# Patient Record
Sex: MALE | Race: BLACK OR AFRICAN AMERICAN | NOT HISPANIC OR LATINO | URBAN - METROPOLITAN AREA
[De-identification: names, ages, dates, MRNs, and addresses within clinical notes are randomized per-mention and may not be internally consistent; named-entity substitution may affect disease eponyms.]

---

## 2018-11-18 ENCOUNTER — INPATIENT (INPATIENT)
Facility: HOSPITAL | Age: 78
LOS: 4 days | Discharge: NURSING, REHAB OR LONG-TERM CARE | DRG: 244 | End: 2018-11-23
Attending: THORACIC SURGERY (CARDIOTHORACIC VASCULAR SURGERY) | Admitting: THORACIC SURGERY (CARDIOTHORACIC VASCULAR SURGERY)
Payer: MEDICARE

## 2018-11-18 VITALS
OXYGEN SATURATION: 96 % | HEART RATE: 78 BPM | DIASTOLIC BLOOD PRESSURE: 108 MMHG | WEIGHT: 167.55 LBS | RESPIRATION RATE: 19 BRPM | SYSTOLIC BLOOD PRESSURE: 188 MMHG

## 2018-11-18 LAB
ALBUMIN SERPL ELPH-MCNC: 3.9 G/DL — SIGNIFICANT CHANGE UP (ref 3.3–5)
ALP SERPL-CCNC: SIGNIFICANT CHANGE UP U/L (ref 40–120)
ALT FLD-CCNC: SIGNIFICANT CHANGE UP U/L (ref 10–45)
ANION GAP SERPL CALC-SCNC: 13 MMOL/L — SIGNIFICANT CHANGE UP (ref 5–17)
APPEARANCE UR: ABNORMAL
APTT BLD: 33.2 SEC — SIGNIFICANT CHANGE UP (ref 27.5–36.3)
AST SERPL-CCNC: SIGNIFICANT CHANGE UP U/L (ref 10–40)
BASOPHILS NFR BLD AUTO: 0.2 % — SIGNIFICANT CHANGE UP (ref 0–2)
BILIRUB SERPL-MCNC: 1.5 MG/DL — HIGH (ref 0.2–1.2)
BILIRUB UR-MCNC: NEGATIVE — SIGNIFICANT CHANGE UP
BLD GP AB SCN SERPL QL: NEGATIVE — SIGNIFICANT CHANGE UP
BUN SERPL-MCNC: 15 MG/DL — SIGNIFICANT CHANGE UP (ref 7–23)
CALCIUM SERPL-MCNC: 9.8 MG/DL — SIGNIFICANT CHANGE UP (ref 8.4–10.5)
CHLORIDE SERPL-SCNC: 102 MMOL/L — SIGNIFICANT CHANGE UP (ref 96–108)
CK MB CFR SERPL CALC: 3.6 NG/ML — SIGNIFICANT CHANGE UP (ref 0–6.7)
CK MB CFR SERPL CALC: 4.8 NG/ML — SIGNIFICANT CHANGE UP (ref 0–6.7)
CK SERPL-CCNC: SIGNIFICANT CHANGE UP U/L (ref 30–200)
CK SERPL-CCNC: SIGNIFICANT CHANGE UP U/L (ref 30–200)
CO2 SERPL-SCNC: 24 MMOL/L — SIGNIFICANT CHANGE UP (ref 22–31)
COLOR SPEC: YELLOW — SIGNIFICANT CHANGE UP
CREAT SERPL-MCNC: 1.55 MG/DL — HIGH (ref 0.5–1.3)
DIFF PNL FLD: ABNORMAL
EOSINOPHIL NFR BLD AUTO: 0.2 % — SIGNIFICANT CHANGE UP (ref 0–6)
GLUCOSE BLDC GLUCOMTR-MCNC: 88 MG/DL — SIGNIFICANT CHANGE UP (ref 70–99)
GLUCOSE SERPL-MCNC: 125 MG/DL — HIGH (ref 70–99)
GLUCOSE UR QL: NEGATIVE — SIGNIFICANT CHANGE UP
HBA1C BLD-MCNC: 5.3 % — SIGNIFICANT CHANGE UP (ref 4–5.6)
HCT VFR BLD CALC: 42 % — SIGNIFICANT CHANGE UP (ref 39–50)
HGB BLD-MCNC: 13.7 G/DL — SIGNIFICANT CHANGE UP (ref 13–17)
INR BLD: 1.21 — HIGH (ref 0.88–1.16)
KETONES UR-MCNC: NEGATIVE — SIGNIFICANT CHANGE UP
LEUKOCYTE ESTERASE UR-ACNC: NEGATIVE — SIGNIFICANT CHANGE UP
LYMPHOCYTES # BLD AUTO: 10.9 % — LOW (ref 13–44)
MCHC RBC-ENTMCNC: 30.8 PG — SIGNIFICANT CHANGE UP (ref 27–34)
MCHC RBC-ENTMCNC: 32.6 G/DL — SIGNIFICANT CHANGE UP (ref 32–36)
MCV RBC AUTO: 94.4 FL — SIGNIFICANT CHANGE UP (ref 80–100)
MONOCYTES NFR BLD AUTO: 6.9 % — SIGNIFICANT CHANGE UP (ref 2–14)
NEUTROPHILS NFR BLD AUTO: 81.8 % — HIGH (ref 43–77)
NITRITE UR-MCNC: NEGATIVE — SIGNIFICANT CHANGE UP
NT-PROBNP SERPL-SCNC: 596 PG/ML — HIGH (ref 0–300)
PH UR: 6.5 — SIGNIFICANT CHANGE UP (ref 5–8)
PLATELET # BLD AUTO: 172 K/UL — SIGNIFICANT CHANGE UP (ref 150–400)
POTASSIUM SERPL-MCNC: SIGNIFICANT CHANGE UP MMOL/L (ref 3.5–5.3)
POTASSIUM SERPL-SCNC: SIGNIFICANT CHANGE UP MMOL/L (ref 3.5–5.3)
PROT SERPL-MCNC: 7.5 G/DL — SIGNIFICANT CHANGE UP (ref 6–8.3)
PROT UR-MCNC: 100 MG/DL
PROTHROM AB SERPL-ACNC: 13.8 SEC — HIGH (ref 10–12.9)
RBC # BLD: 4.45 M/UL — SIGNIFICANT CHANGE UP (ref 4.2–5.8)
RBC # FLD: 13.4 % — SIGNIFICANT CHANGE UP (ref 10.3–16.9)
RH IG SCN BLD-IMP: POSITIVE — SIGNIFICANT CHANGE UP
RH IG SCN BLD-IMP: POSITIVE — SIGNIFICANT CHANGE UP
SODIUM SERPL-SCNC: 139 MMOL/L — SIGNIFICANT CHANGE UP (ref 135–145)
SP GR SPEC: 1.01 — SIGNIFICANT CHANGE UP (ref 1–1.03)
TROPONIN T SERPL-MCNC: <0.01 NG/ML — SIGNIFICANT CHANGE UP (ref 0–0.01)
TROPONIN T SERPL-MCNC: <0.01 NG/ML — SIGNIFICANT CHANGE UP (ref 0–0.01)
TSH SERPL-MCNC: 0.56 UIU/ML — SIGNIFICANT CHANGE UP (ref 0.35–4.94)
UROBILINOGEN FLD QL: 0.2 E.U./DL — SIGNIFICANT CHANGE UP
WBC # BLD: 13.2 K/UL — HIGH (ref 3.8–10.5)
WBC # FLD AUTO: 13.2 K/UL — HIGH (ref 3.8–10.5)

## 2018-11-18 PROCEDURE — 71045 X-RAY EXAM CHEST 1 VIEW: CPT | Mod: 26

## 2018-11-18 PROCEDURE — 99222 1ST HOSP IP/OBS MODERATE 55: CPT

## 2018-11-18 PROCEDURE — 93010 ELECTROCARDIOGRAM REPORT: CPT

## 2018-11-18 PROCEDURE — 99233 SBSQ HOSP IP/OBS HIGH 50: CPT

## 2018-11-18 RX ORDER — HEPARIN SODIUM 5000 [USP'U]/ML
5000 INJECTION INTRAVENOUS; SUBCUTANEOUS EVERY 8 HOURS
Qty: 0 | Refills: 0 | Status: DISCONTINUED | OUTPATIENT
Start: 2018-11-18 | End: 2018-11-19

## 2018-11-18 RX ORDER — SODIUM CHLORIDE 9 MG/ML
1000 INJECTION, SOLUTION INTRAVENOUS
Qty: 0 | Refills: 0 | Status: DISCONTINUED | OUTPATIENT
Start: 2018-11-18 | End: 2018-11-23

## 2018-11-18 RX ORDER — DEXTROSE 50 % IN WATER 50 %
25 SYRINGE (ML) INTRAVENOUS ONCE
Qty: 0 | Refills: 0 | Status: DISCONTINUED | OUTPATIENT
Start: 2018-11-18 | End: 2018-11-23

## 2018-11-18 RX ORDER — LABETALOL HCL 100 MG
10 TABLET ORAL ONCE
Qty: 0 | Refills: 0 | Status: COMPLETED | OUTPATIENT
Start: 2018-11-18 | End: 2018-11-18

## 2018-11-18 RX ORDER — DEXTROSE 50 % IN WATER 50 %
12.5 SYRINGE (ML) INTRAVENOUS ONCE
Qty: 0 | Refills: 0 | Status: DISCONTINUED | OUTPATIENT
Start: 2018-11-18 | End: 2018-11-23

## 2018-11-18 RX ORDER — INSULIN LISPRO 100/ML
VIAL (ML) SUBCUTANEOUS
Qty: 0 | Refills: 0 | Status: DISCONTINUED | OUTPATIENT
Start: 2018-11-18 | End: 2018-11-23

## 2018-11-18 RX ORDER — DEXTROSE 50 % IN WATER 50 %
15 SYRINGE (ML) INTRAVENOUS ONCE
Qty: 0 | Refills: 0 | Status: DISCONTINUED | OUTPATIENT
Start: 2018-11-18 | End: 2018-11-23

## 2018-11-18 RX ORDER — PANTOPRAZOLE SODIUM 20 MG/1
40 TABLET, DELAYED RELEASE ORAL
Qty: 0 | Refills: 0 | Status: DISCONTINUED | OUTPATIENT
Start: 2018-11-18 | End: 2018-11-23

## 2018-11-18 RX ORDER — GLUCAGON INJECTION, SOLUTION 0.5 MG/.1ML
1 INJECTION, SOLUTION SUBCUTANEOUS ONCE
Qty: 0 | Refills: 0 | Status: DISCONTINUED | OUTPATIENT
Start: 2018-11-18 | End: 2018-11-23

## 2018-11-18 RX ORDER — SODIUM CHLORIDE 9 MG/ML
3 INJECTION INTRAMUSCULAR; INTRAVENOUS; SUBCUTANEOUS EVERY 8 HOURS
Qty: 0 | Refills: 0 | Status: DISCONTINUED | OUTPATIENT
Start: 2018-11-18 | End: 2018-11-21

## 2018-11-18 RX ORDER — AMLODIPINE BESYLATE 2.5 MG/1
10 TABLET ORAL DAILY
Qty: 0 | Refills: 0 | Status: DISCONTINUED | OUTPATIENT
Start: 2018-11-18 | End: 2018-11-22

## 2018-11-18 RX ADMIN — Medication 10 MILLIGRAM(S): at 14:23

## 2018-11-18 RX ADMIN — AMLODIPINE BESYLATE 10 MILLIGRAM(S): 2.5 TABLET ORAL at 14:23

## 2018-11-18 RX ADMIN — HEPARIN SODIUM 5000 UNIT(S): 5000 INJECTION INTRAVENOUS; SUBCUTANEOUS at 22:51

## 2018-11-18 RX ADMIN — SODIUM CHLORIDE 3 MILLILITER(S): 9 INJECTION INTRAMUSCULAR; INTRAVENOUS; SUBCUTANEOUS at 14:19

## 2018-11-18 RX ADMIN — Medication 10 MILLIGRAM(S): at 14:20

## 2018-11-18 RX ADMIN — SODIUM CHLORIDE 3 MILLILITER(S): 9 INJECTION INTRAMUSCULAR; INTRAVENOUS; SUBCUTANEOUS at 22:44

## 2018-11-18 NOTE — PATIENT PROFILE ADULT - VISION (WITH CORRECTIVE LENSES IF THE PATIENT USUALLY WEARS THEM):
Normal vision: sees adequately in most situations; can see medication labels, newsprint
requires assistance/unsteady

## 2018-11-18 NOTE — H&P ADULT - HISTORY OF PRESENT ILLNESS
78 year old male with PMHx significant for hypertension, who presented to the ED after having an unwitnessed syncopal episode. He was transferred from [facility] for management of Descending thoracic aortic aneurysm.      Pt reports he was in his usual state of health until early this morning when he felt dizzy getting back into bed. He had a syncopal episode per report (although patient states he did no lose consciousness), and was found down by his wife.  EMS was called and patient was found to be bradycardic when EMS arrived, given Atropine X1.  Workup in the ED showed nl labs with exception of elevated Cr.  CXR concerning for widened mediastinum which prompted CTA of chest.  CT head to eval for CVA. CTA showed fusiform aneurysmal dilation of descending aorta measuring 5.5cm with what looks like extensive mural thrombus, soft plaque vs thrombosed false lumen.  CT head reviewed no acute process noted.    Per conversation with patient, he states he had a similar episode approximately 4-5 years ago, however states he has never been told he has an aortic aneurysm. Patient states he is an ex-smoker (smoked for 10 years), however quit 12 years ago. He denies any alcohol or illicit drug use. 78 year old male with PMHx significant for hypertension, who presented to the ED after having an unwitnessed syncopal episode. He was transferred from [facility] for management of Descending thoracic aortic aneurysm.      Pt reports he was in his usual state of health until early this morning when he felt dizzy getting back into bed. He had a syncopal episode per report (although patient states he did no lose consciousness), and was found down by his wife.  EMS was called and patient was found to be bradycardic when EMS arrived, given Atropine X1.  Workup in the ED showed nl labs with exception of elevated Cr.  CXR concerning for widened mediastinum which prompted CTA of chest.  CT head to eval for CVA. CTA showed fusiform aneurysmal dilation of descending aorta measuring 5.5cm with what looks like extensive mural thrombus, soft plaque vs thrombosed false lumen.  CT head reviewed no acute process noted.    Per conversation with patient, he states he had a similar episode approximately 4-5 years ago, however states he has never been told he has an aortic aneurysm. Patient states he is an ex-smoker (smoked for 10 years), however quit 12 years ago. He denies any alcohol or illicit drug use. 78 year old male with PMHx significant for hypertension, who presented to the ED after having an unwitnessed syncopal episode. He was transferred from [facility] for management of Descending thoracic aortic aneurysm.      Pt reports he was in his usual state of health until early this morning when he felt dizzy getting back into bed. He had a syncopal episode per report (although patient states he did no lose consciousness), and was found down by his wife.  EMS was called and patient was found to be bradycardic when EMS arrived, given Atropine X1.  Workup in the [facility] ED showed nl labs with exception of elevated Cr.  CXR concerning for widened mediastinum which prompted CTA of chest.  CT head to eval for CVA. CTA showed fusiform aneurysmal dilation of descending aorta measuring 5.5cm with what looks like extensive mural thrombus, soft plaque vs thrombosed false lumen.  CT head reviewed no acute process noted. He was started on and Esmolol drip and transferred to [facility].     Per conversation with patient, he states he had a similar episode approximately 4-5 years ago, however states he has never been told he has an aortic aneurysm. Patient states he is an ex-smoker (smoked for 10 years), however quit 12 years ago. He denies any alcohol or illicit drug use. 78 year old male with PMHx significant for hypertension, who presented to the ED after having an unwitnessed syncopal episode. He was transferred from [facility] for management of Descending thoracic aortic aneurysm.      Pt reports he was in his usual state of health until early this morning when he felt dizzy getting back into bed. He had a syncopal episode per report (although patient states he did no lose consciousness), and was found down by his wife.  EMS was called and patient was found to be bradycardic when EMS arrived, given Atropine X1.  Workup in the [facility] ED showed nl labs with exception of elevated Cr.  CXR concerning for widened mediastinum which prompted CTA of chest.  CT head to eval for CVA. CTA showed fusiform aneurysmal dilation of descending aorta measuring 5.5cm with what looks like extensive mural thrombus, soft plaque vs thrombosed false lumen.  CT head reviewed no acute process noted. He was started on and Esmolol drip and transferred to [facility] under the care of Dr. Garcia.     Per conversation with patient, he states he had a similar episode approximately 4-5 years ago, however states he has never been told he has an aortic aneurysm. He denies chest pains, palpitations, shortness of breath, orthopnea, paroxysmal nocturnal dyspnea, or lower extremity edema.

## 2018-11-18 NOTE — H&P ADULT - NSHPLABSRESULTS_GEN_ALL_CORE
78 year old male with PMHx significant for hypertension, who presented to the ED after having an unwitnessed syncopal episode. He was transferred from [facility] for management of Descending thoracic aortic aneurysm.  Pt reports he was in his usual state of health until early this morning when he felt dizzy getting back into bed. He had a syncopal episode per report (although patient states he did no lose consciousness), and was found down by his wife.  EMS was called and patient was found to be bradycardic when EMS arrived, given Atropine X1.  Workup in the [facility] ED showed nl labs with exception of elevated Cr.  CXR concerning for widened mediastinum which prompted CTA of chest.  CT head to eval for CVA. CTA showed fusiform aneurysmal dilation of descending aorta measuring 5.5cm with what looks like extensive mural thrombus, soft plaque vs thrombosed false lumen.  CT head reviewed no acute process noted.    Neuro:   - No pain issues    Cardiac:  -Sinus Jeffrey, SBP 150s. Labetolol given x 1. Norvasc 10 mg daily.   -EP consult needed to evaluate jeffrey cardia, syncope.   -TTE tomorrow.   -Trending Cardiac enzymes, negative.     Vasc:  -Carotid ultrasound pending as part of preop work up    Pulm:   -On RA, no issues.   -PFTs PENDING.     GI:  -PO diet RISS.     Heme:  -Heparin SQ for DVT ppx.     Dispo:  -Dr. Garcia to evaluate for surgical correction of Desc Ao Aneurysm.

## 2018-11-18 NOTE — H&P ADULT - NSHPSOCIALHISTORY_GEN_ALL_CORE
Patient states he is an ex-smoker (smoked for 10 years), however quit 12 years ago. He denies any alcohol or illicit drug use.   Lives at home with his wife and is independent in activities of daily living.

## 2018-11-18 NOTE — PROGRESS NOTE ADULT - SUBJECTIVE AND OBJECTIVE BOX
77yo transferred from [facility] for management of Descending thoracic aortic aneurysm.      Pt reports he had a syncopal episode early AM and was found down by his wife.  EMS was called, per report pt was bradycardic when EMS arrived, given Atropine X1.  Workup in the ED showed nl labs with exception of elevated Cr.  CXR concerning for widened mediastinum which prompted CTA of chest.  CT head to eval for CVA    CTA showed fusiform aneurysmal dilation of descending aorta measuring 5.5cm with what looks like extensive mural thrombus, soft plaque vs thrombosed false lumen.    CT head reviewed no acute process noted. 77yo transferred from [facility] for management of Descending thoracic aortic aneurysm.      Pt reports he he felt dizzy, vomitted and slid to the floor.  His wife found him on the floor and called EMS,  per report pt was bradycardic when EMS arrived, given Atropine X1.  Workup in the ED showed nl labs with exception of elevated Cr.  CXR concerning for widened mediastinum which prompted CTA of chest.  CT head to eval for CVA    CTA showed fusiform aneurysmal dilation of descending aorta measuring 5.5cm with what looks like extensive mural thrombus, soft plaque vs thrombosed false lumen.    CT head reviewed no acute process noted.     Pt transferred to [facility] for further evaluation of aortic aneurysm     On arrival here, he was non-toxic appearing in no acute distress.  Denies CP, SOB, nausea.  Denies weakness but per wife pt has chronic RLE weakness which limits his mobility.     VS showed NSR 60s, Hypertension with SBP 180s. Pt arrived on esmolol drip which was stopped and given IV push of labetolol and home dose of norvasc.       PMH :  ANEURYSM  Hypertension, unspecified type    ROS no complaints, states he feels normal   All other systems reviewed and negative.    ICU Vital Signs Last 24 Hrs  T(C): 36.6 (11-18-18 @ 17:01), Max: 37.1 (11-18-18 @ 15:00)  T(F): 97.8 (11-18-18 @ 17:01), Max: 98.8 (11-18-18 @ 15:00)  HR: 62 (11-18-18 @ 17:01) (56 - 78)  BP: 128/95 (11-18-18 @ 17:01) (128/95 - 188/127)  BP(mean): 108 (11-18-18 @ 17:01) (108 - 148)  RR: 18 (11-18-18 @ 17:01) (12 - 19)  SpO2: 98% (11-18-18 @ 17:01) (94% - 98%)    I&O's Summary    18 Nov 2018 07:01  -  18 Nov 2018 17:33  --------------------------------------------------------  IN: 120 mL / OUT: 250 mL / NET: -130 mL                 13.7   13.2  )-----------( 172      ( 18 Nov 2018 14:14 )             42.0     18 Nov 2018 14:14    139    |  102    |  15     ----------------------------<  125    see note   |  24     |  1.55     Ca    9.8        18 Nov 2018 14:14    TPro  7.5    /  Alb  3.9    /  TBili  1.5    /  DBili  x      /  AST  see note  /  ALT  see note  /  AlkPhos  see note  18 Nov 2018 14:14    PT/INR - ( 18 Nov 2018 14:14 )   PT: 13.8 sec;   INR: 1.21     PTT - ( 18 Nov 2018 14:14 )  PTT:33.2 sec    MEDICATIONS  (STANDING):  amLODIPine   Tablet 10 milliGRAM(s) Oral daily  heparin  Injectable 5000 Unit(s) SubCutaneous every 8 hours  insulin lispro (HumaLOG) corrective regimen sliding scale   SubCutaneous Before meals and at bedtime      Home Medications:  amLODIPine 5 mg oral tablet: 1 tab(s) orally once a day (18 Nov 2018 16:29)  losartan:  (18 Nov 2018 16:29)    PHYSICAL EXAM:  Gen : no acute distress  Neck: No LAD, No JVD  Respiratory: decreased in the bases  Cardiovascular: S1 and S2, RRR, no M/G/R  Gastrointestinal: BS+, soft, NT/ND  Extremities: No peripheral edema  Vascular: 2+ peripheral pulses  Neurological: A/O x 3, no focal deficits

## 2018-11-18 NOTE — H&P ADULT - NSHPREVIEWOFSYSTEMS_GEN_ALL_CORE
CONSTITUTIONAL:  Denies Fevers / chills, sweats, fatigue, weight loss, weight gain                                      NEURO:  Denies parathesias, seizures, confusion                                                                                EYES:  Denies Blurry vision, discharge, pain, loss of vision                                                                                    ENMT:  Denies Difficulty hearing, vertigo, dysphagia, epistaxis, recent dental work                                       CV:  per HPI                                                                                      RESPIRATORY:  Denies Wheezing, SOB, cough / sputum, hemoptysis                                                                GI:  Denies Nausea, vomiting, diarrhea, constipation, melena, difficulty swallowing                                               : Denies Hematuria, dysuria, urgency, incontinence                                                                                         MUSCULOSKELETAL:  Denies arthritis, joint swelling, muscle weakness                                                             SKIN/BREAST:  Denies rash, itching, masses                                                                                            PSYCH:  Denies depression, anxiety, suicidal ideation                                                                                               HEME/LYMPH:  Denies bruises easily, enlarged lymph nodes, tender lymph nodes                                        ENDOCRINE:  Denies cold intolerance, heat intolerance, polydipsia

## 2018-11-18 NOTE — PROGRESS NOTE ADULT - ASSESSMENT
Problems  1. Syncope/bradycardia   2. descending and and abdominal aortic aneurysm with extensive mural thormbus  3. Hypertension   4. Acute vs chronic CKD   Plan   -- needs workup for syncope, continue to rule out ACS, Echo in AM, EP eval  -- hypertension management  -- CKD - hydrate for CINDY   -- CTA reviewed severely diseased aorta currently asymptomatic, either thrombosed dissection or mural hematoma along descending and abd Aorta.  Dr Garcia will review imaging and discuss surgical options for definitive management and evaluate pt for surgical candidacy.   Discussed and reviewed image result and plan with patient and wife at length using  service.     Critical care time spent 40 min

## 2018-11-18 NOTE — H&P ADULT - NSHPPHYSICALEXAM_GEN_ALL_CORE
Pt will be coming in for HFU appt on 7/26/17. Pt called Dr. Ramón rAce office regarding constipation and urinary issues-- pt was advised to continue to monitor and go to ER with any worsening sx. Constitutional: Lying comfortably, no acute distress    Eyes: PERRL, EOMI    ENMT: Hearing grossly intact, oropharynx benign    Neck: supple, no JVD    Back: symmetric, no CVA tenderness    Respiratory: CTABL    Cardiovascular: RRR, no m/r/g    Gastrointestinal: + BS, soft, non tender    Extremities: warm, no edema    Vascular: 2+ Carotid, radial, femoral, DP pulses bilaterally. PT 1+ pulses bilaterally.     Neurological: A&O x 3, non focal    Skin: no lesions or rashes    Musculoskeletal: 5/5 strength and equal in bilateral upper and lower extremities

## 2018-11-19 DIAGNOSIS — R00.1 BRADYCARDIA, UNSPECIFIED: ICD-10-CM

## 2018-11-19 LAB
ALBUMIN SERPL ELPH-MCNC: 3.7 G/DL — SIGNIFICANT CHANGE UP (ref 3.3–5)
ALBUMIN SERPL ELPH-MCNC: 4 G/DL — SIGNIFICANT CHANGE UP (ref 3.3–5)
ALBUMIN SERPL ELPH-MCNC: 4 G/DL — SIGNIFICANT CHANGE UP (ref 3.3–5)
ALP SERPL-CCNC: 71 U/L — SIGNIFICANT CHANGE UP (ref 40–120)
ALP SERPL-CCNC: 75 U/L — SIGNIFICANT CHANGE UP (ref 40–120)
ALP SERPL-CCNC: 76 U/L — SIGNIFICANT CHANGE UP (ref 40–120)
ALT FLD-CCNC: 11 U/L — SIGNIFICANT CHANGE UP (ref 10–45)
ALT FLD-CCNC: 11 U/L — SIGNIFICANT CHANGE UP (ref 10–45)
ALT FLD-CCNC: 13 U/L — SIGNIFICANT CHANGE UP (ref 10–45)
ANION GAP SERPL CALC-SCNC: 10 MMOL/L — SIGNIFICANT CHANGE UP (ref 5–17)
ANION GAP SERPL CALC-SCNC: 10 MMOL/L — SIGNIFICANT CHANGE UP (ref 5–17)
ANION GAP SERPL CALC-SCNC: 14 MMOL/L — SIGNIFICANT CHANGE UP (ref 5–17)
APTT BLD: 28 SEC — SIGNIFICANT CHANGE UP (ref 27.5–36.3)
APTT BLD: 31.9 SEC — SIGNIFICANT CHANGE UP (ref 27.5–36.3)
APTT BLD: 32.9 SEC — SIGNIFICANT CHANGE UP (ref 27.5–36.3)
AST SERPL-CCNC: 23 U/L — SIGNIFICANT CHANGE UP (ref 10–40)
AST SERPL-CCNC: 27 U/L — SIGNIFICANT CHANGE UP (ref 10–40)
AST SERPL-CCNC: 27 U/L — SIGNIFICANT CHANGE UP (ref 10–40)
BILIRUB SERPL-MCNC: 1.2 MG/DL — SIGNIFICANT CHANGE UP (ref 0.2–1.2)
BILIRUB SERPL-MCNC: 1.3 MG/DL — HIGH (ref 0.2–1.2)
BILIRUB SERPL-MCNC: 1.3 MG/DL — HIGH (ref 0.2–1.2)
BUN SERPL-MCNC: 20 MG/DL — SIGNIFICANT CHANGE UP (ref 7–23)
BUN SERPL-MCNC: 21 MG/DL — SIGNIFICANT CHANGE UP (ref 7–23)
BUN SERPL-MCNC: 21 MG/DL — SIGNIFICANT CHANGE UP (ref 7–23)
CALCIUM SERPL-MCNC: 10 MG/DL — SIGNIFICANT CHANGE UP (ref 8.4–10.5)
CALCIUM SERPL-MCNC: 9.3 MG/DL — SIGNIFICANT CHANGE UP (ref 8.4–10.5)
CALCIUM SERPL-MCNC: 9.7 MG/DL — SIGNIFICANT CHANGE UP (ref 8.4–10.5)
CHLORIDE SERPL-SCNC: 103 MMOL/L — SIGNIFICANT CHANGE UP (ref 96–108)
CHLORIDE SERPL-SCNC: 106 MMOL/L — SIGNIFICANT CHANGE UP (ref 96–108)
CHLORIDE SERPL-SCNC: 107 MMOL/L — SIGNIFICANT CHANGE UP (ref 96–108)
CHOLEST SERPL-MCNC: 151 MG/DL — SIGNIFICANT CHANGE UP (ref 10–199)
CK SERPL-CCNC: 435 U/L — HIGH (ref 30–200)
CK SERPL-CCNC: 443 U/L — HIGH (ref 30–200)
CO2 SERPL-SCNC: 23 MMOL/L — SIGNIFICANT CHANGE UP (ref 22–31)
CO2 SERPL-SCNC: 26 MMOL/L — SIGNIFICANT CHANGE UP (ref 22–31)
CO2 SERPL-SCNC: 26 MMOL/L — SIGNIFICANT CHANGE UP (ref 22–31)
CREAT SERPL-MCNC: 2.19 MG/DL — HIGH (ref 0.5–1.3)
CREAT SERPL-MCNC: 2.25 MG/DL — HIGH (ref 0.5–1.3)
CREAT SERPL-MCNC: 2.34 MG/DL — HIGH (ref 0.5–1.3)
GLUCOSE BLDC GLUCOMTR-MCNC: 104 MG/DL — HIGH (ref 70–99)
GLUCOSE BLDC GLUCOMTR-MCNC: 109 MG/DL — HIGH (ref 70–99)
GLUCOSE BLDC GLUCOMTR-MCNC: 113 MG/DL — HIGH (ref 70–99)
GLUCOSE BLDC GLUCOMTR-MCNC: 137 MG/DL — HIGH (ref 70–99)
GLUCOSE SERPL-MCNC: 103 MG/DL — HIGH (ref 70–99)
GLUCOSE SERPL-MCNC: 115 MG/DL — HIGH (ref 70–99)
GLUCOSE SERPL-MCNC: 131 MG/DL — HIGH (ref 70–99)
HCT VFR BLD CALC: 40.2 % — SIGNIFICANT CHANGE UP (ref 39–50)
HCT VFR BLD CALC: 41.9 % — SIGNIFICANT CHANGE UP (ref 39–50)
HCT VFR BLD CALC: 41.9 % — SIGNIFICANT CHANGE UP (ref 39–50)
HDLC SERPL-MCNC: 51 MG/DL — SIGNIFICANT CHANGE UP
HGB BLD-MCNC: 13.2 G/DL — SIGNIFICANT CHANGE UP (ref 13–17)
HGB BLD-MCNC: 13.7 G/DL — SIGNIFICANT CHANGE UP (ref 13–17)
HGB BLD-MCNC: 13.9 G/DL — SIGNIFICANT CHANGE UP (ref 13–17)
INR BLD: 1.15 — SIGNIFICANT CHANGE UP (ref 0.88–1.16)
INR BLD: 1.17 — HIGH (ref 0.88–1.16)
INR BLD: 1.2 — HIGH (ref 0.88–1.16)
LACTATE SERPL-SCNC: 1 MMOL/L — SIGNIFICANT CHANGE UP (ref 0.5–2)
LACTATE SERPL-SCNC: 2.2 MMOL/L — HIGH (ref 0.5–2)
LIPID PNL WITH DIRECT LDL SERPL: 75 MG/DL — SIGNIFICANT CHANGE UP
MAGNESIUM SERPL-MCNC: 2 MG/DL — SIGNIFICANT CHANGE UP (ref 1.6–2.6)
MAGNESIUM SERPL-MCNC: 2.1 MG/DL — SIGNIFICANT CHANGE UP (ref 1.6–2.6)
MAGNESIUM SERPL-MCNC: 2.2 MG/DL — SIGNIFICANT CHANGE UP (ref 1.6–2.6)
MCHC RBC-ENTMCNC: 30.6 PG — SIGNIFICANT CHANGE UP (ref 27–34)
MCHC RBC-ENTMCNC: 30.6 PG — SIGNIFICANT CHANGE UP (ref 27–34)
MCHC RBC-ENTMCNC: 31.4 PG — SIGNIFICANT CHANGE UP (ref 27–34)
MCHC RBC-ENTMCNC: 32.7 G/DL — SIGNIFICANT CHANGE UP (ref 32–36)
MCHC RBC-ENTMCNC: 32.8 G/DL — SIGNIFICANT CHANGE UP (ref 32–36)
MCHC RBC-ENTMCNC: 33.2 G/DL — SIGNIFICANT CHANGE UP (ref 32–36)
MCV RBC AUTO: 93.3 FL — SIGNIFICANT CHANGE UP (ref 80–100)
MCV RBC AUTO: 93.7 FL — SIGNIFICANT CHANGE UP (ref 80–100)
MCV RBC AUTO: 94.6 FL — SIGNIFICANT CHANGE UP (ref 80–100)
PHOSPHATE SERPL-MCNC: 2.3 MG/DL — LOW (ref 2.5–4.5)
PHOSPHATE SERPL-MCNC: 2.7 MG/DL — SIGNIFICANT CHANGE UP (ref 2.5–4.5)
PHOSPHATE SERPL-MCNC: 3.4 MG/DL — SIGNIFICANT CHANGE UP (ref 2.5–4.5)
PLATELET # BLD AUTO: 151 K/UL — SIGNIFICANT CHANGE UP (ref 150–400)
PLATELET # BLD AUTO: 151 K/UL — SIGNIFICANT CHANGE UP (ref 150–400)
PLATELET # BLD AUTO: 158 K/UL — SIGNIFICANT CHANGE UP (ref 150–400)
POTASSIUM SERPL-MCNC: 3.7 MMOL/L — SIGNIFICANT CHANGE UP (ref 3.5–5.3)
POTASSIUM SERPL-MCNC: 4.2 MMOL/L — SIGNIFICANT CHANGE UP (ref 3.5–5.3)
POTASSIUM SERPL-MCNC: 4.4 MMOL/L — SIGNIFICANT CHANGE UP (ref 3.5–5.3)
POTASSIUM SERPL-SCNC: 3.7 MMOL/L — SIGNIFICANT CHANGE UP (ref 3.5–5.3)
POTASSIUM SERPL-SCNC: 4.2 MMOL/L — SIGNIFICANT CHANGE UP (ref 3.5–5.3)
POTASSIUM SERPL-SCNC: 4.4 MMOL/L — SIGNIFICANT CHANGE UP (ref 3.5–5.3)
PROT SERPL-MCNC: 6.7 G/DL — SIGNIFICANT CHANGE UP (ref 6–8.3)
PROT SERPL-MCNC: 6.9 G/DL — SIGNIFICANT CHANGE UP (ref 6–8.3)
PROT SERPL-MCNC: 7.1 G/DL — SIGNIFICANT CHANGE UP (ref 6–8.3)
PROTHROM AB SERPL-ACNC: 13 SEC — HIGH (ref 10–12.9)
PROTHROM AB SERPL-ACNC: 13.3 SEC — HIGH (ref 10–12.9)
PROTHROM AB SERPL-ACNC: 13.6 SEC — HIGH (ref 10–12.9)
RBC # BLD: 4.31 M/UL — SIGNIFICANT CHANGE UP (ref 4.2–5.8)
RBC # BLD: 4.43 M/UL — SIGNIFICANT CHANGE UP (ref 4.2–5.8)
RBC # BLD: 4.47 M/UL — SIGNIFICANT CHANGE UP (ref 4.2–5.8)
RBC # FLD: 13.2 % — SIGNIFICANT CHANGE UP (ref 10.3–16.9)
RBC # FLD: 13.3 % — SIGNIFICANT CHANGE UP (ref 10.3–16.9)
RBC # FLD: 13.4 % — SIGNIFICANT CHANGE UP (ref 10.3–16.9)
SODIUM SERPL-SCNC: 140 MMOL/L — SIGNIFICANT CHANGE UP (ref 135–145)
SODIUM SERPL-SCNC: 142 MMOL/L — SIGNIFICANT CHANGE UP (ref 135–145)
SODIUM SERPL-SCNC: 143 MMOL/L — SIGNIFICANT CHANGE UP (ref 135–145)
TOTAL CHOLESTEROL/HDL RATIO MEASUREMENT: 3 RATIO — LOW (ref 3.4–9.6)
TRIGL SERPL-MCNC: 124 MG/DL — SIGNIFICANT CHANGE UP (ref 10–149)
TROPONIN T SERPL-MCNC: <0.01 NG/ML — SIGNIFICANT CHANGE UP (ref 0–0.01)
WBC # BLD: 10 K/UL — SIGNIFICANT CHANGE UP (ref 3.8–10.5)
WBC # BLD: 11.1 K/UL — HIGH (ref 3.8–10.5)
WBC # BLD: 11.9 K/UL — HIGH (ref 3.8–10.5)
WBC # FLD AUTO: 10 K/UL — SIGNIFICANT CHANGE UP (ref 3.8–10.5)
WBC # FLD AUTO: 11.1 K/UL — HIGH (ref 3.8–10.5)
WBC # FLD AUTO: 11.9 K/UL — HIGH (ref 3.8–10.5)

## 2018-11-19 PROCEDURE — 94010 BREATHING CAPACITY TEST: CPT | Mod: 26

## 2018-11-19 PROCEDURE — 99291 CRITICAL CARE FIRST HOUR: CPT

## 2018-11-19 PROCEDURE — 99223 1ST HOSP IP/OBS HIGH 75: CPT

## 2018-11-19 PROCEDURE — 93880 EXTRACRANIAL BILAT STUDY: CPT | Mod: 26

## 2018-11-19 PROCEDURE — 99292 CRITICAL CARE ADDL 30 MIN: CPT

## 2018-11-19 PROCEDURE — 36620 INSERTION CATHETER ARTERY: CPT

## 2018-11-19 PROCEDURE — 93306 TTE W/DOPPLER COMPLETE: CPT | Mod: 26

## 2018-11-19 RX ORDER — SODIUM CHLORIDE 9 MG/ML
1000 INJECTION, SOLUTION INTRAVENOUS ONCE
Qty: 0 | Refills: 0 | Status: COMPLETED | OUTPATIENT
Start: 2018-11-19 | End: 2018-11-19

## 2018-11-19 RX ORDER — SODIUM CHLORIDE 9 MG/ML
1000 INJECTION, SOLUTION INTRAVENOUS ONCE
Qty: 0 | Refills: 0 | Status: DISCONTINUED | OUTPATIENT
Start: 2018-11-19 | End: 2018-11-21

## 2018-11-19 RX ORDER — SODIUM BICARBONATE 1 MEQ/ML
0.1 SYRINGE (ML) INTRAVENOUS
Qty: 150 | Refills: 0 | Status: DISCONTINUED | OUTPATIENT
Start: 2018-11-19 | End: 2018-11-19

## 2018-11-19 RX ORDER — SODIUM BICARBONATE 1 MEQ/ML
0.21 SYRINGE (ML) INTRAVENOUS
Qty: 150 | Refills: 0 | Status: DISCONTINUED | OUTPATIENT
Start: 2018-11-19 | End: 2018-11-20

## 2018-11-19 RX ORDER — LABETALOL HCL 100 MG
5 TABLET ORAL EVERY 6 HOURS
Qty: 0 | Refills: 0 | Status: DISCONTINUED | OUTPATIENT
Start: 2018-11-19 | End: 2018-11-19

## 2018-11-19 RX ORDER — LABETALOL HCL 100 MG
200 TABLET ORAL EVERY 6 HOURS
Qty: 0 | Refills: 0 | Status: DISCONTINUED | OUTPATIENT
Start: 2018-11-19 | End: 2018-11-20

## 2018-11-19 RX ORDER — LABETALOL HCL 100 MG
5 TABLET ORAL EVERY 4 HOURS
Qty: 0 | Refills: 0 | Status: DISCONTINUED | OUTPATIENT
Start: 2018-11-19 | End: 2018-11-20

## 2018-11-19 RX ADMIN — Medication 100 MEQ/KG/HR: at 15:32

## 2018-11-19 RX ADMIN — SODIUM CHLORIDE 1000 MILLILITER(S): 9 INJECTION, SOLUTION INTRAVENOUS at 13:39

## 2018-11-19 RX ADMIN — SODIUM CHLORIDE 3 MILLILITER(S): 9 INJECTION INTRAMUSCULAR; INTRAVENOUS; SUBCUTANEOUS at 14:23

## 2018-11-19 RX ADMIN — Medication 200 MILLIGRAM(S): at 11:45

## 2018-11-19 RX ADMIN — HEPARIN SODIUM 5000 UNIT(S): 5000 INJECTION INTRAVENOUS; SUBCUTANEOUS at 06:22

## 2018-11-19 RX ADMIN — Medication 200 MILLIGRAM(S): at 23:54

## 2018-11-19 RX ADMIN — AMLODIPINE BESYLATE 10 MILLIGRAM(S): 2.5 TABLET ORAL at 06:21

## 2018-11-19 RX ADMIN — Medication 200 MILLIGRAM(S): at 17:26

## 2018-11-19 RX ADMIN — Medication 5 MILLIGRAM(S): at 15:06

## 2018-11-19 RX ADMIN — Medication 100 MEQ/KG/HR: at 22:14

## 2018-11-19 RX ADMIN — PANTOPRAZOLE SODIUM 40 MILLIGRAM(S): 20 TABLET, DELAYED RELEASE ORAL at 06:21

## 2018-11-19 RX ADMIN — SODIUM CHLORIDE 3 MILLILITER(S): 9 INJECTION INTRAMUSCULAR; INTRAVENOUS; SUBCUTANEOUS at 05:54

## 2018-11-19 RX ADMIN — SODIUM CHLORIDE 3 MILLILITER(S): 9 INJECTION INTRAMUSCULAR; INTRAVENOUS; SUBCUTANEOUS at 22:10

## 2018-11-19 NOTE — PROGRESS NOTE ADULT - SUBJECTIVE AND OBJECTIVE BOX
CTICU  CRITICAL  CARE  attending     Hand off received 					   Pertinent clinical, laboratory, radiographic, hemodynamic, echocardiographic, respiratory data, microbiologic data and chart were reviewed and analyzed frequently throughout the course of the day and night  Patient seen and examined with CTS/ SH attending at bedside  Pt is a 78y , Male, HEALTH ISSUES - PROBLEM Dx:      , FAMILY HISTORY:  No pertinent family history in first degree relatives  PAST MEDICAL & SURGICAL HISTORY:  Hypertension, unspecified type  No significant past surgical history    Patient is a 78y old  Male who presents with a chief complaint of syncope/ fusiform aneurysmal dilation of descending aorta (2018 15:05)      14 system review was unremarkable  acute changes include acute respiratory failure  Vital signs, hemodynamic and respiratory parameters were reviewed from the bedside nursing flowsheet.  ICU Vital Signs Last 24 Hrs  T(C): 36.5 (2018 05:00), Max: 37.1 (2018 15:00)  T(F): 97.7 (2018 05:00), Max: 98.8 (2018 15:00)  HR: 60 (2018 08:00) (55 - 78)  BP: 153/97 (2018 08:00) (120/80 - 188/127)  BP(mean): 116 (2018 08:00) (88 - 148)  ABP: --  ABP(mean): --  RR: 14 (2018 08:00) (12 - 19)  SpO2: 94% (2018 04:00) (92% - 99%)    Adult Advanced Hemodynamics Last 24 Hrs  CVP(mm Hg): --  CVP(cm H2O): --  CO: --  CI: --  PA: --  PA(mean): --  PCWP: --  SVR: --  SVRI: --  PVR: --  PVRI: --,     Intake and output was reviewed and the fluid balance was calculated  Daily     Daily Weight in k (2018 13:50)  I&O's Summary    2018 07:01  -  2018 07:00  --------------------------------------------------------  IN: 220 mL / OUT: 800 mL / NET: -580 mL    2018 07:01  -  2018 09:06  --------------------------------------------------------  IN: 1000 mL / OUT: 0 mL / NET: 1000 mL        All lines and drain sites were assessed  [facility]      POCT Blood Glucose.: 109 mg/dL (2018 06:18)    No acute change in mental status  Auscultation of the chest reveals equal bs  Abdomen is soft  Extremities are warm and well perfused  Wounds appear clean and unremarkable  Antibiotics are periop    labs  CBC Full  -  ( 2018 03:11 )  WBC Count : 11.9 K/uL  Hemoglobin : 13.7 g/dL  Hematocrit : 41.9 %  Platelet Count - Automated : 151 K/uL  Mean Cell Volume : 93.7 fL  Mean Cell Hemoglobin : 30.6 pg  Mean Cell Hemoglobin Concentration : 32.7 g/dL  Auto Neutrophil # : x  Auto Lymphocyte # : x  Auto Monocyte # : x  Auto Eosinophil # : x  Auto Basophil # : x  Auto Neutrophil % : x  Auto Lymphocyte % : x  Auto Monocyte % : x  Auto Eosinophil % : x  Auto Basophil % : x    11-19    140  |  103  |  21  ----------------------------<  115<H>  4.4   |  23  |  2.25<H>    Ca    10.0      2018 03:11  Phos  3.4       Mg     2.1         TPro  7.1  /  Alb  4.0  /  TBili  1.3<H>  /  DBili  x   /  AST  27  /  ALT  11  /  AlkPhos  76  11-    PT/INR - ( 2018 03:11 )   PT: 13.6 sec;   INR: 1.20          PTT - ( 2018 03:11 )  PTT:31.9 sec  The current medications were reviewed   MEDICATIONS  (STANDING):  amLODIPine   Tablet 10 milliGRAM(s) Oral daily  dextrose 5%. 1000 milliLiter(s) (50 mL/Hr) IV Continuous <Continuous>  dextrose 50% Injectable 12.5 Gram(s) IV Push once  dextrose 50% Injectable 25 Gram(s) IV Push once  dextrose 50% Injectable 25 Gram(s) IV Push once  heparin  Injectable 5000 Unit(s) SubCutaneous every 8 hours  insulin lispro (HumaLOG) corrective regimen sliding scale   SubCutaneous Before meals and at bedtime  pantoprazole    Tablet 40 milliGRAM(s) Oral before breakfast  sodium bicarbonate  Infusion 0.103 mEq/kG/Hr (50 mL/Hr) IV Continuous <Continuous>  sodium chloride 0.9% lock flush 3 milliLiter(s) IV Push every 8 hours    MEDICATIONS  (PRN):  dextrose 40% Gel 15 Gram(s) Oral once PRN Blood Glucose LESS THAN 70 milliGRAM(s)/deciliter  glucagon  Injectable 1 milliGRAM(s) IntraMuscular once PRN Glucose LESS THAN 70 milligrams/deciliter       PROBLEM LIST/ ASSESSMENT:  HEALTH ISSUES - PROBLEM Dx:      ,   Patient is a 78y old  Male who presents with a chief complaint of syncope/ fusiform aneurysmal dilation of descending aorta (2018 15:05)        My plan includes :  close hemodynamic, ventilatory and drain monitoring and management per post op routine  fu renal chem  Monitor for arrhythmias and monitor parameters for organ perfusion  monitor neurologic status  Head of the bed should remain elevated to 45 deg .   chest PT and IS will be encouraged  monitor adequacy of oxygenation and ventilation and attempt to wean oxygen  Nutritional goals will be met using po eventually , ensure adequate caloric intake and montior the same  Stress ulcer and VTE prophylaxis will be achieved    Glycemic control is satisfactory  Electrolytes have been repleted as necessary and wound care has been carried out. Pain control has been achieved.   agressive physical therapy and early mobility and ambulation goals will be met   The family was updated about the course and plan  CRITICAL CARE TIME SPENT in evaluation and management, reassessments, review and interpretation of labs and x-rays, ventilator and hemodynamic management, formulating a plan and coordinating care: ___90____ MIN.  Time does not include procedural time.  CTICU ATTENDING     					    Harley Vazquez MD

## 2018-11-19 NOTE — CONSULT NOTE ADULT - SUBJECTIVE AND OBJECTIVE BOX
HISTORY OF PRESENT ILLNESS: 78 year old male with PMHx significant for hypertension, who presented to the ED after having an unwitnessed syncopal episode. He was transferred from [facility] for management of Descending thoracic aortic aneurysm.      Pt reports he was in his usual state of health until early Sunday morning when he felt dizzy getting back into bed. He had a syncopal episode per report (although patient states he did not lose consciousness), and was found down by his wife.  EMS was called and patient was found to be bradycardic, he was given Atropine X1 (strips not available).  Workup in the [facility] ED showed nl labs with exception of elevated Cr.  CXR concerning for widened mediastinum which prompted CTA of chest.  CT head to [facility] for CVA. CTA showed fusiform aneurysmal dilation of descending aorta measuring 5.5cm with what looks like extensive mural thrombus, soft plaque vs thrombosed false lumen.  CT head reviewed no acute process noted. He was started on Esmolol drip and transferred to [facility] under the care of Dr. Garcia.     Per conversation with patient, he states that he was sitting down and suddenly became dizzy. The dizziness subsided after lying down, however, his wife insisted he go to the hospital. He denies loc. He had a similar episode 5 years ago while brushing his teeth. He felt dizzy, sat on the toilet, dizziness persisted, he decided to lay down on the floor, then felt better. Again denies loc. He denies associated chest pains, palpitations, shortness of breath, orthopnea, paroxysmal nocturnal dyspnea, or lower extremity edema.     EP has been consulted for bradycardia to determine if PPM implant is appropriate.     PAST MEDICAL & SURGICAL HISTORY:  Hypertension, unspecified type  No significant past surgical history    Allergies  No Known Allergies    MEDICATIONS:  amLODIPine   Tablet 10 milliGRAM(s) Oral daily  labetalol 200 milliGRAM(s) Oral every 6 hours  labetalol Injectable 5 milliGRAM(s) IV Push every 4 hours PRN  pantoprazole  Tablet 40 milliGRAM(s) Oral before breakfast  glucagon  Injectable 1 milliGRAM(s) IntraMuscular once PRN  insulin lispro (HumaLOG) corrective regimen sliding scale   SubCutaneous Before meals and at bedtime  dextrose 5%. 1000 milliLiter(s) IV Continuous <Continuous>  sodium bicarbonate  Infusion 0.103 mEq/kG/Hr IV Continuous <Continuous>  sodium chloride 0.9% lock flush 3 milliLiter(s) IV Push every 8 hours    FAMILY HISTORY:  No pertinent family history in first degree relatives    SOCIAL HISTORY:    [X] Non-smoker  [ ] Smoker  [ ] Alcohol    REVIEW OF SYSTEMS:    CONSTITUTIONAL: No fever, weight loss, or fatigue  EYES: No eye pain, visual disturbances, or discharge  ENMT:  No difficulty hearing, tinnitus, vertigo; No sinus or throat pain  NECK: No pain or stiffness  BREASTS: No pain, masses, or nipple discharge  RESPIRATORY: No cough, wheezing, chills or hemoptysis; No Shortness of Breath  CARDIOVASCULAR: No chest pain, palpitations, dizziness, or leg swelling  GASTROINTESTINAL: No abdominal or epigastric pain. No nausea, vomiting, or hematemesis; No diarrhea or constipation. No melena or hematochezia.  GENITOURINARY: No dysuria, frequency, hematuria, or incontinence  NEUROLOGICAL: No headaches, memory loss, loss of strength, numbness, or tremors  SKIN: No itching, burning, rashes, or lesions   LYMPH Nodes: No enlarged glands  ENDOCRINE: No heat or cold intolerance; No hair loss  MUSCULOSKELETAL: No joint pain or swelling; No muscle, back, or extremity pain  PSYCHIATRIC: No depression, anxiety, mood swings, or difficulty sleeping  HEME/LYMPH: No easy bruising, or bleeding gums  ALLERY AND IMMUNOLOGIC: No hives or eczema	    [X] All others negative	  [ ] Unable to obtain    PHYSICAL EXAM:  T(C): 36.7 (11-19-18 @ 10:00), Max: 37.1 (11-18-18 @ 15:00)  HR: 74 (11-19-18 @ 11:00) (55 - 80)  BP: 155/94 (11-19-18 @ 11:00) (120/80 - 188/127)  RR: 24 (11-19-18 @ 11:00) (12 - 24)  SpO2: 97% (11-19-18 @ 11:00) (92% - 99%)    I&O's Summary    18 Nov 2018 07:01  -  19 Nov 2018 07:00  --------------------------------------------------------  IN: 220 mL / OUT: 800 mL / NET: -580 mL    19 Nov 2018 07:01  -  19 Nov 2018 11:12  --------------------------------------------------------  IN: 1320 mL / OUT: 250 mL / NET: 1070 mL    TELEMETRY: Sinus bradycardia/sinus rhythm 50-60bpm, no pauses, no arrythmia 	    ECG: Sinus rhythm 63 bpm, normal intervals, QRS 90ms, LVH    Appearance: Normal	  HEENT:   Normal oral mucosa, PERRL, EOMI	  Cardiovascular: Normal S1 S2, No JVD, No murmurs, No edema  Respiratory: Lungs clear to auscultation	  Gastrointestinal:  Soft, Non-tender, + BS	  Neurologic: A&O x 3, Non-focal  Extremities: Normal range of motion, No clubbing, cyanosis or edema  Vascular: Peripheral pulses palpable 2+ bilaterally    	  LABS:	 	                        13.7   11.9  )-----------( 151      ( 19 Nov 2018 03:11 )             41.9     11-19    140  |  103  |  21  ----------------------------<  115<H>  4.4   |  23  |  2.25<H>    Ca    10.0      19 Nov 2018 03:11  Phos  3.4     11-19  Mg     2.1     11-19    TPro  7.1  /  Alb  4.0  /  TBili  1.3<H>  /  DBili  x   /  AST  27  /  ALT  11  /  AlkPhos  76  11-19    proBNP: Serum Pro-Brain Natriuretic Peptide: 596 pg/mL (11-18 @ 14:14)  HgA1c: Hemoglobin A1C, Whole Blood: 5.3 % (11-18 @ 14:14)  TSH: Thyroid Stimulating Hormone, Serum: 0.561 uIU/mL (11-18 @ 14:14)

## 2018-11-19 NOTE — CONSULT NOTE ADULT - ASSESSMENT
78 year old male with PMHx significant for hypertension, who presented to the ED after having an unwitnessed syncopal episode. He was transferred from [facility] for management of Descending thoracic aortic aneurysm. Pt reports he was in his usual state of health until early Sunday morning when he felt dizzy getting back into bed. He had a syncopal episode per report (although patient states he did not lose consciousness), and was found down by his wife.  EMS was called and patient was found to be bradycardic, he was given Atropine X1 (strips not available). Per conversation with patient, he states that he was sitting down and suddenly became dizzy. The dizziness subsided after lying down, however, his wife insisted he go to the hospital. He denies loc. He had a similar episode 5 years ago while brushing his teeth. He felt dizzy, sat on the toilet, dizziness persisted, he decided to lay down on the floor, then felt better. Again denies loc. He denies associated chest pains, palpitations, shortness of breath, orthopnea, paroxysmal nocturnal dyspnea, or lower extremity edema.     Work-up significant for CTA showing fusiform aneurysmal dilation of descending aorta measuring 5.5 cm with what looks like extensive mural thrombus, soft plaque vs thrombosed false lumen. CT surgery managing medically. Pt started on labetolol for BP control.

## 2018-11-19 NOTE — PROCEDURE NOTE - PROCEDURE
<<-----Click on this checkbox to enter Procedure Arterial line placement  11/19/2018    Active  DAVID

## 2018-11-19 NOTE — CONSULT NOTE ADULT - PROBLEM SELECTOR RECOMMENDATION 9
Reported episode of bradycardia in the field requiring atropine. The patient is in sinus rhythm on telemetry with no evidence of significant bradycardia, pauses or arrythmia. His EKG is sinus with normal intervals, and no conduction abnormalities.     Episode five years ago sounds orthostatic in nature. Episode yesterday could also be orthostatic with resultant cardioinhibition. This is not indication for pacemaker placement.     - Agree with echo for cardiac structures and EF evaluation.  - Recommend outpatient long-term monitor for longer term arrythmia monitoring. We will send this to his home and f/u with him in 6 weeks.

## 2018-11-19 NOTE — PROCEDURE NOTE - NSPROCDETAILS_GEN_ALL_CORE
location identified, draped/prepped, sterile technique used, needle inserted/introduced/positive blood return obtained via catheter/connected to a pressurized flush line/sutured in place

## 2018-11-20 LAB
ALBUMIN SERPL ELPH-MCNC: 3.3 G/DL — SIGNIFICANT CHANGE UP (ref 3.3–5)
ALBUMIN SERPL ELPH-MCNC: 3.7 G/DL — SIGNIFICANT CHANGE UP (ref 3.3–5)
ALP SERPL-CCNC: 64 U/L — SIGNIFICANT CHANGE UP (ref 40–120)
ALP SERPL-CCNC: 68 U/L — SIGNIFICANT CHANGE UP (ref 40–120)
ALT FLD-CCNC: 12 U/L — SIGNIFICANT CHANGE UP (ref 10–45)
ALT FLD-CCNC: 13 U/L — SIGNIFICANT CHANGE UP (ref 10–45)
ANION GAP SERPL CALC-SCNC: 12 MMOL/L — SIGNIFICANT CHANGE UP (ref 5–17)
ANION GAP SERPL CALC-SCNC: 8 MMOL/L — SIGNIFICANT CHANGE UP (ref 5–17)
APTT BLD: 25.7 SEC — LOW (ref 27.5–36.3)
APTT BLD: 27.3 SEC — LOW (ref 27.5–36.3)
AST SERPL-CCNC: 20 U/L — SIGNIFICANT CHANGE UP (ref 10–40)
AST SERPL-CCNC: 22 U/L — SIGNIFICANT CHANGE UP (ref 10–40)
BASOPHILS NFR BLD AUTO: 0.1 % — SIGNIFICANT CHANGE UP (ref 0–2)
BILIRUB SERPL-MCNC: 0.5 MG/DL — SIGNIFICANT CHANGE UP (ref 0.2–1.2)
BILIRUB SERPL-MCNC: 0.8 MG/DL — SIGNIFICANT CHANGE UP (ref 0.2–1.2)
BUN SERPL-MCNC: 19 MG/DL — SIGNIFICANT CHANGE UP (ref 7–23)
BUN SERPL-MCNC: 20 MG/DL — SIGNIFICANT CHANGE UP (ref 7–23)
CALCIUM SERPL-MCNC: 8.9 MG/DL — SIGNIFICANT CHANGE UP (ref 8.4–10.5)
CALCIUM SERPL-MCNC: 9.2 MG/DL — SIGNIFICANT CHANGE UP (ref 8.4–10.5)
CHLORIDE SERPL-SCNC: 104 MMOL/L — SIGNIFICANT CHANGE UP (ref 96–108)
CHLORIDE SERPL-SCNC: 104 MMOL/L — SIGNIFICANT CHANGE UP (ref 96–108)
CO2 SERPL-SCNC: 29 MMOL/L — SIGNIFICANT CHANGE UP (ref 22–31)
CO2 SERPL-SCNC: 33 MMOL/L — HIGH (ref 22–31)
CREAT SERPL-MCNC: 2.24 MG/DL — HIGH (ref 0.5–1.3)
CREAT SERPL-MCNC: 2.27 MG/DL — HIGH (ref 0.5–1.3)
EOSINOPHIL NFR BLD AUTO: 1.1 % — SIGNIFICANT CHANGE UP (ref 0–6)
GAS PNL BLDA: SIGNIFICANT CHANGE UP
GLUCOSE BLDC GLUCOMTR-MCNC: 101 MG/DL — HIGH (ref 70–99)
GLUCOSE BLDC GLUCOMTR-MCNC: 117 MG/DL — HIGH (ref 70–99)
GLUCOSE BLDC GLUCOMTR-MCNC: 124 MG/DL — HIGH (ref 70–99)
GLUCOSE BLDC GLUCOMTR-MCNC: 126 MG/DL — HIGH (ref 70–99)
GLUCOSE BLDC GLUCOMTR-MCNC: 95 MG/DL — SIGNIFICANT CHANGE UP (ref 70–99)
GLUCOSE SERPL-MCNC: 125 MG/DL — HIGH (ref 70–99)
GLUCOSE SERPL-MCNC: 132 MG/DL — HIGH (ref 70–99)
HCT VFR BLD CALC: 38.4 % — LOW (ref 39–50)
HCT VFR BLD CALC: 39.4 % — SIGNIFICANT CHANGE UP (ref 39–50)
HGB BLD-MCNC: 12.5 G/DL — LOW (ref 13–17)
HGB BLD-MCNC: 12.7 G/DL — LOW (ref 13–17)
INR BLD: 1.21 — HIGH (ref 0.88–1.16)
INR BLD: 1.22 — HIGH (ref 0.88–1.16)
LACTATE SERPL-SCNC: 1.5 MMOL/L — SIGNIFICANT CHANGE UP (ref 0.5–2)
LYMPHOCYTES # BLD AUTO: 14.2 % — SIGNIFICANT CHANGE UP (ref 13–44)
MAGNESIUM SERPL-MCNC: 1.8 MG/DL — SIGNIFICANT CHANGE UP (ref 1.6–2.6)
MCHC RBC-ENTMCNC: 30.3 PG — SIGNIFICANT CHANGE UP (ref 27–34)
MCHC RBC-ENTMCNC: 30.5 PG — SIGNIFICANT CHANGE UP (ref 27–34)
MCHC RBC-ENTMCNC: 32.2 G/DL — SIGNIFICANT CHANGE UP (ref 32–36)
MCHC RBC-ENTMCNC: 32.6 G/DL — SIGNIFICANT CHANGE UP (ref 32–36)
MCV RBC AUTO: 93.2 FL — SIGNIFICANT CHANGE UP (ref 80–100)
MCV RBC AUTO: 94.7 FL — SIGNIFICANT CHANGE UP (ref 80–100)
MONOCYTES NFR BLD AUTO: 9.6 % — SIGNIFICANT CHANGE UP (ref 2–14)
NEUTROPHILS NFR BLD AUTO: 75 % — SIGNIFICANT CHANGE UP (ref 43–77)
PHOSPHATE SERPL-MCNC: 3.3 MG/DL — SIGNIFICANT CHANGE UP (ref 2.5–4.5)
PLATELET # BLD AUTO: 140 K/UL — LOW (ref 150–400)
PLATELET # BLD AUTO: 141 K/UL — LOW (ref 150–400)
POTASSIUM SERPL-MCNC: 3.5 MMOL/L — SIGNIFICANT CHANGE UP (ref 3.5–5.3)
POTASSIUM SERPL-MCNC: 3.8 MMOL/L — SIGNIFICANT CHANGE UP (ref 3.5–5.3)
POTASSIUM SERPL-SCNC: 3.5 MMOL/L — SIGNIFICANT CHANGE UP (ref 3.5–5.3)
POTASSIUM SERPL-SCNC: 3.8 MMOL/L — SIGNIFICANT CHANGE UP (ref 3.5–5.3)
PROT SERPL-MCNC: 6.2 G/DL — SIGNIFICANT CHANGE UP (ref 6–8.3)
PROT SERPL-MCNC: 6.4 G/DL — SIGNIFICANT CHANGE UP (ref 6–8.3)
PROTHROM AB SERPL-ACNC: 13.7 SEC — HIGH (ref 10–12.9)
PROTHROM AB SERPL-ACNC: 13.9 SEC — HIGH (ref 10–12.9)
RBC # BLD: 4.12 M/UL — LOW (ref 4.2–5.8)
RBC # BLD: 4.16 M/UL — LOW (ref 4.2–5.8)
RBC # FLD: 12.9 % — SIGNIFICANT CHANGE UP (ref 10.3–16.9)
RBC # FLD: 13.2 % — SIGNIFICANT CHANGE UP (ref 10.3–16.9)
SODIUM SERPL-SCNC: 145 MMOL/L — SIGNIFICANT CHANGE UP (ref 135–145)
SODIUM SERPL-SCNC: 145 MMOL/L — SIGNIFICANT CHANGE UP (ref 135–145)
WBC # BLD: 8.7 K/UL — SIGNIFICANT CHANGE UP (ref 3.8–10.5)
WBC # BLD: 8.9 K/UL — SIGNIFICANT CHANGE UP (ref 3.8–10.5)
WBC # FLD AUTO: 8.7 K/UL — SIGNIFICANT CHANGE UP (ref 3.8–10.5)
WBC # FLD AUTO: 8.9 K/UL — SIGNIFICANT CHANGE UP (ref 3.8–10.5)

## 2018-11-20 PROCEDURE — 99291 CRITICAL CARE FIRST HOUR: CPT

## 2018-11-20 PROCEDURE — 71045 X-RAY EXAM CHEST 1 VIEW: CPT | Mod: 26

## 2018-11-20 RX ORDER — POTASSIUM CHLORIDE 20 MEQ
20 PACKET (EA) ORAL ONCE
Qty: 0 | Refills: 0 | Status: COMPLETED | OUTPATIENT
Start: 2018-11-20 | End: 2018-11-20

## 2018-11-20 RX ORDER — HYDRALAZINE HCL 50 MG
10 TABLET ORAL EVERY 6 HOURS
Qty: 0 | Refills: 0 | Status: DISCONTINUED | OUTPATIENT
Start: 2018-11-20 | End: 2018-11-22

## 2018-11-20 RX ORDER — ALBUMIN HUMAN 25 %
250 VIAL (ML) INTRAVENOUS
Qty: 0 | Refills: 0 | Status: COMPLETED | OUTPATIENT
Start: 2018-11-20 | End: 2018-11-20

## 2018-11-20 RX ORDER — ACETAZOLAMIDE 250 MG/1
250 TABLET ORAL ONCE
Qty: 0 | Refills: 0 | Status: COMPLETED | OUTPATIENT
Start: 2018-11-20 | End: 2018-11-20

## 2018-11-20 RX ORDER — POTASSIUM CHLORIDE 20 MEQ
40 PACKET (EA) ORAL ONCE
Qty: 0 | Refills: 0 | Status: COMPLETED | OUTPATIENT
Start: 2018-11-20 | End: 2018-11-20

## 2018-11-20 RX ORDER — MAGNESIUM SULFATE 500 MG/ML
2 VIAL (ML) INJECTION ONCE
Qty: 0 | Refills: 0 | Status: COMPLETED | OUTPATIENT
Start: 2018-11-20 | End: 2018-11-20

## 2018-11-20 RX ORDER — SODIUM CHLORIDE 9 MG/ML
1000 INJECTION, SOLUTION INTRAVENOUS
Qty: 0 | Refills: 0 | Status: DISCONTINUED | OUTPATIENT
Start: 2018-11-20 | End: 2018-11-21

## 2018-11-20 RX ADMIN — Medication 100 MEQ/KG/HR: at 08:16

## 2018-11-20 RX ADMIN — PANTOPRAZOLE SODIUM 40 MILLIGRAM(S): 20 TABLET, DELAYED RELEASE ORAL at 06:57

## 2018-11-20 RX ADMIN — AMLODIPINE BESYLATE 10 MILLIGRAM(S): 2.5 TABLET ORAL at 05:36

## 2018-11-20 RX ADMIN — Medication 20 MILLIEQUIVALENT(S): at 12:58

## 2018-11-20 RX ADMIN — Medication 5 MILLIGRAM(S): at 04:05

## 2018-11-20 RX ADMIN — SODIUM CHLORIDE 3 MILLILITER(S): 9 INJECTION INTRAMUSCULAR; INTRAVENOUS; SUBCUTANEOUS at 05:37

## 2018-11-20 RX ADMIN — SODIUM CHLORIDE 3 MILLILITER(S): 9 INJECTION INTRAMUSCULAR; INTRAVENOUS; SUBCUTANEOUS at 22:28

## 2018-11-20 RX ADMIN — Medication 200 MILLIGRAM(S): at 05:36

## 2018-11-20 RX ADMIN — SODIUM CHLORIDE 3 MILLILITER(S): 9 INJECTION INTRAMUSCULAR; INTRAVENOUS; SUBCUTANEOUS at 16:06

## 2018-11-20 RX ADMIN — Medication 250 MILLILITER(S): at 12:55

## 2018-11-20 RX ADMIN — Medication 50 GRAM(S): at 12:56

## 2018-11-20 RX ADMIN — Medication 10 MILLIGRAM(S): at 17:42

## 2018-11-20 RX ADMIN — SODIUM CHLORIDE 100 MILLILITER(S): 9 INJECTION, SOLUTION INTRAVENOUS at 11:33

## 2018-11-20 RX ADMIN — Medication 250 MILLILITER(S): at 11:22

## 2018-11-20 NOTE — DIETITIAN INITIAL EVALUATION ADULT. - OTHER INFO
Patient is a 78y old  Male who presents with a chief complaint of syncope/ fusiform aneurysmal dilation of descending aorta. Pt seen asleep in bed this am. Per RN, pt had syncopal episode this am and requested to let pt rest. He is NPO at present. No N/V/D/C and mechanical issues noted. Pt appears to be comfortable, without pain. Unsure of diet/wt history PTA as no family present. H/o HTN noted otherwise healthy. Please restart Dash/TLC diet once medically feasible. RD to follow per policy to monitor meal intake/tolerance and educate as appropriate.

## 2018-11-20 NOTE — PROGRESS NOTE ADULT - SUBJECTIVE AND OBJECTIVE BOX
EPS Progress Note    S: no active cp, palps, dizziness. responded "yes I think i passed out"  when asked about event this morning  O: pt's nurse vidya, reports patient was sitting up in chair talking after taking morning meds when BP started to drop, patient became diaphoretic. back to bed and reported LOC with bradycardia ~ 50 bpm.  Reports SBP as low as 80 mmHg  T(C): 37.2 (11-20-18 @ 01:00), Max: 37.2 (11-20-18 @ 01:00)  HR: 58 (11-20-18 @ 08:00) (58 - 90)  BP: 134/91 (11-20-18 @ 08:00) (124/82 - 163/92)  RR: 18 (11-20-18 @ 08:00) (14 - 26)  SpO2: 99% (11-20-18 @ 08:00) (93% - 99%)  Wt(kg): --     Telemetry: SR 60-70's, jeffrey down to ~ 50 bpm at ~ 6:28 am          General:  NAD     lethargic but arouseable, oriented x3    Chest:  CTA B/L         Cardiac:  RRR      s1/s2         Abdomen:  +BS      Soft      Non-Tender      Non-Distended         Extremities: No LE Edema      Distal Pulses Intact         Labs:                                                               12.5   8.7   )-----------( 140      ( 20 Nov 2018 03:27 )             38.4     11-20    145  |  104  |  20  ----------------------------<  125<H>  3.5   |  29  |  2.27<H>    Ca    8.9      20 Nov 2018 03:27  Phos  2.3     11-19  Mg     2.0     11-19    TPro  6.2  /  Alb  3.3  /  TBili  0.5  /  DBili  x   /  AST  22  /  ALT  12  /  AlkPhos  64  11-20    PT/INR - ( 20 Nov 2018 03:27 )   PT: 13.7 sec;   INR: 1.21          PTT - ( 20 Nov 2018 03:27 )  PTT:27.3 sec    Assessment/Plan:    78 year old male with PMHx significant for hypertension, who presented to the ED after having an unwitnessed syncopal episode. He was transferred from [facility] for management of Descending thoracic aortic aneurysm. Pt reports he was in his usual state of health until early Sunday morning when he felt dizzy getting back into bed. He had a syncopal episode per report (although patient states he did not lose consciousness), and was found down by his wife.  EMS was called and patient was found to be bradycardic, he was given Atropine X1 (strips not available). Per conversation with patient, he states that he was sitting down and suddenly became dizzy. The dizziness subsided after lying down, however, his wife insisted he go to the hospital. He denies loc. He had a similar episode 5 years ago while brushing his teeth. He felt dizzy, sat on the toilet, dizziness persisted, he decided to lay down on the floor, then felt better. Again denies loc. He denies associated chest pains, palpitations, shortness of breath, orthopnea, paroxysmal nocturnal dyspnea, or lower extremity edema.     Work-up significant for CTA showing fusiform aneurysmal dilation of descending aorta measuring 5.5 cm with what looks like extensive mural thrombus, soft plaque vs thrombosed false lumen. CT surgery managing medically. Pt started on labetolol for BP control.           - Witnessed syncopal episode this AM.  Likely vasovagal syncope with mixed response.  Will consider PPM with CLS algorithm. EPS Progress Note    S: no active cp, palps, dizziness. responded "yes I think i passed out"  when asked about event this morning  O: pt's nurse vidya, reports patient was sitting up in chair talking after taking morning meds when BP started to drop, patient became diaphoretic. back to bed and reported LOC with bradycardia ~ 50 bpm.  Reports SBP as low as 80 mmHg  T(C): 37.2 (11-20-18 @ 01:00), Max: 37.2 (11-20-18 @ 01:00)  HR: 58 (11-20-18 @ 08:00) (58 - 90)  BP: 134/91 (11-20-18 @ 08:00) (124/82 - 163/92)  RR: 18 (11-20-18 @ 08:00) (14 - 26)  SpO2: 99% (11-20-18 @ 08:00) (93% - 99%)  Wt(kg): --     Telemetry: SR 60-70's, jeffrey down to ~ 50 bpm at ~ 6:28 am          General:  NAD     lethargic but arouseable, oriented x3    Chest:  CTA B/L         Cardiac:  RRR      s1/s2         Abdomen:  +BS      Soft      Non-Tender      Non-Distended         Extremities: No LE Edema      Distal Pulses Intact         Labs:                                                               12.5   8.7   )-----------( 140      ( 20 Nov 2018 03:27 )             38.4     11-20    145  |  104  |  20  ----------------------------<  125<H>  3.5   |  29  |  2.27<H>    Ca    8.9      20 Nov 2018 03:27  Phos  2.3     11-19  Mg     2.0     11-19    TPro  6.2  /  Alb  3.3  /  TBili  0.5  /  DBili  x   /  AST  22  /  ALT  12  /  AlkPhos  64  11-20    PT/INR - ( 20 Nov 2018 03:27 )   PT: 13.7 sec;   INR: 1.21          PTT - ( 20 Nov 2018 03:27 )  PTT:27.3 sec    Assessment/Plan:    78 year old male with PMHx significant for hypertension, who presented to the ED after having an unwitnessed syncopal episode. He was transferred from [facility] for management of Descending thoracic aortic aneurysm. Pt reports he was in his usual state of health until early Sunday morning when he felt dizzy getting back into bed. He had a syncopal episode per report (although patient states he did not lose consciousness), and was found down by his wife.  EMS was called and patient was found to be bradycardic, he was given Atropine X1 (strips not available). Per conversation with patient, he states that he was sitting down and suddenly became dizzy. The dizziness subsided after lying down, however, his wife insisted he go to the hospital. He denies loc. He had a similar episode 5 years ago while brushing his teeth. He felt dizzy, sat on the toilet, dizziness persisted, he decided to lay down on the floor, then felt better. Again denies loc. He denies associated chest pains, palpitations, shortness of breath, orthopnea, paroxysmal nocturnal dyspnea, or lower extremity edema.     Work-up significant for CTA showing fusiform aneurysmal dilation of descending aorta measuring 5.5 cm with what looks like extensive mural thrombus, soft plaque vs thrombosed false lumen. CT surgery managing medically. Pt started on labetolol for BP control.           - Witnessed syncopal episode this AM.  Likely vasovagal syncope with mixed response. EPS Progress Note    S: no active cp, palps, dizziness. responded "yes I think i passed out"  when asked about event this morning  O: pt's nurse vidya, reports patient was sitting up in chair talking after taking morning meds when BP started to drop, patient became diaphoretic. back to bed and reported LOC with bradycardia ~ 50 bpm.  Reports SBP as low as 80 mmHg  T(C): 37.2 (11-20-18 @ 01:00), Max: 37.2 (11-20-18 @ 01:00)  HR: 58 (11-20-18 @ 08:00) (58 - 90)  BP: 134/91 (11-20-18 @ 08:00) (124/82 - 163/92)  RR: 18 (11-20-18 @ 08:00) (14 - 26)  SpO2: 99% (11-20-18 @ 08:00) (93% - 99%)  Wt(kg): --     Telemetry: SR 60-70's, jeffrey down to ~ 50 bpm at ~ 6:28 am          General:  NAD     lethargic but arouseable, oriented x3    Chest:  CTA B/L         Cardiac:  RRR      s1/s2         Abdomen:  +BS      Soft      Non-Tender      Non-Distended         Extremities: No LE Edema      Distal Pulses Intact         Labs:                                                               12.5   8.7   )-----------( 140      ( 20 Nov 2018 03:27 )             38.4     11-20    145  |  104  |  20  ----------------------------<  125<H>  3.5   |  29  |  2.27<H>    Ca    8.9      20 Nov 2018 03:27  Phos  2.3     11-19  Mg     2.0     11-19    TPro  6.2  /  Alb  3.3  /  TBili  0.5  /  DBili  x   /  AST  22  /  ALT  12  /  AlkPhos  64  11-20    PT/INR - ( 20 Nov 2018 03:27 )   PT: 13.7 sec;   INR: 1.21          PTT - ( 20 Nov 2018 03:27 )  PTT:27.3 sec    Assessment/Plan:    78 year old male with PMHx significant for hypertension, who presented to the ED after having an unwitnessed syncopal episode. He was transferred from [facility] for management of Descending thoracic aortic aneurysm. Pt reports he was in his usual state of health until early Sunday morning when he felt dizzy getting back into bed. He had a syncopal episode per report (although patient states he did not lose consciousness), and was found down by his wife.  EMS was called and patient was found to be bradycardic, he was given Atropine X1 (strips not available). Per conversation with patient, he states that he was sitting down and suddenly became dizzy. The dizziness subsided after lying down, however, his wife insisted he go to the hospital. He denies loc. He had a similar episode 5 years ago while brushing his teeth. He felt dizzy, sat on the toilet, dizziness persisted, he decided to lay down on the floor, then felt better. Again denies loc. He denies associated chest pains, palpitations, shortness of breath, orthopnea, paroxysmal nocturnal dyspnea, or lower extremity edema.     Work-up significant for CTA showing fusiform aneurysmal dilation of descending aorta measuring 5.5 cm with what looks like extensive mural thrombus, soft plaque vs thrombosed false lumen. CT surgery managing medically. Pt started on labetolol for BP control.           - Witnessed syncopal episode this AM.  Consistent with vasovagal syncope with mixed response. EPS Progress Note    S: no active cp, palps, dizziness. responded "yes I think i passed out"  when asked about event this morning. He continues to deny losing consciousness.     O: pt's nurse vidya, reports patient was sitting up in chair talking after taking morning meds when BP started to drop, patient became diaphoretic. Back to bed and reported LOC with bradycardia ~ 50 bpm.  SBP as low as 80 mmHg (verified on A-line).     T(C): 37.2 (11-20-18 @ 01:00), Max: 37.2 (11-20-18 @ 01:00)  HR: 58 (11-20-18 @ 08:00) (58 - 90)  BP: 134/91 (11-20-18 @ 08:00) (124/82 - 163/92)  RR: 18 (11-20-18 @ 08:00) (14 - 26)  SpO2: 99% (11-20-18 @ 08:00) (93% - 99%)     Telemetry: SR 60-70's, jeffrey down to ~ 50 bpm at ~ 6:28 am          General:  NAD     lethargic but arouseable, oriented x3   Chest:  CTA B/L        Cardiac:  RRR      s1/s2        Abdomen:  +BS      Soft      Non-Tender      Non-Distended        Extremities: No LE Edema      Distal Pulses Intact           Labs:                                                  12.5   8.7   )-----------( 140      ( 20 Nov 2018 03:27 )             38.4     11-20    145  |  104  |  20  ----------------------------<  125<H>  3.5   |  29  |  2.27<H>    Ca    8.9      20 Nov 2018 03:27  Phos  2.3     11-19  Mg     2.0     11-19    TPro  6.2  /  Alb  3.3  /  TBili  0.5  /  DBili  x   /  AST  22  /  ALT  12  /  AlkPhos  64  11-20    PT/INR - ( 20 Nov 2018 03:27 )   PT: 13.7 sec;   INR: 1.21          PTT - ( 20 Nov 2018 03:27 )  PTT:27.3 sec    Assessment/Plan:    78 year old male with PMHx significant for hypertension, who presented to the ED after having an unwitnessed syncopal episode. He was transferred from [facility] for management of Descending thoracic aortic aneurysm. Pt reports he was in his usual state of health until early Sunday morning when he felt dizzy getting back into bed. He had a syncopal episode per report (although patient states he did not lose consciousness), and was found down by his wife.  EMS was called and patient was found to be bradycardic, he was given Atropine X1 (strips not available). Per conversation with patient, he states that he was sitting down and suddenly became dizzy. The dizziness subsided after lying down, however, his wife insisted he go to the hospital. He denies loc. He had a similar episode 5 years ago while brushing his teeth. He felt dizzy, sat on the toilet, dizziness persisted, he decided to lay down on the floor, then felt better. Again denies loc. He denies associated chest pains, palpitations, shortness of breath, orthopnea, paroxysmal nocturnal dyspnea, or lower extremity edema.     Work-up significant for CTA showing fusiform aneurysmal dilation of descending aorta measuring 5.5 cm with what looks like extensive mural thrombus, soft plaque vs thrombosed false lumen. CT surgery managing medically. Pt started on labetolol for BP control.           - Witnessed syncopal episode this AM.  Consistent with vasovagal syncope with mixed response.  This patient could benefit from a Biotronik CLS pacemaker. Had discussion with the patient who is amenable. Placement timing to be decided. Please keep patient NPO after midnight.

## 2018-11-20 NOTE — DIETITIAN INITIAL EVALUATION ADULT. - ENERGY NEEDS
Ht 171.5cm; Wt 72.6Kg  IBW 68.7Kg; %%  BMI 24.7    Utilized ABW to calculate needs, pt falls within % of IBW. Adjusted for age.

## 2018-11-20 NOTE — PROGRESS NOTE ADULT - ATTENDING COMMENTS
Witnessed episode of syncope with drop in BP and heart rate, followed by diaphoresis. No particular triggering mechanism, patient sitting on the chair, in no distress. Recurrent episodes of syncope, possibly vasovagal nature with mixed response. Patient not dehydrated and has no other identifiable risk factors that could trigger vagal response. I explained that although pacemaker use in that setting is questionable, however newer sensors (CLS) are promising, therefore it would not be unreasonable since no other therapeutic available. Will discuss with wife and primary team.

## 2018-11-20 NOTE — PROGRESS NOTE ADULT - SUBJECTIVE AND OBJECTIVE BOX
CTICU  CRITICAL  CARE  attending     Hand off received 					   Pertinent clinical, laboratory, radiographic, hemodynamic, echocardiographic, respiratory data, microbiologic data and chart were reviewed and analyzed frequently throughout the course of the day and night  Patient seen and examined with CTS/ SH attending at bedside  Pt is a 78y , Male, HEALTH ISSUES - PROBLEM Dx:  Bradycardia: Bradycardia      , FAMILY HISTORY:  No pertinent family history in first degree relatives  PAST MEDICAL & SURGICAL HISTORY:  Hypertension, unspecified type  No significant past surgical history    Patient is a 78y old  Male who presents with a chief complaint of syncope/ fusiform aneurysmal dilation of descending aorta (22 Nov 2018 17:26)      14 system review was unremarkable  acute changes include acute respiratory failure  Vital signs, hemodynamic and respiratory parameters were reviewed from the bedside nursing flowsheet.  ICU Vital Signs Last 24 Hrs  T(C): 37.4 (22 Nov 2018 14:00), Max: 37.4 (22 Nov 2018 14:00)  T(F): 99.3 (22 Nov 2018 14:00), Max: 99.3 (22 Nov 2018 14:00)  HR: 78 (22 Nov 2018 14:50) (66 - 106)  BP: 134/105 (22 Nov 2018 14:50) (115/77 - 169/108)  BP(mean): 122 (22 Nov 2018 14:50) (84 - 138)  ABP: --  ABP(mean): --  RR: 16 (22 Nov 2018 13:55) (16 - 20)  SpO2: 100% (22 Nov 2018 14:50) (91% - 100%)    Adult Advanced Hemodynamics Last 24 Hrs  CVP(mm Hg): --  CVP(cm H2O): --  CO: --  CI: --  PA: --  PA(mean): --  PCWP: --  SVR: --  SVRI: --  PVR: --  PVRI: --, ABG - ( 22 Nov 2018 14:14 )  pH, Arterial: 7.48  pH, Blood: x     /  pCO2: 28    /  pO2: 85    / HCO3: 20    / Base Excess: -1.9  /  SaO2: 97                  Intake and output was reviewed and the fluid balance was calculated  Daily     Daily   I&O's Summary    21 Nov 2018 07:01  -  22 Nov 2018 07:00  --------------------------------------------------------  IN: 615 mL / OUT: 1040 mL / NET: -425 mL        All lines and drain sites were assessed  [facility]      POCT Blood Glucose.: 140 mg/dL (22 Nov 2018 16:48)    No acute change in mental status  Auscultation of the chest reveals equal bs  Abdomen is soft  Extremities are warm and well perfused  Wounds appear clean and unremarkable  Antibiotics are periop    labs  CBC Full  -  ( 22 Nov 2018 17:07 )  WBC Count : 12.2 K/uL  Hemoglobin : 13.7 g/dL  Hematocrit : 41.3 %  Platelet Count - Automated : 158 K/uL  Mean Cell Volume : 93.0 fL  Mean Cell Hemoglobin : 30.9 pg  Mean Cell Hemoglobin Concentration : 33.2 g/dL  Auto Neutrophil # : x  Auto Lymphocyte # : x  Auto Monocyte # : x  Auto Eosinophil # : x  Auto Basophil # : x  Auto Neutrophil % : x  Auto Lymphocyte % : x  Auto Monocyte % : x  Auto Eosinophil % : x  Auto Basophil % : x    11-22    141  |  107  |  25<H>  ----------------------------<  123<H>  4.2   |  19<L>  |  2.01<H>    Ca    9.7      22 Nov 2018 14:33  Phos  3.5     11-22  Mg     2.4     11-22    TPro  8.1  /  Alb  4.5  /  TBili  1.6<H>  /  DBili  x   /  AST  22  /  ALT  11  /  AlkPhos  73  11-22    PT/INR - ( 21 Nov 2018 03:37 )   PT: 13.4 sec;   INR: 1.18          PTT - ( 21 Nov 2018 03:37 )  PTT:28.3 sec  The current medications were reviewed   MEDICATIONS  (STANDING):  dextrose 5%. 1000 milliLiter(s) (50 mL/Hr) IV Continuous <Continuous>  dextrose 50% Injectable 12.5 Gram(s) IV Push once  dextrose 50% Injectable 25 Gram(s) IV Push once  dextrose 50% Injectable 25 Gram(s) IV Push once  docusate sodium 100 milliGRAM(s) Oral three times a day  heparin  Injectable 5000 Unit(s) SubCutaneous every 8 hours  insulin lispro (HumaLOG) corrective regimen sliding scale   SubCutaneous Before meals and at bedtime  pantoprazole    Tablet 40 milliGRAM(s) Oral before breakfast  senna 2 Tablet(s) Oral at bedtime  sertraline 25 milliGRAM(s) Oral daily  sodium chloride 0.9% lock flush 3 milliLiter(s) IV Push every 8 hours  testosterone patch 4 mG/24 Hr(s) 4 milliGRAM(s) Transdermal daily    MEDICATIONS  (PRN):  dextrose 40% Gel 15 Gram(s) Oral once PRN Blood Glucose LESS THAN 70 milliGRAM(s)/deciliter  glucagon  Injectable 1 milliGRAM(s) IntraMuscular once PRN Glucose LESS THAN 70 milligrams/deciliter  polyethylene glycol 3350 17 Gram(s) Oral daily PRN Constipation       PROBLEM LIST/ ASSESSMENT:  HEALTH ISSUES - PROBLEM Dx:  Bradycardia: Bradycardia      ,   Patient is a 78y old  Male who presents with a chief complaint of syncope/ fusiform aneurysmal dilation of descending aorta (22 Nov 2018 17:26)     s/p cardiac surgery      My plan includes :  close hemodynamic, ventilatory and drain monitoring and management per post op routine    Monitor for arrhythmias and monitor parameters for organ perfusion  monitor neurologic status  Head of the bed should remain elevated to 45 deg .   chest PT and IS will be encouraged  monitor adequacy of oxygenation and ventilation and attempt to wean oxygen  Nutritional goals will be met using po eventually , ensure adequate caloric intake and montior the same  Stress ulcer and VTE prophylaxis will be achieved    Glycemic control is satisfactory  Electrolytes have been repleted as necessary and wound care has been carried out. Pain control has been achieved.   agressive physical therapy and early mobility and ambulation goals will be met   The family was updated about the course and plan  CRITICAL CARE TIME SPENT in evaluation and management, reassessments, review and interpretation of labs and x-rays, ventilator and hemodynamic management, formulating a plan and coordinating care: ___90____ MIN.  Time does not include procedural time.  CTICU ATTENDING     					    Harley Vazquez MD

## 2018-11-21 LAB
ALBUMIN SERPL ELPH-MCNC: 4.3 G/DL — SIGNIFICANT CHANGE UP (ref 3.3–5)
ALP SERPL-CCNC: 74 U/L — SIGNIFICANT CHANGE UP (ref 40–120)
ALT FLD-CCNC: 12 U/L — SIGNIFICANT CHANGE UP (ref 10–45)
ANION GAP SERPL CALC-SCNC: 11 MMOL/L — SIGNIFICANT CHANGE UP (ref 5–17)
ANION GAP SERPL CALC-SCNC: 13 MMOL/L — SIGNIFICANT CHANGE UP (ref 5–17)
APTT BLD: 28.3 SEC — SIGNIFICANT CHANGE UP (ref 27.5–36.3)
AST SERPL-CCNC: 19 U/L — SIGNIFICANT CHANGE UP (ref 10–40)
BASE EXCESS BLDA CALC-SCNC: 2 MMOL/L — SIGNIFICANT CHANGE UP (ref -2–3)
BILIRUB SERPL-MCNC: 1.2 MG/DL — SIGNIFICANT CHANGE UP (ref 0.2–1.2)
BUN SERPL-MCNC: 16 MG/DL — SIGNIFICANT CHANGE UP (ref 7–23)
BUN SERPL-MCNC: 18 MG/DL — SIGNIFICANT CHANGE UP (ref 7–23)
CALCIUM SERPL-MCNC: 10.4 MG/DL — SIGNIFICANT CHANGE UP (ref 8.4–10.5)
CALCIUM SERPL-MCNC: 9.8 MG/DL — SIGNIFICANT CHANGE UP (ref 8.4–10.5)
CHLORIDE SERPL-SCNC: 105 MMOL/L — SIGNIFICANT CHANGE UP (ref 96–108)
CHLORIDE SERPL-SCNC: 108 MMOL/L — SIGNIFICANT CHANGE UP (ref 96–108)
CO2 SERPL-SCNC: 24 MMOL/L — SIGNIFICANT CHANGE UP (ref 22–31)
CO2 SERPL-SCNC: 26 MMOL/L — SIGNIFICANT CHANGE UP (ref 22–31)
CREAT SERPL-MCNC: 2.12 MG/DL — HIGH (ref 0.5–1.3)
CREAT SERPL-MCNC: 2.21 MG/DL — HIGH (ref 0.5–1.3)
GAS PNL BLDA: SIGNIFICANT CHANGE UP
GLUCOSE BLDC GLUCOMTR-MCNC: 109 MG/DL — HIGH (ref 70–99)
GLUCOSE BLDC GLUCOMTR-MCNC: 116 MG/DL — HIGH (ref 70–99)
GLUCOSE BLDC GLUCOMTR-MCNC: 164 MG/DL — HIGH (ref 70–99)
GLUCOSE SERPL-MCNC: 111 MG/DL — HIGH (ref 70–99)
GLUCOSE SERPL-MCNC: 111 MG/DL — HIGH (ref 70–99)
HCO3 BLDA-SCNC: 25 MMOL/L — SIGNIFICANT CHANGE UP (ref 21–28)
HCT VFR BLD CALC: 41.9 % — SIGNIFICANT CHANGE UP (ref 39–50)
HGB BLD-MCNC: 13.7 G/DL — SIGNIFICANT CHANGE UP (ref 13–17)
INR BLD: 1.18 — HIGH (ref 0.88–1.16)
MAGNESIUM SERPL-MCNC: 2.3 MG/DL — SIGNIFICANT CHANGE UP (ref 1.6–2.6)
MCHC RBC-ENTMCNC: 30.8 PG — SIGNIFICANT CHANGE UP (ref 27–34)
MCHC RBC-ENTMCNC: 32.7 G/DL — SIGNIFICANT CHANGE UP (ref 32–36)
MCV RBC AUTO: 94.2 FL — SIGNIFICANT CHANGE UP (ref 80–100)
PCO2 BLDA: 34 MMHG — LOW (ref 35–48)
PH BLDA: 7.48 — HIGH (ref 7.35–7.45)
PHOSPHATE SERPL-MCNC: 2.7 MG/DL — SIGNIFICANT CHANGE UP (ref 2.5–4.5)
PLATELET # BLD AUTO: 153 K/UL — SIGNIFICANT CHANGE UP (ref 150–400)
PO2 BLDA: 70 MMHG — LOW (ref 83–108)
POTASSIUM SERPL-MCNC: 3.8 MMOL/L — SIGNIFICANT CHANGE UP (ref 3.5–5.3)
POTASSIUM SERPL-MCNC: 3.8 MMOL/L — SIGNIFICANT CHANGE UP (ref 3.5–5.3)
POTASSIUM SERPL-SCNC: 3.8 MMOL/L — SIGNIFICANT CHANGE UP (ref 3.5–5.3)
POTASSIUM SERPL-SCNC: 3.8 MMOL/L — SIGNIFICANT CHANGE UP (ref 3.5–5.3)
PROT SERPL-MCNC: 7.7 G/DL — SIGNIFICANT CHANGE UP (ref 6–8.3)
PROTHROM AB SERPL-ACNC: 13.4 SEC — HIGH (ref 10–12.9)
RBC # BLD: 4.45 M/UL — SIGNIFICANT CHANGE UP (ref 4.2–5.8)
RBC # FLD: 13.2 % — SIGNIFICANT CHANGE UP (ref 10.3–16.9)
SAO2 % BLDA: 95 % — SIGNIFICANT CHANGE UP (ref 95–100)
SODIUM SERPL-SCNC: 142 MMOL/L — SIGNIFICANT CHANGE UP (ref 135–145)
SODIUM SERPL-SCNC: 145 MMOL/L — SIGNIFICANT CHANGE UP (ref 135–145)
WBC # BLD: 9.6 K/UL — SIGNIFICANT CHANGE UP (ref 3.8–10.5)
WBC # FLD AUTO: 9.6 K/UL — SIGNIFICANT CHANGE UP (ref 3.8–10.5)

## 2018-11-21 PROCEDURE — 99291 CRITICAL CARE FIRST HOUR: CPT

## 2018-11-21 PROCEDURE — 71045 X-RAY EXAM CHEST 1 VIEW: CPT | Mod: 26,77

## 2018-11-21 PROCEDURE — 71045 X-RAY EXAM CHEST 1 VIEW: CPT | Mod: 26

## 2018-11-21 PROCEDURE — 33208 INSRT HEART PM ATRIAL & VENT: CPT | Mod: KX

## 2018-11-21 RX ORDER — CEFAZOLIN SODIUM 1 G
2000 VIAL (EA) INJECTION ONCE
Qty: 0 | Refills: 0 | Status: COMPLETED | OUTPATIENT
Start: 2018-11-21 | End: 2018-11-21

## 2018-11-21 RX ORDER — VANCOMYCIN HCL 1 G
1000 VIAL (EA) INTRAVENOUS ONCE
Qty: 0 | Refills: 0 | Status: COMPLETED | OUTPATIENT
Start: 2018-11-21 | End: 2018-11-21

## 2018-11-21 RX ORDER — SODIUM CHLORIDE 9 MG/ML
3 INJECTION INTRAMUSCULAR; INTRAVENOUS; SUBCUTANEOUS EVERY 8 HOURS
Qty: 0 | Refills: 0 | Status: DISCONTINUED | OUTPATIENT
Start: 2018-11-21 | End: 2018-11-23

## 2018-11-21 RX ORDER — METOPROLOL TARTRATE 50 MG
5 TABLET ORAL ONCE
Qty: 0 | Refills: 0 | Status: COMPLETED | OUTPATIENT
Start: 2018-11-21 | End: 2018-11-21

## 2018-11-21 RX ADMIN — Medication 10 MILLIGRAM(S): at 00:23

## 2018-11-21 RX ADMIN — Medication 100 MILLIGRAM(S): at 15:00

## 2018-11-21 RX ADMIN — Medication 2: at 22:11

## 2018-11-21 RX ADMIN — PANTOPRAZOLE SODIUM 40 MILLIGRAM(S): 20 TABLET, DELAYED RELEASE ORAL at 06:59

## 2018-11-21 RX ADMIN — SODIUM CHLORIDE 3 MILLILITER(S): 9 INJECTION INTRAMUSCULAR; INTRAVENOUS; SUBCUTANEOUS at 22:00

## 2018-11-21 RX ADMIN — Medication 250 MILLIGRAM(S): at 15:15

## 2018-11-21 RX ADMIN — SODIUM CHLORIDE 3 MILLILITER(S): 9 INJECTION INTRAMUSCULAR; INTRAVENOUS; SUBCUTANEOUS at 14:45

## 2018-11-21 RX ADMIN — AMLODIPINE BESYLATE 10 MILLIGRAM(S): 2.5 TABLET ORAL at 05:10

## 2018-11-21 RX ADMIN — Medication 5 MILLIGRAM(S): at 23:55

## 2018-11-21 RX ADMIN — SODIUM CHLORIDE 3 MILLILITER(S): 9 INJECTION INTRAMUSCULAR; INTRAVENOUS; SUBCUTANEOUS at 05:09

## 2018-11-21 RX ADMIN — Medication 10 MILLIGRAM(S): at 22:10

## 2018-11-21 NOTE — CHART NOTE - NSCHARTNOTEFT_GEN_A_CORE
Admitting Diagnosis:   Patient is a 78y old  Male who presents with a chief complaint of syncope/ fusiform aneurysmal dilation of descending aorta (2018 08:06)      PAST MEDICAL & SURGICAL HISTORY:  Hypertension, unspecified type  No significant past surgical history      Current Nutrition Order: NPO     GI Issues: Pt with no complaints of n/v/d/c     Pain: Pt with no complaints of pain     Skin Integrity: intact; tangela 18    Labs:       145  |  108  |  16  ----------------------------<  111<H>  3.8   |  24  |  2.12<H>    Ca    9.8      2018 03:37  Phos  2.7       Mg     2.3         TPro  7.7  /  Alb  4.3  /  TBili  1.2  /  DBili  x   /  AST  19  /  ALT  12  /  AlkPhos  74      CAPILLARY BLOOD GLUCOSE      POCT Blood Glucose.: 101 mg/dL (2018 23:44)  POCT Blood Glucose.: 95 mg/dL (2018 17:47)  POCT Blood Glucose.: 126 mg/dL (2018 11:27)      Medications:  MEDICATIONS  (STANDING):  amLODIPine   Tablet 10 milliGRAM(s) Oral daily  dextrose 5%. 1000 milliLiter(s) (50 mL/Hr) IV Continuous <Continuous>  dextrose 50% Injectable 12.5 Gram(s) IV Push once  dextrose 50% Injectable 25 Gram(s) IV Push once  dextrose 50% Injectable 25 Gram(s) IV Push once  insulin lispro (HumaLOG) corrective regimen sliding scale   SubCutaneous Before meals and at bedtime  lactated ringers Bolus 1000 milliLiter(s) IV Bolus once  lactated ringers. 1000 milliLiter(s) (100 mL/Hr) IV Continuous <Continuous>  pantoprazole    Tablet 40 milliGRAM(s) Oral before breakfast  sodium chloride 0.9% lock flush 3 milliLiter(s) IV Push every 8 hours    MEDICATIONS  (PRN):  dextrose 40% Gel 15 Gram(s) Oral once PRN Blood Glucose LESS THAN 70 milliGRAM(s)/deciliter  glucagon  Injectable 1 milliGRAM(s) IntraMuscular once PRN Glucose LESS THAN 70 milligrams/deciliter  hydrALAZINE Injectable 10 milliGRAM(s) IV Push every 6 hours PRN SBP > 160      Daily Weight in k.6 (2018 13:48)    Weight Change: () 76kg; ~3.5kg weight loss noted - will continue to monitor     Estimated energy needs: Utilized ABW to calculate needs, pt falls within % of IBW. Adjusted for age.  1815-2178kcal (25-30cal/kg)  72.6-87.12g pro (1-1.2g pro/kg)  2178-2541ml (30-35ml/kg)    Subjective: Pt presents with a chief complaint of syncope/ fusiform aneurysmal dilation of descending aorta.  Pt is s/p syncopal episode yesterday; much improved at present, however, unable to answer several of interview questions at this time.  Of note, yesterday's episode has been determined to be vasovagal; possible plan for placement of pacemaker.  Pt remains NPO.      Previous Nutrition Diagnosis: Inadequate energy intake r/t NPO status AEB meeting 0% estimated nutrient needs    Active [ x  ]  Resolved [   ]    Goal: Resume PO diet within 24 hrs    Recommendations:   1. Monitor MS  2. Resume previous DASH/TLC diet as medically feasible     Education: Nutrition education unable to be completed at this time.     Risk Level: High [  x ] Moderate [   ] Low [   ]

## 2018-11-21 NOTE — PROGRESS NOTE ADULT - SUBJECTIVE AND OBJECTIVE BOX
Brief note, full note to follow.    Uncomplicated dual chamber pacemaker implanted via a single left axillary puncture (second rib under fluoroscopic guidance)  RV septal and high right atrial leads placed and confirmed in the [place] and [place]ANG projections.    Programmed DDD with intrinsic rhythm optimization as well as CLS algorithm for treatment of neurocardiogenic syncope.    Tolerated well. No further antibiotics (creatinine clearance of 30)  chest x-ray and ecg in am.

## 2018-11-21 NOTE — PROGRESS NOTE ADULT - SUBJECTIVE AND OBJECTIVE BOX
CTICU  CRITICAL  CARE  attending     Hand off received @ 7a					   Pertinent clinical, laboratory, radiographic, hemodynamic, echocardiographic, respiratory data, microbiologic data and chart were reviewed and analyzed frequently throughout the course of the day and night  Patient seen and examined with CTS/ SH attending at bedside    Pt is a 78y , Male, admitted with Type B aortic dissection;    active issues:    symptomatic bradycardia  pre syncope while ambulating yesterday  awaiting PPM this afternoon      Bradycardia: Bradycardia      , FAMILY HISTORY:  No pertinent family history in first degree relatives  PAST MEDICAL & SURGICAL HISTORY:  Hypertension, unspecified type  No significant past surgical history    Patient is a 78y old  Male who presents with a chief complaint of syncope/ fusiform aneurysmal dilation of descending aorta (2018 08:06)      14 system review was unremarkable  acute changes include acute respiratory failure  Vital signs, hemodynamic and respiratory parameters were reviewed from the bedside nursing flowsheet.  ICU Vital Signs Last 24 Hrs  T(C): 36.7 (2018 09:44), Max: 36.9 (2018 22:09)  T(F): 98 (2018 09:44), Max: 98.5 (2018 22:09)  HR: 82 (2018 11:00) (62 - 95)  BP: 139/80 (2018 07:16) (131/91 - 158/82)  BP(mean): 111 (2018 21:00) (102 - 111)  ABP: 138/80 (2018 11:00) (134/74 - 174/88)  ABP(mean): 100 (2018 11:00) (96 - 122)  RR: 14 (2018 11:00) (14 - 22)  SpO2: 97% (2018 11:00) (95% - 100%)    Adult Advanced Hemodynamics Last 24 Hrs  CVP(mm Hg): --  CVP(cm H2O): --  CO: --  CI: --  PA: --  PA(mean): --  PCWP: --  SVR: --  SVRI: --  PVR: --  PVRI: --, ABG - ( 2018 03:34 )  pH, Arterial: 7.48  pH, Blood: x     /  pCO2: 34    /  pO2: 70    / HCO3: 25    / Base Excess: 2.0   /  SaO2: 95                  Intake and output was reviewed and the fluid balance was calculated  Daily Height in cm: 171 (2018 07:16)    Daily Weight in k.6 (2018 13:48)  I&O's Summary    2018 07:  -  2018 07:00  --------------------------------------------------------  IN: 1500 mL / OUT: 5095 mL / NET: -3595 mL    2018 07:01  -  2018 12:27  --------------------------------------------------------  IN: 155 mL / OUT: 340 mL / NET: -185 mL        All lines and drain sites were assessed  [facility]      POCT Blood Glucose.: 116 mg/dL (2018 10:55)    No acute change in mental status  Auscultation of the chest reveals equal bs  Abdomen is soft  Extremities are warm and well perfused  Wounds appear clean and unremarkable  Antibiotics are periop    labs  CBC Full  -  ( 2018 03:37 )  WBC Count : 9.6 K/uL  Hemoglobin : 13.7 g/dL  Hematocrit : 41.9 %  Platelet Count - Automated : 153 K/uL  Mean Cell Volume : 94.2 fL  Mean Cell Hemoglobin : 30.8 pg  Mean Cell Hemoglobin Concentration : 32.7 g/dL  Auto Neutrophil # : x  Auto Lymphocyte # : x  Auto Monocyte # : x  Auto Eosinophil # : x  Auto Basophil # : x  Auto Neutrophil % : x  Auto Lymphocyte % : x  Auto Monocyte % : x  Auto Eosinophil % : x  Auto Basophil % : x        145  |  108  |  16  ----------------------------<  111<H>  3.8   |  24  |  2.12<H>    Ca    9.8      2018 03:37  Phos  2.7       Mg     2.3         TPro  7.7  /  Alb  4.3  /  TBili  1.2  /  DBili  x   /  AST  19  /  ALT  12  /  AlkPhos  74      PT/INR - ( 2018 03:37 )   PT: 13.4 sec;   INR: 1.18          PTT - ( 2018 03:37 )  PTT:28.3 sec  The current medications were reviewed   MEDICATIONS  (STANDING):  amLODIPine   Tablet 10 milliGRAM(s) Oral daily  dextrose 5%. 1000 milliLiter(s) (50 mL/Hr) IV Continuous <Continuous>  dextrose 50% Injectable 12.5 Gram(s) IV Push once  dextrose 50% Injectable 25 Gram(s) IV Push once  dextrose 50% Injectable 25 Gram(s) IV Push once  insulin lispro (HumaLOG) corrective regimen sliding scale   SubCutaneous Before meals and at bedtime  lactated ringers Bolus 1000 milliLiter(s) IV Bolus once  lactated ringers. 1000 milliLiter(s) (100 mL/Hr) IV Continuous <Continuous>  pantoprazole    Tablet 40 milliGRAM(s) Oral before breakfast  sodium chloride 0.9% lock flush 3 milliLiter(s) IV Push every 8 hours    MEDICATIONS  (PRN):  dextrose 40% Gel 15 Gram(s) Oral once PRN Blood Glucose LESS THAN 70 milliGRAM(s)/deciliter  glucagon  Injectable 1 milliGRAM(s) IntraMuscular once PRN Glucose LESS THAN 70 milligrams/deciliter  hydrALAZINE Injectable 10 milliGRAM(s) IV Push every 6 hours PRN SBP > 160       PROBLEM LIST/ ASSESSMENT:  HEALTH ISSUES - PROBLEM Dx:  Presyncope  type B aortic dissection  Bradycardia: Bradycardia      ,   Patient is a 78y old  Male who presents with a chief complaint of syncope/ fusiform aneurysmal dilation of descending aorta (2018 08:06)        My plan includes :  close hemodynamic, ventilatory and drain monitoring and management per post op routine    Monitor for arrhythmias and monitor parameters for organ perfusion  monitor neurologic status  Head of the bed should remain elevated to 45 deg .   chest PT and IS will be encouraged  monitor adequacy of oxygenation and ventilation and attempt to wean oxygen  Nutritional goals will be met using po eventually , ensure adequate caloric intake and montior the same  Stress ulcer and VTE prophylaxis will be achieved    Glycemic control is satisfactory  Electrolytes have been repleted as necessary and wound care has been carried out. Pain control has been achieved.   agressive physical therapy and early mobility and ambulation goals will be met   The family was updated about the course and plan  CRITICAL CARE TIME SPENT in evaluation and management, reassessments, review and interpretation of labs and x-rays, ventilator and hemodynamic management, formulating a plan and coordinating care: ___45____ MIN.  Time does not include procedural time.  CTICU ATTENDING     					    Chapito Ortega MD

## 2018-11-22 LAB
ALBUMIN SERPL ELPH-MCNC: 4.5 G/DL — SIGNIFICANT CHANGE UP (ref 3.3–5)
ALP SERPL-CCNC: 73 U/L — SIGNIFICANT CHANGE UP (ref 40–120)
ALT FLD-CCNC: 11 U/L — SIGNIFICANT CHANGE UP (ref 10–45)
ANION GAP SERPL CALC-SCNC: 15 MMOL/L — SIGNIFICANT CHANGE UP (ref 5–17)
ANION GAP SERPL CALC-SCNC: 15 MMOL/L — SIGNIFICANT CHANGE UP (ref 5–17)
AST SERPL-CCNC: 22 U/L — SIGNIFICANT CHANGE UP (ref 10–40)
BASE EXCESS BLDA CALC-SCNC: -1.9 MMOL/L — SIGNIFICANT CHANGE UP (ref -2–3)
BASOPHILS NFR BLD AUTO: 0.3 % — SIGNIFICANT CHANGE UP (ref 0–2)
BILIRUB SERPL-MCNC: 1.6 MG/DL — HIGH (ref 0.2–1.2)
BUN SERPL-MCNC: 22 MG/DL — SIGNIFICANT CHANGE UP (ref 7–23)
BUN SERPL-MCNC: 25 MG/DL — HIGH (ref 7–23)
CALCIUM SERPL-MCNC: 9.7 MG/DL — SIGNIFICANT CHANGE UP (ref 8.4–10.5)
CALCIUM SERPL-MCNC: 9.9 MG/DL — SIGNIFICANT CHANGE UP (ref 8.4–10.5)
CHLORIDE SERPL-SCNC: 103 MMOL/L — SIGNIFICANT CHANGE UP (ref 96–108)
CHLORIDE SERPL-SCNC: 107 MMOL/L — SIGNIFICANT CHANGE UP (ref 96–108)
CO2 SERPL-SCNC: 19 MMOL/L — LOW (ref 22–31)
CO2 SERPL-SCNC: 21 MMOL/L — LOW (ref 22–31)
CREAT SERPL-MCNC: 2.01 MG/DL — HIGH (ref 0.5–1.3)
CREAT SERPL-MCNC: 2.01 MG/DL — HIGH (ref 0.5–1.3)
EOSINOPHIL NFR BLD AUTO: 2.3 % — SIGNIFICANT CHANGE UP (ref 0–6)
GAS PNL BLDA: SIGNIFICANT CHANGE UP
GLUCOSE BLDC GLUCOMTR-MCNC: 105 MG/DL — HIGH (ref 70–99)
GLUCOSE BLDC GLUCOMTR-MCNC: 120 MG/DL — HIGH (ref 70–99)
GLUCOSE BLDC GLUCOMTR-MCNC: 140 MG/DL — HIGH (ref 70–99)
GLUCOSE BLDC GLUCOMTR-MCNC: 151 MG/DL — HIGH (ref 70–99)
GLUCOSE BLDC GLUCOMTR-MCNC: 93 MG/DL — SIGNIFICANT CHANGE UP (ref 70–99)
GLUCOSE SERPL-MCNC: 116 MG/DL — HIGH (ref 70–99)
GLUCOSE SERPL-MCNC: 123 MG/DL — HIGH (ref 70–99)
HCO3 BLDA-SCNC: 20 MMOL/L — LOW (ref 21–28)
HCT VFR BLD CALC: 41.3 % — SIGNIFICANT CHANGE UP (ref 39–50)
HCT VFR BLD CALC: 45.8 % — SIGNIFICANT CHANGE UP (ref 39–50)
HGB BLD-MCNC: 13.7 G/DL — SIGNIFICANT CHANGE UP (ref 13–17)
HGB BLD-MCNC: 15.4 G/DL — SIGNIFICANT CHANGE UP (ref 13–17)
LACTATE SERPL-SCNC: 1.6 MMOL/L — SIGNIFICANT CHANGE UP (ref 0.5–2)
LYMPHOCYTES # BLD AUTO: 13.7 % — SIGNIFICANT CHANGE UP (ref 13–44)
MAGNESIUM SERPL-MCNC: 2.4 MG/DL — SIGNIFICANT CHANGE UP (ref 1.6–2.6)
MCHC RBC-ENTMCNC: 30.9 PG — SIGNIFICANT CHANGE UP (ref 27–34)
MCHC RBC-ENTMCNC: 31.5 PG — SIGNIFICANT CHANGE UP (ref 27–34)
MCHC RBC-ENTMCNC: 33.2 G/DL — SIGNIFICANT CHANGE UP (ref 32–36)
MCHC RBC-ENTMCNC: 33.6 G/DL — SIGNIFICANT CHANGE UP (ref 32–36)
MCV RBC AUTO: 93 FL — SIGNIFICANT CHANGE UP (ref 80–100)
MCV RBC AUTO: 93.7 FL — SIGNIFICANT CHANGE UP (ref 80–100)
MONOCYTES NFR BLD AUTO: 9.5 % — SIGNIFICANT CHANGE UP (ref 2–14)
NEUTROPHILS NFR BLD AUTO: 74.2 % — SIGNIFICANT CHANGE UP (ref 43–77)
PCO2 BLDA: 28 MMHG — LOW (ref 35–48)
PH BLDA: 7.48 — HIGH (ref 7.35–7.45)
PHOSPHATE SERPL-MCNC: 3.5 MG/DL — SIGNIFICANT CHANGE UP (ref 2.5–4.5)
PLATELET # BLD AUTO: 147 K/UL — LOW (ref 150–400)
PLATELET # BLD AUTO: 158 K/UL — SIGNIFICANT CHANGE UP (ref 150–400)
PO2 BLDA: 85 MMHG — SIGNIFICANT CHANGE UP (ref 83–108)
POTASSIUM SERPL-MCNC: 4.2 MMOL/L — SIGNIFICANT CHANGE UP (ref 3.5–5.3)
POTASSIUM SERPL-MCNC: 4.2 MMOL/L — SIGNIFICANT CHANGE UP (ref 3.5–5.3)
POTASSIUM SERPL-SCNC: 4.2 MMOL/L — SIGNIFICANT CHANGE UP (ref 3.5–5.3)
POTASSIUM SERPL-SCNC: 4.2 MMOL/L — SIGNIFICANT CHANGE UP (ref 3.5–5.3)
PROT SERPL-MCNC: 8.1 G/DL — SIGNIFICANT CHANGE UP (ref 6–8.3)
RBC # BLD: 4.44 M/UL — SIGNIFICANT CHANGE UP (ref 4.2–5.8)
RBC # BLD: 4.89 M/UL — SIGNIFICANT CHANGE UP (ref 4.2–5.8)
RBC # FLD: 13 % — SIGNIFICANT CHANGE UP (ref 10.3–16.9)
RBC # FLD: 13.4 % — SIGNIFICANT CHANGE UP (ref 10.3–16.9)
SAO2 % BLDA: 97 % — SIGNIFICANT CHANGE UP (ref 95–100)
SODIUM SERPL-SCNC: 139 MMOL/L — SIGNIFICANT CHANGE UP (ref 135–145)
SODIUM SERPL-SCNC: 141 MMOL/L — SIGNIFICANT CHANGE UP (ref 135–145)
WBC # BLD: 11 K/UL — HIGH (ref 3.8–10.5)
WBC # BLD: 12.2 K/UL — HIGH (ref 3.8–10.5)
WBC # FLD AUTO: 11 K/UL — HIGH (ref 3.8–10.5)
WBC # FLD AUTO: 12.2 K/UL — HIGH (ref 3.8–10.5)

## 2018-11-22 PROCEDURE — 70450 CT HEAD/BRAIN W/O DYE: CPT | Mod: 26

## 2018-11-22 PROCEDURE — 93010 ELECTROCARDIOGRAM REPORT: CPT

## 2018-11-22 PROCEDURE — 71045 X-RAY EXAM CHEST 1 VIEW: CPT | Mod: 26,59

## 2018-11-22 PROCEDURE — 71045 X-RAY EXAM CHEST 1 VIEW: CPT | Mod: 26,77,59

## 2018-11-22 PROCEDURE — 71046 X-RAY EXAM CHEST 2 VIEWS: CPT | Mod: 26,59

## 2018-11-22 PROCEDURE — 71250 CT THORAX DX C-: CPT | Mod: 26

## 2018-11-22 PROCEDURE — 99291 CRITICAL CARE FIRST HOUR: CPT

## 2018-11-22 RX ORDER — LABETALOL HCL 100 MG
100 TABLET ORAL EVERY 8 HOURS
Qty: 0 | Refills: 0 | Status: DISCONTINUED | OUTPATIENT
Start: 2018-11-22 | End: 2018-11-22

## 2018-11-22 RX ORDER — HYDRALAZINE HCL 50 MG
5 TABLET ORAL ONCE
Qty: 0 | Refills: 0 | Status: COMPLETED | OUTPATIENT
Start: 2018-11-22 | End: 2018-11-22

## 2018-11-22 RX ORDER — FLUDROCORTISONE ACETATE 0.1 MG/1
0.1 TABLET ORAL EVERY 12 HOURS
Qty: 0 | Refills: 0 | Status: DISCONTINUED | OUTPATIENT
Start: 2018-11-22 | End: 2018-11-23

## 2018-11-22 RX ORDER — SERTRALINE 25 MG/1
25 TABLET, FILM COATED ORAL DAILY
Qty: 0 | Refills: 0 | Status: DISCONTINUED | OUTPATIENT
Start: 2018-11-22 | End: 2018-11-23

## 2018-11-22 RX ORDER — POLYETHYLENE GLYCOL 3350 17 G/17G
17 POWDER, FOR SOLUTION ORAL DAILY
Qty: 0 | Refills: 0 | Status: DISCONTINUED | OUTPATIENT
Start: 2018-11-22 | End: 2018-11-23

## 2018-11-22 RX ORDER — ALBUMIN HUMAN 25 %
250 VIAL (ML) INTRAVENOUS ONCE
Qty: 0 | Refills: 0 | Status: COMPLETED | OUTPATIENT
Start: 2018-11-22 | End: 2018-11-22

## 2018-11-22 RX ORDER — HEPARIN SODIUM 5000 [USP'U]/ML
5000 INJECTION INTRAVENOUS; SUBCUTANEOUS EVERY 8 HOURS
Qty: 0 | Refills: 0 | Status: DISCONTINUED | OUTPATIENT
Start: 2018-11-22 | End: 2018-11-23

## 2018-11-22 RX ORDER — METOPROLOL TARTRATE 50 MG
12.5 TABLET ORAL EVERY 12 HOURS
Qty: 0 | Refills: 0 | Status: DISCONTINUED | OUTPATIENT
Start: 2018-11-22 | End: 2018-11-22

## 2018-11-22 RX ORDER — SENNA PLUS 8.6 MG/1
2 TABLET ORAL AT BEDTIME
Qty: 0 | Refills: 0 | Status: DISCONTINUED | OUTPATIENT
Start: 2018-11-22 | End: 2018-11-23

## 2018-11-22 RX ORDER — DOCUSATE SODIUM 100 MG
100 CAPSULE ORAL THREE TIMES A DAY
Qty: 0 | Refills: 0 | Status: DISCONTINUED | OUTPATIENT
Start: 2018-11-22 | End: 2018-11-23

## 2018-11-22 RX ORDER — LABETALOL HCL 100 MG
200 TABLET ORAL EVERY 8 HOURS
Qty: 0 | Refills: 0 | Status: DISCONTINUED | OUTPATIENT
Start: 2018-11-22 | End: 2018-11-22

## 2018-11-22 RX ADMIN — AMLODIPINE BESYLATE 10 MILLIGRAM(S): 2.5 TABLET ORAL at 05:03

## 2018-11-22 RX ADMIN — Medication 4 MILLIGRAM(S): at 18:45

## 2018-11-22 RX ADMIN — SODIUM CHLORIDE 3 MILLILITER(S): 9 INJECTION INTRAMUSCULAR; INTRAVENOUS; SUBCUTANEOUS at 21:28

## 2018-11-22 RX ADMIN — Medication 10 MILLIGRAM(S): at 05:02

## 2018-11-22 RX ADMIN — Medication 100 MILLIGRAM(S): at 09:52

## 2018-11-22 RX ADMIN — Medication 500 MILLILITER(S): at 13:00

## 2018-11-22 RX ADMIN — SENNA PLUS 2 TABLET(S): 8.6 TABLET ORAL at 21:27

## 2018-11-22 RX ADMIN — HEPARIN SODIUM 5000 UNIT(S): 5000 INJECTION INTRAVENOUS; SUBCUTANEOUS at 13:15

## 2018-11-22 RX ADMIN — Medication 12.5 MILLIGRAM(S): at 07:50

## 2018-11-22 RX ADMIN — SODIUM CHLORIDE 3 MILLILITER(S): 9 INJECTION INTRAMUSCULAR; INTRAVENOUS; SUBCUTANEOUS at 13:15

## 2018-11-22 RX ADMIN — Medication 100 MILLIGRAM(S): at 21:27

## 2018-11-22 RX ADMIN — Medication 125 MILLILITER(S): at 05:03

## 2018-11-22 RX ADMIN — Medication 100 MILLIGRAM(S): at 13:17

## 2018-11-22 RX ADMIN — SERTRALINE 25 MILLIGRAM(S): 25 TABLET, FILM COATED ORAL at 18:46

## 2018-11-22 RX ADMIN — Medication 2: at 13:00

## 2018-11-22 RX ADMIN — HEPARIN SODIUM 5000 UNIT(S): 5000 INJECTION INTRAVENOUS; SUBCUTANEOUS at 21:28

## 2018-11-22 RX ADMIN — PANTOPRAZOLE SODIUM 40 MILLIGRAM(S): 20 TABLET, DELAYED RELEASE ORAL at 07:51

## 2018-11-22 RX ADMIN — FLUDROCORTISONE ACETATE 0.1 MILLIGRAM(S): 0.1 TABLET ORAL at 21:27

## 2018-11-22 RX ADMIN — SODIUM CHLORIDE 3 MILLILITER(S): 9 INJECTION INTRAMUSCULAR; INTRAVENOUS; SUBCUTANEOUS at 07:51

## 2018-11-22 RX ADMIN — Medication 5 MILLIGRAM(S): at 04:30

## 2018-11-22 NOTE — PROGRESS NOTE ADULT - ASSESSMENT
78 year old male with PMHx significant for HTN, who presented to the ED after having an unwitnessed syncopal episode. He was transferred from [facility] for management of Descending thoracic aortic aneurysm.  He had a syncopal episode and was found down by his wife.  EMS was called and patient was found to be bradycardic when EMS arrived, given Atropine X1.  CTA showed fusiform aneurysmal dilation of descending aorta measuring 5.5cm with what looks like extensive mural thrombus, soft plaque vs thrombosed false lumen.  CT head reviewed no acute process noted. He was started on and Esmolol drip and transferred to [facility] under the care of Dr. Garcia. Medical management for Descending thoracic aneurysm. Patient had CLS PM placed by EP 2/2 symptomatic bradycardia with vasovagal syncope.  Patient then transferred to floor. Today POD1.     A/P:  Neurovascular: No delirium. No pain.  -Syncope now s/p PPM.    Cardiovascular: Hemodynamically stable. HR controlled.  -Hx HTN, syncope 2/2 vasovagal response now s/p PM by EP  -Dr. Sellers interrogated PM this morning, thought that one of the leads may have been dislodged, CXR reviewed looked OK, wants patient to be NPO for possible revision tomorrow, will re-interrogate in AM  -5.5 cm descending thoracic aneyursym- Continue amlodipine 10mg qd, labetolol 100mg TID per Dr. Vazquez  -monitor hr/bp/tele    Respiratory: 02 Sat = 98% on RA.  -If on oxygen wean to RA from for O2 Sat > 93%.  -Encourage ambulation, C+DB and Use of IS 10x / hr while awake.  -CXR- stable    GI: Stable.  -protonix for GI PPX.  -Continue bowel regimen  -PO Diet, NPO p MN    Renal / : ? CKD, baseline Cr unkown, BUN/Cr 22/2.01, trending down  -Monitor renal function.  -Monitor I/O's.    Endocrine:    -A1c 5.3  -TSH WNL    Hematologic: H&H 15/45  -f/u AM CBC    ID: Afebrile, WBC 11  -received one dose Abx per EP 2/2 Cr  -Observe for SIRS/Sepsis Syndrome.    Prophylaxis:  -DVT prophylaxis with 5000 SubQ Heparin q8h.  -SCD's    Disposition:  -PT rec IRAM

## 2018-11-22 NOTE — PROGRESS NOTE ADULT - SUBJECTIVE AND OBJECTIVE BOX
CTICU  CRITICAL  CARE  attending     Hand off received 					   Pertinent clinical, laboratory, radiographic, hemodynamic, echocardiographic, respiratory data, microbiologic data and chart were reviewed and analyzed frequently throughout the course of the day and night  Patient seen and examined with CTS/ SH attending at bedside  Pt is a 78y , Male, HEALTH ISSUES - PROBLEM Dx:  Bradycardia: Bradycardia      , FAMILY HISTORY:  No pertinent family history in first degree relatives  PAST MEDICAL & SURGICAL HISTORY:  Hypertension, unspecified type  No significant past surgical history    Patient is a 78y old  Male who presents with a chief complaint of syncope/ fusiform aneurysmal dilation of descending aorta (22 Nov 2018 12:46)      14 system review was unremarkable  acute changes include acute respiratory failure  Vital signs, hemodynamic and respiratory parameters were reviewed from the bedside nursing flowsheet.  ICU Vital Signs Last 24 Hrs  T(C): 37.4 (22 Nov 2018 14:00), Max: 37.4 (22 Nov 2018 14:00)  T(F): 99.3 (22 Nov 2018 14:00), Max: 99.3 (22 Nov 2018 14:00)  HR: 78 (22 Nov 2018 14:50) (66 - 106)  BP: 134/105 (22 Nov 2018 14:50) (115/77 - 169/108)  BP(mean): 122 (22 Nov 2018 14:50) (84 - 138)  ABP: --  ABP(mean): --  RR: 16 (22 Nov 2018 13:55) (16 - 20)  SpO2: 100% (22 Nov 2018 14:50) (91% - 100%)    Adult Advanced Hemodynamics Last 24 Hrs  CVP(mm Hg): --  CVP(cm H2O): --  CO: --  CI: --  PA: --  PA(mean): --  PCWP: --  SVR: --  SVRI: --  PVR: --  PVRI: --, ABG - ( 22 Nov 2018 14:14 )  pH, Arterial: 7.48  pH, Blood: x     /  pCO2: 28    /  pO2: 85    / HCO3: 20    / Base Excess: -1.9  /  SaO2: 97                  Intake and output was reviewed and the fluid balance was calculated  Daily     Daily   I&O's Summary    21 Nov 2018 07:01  -  22 Nov 2018 07:00  --------------------------------------------------------  IN: 615 mL / OUT: 1040 mL / NET: -425 mL        All lines and drain sites were assessed  [facility]      POCT Blood Glucose.: 140 mg/dL (22 Nov 2018 16:48)    No acute change in mental status  Auscultation of the chest reveals equal bs  Abdomen is soft  Extremities are warm and well perfused  Wounds appear clean and unremarkable  Antibiotics are periop    labs  CBC Full  -  ( 22 Nov 2018 07:23 )  WBC Count : 11.0 K/uL  Hemoglobin : 15.4 g/dL  Hematocrit : 45.8 %  Platelet Count - Automated : 147 K/uL  Mean Cell Volume : 93.7 fL  Mean Cell Hemoglobin : 31.5 pg  Mean Cell Hemoglobin Concentration : 33.6 g/dL  Auto Neutrophil # : x  Auto Lymphocyte # : x  Auto Monocyte # : x  Auto Eosinophil # : x  Auto Basophil # : x  Auto Neutrophil % : 74.2 %  Auto Lymphocyte % : 13.7 %  Auto Monocyte % : 9.5 %  Auto Eosinophil % : 2.3 %  Auto Basophil % : 0.3 %    11-22    141  |  107  |  25<H>  ----------------------------<  123<H>  4.2   |  19<L>  |  2.01<H>    Ca    9.7      22 Nov 2018 14:33  Phos  3.5     11-22  Mg     2.4     11-22    TPro  8.1  /  Alb  4.5  /  TBili  1.6<H>  /  DBili  x   /  AST  22  /  ALT  11  /  AlkPhos  73  11-22    PT/INR - ( 21 Nov 2018 03:37 )   PT: 13.4 sec;   INR: 1.18          PTT - ( 21 Nov 2018 03:37 )  PTT:28.3 sec  The current medications were reviewed   MEDICATIONS  (STANDING):  dextrose 5%. 1000 milliLiter(s) (50 mL/Hr) IV Continuous <Continuous>  dextrose 50% Injectable 12.5 Gram(s) IV Push once  dextrose 50% Injectable 25 Gram(s) IV Push once  dextrose 50% Injectable 25 Gram(s) IV Push once  docusate sodium 100 milliGRAM(s) Oral three times a day  heparin  Injectable 5000 Unit(s) SubCutaneous every 8 hours  insulin lispro (HumaLOG) corrective regimen sliding scale   SubCutaneous Before meals and at bedtime  pantoprazole    Tablet 40 milliGRAM(s) Oral before breakfast  senna 2 Tablet(s) Oral at bedtime  sertraline 25 milliGRAM(s) Oral daily  sodium chloride 0.9% lock flush 3 milliLiter(s) IV Push every 8 hours  testosterone patch 4 mG/24 Hr(s) 4 milliGRAM(s) Transdermal daily    MEDICATIONS  (PRN):  dextrose 40% Gel 15 Gram(s) Oral once PRN Blood Glucose LESS THAN 70 milliGRAM(s)/deciliter  glucagon  Injectable 1 milliGRAM(s) IntraMuscular once PRN Glucose LESS THAN 70 milligrams/deciliter  polyethylene glycol 3350 17 Gram(s) Oral daily PRN Constipation       PROBLEM LIST/ ASSESSMENT:  HEALTH ISSUES - PROBLEM Dx:  Bradycardia: Bradycardia      ,   Patient is a 78y old  Male who presents with a chief complaint of syncope/ fusiform aneurysmal dilation of descending aorta (22 Nov 2018 12:46)     s/p cardiac surgery      My plan includes :  close hemodynamic, ventilatory and drain monitoring and management per post op routine    Monitor for arrhythmias and monitor parameters for organ perfusion  monitor neurologic status  Head of the bed should remain elevated to 45 deg .   chest PT and IS will be encouraged  monitor adequacy of oxygenation and ventilation and attempt to wean oxygen  Nutritional goals will be met using po eventually , ensure adequate caloric intake and montior the same  Stress ulcer and VTE prophylaxis will be achieved    Glycemic control is satisfactory  Electrolytes have been repleted as necessary and wound care has been carried out. Pain control has been achieved.   agressive physical therapy and early mobility and ambulation goals will be met   The family was updated about the course and plan  CRITICAL CARE TIME SPENT in evaluation and management, reassessments, review and interpretation of labs and x-rays, ventilator and hemodynamic management, formulating a plan and coordinating care: ___90____ MIN.  Time does not include procedural time.  CTICU ATTENDING     					    Harley Vazquez MD

## 2018-11-22 NOTE — CHART NOTE - NSCHARTNOTEFT_GEN_A_CORE
Called to patients bedside about 14:00 by RN because of AMS.  Patient seen and examined at bedside, AAOx3, neuro exam WNL, no focal deficits, hemodynamically stable with HR in 70s, BP 110s, NSR/paced rhythm.  Dr. Vazquez called to bedside. Per Dr. Vazquez Albumin 25% 250cc x 2, and Kevin (total of 3ml in about 15 minutes given), with rise in patients BP to 130-140s, with improvement.  Patient ordered for CT head and chest, (still pending), and Dr. Sellers called.  Dr. Sellers to review PM with potential for lead revision in AM.    -Patient remained hemodynamically stable and neurologically intact entire time. Per Dr. Vazquez, D/C all BP meds including Labetolol 100mg that was started this AM 2/2 descending thoracic aneurysm for BP control. Called to patients bedside about 14:00 by RN because of AMS.  Patient seen and examined at bedside, AAOx3, neuro exam WNL, no focal deficits, hemodynamically stable with HR in 70s, BP 110s, NSR/paced rhythm.  Dr. Vazquez called to bedside. Per Dr. Vazquez Albumin 25% 250cc x 2, and Kevin (total of 3ml in about 15 minutes given), with rise in patients BP to 130-140s, with improvement. Labs/ABG/CXR reviewed by myself and Dr. Vazquez- acceptable. Patient ordered for CT head and chest, (still pending), and Dr. Sellers called.  Dr. Sellers to review PM with potential for lead revision in AM.    -Patient remained hemodynamically stable and neurologically intact entire time. Per Dr. Vazquez, D/C all BP meds including Labetolol 100mg that was started this AM 2/2 descending thoracic aneurysm for BP control.

## 2018-11-22 NOTE — PHYSICAL THERAPY INITIAL EVALUATION ADULT - GAIT DEVIATIONS NOTED, PT EVAL
decreased step length/dec L step length, unsteady, veers to R, stooped posture, increased lateral sway, dec L heel strike/decreased weight-shifting ability dec L step length, unsteady, veers to R, stooped posture, increased lateral sway, dec L heel strike, SOB, MCNALLY, desat to 92% on room air/decreased step length/decreased weight-shifting ability

## 2018-11-22 NOTE — PROGRESS NOTE ADULT - SUBJECTIVE AND OBJECTIVE BOX
Patient discussed on morning rounds with Dr. Panda/Dr. Vazquez     Operation / Date:  s/p PPM 11/22/18    SUBJECTIVE ASSESSMENT:  78y Male seen and examined. Feels well, offers no acute complaints. Denies fever, chest pain, dizziness, weakness, changes in vision, palpitations, SOB, abdominal pain, n/v.  He worked with PT today is rec IRAM. D    Vital Signs Last 24 Hrs  T(C): 37.3 (22 Nov 2018 09:00), Max: 37.3 (22 Nov 2018 09:00)  T(F): 99.2 (22 Nov 2018 09:00), Max: 99.2 (22 Nov 2018 09:00)  HR: 80 (22 Nov 2018 12:02) (80 - 106)  BP: 142/85 (22 Nov 2018 12:02) (140/93 - 169/108)  BP(mean): 102 (22 Nov 2018 12:02) (102 - 138)  RR: 16 (22 Nov 2018 12:02) (16 - 27)  SpO2: 100% (22 Nov 2018 12:02) (97% - 100%)  I&O's Detail    21 Nov 2018 07:01  -  22 Nov 2018 07:00  --------------------------------------------------------  IN:    lactated ringers.: 45 mL    Oral Fluid: 570 mL  Total IN: 615 mL    OUT:    Indwelling Catheter - Urethral: 340 mL    Voided: 700 mL  Total OUT: 1040 mL    Total NET: -425 mL      CHEST TUBE:  No  BRUNA DRAIN:  No.  EPICARDIAL WIRES: No.  TIE DOWNS:No.  CASILLAS: No.    PHYSICAL EXAM:    General: Patient lying comfortably in bed, no acute distress     Neurological: Alert and oriented. No focal neurological deficits     Cardiovascular: S1S2, RRR, no murmurs appreciated on exam     Respiratory: Clear to ausculation bilaterally, no wheeze/rhonchi/rales    Gastrointestinal: + BS, soft, non tender, non distended     Extremities: Warm and well perfused. No edema, no calf tenderness     Vascular: 2+ Peripheral pulses b/l     Incision Sites: PPM site: CDI, soft no hematoma/signs of infection.     LABS:                        15.4   11.0  )-----------( 147      ( 22 Nov 2018 07:23 )             45.8       COUMADIN:  No    PT/INR - ( 21 Nov 2018 03:37 )   PT: 13.4 sec;   INR: 1.18          PTT - ( 21 Nov 2018 03:37 )  PTT:28.3 sec    11-22    139  |  103  |  22  ----------------------------<  116<H>  4.2   |  21<L>  |  2.01<H>    Ca    9.9      22 Nov 2018 07:23  Phos  2.7     11-21  Mg     2.3     11-21    TPro  7.7  /  Alb  4.3  /  TBili  1.2  /  DBili  x   /  AST  19  /  ALT  12  /  AlkPhos  74  11-21          MEDICATIONS  (STANDING):  amLODIPine   Tablet 10 milliGRAM(s) Oral daily  dextrose 5%. 1000 milliLiter(s) (50 mL/Hr) IV Continuous <Continuous>  dextrose 50% Injectable 12.5 Gram(s) IV Push once  dextrose 50% Injectable 25 Gram(s) IV Push once  dextrose 50% Injectable 25 Gram(s) IV Push once  docusate sodium 100 milliGRAM(s) Oral three times a day  heparin  Injectable 5000 Unit(s) SubCutaneous every 8 hours  insulin lispro (HumaLOG) corrective regimen sliding scale   SubCutaneous Before meals and at bedtime  labetalol 100 milliGRAM(s) Oral every 8 hours  pantoprazole    Tablet 40 milliGRAM(s) Oral before breakfast  senna 2 Tablet(s) Oral at bedtime  sodium chloride 0.9% lock flush 3 milliLiter(s) IV Push every 8 hours    MEDICATIONS  (PRN):  dextrose 40% Gel 15 Gram(s) Oral once PRN Blood Glucose LESS THAN 70 milliGRAM(s)/deciliter  glucagon  Injectable 1 milliGRAM(s) IntraMuscular once PRN Glucose LESS THAN 70 milligrams/deciliter  polyethylene glycol 3350 17 Gram(s) Oral daily PRN Constipation        RADIOLOGY & ADDITIONAL TESTS:

## 2018-11-22 NOTE — PHYSICAL THERAPY INITIAL EVALUATION ADULT - GENERAL OBSERVATIONS, REHAB EVAL
Received sitting in chair in NAD, denies pain +EKG, IV hep. Left as found +RN Cordelia aware, call rincon in reach

## 2018-11-22 NOTE — PROGRESS NOTE ADULT - SUBJECTIVE AND OBJECTIVE BOX
EPS Progress Note    S: The patient is without overnight events or complaints.  Denies any pain or discomfort at site of device implant.      T(C): 37.2 (11-22-18 @ 17:39), Max: 37.4 (11-22-18 @ 14:00)  HR: 78 (11-22-18 @ 14:50) (66 - 106)  BP: 134/105 (11-22-18 @ 14:50) (115/77 - 169/108)  RR: 16 (11-22-18 @ 13:55) (16 - 20)  SpO2: 100% (11-22-18 @ 14:50) (91% - 100%)  Wt(kg): --     Telemetry:           General:  No acute distress      Chest:  Chest is clear to auscultation bilaterally without wheezes, crackles, or rhonchi  Device site:  Site is healing well without signs of hematoma or infection  Cardiac:  Regular rate and rhythm.  No murmur, rubs, or gallops heard.  Abdomen:  Soft without rebound or guarding.  Bowel sounds are presnt in all 4 quadrants.  No hepatosplenomegaly  Extremities:  No lower extremity edema is present.  No cyanosis or clubbing       MEDICATIONS  (STANDING):  dextrose 5%. 1000 milliLiter(s) (50 mL/Hr) IV Continuous <Continuous>  dextrose 50% Injectable 12.5 Gram(s) IV Push once  dextrose 50% Injectable 25 Gram(s) IV Push once  dextrose 50% Injectable 25 Gram(s) IV Push once  docusate sodium 100 milliGRAM(s) Oral three times a day  heparin  Injectable 5000 Unit(s) SubCutaneous every 8 hours  insulin lispro (HumaLOG) corrective regimen sliding scale   SubCutaneous Before meals and at bedtime  pantoprazole    Tablet 40 milliGRAM(s) Oral before breakfast  senna 2 Tablet(s) Oral at bedtime  sertraline 25 milliGRAM(s) Oral daily  sodium chloride 0.9% lock flush 3 milliLiter(s) IV Push every 8 hours  testosterone patch 4 mG/24 Hr(s) 4 milliGRAM(s) Transdermal daily    MEDICATIONS  (PRN):  dextrose 40% Gel 15 Gram(s) Oral once PRN Blood Glucose LESS THAN 70 milliGRAM(s)/deciliter  glucagon  Injectable 1 milliGRAM(s) IntraMuscular once PRN Glucose LESS THAN 70 milligrams/deciliter  polyethylene glycol 3350 17 Gram(s) Oral daily PRN Constipation         Device Intterogation:  device parameters reviewed and R wave sensing has decreased    Labs:                                                               13.7   12.2  )-----------( 158      ( 22 Nov 2018 17:07 )             41.3     11-22    141  |  107  |  25<H>  ----------------------------<  123<H>  4.2   |  19<L>  |  2.01<H>    Ca    9.7      22 Nov 2018 14:33  Phos  3.5     11-22  Mg     2.4     11-22    TPro  8.1  /  Alb  4.5  /  TBili  1.6<H>  /  DBili  x   /  AST  22  /  ALT  11  /  AlkPhos  73  11-22    PT/INR - ( 21 Nov 2018 03:37 )   PT: 13.4 sec;   INR: 1.18          PTT - ( 21 Nov 2018 03:37 )  PTT:28.3 sec      CXR:  Device position is appropriate and no evidence of pneumothorax    Assessment/Plan:    Mr. Carrasquillo is a 79 y/o male with AAA repair and vasovagal syncope s/p dual chamber PPM.  Decreased R wave sensing today, but lead remains in same position as implant on CXR.  Will re-evaluate tomorrow.  If values remain low, will consider lead revision  -NPO at midnight.

## 2018-11-22 NOTE — CHART NOTE - NSCHARTNOTEFT_GEN_A_CORE
Called by Dr. Sellers for changes in capture of new pacemaker.   BiotroniAdAdapted  brought to bedside.  Pacing mode DDD changed to AAI because of decreasing capture amplitude.   Discussed with Dr. Sellers who will see patient tomorrow.   Please call cardiology fellow overnight for any concerns.   Interrogation strip in chart.

## 2018-11-23 ENCOUNTER — TRANSCRIPTION ENCOUNTER (OUTPATIENT)
Age: 78
End: 2018-11-23

## 2018-11-23 VITALS
OXYGEN SATURATION: 98 % | RESPIRATION RATE: 12 BRPM | SYSTOLIC BLOOD PRESSURE: 129 MMHG | HEART RATE: 92 BPM | DIASTOLIC BLOOD PRESSURE: 106 MMHG

## 2018-11-23 PROBLEM — Z00.00 ENCOUNTER FOR PREVENTIVE HEALTH EXAMINATION: Status: ACTIVE | Noted: 2018-11-23

## 2018-11-23 PROBLEM — I10 ESSENTIAL (PRIMARY) HYPERTENSION: Chronic | Status: ACTIVE | Noted: 2018-11-18

## 2018-11-23 LAB
ANION GAP SERPL CALC-SCNC: 14 MMOL/L — SIGNIFICANT CHANGE UP (ref 5–17)
BUN SERPL-MCNC: 31 MG/DL — HIGH (ref 7–23)
CALCIUM SERPL-MCNC: 9.9 MG/DL — SIGNIFICANT CHANGE UP (ref 8.4–10.5)
CHLORIDE SERPL-SCNC: 107 MMOL/L — SIGNIFICANT CHANGE UP (ref 96–108)
CO2 SERPL-SCNC: 22 MMOL/L — SIGNIFICANT CHANGE UP (ref 22–31)
CREAT SERPL-MCNC: 2.1 MG/DL — HIGH (ref 0.5–1.3)
GLUCOSE BLDC GLUCOMTR-MCNC: 125 MG/DL — HIGH (ref 70–99)
GLUCOSE BLDC GLUCOMTR-MCNC: 163 MG/DL — HIGH (ref 70–99)
GLUCOSE SERPL-MCNC: 138 MG/DL — HIGH (ref 70–99)
HCT VFR BLD CALC: 41.4 % — SIGNIFICANT CHANGE UP (ref 39–50)
HGB BLD-MCNC: 13.8 G/DL — SIGNIFICANT CHANGE UP (ref 13–17)
MAGNESIUM SERPL-MCNC: 2.5 MG/DL — SIGNIFICANT CHANGE UP (ref 1.6–2.6)
MCHC RBC-ENTMCNC: 31.4 PG — SIGNIFICANT CHANGE UP (ref 27–34)
MCHC RBC-ENTMCNC: 33.3 G/DL — SIGNIFICANT CHANGE UP (ref 32–36)
MCV RBC AUTO: 94.1 FL — SIGNIFICANT CHANGE UP (ref 80–100)
PLATELET # BLD AUTO: 175 K/UL — SIGNIFICANT CHANGE UP (ref 150–400)
POTASSIUM SERPL-MCNC: 3.6 MMOL/L — SIGNIFICANT CHANGE UP (ref 3.5–5.3)
POTASSIUM SERPL-SCNC: 3.6 MMOL/L — SIGNIFICANT CHANGE UP (ref 3.5–5.3)
RBC # BLD: 4.4 M/UL — SIGNIFICANT CHANGE UP (ref 4.2–5.8)
RBC # FLD: 13.5 % — SIGNIFICANT CHANGE UP (ref 10.3–16.9)
SODIUM SERPL-SCNC: 143 MMOL/L — SIGNIFICANT CHANGE UP (ref 135–145)
WBC # BLD: 10.3 K/UL — SIGNIFICANT CHANGE UP (ref 3.8–10.5)
WBC # FLD AUTO: 10.3 K/UL — SIGNIFICANT CHANGE UP (ref 3.8–10.5)

## 2018-11-23 RX ORDER — SERTRALINE 25 MG/1
1 TABLET, FILM COATED ORAL
Qty: 0 | Refills: 0 | COMMUNITY
Start: 2018-11-23

## 2018-11-23 RX ORDER — LOSARTAN POTASSIUM 100 MG/1
0 TABLET, FILM COATED ORAL
Qty: 0 | Refills: 0 | COMMUNITY

## 2018-11-23 RX ORDER — IPRATROPIUM BROMIDE 0.2 MG/ML
500 SOLUTION, NON-ORAL INHALATION EVERY 6 HOURS
Qty: 0 | Refills: 0 | Status: DISCONTINUED | OUTPATIENT
Start: 2018-11-23 | End: 2018-11-23

## 2018-11-23 RX ORDER — SENNA PLUS 8.6 MG/1
2 TABLET ORAL
Qty: 0 | Refills: 0 | COMMUNITY
Start: 2018-11-23

## 2018-11-23 RX ORDER — FLUDROCORTISONE ACETATE 0.1 MG/1
1 TABLET ORAL
Qty: 0 | Refills: 0 | COMMUNITY
Start: 2018-11-23

## 2018-11-23 RX ORDER — POLYETHYLENE GLYCOL 3350 17 G/17G
17 POWDER, FOR SOLUTION ORAL
Qty: 0 | Refills: 0 | COMMUNITY
Start: 2018-11-23

## 2018-11-23 RX ORDER — PANTOPRAZOLE SODIUM 20 MG/1
1 TABLET, DELAYED RELEASE ORAL
Qty: 0 | Refills: 0 | COMMUNITY
Start: 2018-11-23

## 2018-11-23 RX ORDER — DOCUSATE SODIUM 100 MG
1 CAPSULE ORAL
Qty: 0 | Refills: 0 | COMMUNITY
Start: 2018-11-23

## 2018-11-23 RX ORDER — AMLODIPINE BESYLATE 2.5 MG/1
1 TABLET ORAL
Qty: 0 | Refills: 0 | COMMUNITY

## 2018-11-23 RX ADMIN — Medication 2: at 12:17

## 2018-11-23 RX ADMIN — SODIUM CHLORIDE 3 MILLILITER(S): 9 INJECTION INTRAMUSCULAR; INTRAVENOUS; SUBCUTANEOUS at 06:39

## 2018-11-23 RX ADMIN — Medication 4 MILLIGRAM(S): at 06:39

## 2018-11-23 RX ADMIN — HEPARIN SODIUM 5000 UNIT(S): 5000 INJECTION INTRAVENOUS; SUBCUTANEOUS at 13:55

## 2018-11-23 RX ADMIN — Medication 500 MICROGRAM(S): at 11:54

## 2018-11-23 RX ADMIN — Medication 100 MILLIGRAM(S): at 13:55

## 2018-11-23 RX ADMIN — HEPARIN SODIUM 5000 UNIT(S): 5000 INJECTION INTRAVENOUS; SUBCUTANEOUS at 06:39

## 2018-11-23 RX ADMIN — SERTRALINE 25 MILLIGRAM(S): 25 TABLET, FILM COATED ORAL at 11:54

## 2018-11-23 RX ADMIN — FLUDROCORTISONE ACETATE 0.1 MILLIGRAM(S): 0.1 TABLET ORAL at 11:49

## 2018-11-23 RX ADMIN — SODIUM CHLORIDE 3 MILLILITER(S): 9 INJECTION INTRAMUSCULAR; INTRAVENOUS; SUBCUTANEOUS at 13:32

## 2018-11-23 NOTE — PROGRESS NOTE ADULT - SUBJECTIVE AND OBJECTIVE BOX
Patient discussed on morning rounds with Dr. Panda/Dr. Godoy    Operation / Date:  s/p PPM 11/22/18    SUBJECTIVE ASSESSMENT:  78y Male seen and examined. Called to patients bedside by RN around 08:30 because the patient fell. Patient states that he was sitting up in chair with his legs up on chair facing him. He wanted to "scoot" up in chair so he pushed with his legs off of the adjacent chair which slid out from under him, and slid in between both chairs landing on his buttock.  He denies any pain, LOC, dizziness, weakness, trying to get up and walk, loss of balance, head trauma. Other wise feels well today, is ambulating with PT, using IS pulling 500cc, tolerating PO diet.  He is agreeable to rehab.  Denies fever, chest pain, palpitations, SOB, abdominal pain, n/v.     Vital Signs Last 24 Hrs  T(C): 37.2 (23 Nov 2018 05:01), Max: 37.4 (22 Nov 2018 14:00)  T(F): 98.9 (23 Nov 2018 05:01), Max: 99.3 (22 Nov 2018 14:00)  HR: 96 (23 Nov 2018 08:21) (66 - 96)  BP: 134/96 (23 Nov 2018 08:21) (115/77 - 158/86)  BP(mean): 117 (23 Nov 2018 08:21) (84 - 128)  RR: 15 (23 Nov 2018 08:21) (15 - 17)  SpO2: 94% (23 Nov 2018 08:21) (91% - 100%)  I&O's Detail    22 Nov 2018 07:01  -  23 Nov 2018 07:00  --------------------------------------------------------  IN:    Albumin 5%  - 250 mL: 500 mL    Oral Fluid: 600 mL  Total IN: 1100 mL    OUT:    Voided: 725 mL  Total OUT: 725 mL    Total NET: 375 mL        PHYSICAL EXAM:    PHYSICAL EXAM:    General: Patient lying comfortably in bed, no acute distress     Neurological: Alert and oriented x 3. No focal neurological deficits. 5/5 b/l UE and LE strength.     Cardiovascular: S1S2, RRR, no murmurs appreciated on exam     Respiratory: Clear to ausculation bilaterally, no wheeze/rhonchi/rales    Gastrointestinal: + BS, soft, non tender, non distended     Extremities: Warm and well perfused. No edema, no calf tenderness     Vascular: 2+ Peripheral pulses b/l     Incision Sites: PPM site: CDI, soft no hematoma/signs of infection.       LABS:                        13.8   10.3  )-----------( 175      ( 23 Nov 2018 06:51 )             41.4       COUMADIN:  No        11-23    143  |  107  |  31<H>  ----------------------------<  138<H>  3.6   |  22  |  2.10<H>    Ca    9.9      23 Nov 2018 06:51  Phos  3.5     11-22  Mg     2.5     11-23    TPro  8.1  /  Alb  4.5  /  TBili  1.6<H>  /  DBili  x   /  AST  22  /  ALT  11  /  AlkPhos  73  11-22          MEDICATIONS  (STANDING):  dextrose 5%. 1000 milliLiter(s) (50 mL/Hr) IV Continuous <Continuous>  dextrose 50% Injectable 12.5 Gram(s) IV Push once  dextrose 50% Injectable 25 Gram(s) IV Push once  dextrose 50% Injectable 25 Gram(s) IV Push once  docusate sodium 100 milliGRAM(s) Oral three times a day  fludroCORTISONE 0.1 milliGRAM(s) Oral every 12 hours  heparin  Injectable 5000 Unit(s) SubCutaneous every 8 hours  insulin lispro (HumaLOG) corrective regimen sliding scale   SubCutaneous Before meals and at bedtime  pantoprazole    Tablet 40 milliGRAM(s) Oral before breakfast  senna 2 Tablet(s) Oral at bedtime  sertraline 25 milliGRAM(s) Oral daily  sodium chloride 0.9% lock flush 3 milliLiter(s) IV Push every 8 hours  testosterone patch 4 mG/24 Hr(s) 4 milliGRAM(s) Transdermal daily    MEDICATIONS  (PRN):  dextrose 40% Gel 15 Gram(s) Oral once PRN Blood Glucose LESS THAN 70 milliGRAM(s)/deciliter  glucagon  Injectable 1 milliGRAM(s) IntraMuscular once PRN Glucose LESS THAN 70 milligrams/deciliter  polyethylene glycol 3350 17 Gram(s) Oral daily PRN Constipation        RADIOLOGY & ADDITIONAL TESTS: Patient discussed on morning rounds with Dr. Panda/Dr. Godoy    Operation / Date: Incidental descending thoracic aneurysm 5.5cm. Bradycardia with vasovagal syncopne s/p PPM 11/22/18    SUBJECTIVE ASSESSMENT:  78y Male seen and examined. Called to patients bedside by RN around 08:30 because the patient fell. Patient states that he was sitting up in chair with his legs up on chair facing him. He wanted to "scoot" up in chair so he pushed with his legs off of the adjacent chair which slid out from under him, and slid in between both chairs landing on his buttock.  He denies any pain, LOC, dizziness, weakness, trying to get up and walk, loss of balance, head trauma. Other wise feels well today, is ambulating with PT, using IS pulling 500cc, tolerating PO diet.  He is agreeable to rehab.  Denies fever, chest pain, palpitations, SOB, abdominal pain, n/v.     Vital Signs Last 24 Hrs  T(C): 37.2 (23 Nov 2018 05:01), Max: 37.4 (22 Nov 2018 14:00)  T(F): 98.9 (23 Nov 2018 05:01), Max: 99.3 (22 Nov 2018 14:00)  HR: 96 (23 Nov 2018 08:21) (66 - 96)  BP: 134/96 (23 Nov 2018 08:21) (115/77 - 158/86)  BP(mean): 117 (23 Nov 2018 08:21) (84 - 128)  RR: 15 (23 Nov 2018 08:21) (15 - 17)  SpO2: 94% (23 Nov 2018 08:21) (91% - 100%)  I&O's Detail    22 Nov 2018 07:01  -  23 Nov 2018 07:00  --------------------------------------------------------  IN:    Albumin 5%  - 250 mL: 500 mL    Oral Fluid: 600 mL  Total IN: 1100 mL    OUT:    Voided: 725 mL  Total OUT: 725 mL    Total NET: 375 mL        PHYSICAL EXAM:    PHYSICAL EXAM:    General: Patient lying comfortably in bed, no acute distress     Neurological: Alert and oriented x 3. No focal neurological deficits. 5/5 b/l UE and LE strength.     Cardiovascular: S1S2, RRR, no murmurs appreciated on exam     Respiratory: Clear to ausculation bilaterally, no wheeze/rhonchi/rales    Gastrointestinal: + BS, soft, non tender, non distended     Extremities: Warm and well perfused. No edema, no calf tenderness     Vascular: 2+ Peripheral pulses b/l     Incision Sites: PPM site: CDI, soft no hematoma/signs of infection.       LABS:                        13.8   10.3  )-----------( 175      ( 23 Nov 2018 06:51 )             41.4       COUMADIN:  No        11-23    143  |  107  |  31<H>  ----------------------------<  138<H>  3.6   |  22  |  2.10<H>    Ca    9.9      23 Nov 2018 06:51  Phos  3.5     11-22  Mg     2.5     11-23    TPro  8.1  /  Alb  4.5  /  TBili  1.6<H>  /  DBili  x   /  AST  22  /  ALT  11  /  AlkPhos  73  11-22          MEDICATIONS  (STANDING):  dextrose 5%. 1000 milliLiter(s) (50 mL/Hr) IV Continuous <Continuous>  dextrose 50% Injectable 12.5 Gram(s) IV Push once  dextrose 50% Injectable 25 Gram(s) IV Push once  dextrose 50% Injectable 25 Gram(s) IV Push once  docusate sodium 100 milliGRAM(s) Oral three times a day  fludroCORTISONE 0.1 milliGRAM(s) Oral every 12 hours  heparin  Injectable 5000 Unit(s) SubCutaneous every 8 hours  insulin lispro (HumaLOG) corrective regimen sliding scale   SubCutaneous Before meals and at bedtime  pantoprazole    Tablet 40 milliGRAM(s) Oral before breakfast  senna 2 Tablet(s) Oral at bedtime  sertraline 25 milliGRAM(s) Oral daily  sodium chloride 0.9% lock flush 3 milliLiter(s) IV Push every 8 hours  testosterone patch 4 mG/24 Hr(s) 4 milliGRAM(s) Transdermal daily    MEDICATIONS  (PRN):  dextrose 40% Gel 15 Gram(s) Oral once PRN Blood Glucose LESS THAN 70 milliGRAM(s)/deciliter  glucagon  Injectable 1 milliGRAM(s) IntraMuscular once PRN Glucose LESS THAN 70 milligrams/deciliter  polyethylene glycol 3350 17 Gram(s) Oral daily PRN Constipation        RADIOLOGY & ADDITIONAL TESTS:

## 2018-11-23 NOTE — PROGRESS NOTE ADULT - PROVIDER SPECIALTY LIST ADULT
CT Surgery
Critical Care
Electrophysiology
CT Surgery

## 2018-11-23 NOTE — DISCHARGE NOTE ADULT - HOSPITAL COURSE
78 year old M, PMHx PMHx significant for HTN, who presented to the ED after having an unwitnessed syncopal episode. He was transferred from [facility] [place] for management of Descending thoracic aortic aneurysm.  He had a syncopal episode and was found down by his wife.  EMS was called and patient was found to be bradycardic when EMS arrived, given Atropine X1.  CTA showed fusiform aneurysmal dilation of descending aorta measuring 5.5cm with what looks like extensive mural thrombus, soft plaque vs thrombosed false lumen.  CT head reviewed no acute process noted. He was started on and Esmolol drip and transferred to [facility] under the care of Dr. Garcia. Medical management for Descending thoracic aneurysm. Patient had dual chamber [employer] PM placed by EP 2/2 symptomatic bradycardia with vasovagal syncope.  Patient then transferred to floor. POD1, AMS 2/2 relative hypotension, exam non focal, resolved with sly push, CT head negative for acute process. All HTN agents stopped, put on zoloft, fludrocortisone, test patch per Dr. Vazquez. EPS Dr. Sellers interrogated PM, changed settings to AAI 60.  Today POD2, Dr. Sellers re-interrogated PM with no issues. Patient is doing well, he is ambulating with PT, offers no acute complaints. Per Dr. Vazquez patient to be d/c on Fludrocortisone 0.1mg BID x 5 days followed by 0.1mg QD, zoloft 25mg QD, and Hydralazine 25mg PO PRN for systolic BP > 180. Patient will f/u with Dr. Garcia, Dr. Martin, and Dr. Sellers.  Per Dr. Panda and Dr. Vazquez patient ready for discharge to [facility].

## 2018-11-23 NOTE — DISCHARGE NOTE ADULT - MEDICATION SUMMARY - MEDICATIONS TO TAKE
I will START or STAY ON the medications listed below when I get home from the hospital:    fludrocortisone 0.1 mg oral tablet  -- 1 tab(s) by mouth every 12 hours x 5 days (End date: 11/27/18). Then 1 tab PO QD (start date 11/28/18)  -- Indication: For Steroid    sertraline 25 mg oral tablet  -- 1 tab(s) by mouth once a day  -- Indication: For Mental status    docusate sodium 100 mg oral capsule  -- 1 cap(s) by mouth 3 times a day  -- Indication: For Stool softer- hold for loose stool    senna oral tablet  -- 2 tab(s) by mouth once a day (at bedtime)  -- Indication: For laxative - hold for loose stool    polyethylene glycol 3350 oral powder for reconstitution  -- 17 gram(s) by mouth once a day, As needed, Constipation  -- Indication: For Laxative- as needed for constipation    pantoprazole 40 mg oral delayed release tablet  -- 1 tab(s) by mouth once a day (before a meal)  -- Indication: For Stomach protection    testosterone 4 mg/24 hr transdermal film, extended release  -- 1 patch by transdermal patch once a day x 10 days  -- Indication: For Hormone     hydrALAZINE 25 mg oral tablet  -- 1 tab(s) by mouth 2 times a day, As Needed for systolic BP > 180  -- Indication: For As needed for blood pressure

## 2018-11-23 NOTE — PROGRESS NOTE ADULT - ASSESSMENT
78 year old male with PMHx significant for HTN, who presented to the ED after having an unwitnessed syncopal episode. He was transferred from [facility] for management of Descending thoracic aortic aneurysm.  He had a syncopal episode and was found down by his wife.  EMS was called and patient was found to be bradycardic when EMS arrived, given Atropine X1.  CTA showed fusiform aneurysmal dilation of descending aorta measuring 5.5cm with what looks like extensive mural thrombus, soft plaque vs thrombosed false lumen.  CT head reviewed no acute process noted. He was started on and Esmolol drip and transferred to [facility] under the care of Dr. Garcia. Medical management for Descending thoracic aneurysm. Patient had CLS PM placed by EP 2/2 symptomatic bradycardia with vasovagal syncope.  Patient then transferred to floor. POD1, AMS 2/2 relative hypotension, exam non focal, resolved with sly push, CT head negative for acute process. All HTN agents stopped, put on zoloft, fludrocortisone, test patch per Dr. Vazquez.     A/P:  Neurovascular: No delirium. No pain.  -Syncope now s/p PPM.    Cardiovascular: Hemodynamically stable. HR controlled.  -Hx HTN, syncope 2/2 vasovagal response now s/p PM by EP  -Dr. Sellers interrogated PM yesterday, thought that one of the leads may have been dislodged, CXR reviewed looked OK, currently NPO, for possible revision today. Dr. Sellers to f/u this AM to re-interrogate  -Cardiology fellow changed settings of PM last night, -AAI 60  -5.5 cm descending thoracic aneyursym- allowing permissive -160 per Dr. Vazquez and Hemli  2/2 episode yesterday of AMS in setting of relative hypotension.   -monitor hr/bp/tele    Respiratory: 02 Sat = 98% on RA.  -If on oxygen wean to RA from for O2 Sat > 93%.  -Encourage ambulation, C+DB and Use of IS 10x / hr while awake.  -CXR- stable    GI: Stable.  -protonix for GI PPX.  -Continue bowel regimen  -Currently NPO for possible lead revision    Renal / : ? CKD, baseline Cr unkown, BUN/Cr 31/2.10  -Monitor renal function.  -Monitor I/O's.  -encourage PO hydration     Endocrine:    -A1c 5.3  -TSH WNL    Hematologic: H&H 13/41  -f/u AM CBC    ID: Afebrile, WBC 10  -Observe for SIRS/Sepsis Syndrome.    Prophylaxis:  -DVT prophylaxis with 5000 SubQ Heparin q8h.  -SCD's    Disposition:  -PT rec IRAM 78 year old male with PMHx significant for HTN, who presented to the ED after having an unwitnessed syncopal episode. He was transferred from [facility] for management of Descending thoracic aortic aneurysm.  He had a syncopal episode and was found down by his wife.  EMS was called and patient was found to be bradycardic when EMS arrived, given Atropine X1.  CTA showed fusiform aneurysmal dilation of descending aorta measuring 5.5cm with what looks like extensive mural thrombus, soft plaque vs thrombosed false lumen.  CT head reviewed no acute process noted. He was started on and Esmolol drip and transferred to [facility] under the care of Dr. Garcia. Medical management for Descending thoracic aneurysm. Patient had CLS PM placed by EP 2/2 symptomatic bradycardia with vasovagal syncope.  Patient then transferred to floor. POD1, AMS 2/2 relative hypotension, exam non focal, resolved with sly push, CT head negative for acute process. All HTN agents stopped, put on zoloft, fludrocortisone, test patch per Dr. Vazquez.     A/P:  Neurovascular: No delirium. No pain.  -Syncope now s/p PPM.    Cardiovascular: Hemodynamically stable. HR controlled.  -Hx HTN, syncope 2/2 vasovagal response now s/p PM by EP  -Dr. Sellers interrogated PM yesterday, thought that one of the leads may have been dislodged, CXR reviewed looked OK, currently NPO, for possible revision today. Dr. Sellers to f/u this AM to re-interrogate  -Cardiology fellow changed settings of PM last night, -AAI 60  -5.5 cm descending thoracic aneyursym- allowing permissive -160 per Dr. Vazquez and Hemli  2/2 episode yesterday of AMS in setting of relative hypotension.   -monitor hr/bp/tele    Respiratory: 02 Sat = 98% on RA.  -If on oxygen wean to RA from for O2 Sat > 93%.  -Encourage ambulation, C+DB and Use of IS 10x / hr while awake.  -CXR- stable    GI: Stable.  -protonix for GI PPX.  -Continue bowel regimen  -Currently NPO for possible lead revision    Renal / : ? CKD, baseline Cr unkown, BUN/Cr 31/2.10  -Monitor renal function.  -Monitor I/O's.  -encourage PO hydration     Endocrine:    -A1c 5.3  -TSH WNL    Hematologic: H&H 13/41  -f/u AM CBC    ID: Afebrile, WBC 10  -Observe for SIRS/Sepsis Syndrome.    Misc  -Called to patients bedside by RN around 08:30 because the patient fell. Seen and examined at bedside. Patient states that he was sitting up in chair with his legs up on chair facing him. He wanted to "scoot" up in chair so he pushed with his legs off of the adjacent chair which slid out from under him, and slid in between both chairs landing on his buttock.  He denies any pain, LOC, dizziness, weakness, trying to get up and walk, loss of balance, head trauma. BP was 130s at time of visit. Discussed with Dr. Godoy, no need for additional imaging/lab work.   -Will continue to monitor    Prophylaxis:  -DVT prophylaxis with 5000 SubQ Heparin q8h.  -SCD's    Disposition:  -PT rec IRAM

## 2018-11-23 NOTE — DISCHARGE NOTE ADULT - PATIENT PORTAL LINK FT
You can access the [facility] Patient Portal, offered by [facility], by registering with the following website: [email or web address]

## 2018-11-23 NOTE — DISCHARGE NOTE ADULT - CARE PLAN
Principal Discharge DX:	Descending aortic aneurysm  Goal:	To feel better  Assessment and plan of treatment:	-Please follow up with Dr. Garcia on 12/5/18 at 10:00am.  The office is located at [facility], [facility], 4th floor. Call us with any questions, #[phone].  -Please follow up with Dr. Sellers (Elecrophysiologist) on 12/3/18 at 11:15am for your pacemaker. Office information is listed below.   -Please follow up with Dr. Martin (neurologist) on 12/7/18 at 1:00pm. Office information listed below.     -Walk daily as tolerated and use your incentive spirometer every hour.    -No driving or strenuous activity/exercise for 6 weeks, or until cleared by your surgeon.    -You may shower.  Be sure to gently clean your incisions with anti-bacterial soap and water daily, pat dry.  You may leave them open to air.    -Call your doctor if you have shortness of breath, chest pain not relieved by pain medication, dizziness, fever >101.5, or increased redness or drainage from incisions.

## 2018-11-23 NOTE — DISCHARGE NOTE ADULT - PLAN OF CARE
To feel better -Please follow up with Dr. Garcia on 12/5/18 at 10:00am.  The office is located at [facility], [facility], 4th floor. Call us with any questions, #[phone].  -Please follow up with Dr. Sellers (Elecrophysiologist) on 12/3/18 at 11:15am for your pacemaker. Office information is listed below.   -Please follow up with Dr. Martin (neurologist) on 12/7/18 at 1:00pm. Office information listed below.     -Walk daily as tolerated and use your incentive spirometer every hour.    -No driving or strenuous activity/exercise for 6 weeks, or until cleared by your surgeon.    -You may shower.  Be sure to gently clean your incisions with anti-bacterial soap and water daily, pat dry.  You may leave them open to air.    -Call your doctor if you have shortness of breath, chest pain not relieved by pain medication, dizziness, fever >101.5, or increased redness or drainage from incisions.

## 2018-11-23 NOTE — PROGRESS NOTE ADULT - SUBJECTIVE AND OBJECTIVE BOX
EPS Progress Note    S: The patient is without overnight events or complaints.  Denies any pain or discomfort at site of device implant.   Due to poor R wave sensing device was placed in AAI mode overnight    T(C): 36.5 (11-23-18 @ 10:15), Max: 37.4 (11-22-18 @ 14:00)  HR: 96 (11-23-18 @ 08:21) (66 - 96)  BP: 134/96 (11-23-18 @ 08:21) (115/77 - 158/86)  RR: 15 (11-23-18 @ 08:21) (15 - 17)  SpO2: 94% (11-23-18 @ 08:21) (91% - 100%)  Wt(kg): --     Telemetry:  NSR          General:  No acute distress      Chest:  Chest is clear to auscultation bilaterally without wheezes, crackles, or rhonchi  Device site:  Site is healing well without signs of hematoma or infection  Cardiac:  Regular rate and rhythm.  No murmur, rubs, or gallops heard.  Device site healing well.    Abdomen:  Soft without rebound or guarding.  Bowel sounds are presnt in all 4 quadrants.  No hepatosplenomegaly  Extremities:  No lower extremity edema is present.  No cyanosis or clubbing       MEDICATIONS  (STANDING):  dextrose 50% Injectable 12.5 Gram(s) IV Push once  dextrose 50% Injectable 25 Gram(s) IV Push once  dextrose 50% Injectable 25 Gram(s) IV Push once  docusate sodium 100 milliGRAM(s) Oral three times a day  fludroCORTISONE 0.1 milliGRAM(s) Oral every 12 hours  heparin  Injectable 5000 Unit(s) SubCutaneous every 8 hours  insulin lispro (HumaLOG) corrective regimen sliding scale   SubCutaneous Before meals and at bedtime  ipratropium    for Nebulization 500 MICROGram(s) Nebulizer every 6 hours  pantoprazole    Tablet 40 milliGRAM(s) Oral before breakfast  senna 2 Tablet(s) Oral at bedtime  sertraline 25 milliGRAM(s) Oral daily  sodium chloride 0.9% lock flush 3 milliLiter(s) IV Push every 8 hours  testosterone patch 4 mG/24 Hr(s) 4 milliGRAM(s) Transdermal daily    MEDICATIONS  (PRN):  dextrose 40% Gel 15 Gram(s) Oral once PRN Blood Glucose LESS THAN 70 milliGRAM(s)/deciliter  glucagon  Injectable 1 milliGRAM(s) IntraMuscular once PRN Glucose LESS THAN 70 milligrams/deciliter  polyethylene glycol 3350 17 Gram(s) Oral daily PRN Constipation         Device Interrogation:  device parameters reviewed and R wave sensing is improving.  Remaining parameters stable and adequate    Labs:                                                               13.8   10.3  )-----------( 175      ( 23 Nov 2018 06:51 )             41.4     11-23    143  |  107  |  31<H>  ----------------------------<  138<H>  3.6   |  22  |  2.10<H>    Ca    9.9      23 Nov 2018 06:51  Phos  3.5     11-22  Mg     2.5     11-23    TPro  8.1  /  Alb  4.5  /  TBili  1.6<H>  /  DBili  x   /  AST  22  /  ALT  11  /  AlkPhos  73  11-22          CXR:  Device position is appropriate and no evidence of pneumothorax    Assessment/Plan:    Mr. Carrasquillo is a 78 year-old male s/p AAA repair and vasovagal syncope s/p dual chamber Biotronik pacemaker.  Poor R wave sensing yesterday was likely due to transient inflammation/edema.  As it is improving now, will not proceed with lead revision at this time.  Will repeat interrogation in 2 weeks as outpatient.  No further EP intervention/workup this admission.

## 2018-11-23 NOTE — PROGRESS NOTE ADULT - REASON FOR ADMISSION
Descending thoracic aneursym
syncope/ fusiform aneurysmal dilation of descending aorta

## 2018-11-23 NOTE — DISCHARGE NOTE ADULT - CARE PROVIDER_API CALL
Mitchell Garcia), Surgery; Thoracic and Cardiac Surgery  [address]  [address]th [place] [address]  Phone: [phone]  Fax: [phone]    Kwame Martin), Neurology; Vascular Neurology  [address] [address]  Phone: [phone]  Fax: [phone]    Suzy Sellers), Cardiac Electrophysiology; Cardiovascular Disease; Internal Medicine  [address] [place] [address]  Phone: [phone]  Fax: [phone]

## 2018-11-23 NOTE — DISCHARGE NOTE ADULT - CARE PROVIDERS DIRECT ADDRESSES
[email or web address]

## 2018-11-26 ENCOUNTER — INBOUND DOCUMENT (OUTPATIENT)
Age: 78
End: 2018-11-26

## 2018-11-27 DIAGNOSIS — I95.89 OTHER HYPOTENSION: ICD-10-CM

## 2018-11-27 DIAGNOSIS — I71.9 AORTIC ANEURYSM OF UNSPECIFIED SITE, WITHOUT RUPTURE: ICD-10-CM

## 2018-11-27 DIAGNOSIS — I10 ESSENTIAL (PRIMARY) HYPERTENSION: ICD-10-CM

## 2018-11-27 DIAGNOSIS — Z87.891 PERSONAL HISTORY OF NICOTINE DEPENDENCE: ICD-10-CM

## 2018-11-27 DIAGNOSIS — R00.1 BRADYCARDIA, UNSPECIFIED: ICD-10-CM

## 2018-11-27 DIAGNOSIS — R55 SYNCOPE AND COLLAPSE: ICD-10-CM

## 2018-11-27 DIAGNOSIS — R41.82 ALTERED MENTAL STATUS, UNSPECIFIED: ICD-10-CM

## 2018-11-27 PROBLEM — I71.2 ANEURYSM OF DESCENDING THORACIC AORTA: Status: ACTIVE | Noted: 2018-11-27

## 2018-11-27 LAB
CULTURE RESULTS: SIGNIFICANT CHANGE UP
SPECIMEN SOURCE: SIGNIFICANT CHANGE UP

## 2018-12-03 ENCOUNTER — APPOINTMENT (OUTPATIENT)
Dept: HEART AND VASCULAR | Facility: CLINIC | Age: 78
End: 2018-12-03

## 2018-12-03 ENCOUNTER — INPATIENT (INPATIENT)
Facility: HOSPITAL | Age: 78
LOS: 9 days | Discharge: NURSING, REHAB OR LONG-TERM CARE | DRG: 221 | End: 2018-12-13
Attending: THORACIC SURGERY (CARDIOTHORACIC VASCULAR SURGERY) | Admitting: THORACIC SURGERY (CARDIOTHORACIC VASCULAR SURGERY)
Payer: MEDICARE

## 2018-12-03 VITALS
HEART RATE: 68 BPM | RESPIRATION RATE: 15 BRPM | OXYGEN SATURATION: 98 % | SYSTOLIC BLOOD PRESSURE: 180 MMHG | WEIGHT: 154.1 LBS | DIASTOLIC BLOOD PRESSURE: 111 MMHG | TEMPERATURE: 98 F

## 2018-12-03 DIAGNOSIS — I71.2 THORACIC AORTIC ANEURYSM, W/OUT RUPTURE: ICD-10-CM

## 2018-12-03 PROBLEM — Z95.0 PACEMAKER: Status: ACTIVE | Noted: 2018-12-03

## 2018-12-03 PROCEDURE — 99291 CRITICAL CARE FIRST HOUR: CPT

## 2018-12-03 RX ORDER — INFLUENZA VIRUS VACCINE 15; 15; 15; 15 UG/.5ML; UG/.5ML; UG/.5ML; UG/.5ML
0.5 SUSPENSION INTRAMUSCULAR ONCE
Qty: 0 | Refills: 0 | Status: DISCONTINUED | OUTPATIENT
Start: 2018-12-03 | End: 2018-12-07

## 2018-12-03 RX ORDER — HYDRALAZINE HCL 50 MG
10 TABLET ORAL ONCE
Qty: 0 | Refills: 0 | Status: DISCONTINUED | OUTPATIENT
Start: 2018-12-03 | End: 2018-12-04

## 2018-12-03 NOTE — PATIENT PROFILE ADULT - NSPROEDALEARNPREFOTH_GEN_A_NUR
audio/skill demonstration/individual instruction/pictorial/verbal instruction/video/written material/computer/internet

## 2018-12-03 NOTE — PROGRESS NOTE ADULT - SUBJECTIVE AND OBJECTIVE BOX
CTICU  CRITICAL  CARE  attending     Hand off received 					   Pertinent clinical, laboratory, radiographic, hemodynamic, echocardiographic, respiratory data, microbiologic data and chart were reviewed and analyzed frequently throughout the course of the day and night  Patient seen and examined with CTS/ SH attending at bedside     79 y/o male PMH HTN , CRI (1.65) ,aortic aneurysm (fusiform aneurysmal dilation of descending aorta measuring 5.5cm) , He was admitted to [facility] with syncope.  He was transferred to [facility] for bradycardia 18.  He is S/P [employer] PPM (followed by Dr. Sarmiento). He was transferred to nursing home.   He was admitted to [facility] after another syncopal episode while at [facility] working with home PT. He describes that he passed out for a short time after feeling very warm. Denies hitting his head, denies chest pain or back pain. Went to [facility] and subsequently was transferred to [facility] for observation and management.        HEALTH ISSUES - PROBLEM Dx:  Phlebitis/thrombophlebitis: Phlebitis/thrombophlebitis  Bradycardia: Bradycardia  Renal insufficiency: Renal insufficiency  Hypertension, unspecified type: Hypertension, unspecified type  Syncope, unspecified syncope type: Syncope, unspecified syncope type  Descending aortic aneurysm: Descending aortic aneurysm      FAMILY HISTORY:  No pertinent family history in first degree relatives  PAST MEDICAL & SURGICAL HISTORY:  Bradycardia  Renal insufficiency  Anxiety  Syncope, unspecified syncope type  Descending aortic aneurysm  Hypertension, unspecified type  H/O cardiac pacemaker: Medtronic     Patient is a 78y old  Male who presents with a chief complaint of     14 system review was unremarkable  acute changes include acute respiratory failure  Vital signs, hemodynamic and respiratory parameters were reviewed from the bedside nursing flow sheet.  ICU Vital Signs Last 24 Hrs  T(C): 36.9 (04 Dec 2018 01:01), Max: 36.9 (04 Dec 2018 01:01)  T(F): 98.5 (04 Dec 2018 01:01), Max: 98.5 (04 Dec 2018 01:01)  HR: 68 (04 Dec 2018 03:00) (62 - 82)  BP: 162/101 (03 Dec 2018 23:00) (162/101 - 180/111)  BP(mean): 125 (03 Dec 2018 23:00) (125 - 138)  ABP: 142/72 (04 Dec 2018 03:00) (142/72 - 204/114)  ABP(mean): 96 (04 Dec 2018 03:00) (96 - 150)  RR: 13 (04 Dec 2018 03:00) (13 - 18)  SpO2: 94% (04 Dec 2018 03:00) (94% - 98%)    Adult Advanced Hemodynamics Last 24 Hrs  CVP(mm Hg): --  CVP(cm H2O): --  CO: --  CI: --  PA: --  PA(mean): --  PCWP: --  SVR: --  SVRI: --  PVR: --  PVRI: --, ABG - ( 04 Dec 2018 02:39 )  pH, Arterial: 7.44  pH, Blood: x     /  pCO2: 35    /  pO2: 76    / HCO3: 23    / Base Excess: -0.2  /  SaO2: 96                  Intake and output was reviewed and the fluid balance was calculated  Daily     Daily Weight in k.9 (03 Dec 2018 22:00)  I&O's Summary    03 Dec 2018 07:01  -  04 Dec 2018 03:35  --------------------------------------------------------  IN: 234 mL / OUT: 700 mL / NET: -466 mL        All lines and drain sites were assessed  Glycemic trend was reviewed CAPILLARY BLOOD GLUCOSE        NEURO: No acute change in mental status from the baseline. No focal motor deficit. Reflexes 2 +.  CHEST: Left pectoral incision  looks OK.  CVS: S1, S2, No S3.  RESPIRATORY: Auscultation of the chest reveals equal breath sounds.  GI: Abdomen is soft  Extremities are warm and well perfused.  VASCULAR: + Distal pulses.  Wounds appear clean and unremarkable      labs  CBC Full  -  ( 04 Dec 2018 02:54 )  WBC Count : 9.3 K/uL  Hemoglobin : 12.9 g/dL  Hematocrit : 38.2 %  Platelet Count - Automated : 314 K/uL  Mean Cell Volume : 93.9 fL  Mean Cell Hemoglobin : 31.7 pg  Mean Cell Hemoglobin Concentration : 33.8 g/dL  Auto Neutrophil # : x  Auto Lymphocyte # : x  Auto Monocyte # : x  Auto Eosinophil # : x  Auto Basophil # : x  Auto Neutrophil % : x  Auto Lymphocyte % : x  Auto Monocyte % : x  Auto Eosinophil % : x  Auto Basophil % : x    12-04    144  |  106  |  17  ----------------------------<  123<H>  4.0   |  23  |  1.65<H>    Ca    9.6      04 Dec 2018 02:53  Phos  2.5     12  Mg     2.1         TPro  7.4  /  Alb  3.8  /  TBili  0.5  /  DBili  x   /  AST  45<H>  /  ALT  101<H>  /  AlkPhos  82  12    PT/INR - ( 04 Dec 2018 02:56 )   PT: 13.9 sec;   INR: 1.22          PTT - ( 04 Dec 2018 02:56 )  PTT:30.4 sec  The current medications were reviewed   MEDICATIONS  (STANDING):  clevidipine Infusion 1.25 mG/Hr (2.5 mL/Hr) IV Continuous <Continuous>  heparin  Injectable 5000 Unit(s) SubCutaneous every 8 hours  influenza   Vaccine 0.5 milliLiter(s) IntraMuscular once  pantoprazole    Tablet 40 milliGRAM(s) Oral before breakfast  sertraline 25 milliGRAM(s) Oral daily  sodium chloride 0.9% lock flush 3 milliLiter(s) IV Push every 8 hours    MEDICATIONS  (PRN):      PROBLEM LIST/ ASSESSMENT:  HEALTH ISSUES - PROBLEM Dx:  Phlebitis/thrombophlebitis: Phlebitis/thrombophlebitis  Bradycardia: Bradycardia  Renal insufficiency: Renal insufficiency  Hypertension, unspecified type: Hypertension, unspecified type  Syncope, unspecified syncope type: Syncope, unspecified syncope type  Descending aortic aneurysm: Descending aortic aneurysm        Patient is a 78y old  Male who presents with SYNCOPE.  Uncontrolled HTN        My plan includes :  ECHO  ADJUST Anti hypertensive medications.  Schedule for TEVAR  Monitor for arrhythmias and monitor parameters for organ perfusion  Monitor neurologic status  Head of the bed should remain elevated to 45 deg .   Chest PT and IS will be encouraged  Monitor adequacy of oxygenation and ventilation and attempt to wean oxygen  Nutritional goals will be met using po eventually , ensure adequate caloric intake and monitor  the same  Stress ulcer and VTE prophylaxis will be achieved    Glycemic control is satisfactory  Electrolytes have been repleted as necessary and wound care has been carried out. Pain control has been achieved.   Aggressive  physical therapy and early mobility and ambulation goals will be met   The family was updated about the course and plan  CRITICAL CARE TIME SPENT in evaluation and management, reassessments, review and interpretation of labs and x-rays, ventilator and hemodynamic management, formulating a plan and coordinating care: ___60____ MIN.  Time does not include procedural time.  CTICU ATTENDING     					    Jose Alford MD

## 2018-12-03 NOTE — PROGRESS NOTE ADULT - SUBJECTIVE AND OBJECTIVE BOX
CTICU  CRITICAL  CARE  attending     Hand off received 					   Pertinent clinical, laboratory, radiographic, hemodynamic, echocardiographic, respiratory data, microbiologic data and chart were reviewed and analyzed frequently throughout the course of the day and night  Patient seen and examined with CTS/ SH attending at bedside  Pt is a 78y , Male, HEALTH ISSUES - PROBLEM Dx:  Phlebitis/thrombophlebitis: Phlebitis/thrombophlebitis  Bradycardia: Bradycardia  Renal insufficiency: Renal insufficiency  Hypertension, unspecified type: Hypertension, unspecified type  Syncope, unspecified syncope type: Syncope, unspecified syncope type  Descending aortic aneurysm: Descending aortic aneurysm      , FAMILY HISTORY:  No pertinent family history in first degree relatives  PAST MEDICAL & SURGICAL HISTORY:  Bradycardia  Renal insufficiency  Anxiety  Syncope, unspecified syncope type  Descending aortic aneurysm  Hypertension, unspecified type  H/O cardiac pacemaker: medtronic    Patient is a 78y old  Male who presents with a chief complaint of     14 system review was unremarkable  acute changes include acute respiratory failure  Vital signs, hemodynamic and respiratory parameters were reviewed from the bedside nursing flowsheet.  ICU Vital Signs Last 24 Hrs  T(C): 36.3 (04 Dec 2018 13:31), Max: 36.9 (04 Dec 2018 01:01)  T(F): 97.4 (04 Dec 2018 13:31), Max: 98.5 (04 Dec 2018 01:01)  HR: 76 (04 Dec 2018 15:00) (62 - 90)  BP: 165/90 (04 Dec 2018 15:00) (112/73 - 180/111)  BP(mean): 114 (04 Dec 2018 15:00) (84 - 138)  ABP: 156/86 (04 Dec 2018 10:00) (140/74 - 204/114)  ABP(mean): 110 (04 Dec 2018 10:00) (96 - 150)  RR: 16 (04 Dec 2018 15:00) (9 - 20)  SpO2: 97% (04 Dec 2018 15:00) (94% - 99%)    Adult Advanced Hemodynamics Last 24 Hrs  CVP(mm Hg): --  CVP(cm H2O): --  CO: --  CI: --  PA: --  PA(mean): --  PCWP: --  SVR: --  SVRI: --  PVR: --  PVRI: --, ABG - ( 04 Dec 2018 02:39 )  pH, Arterial: 7.44  pH, Blood: x     /  pCO2: 35    /  pO2: 76    / HCO3: 23    / Base Excess: -0.2  /  SaO2: 96                  Intake and output was reviewed and the fluid balance was calculated  Daily     Daily Weight in k.9 (03 Dec 2018 22:00)  I&O's Summary    03 Dec 2018 07:01  -  04 Dec 2018 07:00  --------------------------------------------------------  IN: 412 mL / OUT: 1170 mL / NET: -758 mL    04 Dec 2018 07:01  -  04 Dec 2018 15:25  --------------------------------------------------------  IN: 4 mL / OUT: 300 mL / NET: -296 mL        All lines and drain sites were assessed  Glycemic trend was reviewedCAPILLARY BLOOD GLUCOSE        No acute change in mental status  Auscultation of the chest reveals equal bs  Abdomen is soft  Extremities are warm and well perfused  Wounds appear clean and unremarkable  Antibiotics are periop    labs  CBC Full  -  ( 04 Dec 2018 02:54 )  WBC Count : 9.3 K/uL  Hemoglobin : 12.9 g/dL  Hematocrit : 38.2 %  Platelet Count - Automated : 314 K/uL  Mean Cell Volume : 93.9 fL  Mean Cell Hemoglobin : 31.7 pg  Mean Cell Hemoglobin Concentration : 33.8 g/dL  Auto Neutrophil # : x  Auto Lymphocyte # : x  Auto Monocyte # : x  Auto Eosinophil # : x  Auto Basophil # : x  Auto Neutrophil % : x  Auto Lymphocyte % : x  Auto Monocyte % : x  Auto Eosinophil % : x  Auto Basophil % : x    12    144  |  106  |  17  ----------------------------<  123<H>  4.0   |  23  |  1.65<H>    Ca    9.6      04 Dec 2018 02:53  Phos  2.5     12-  Mg     2.1     12-04    TPro  7.4  /  Alb  3.8  /  TBili  0.5  /  DBili  x   /  AST  45<H>  /  ALT  101<H>  /  AlkPhos  82  12-04    PT/INR - ( 04 Dec 2018 02:56 )   PT: 13.9 sec;   INR: 1.22          PTT - ( 04 Dec 2018 02:56 )  PTT:30.4 sec  The current medications were reviewed   MEDICATIONS  (STANDING):  clevidipine Infusion 1.25 mG/Hr (2.5 mL/Hr) IV Continuous <Continuous>  docusate sodium 100 milliGRAM(s) Oral two times a day  heparin  Injectable 5000 Unit(s) SubCutaneous every 8 hours  influenza   Vaccine 0.5 milliLiter(s) IntraMuscular once  labetalol 200 milliGRAM(s) Oral every 6 hours  pantoprazole    Tablet 40 milliGRAM(s) Oral before breakfast  polyethylene glycol 3350 17 Gram(s) Oral daily  senna 2 Tablet(s) Oral at bedtime  sertraline 25 milliGRAM(s) Oral daily  sodium chloride 0.9% lock flush 3 milliLiter(s) IV Push every 8 hours  testosterone patch 4 mG/24 Hr(s) 4 milliGRAM(s) Transdermal daily    MEDICATIONS  (PRN):  acetaminophen   Tablet .. 650 milliGRAM(s) Oral every 6 hours PRN Mild Pain (1 - 3)  hydrALAZINE Injectable 10 milliGRAM(s) IV Push every 4 hours PRN SBP > 160       PROBLEM LIST/ ASSESSMENT:  HEALTH ISSUES - PROBLEM Dx:  Phlebitis/thrombophlebitis: Phlebitis/thrombophlebitis  Bradycardia: Bradycardia  Renal insufficiency: Renal insufficiency  Hypertension, unspecified type: Hypertension, unspecified type  Syncope, unspecified syncope type: Syncope, unspecified syncope type  Descending aortic aneurysm: Descending aortic aneurysm      ,   Patient is a 78y old  Male who presents with a chief complaint of    s/p cardiac surgery      My plan includes :  close hemodynamic, ventilatory and drain monitoring and management per post op routine    Monitor for arrhythmias and monitor parameters for organ perfusion  monitor neurologic status  Head of the bed should remain elevated to 45 deg .   chest PT and IS will be encouraged  monitor adequacy of oxygenation and ventilation and attempt to wean oxygen  Nutritional goals will be met using po eventually , ensure adequate caloric intake and montior the same  Stress ulcer and VTE prophylaxis will be achieved    Glycemic control is satisfactory  Electrolytes have been repleted as necessary and wound care has been carried out. Pain control has been achieved.   agressive physical therapy and early mobility and ambulation goals will be met   The family was updated about the course and plan  CRITICAL CARE TIME SPENT in evaluation and management, reassessments, review and interpretation of labs and x-rays, ventilator and hemodynamic management, formulating a plan and coordinating care: ___90____ MIN.  Time does not include procedural time.  CTICU ATTENDING     					    Harley Vazquez MD

## 2018-12-03 NOTE — PATIENT PROFILE ADULT - NSPROEDALEARNPREF_GEN_A_NUR
skill demonstration/pictorial/computer/internet/individual instruction/video/written material/audio/verbal instruction

## 2018-12-04 ENCOUNTER — OTHER (OUTPATIENT)
Age: 78
End: 2018-12-04

## 2018-12-04 DIAGNOSIS — N28.9 DISORDER OF KIDNEY AND URETER, UNSPECIFIED: ICD-10-CM

## 2018-12-04 DIAGNOSIS — I10 ESSENTIAL (PRIMARY) HYPERTENSION: ICD-10-CM

## 2018-12-04 DIAGNOSIS — R00.1 BRADYCARDIA, UNSPECIFIED: ICD-10-CM

## 2018-12-04 DIAGNOSIS — R55 SYNCOPE AND COLLAPSE: ICD-10-CM

## 2018-12-04 DIAGNOSIS — I71.9 AORTIC ANEURYSM OF UNSPECIFIED SITE, WITHOUT RUPTURE: ICD-10-CM

## 2018-12-04 DIAGNOSIS — Z95.0 PRESENCE OF CARDIAC PACEMAKER: Chronic | ICD-10-CM

## 2018-12-04 DIAGNOSIS — I80.9 PHLEBITIS AND THROMBOPHLEBITIS OF UNSPECIFIED SITE: ICD-10-CM

## 2018-12-04 LAB
ALBUMIN SERPL ELPH-MCNC: 3.8 G/DL — SIGNIFICANT CHANGE UP (ref 3.3–5)
ALBUMIN SERPL ELPH-MCNC: 4 G/DL — SIGNIFICANT CHANGE UP (ref 3.3–5)
ALP SERPL-CCNC: 82 U/L — SIGNIFICANT CHANGE UP (ref 40–120)
ALP SERPL-CCNC: 87 U/L — SIGNIFICANT CHANGE UP (ref 40–120)
ALT FLD-CCNC: 101 U/L — HIGH (ref 10–45)
ALT FLD-CCNC: 105 U/L — HIGH (ref 10–45)
ANION GAP SERPL CALC-SCNC: 12 MMOL/L — SIGNIFICANT CHANGE UP (ref 5–17)
ANION GAP SERPL CALC-SCNC: 15 MMOL/L — SIGNIFICANT CHANGE UP (ref 5–17)
APPEARANCE UR: CLEAR — SIGNIFICANT CHANGE UP
APTT BLD: 28.3 SEC — SIGNIFICANT CHANGE UP (ref 27.5–36.3)
APTT BLD: 30.4 SEC — SIGNIFICANT CHANGE UP (ref 27.5–36.3)
AST SERPL-CCNC: 44 U/L — HIGH (ref 10–40)
AST SERPL-CCNC: 45 U/L — HIGH (ref 10–40)
BASE EXCESS BLDA CALC-SCNC: -2 MMOL/L — SIGNIFICANT CHANGE UP (ref -2–3)
BILIRUB SERPL-MCNC: 0.5 MG/DL — SIGNIFICANT CHANGE UP (ref 0.2–1.2)
BILIRUB SERPL-MCNC: 0.6 MG/DL — SIGNIFICANT CHANGE UP (ref 0.2–1.2)
BILIRUB UR-MCNC: NEGATIVE — SIGNIFICANT CHANGE UP
BLD GP AB SCN SERPL QL: NEGATIVE — SIGNIFICANT CHANGE UP
BUN SERPL-MCNC: 17 MG/DL — SIGNIFICANT CHANGE UP (ref 7–23)
BUN SERPL-MCNC: 18 MG/DL — SIGNIFICANT CHANGE UP (ref 7–23)
CALCIUM SERPL-MCNC: 9.5 MG/DL — SIGNIFICANT CHANGE UP (ref 8.4–10.5)
CALCIUM SERPL-MCNC: 9.6 MG/DL — SIGNIFICANT CHANGE UP (ref 8.4–10.5)
CHLORIDE SERPL-SCNC: 106 MMOL/L — SIGNIFICANT CHANGE UP (ref 96–108)
CHLORIDE SERPL-SCNC: 110 MMOL/L — HIGH (ref 96–108)
CHOLEST SERPL-MCNC: 129 MG/DL — SIGNIFICANT CHANGE UP (ref 10–199)
CO2 SERPL-SCNC: 21 MMOL/L — LOW (ref 22–31)
CO2 SERPL-SCNC: 23 MMOL/L — SIGNIFICANT CHANGE UP (ref 22–31)
COLOR SPEC: YELLOW — SIGNIFICANT CHANGE UP
CREAT SERPL-MCNC: 1.61 MG/DL — HIGH (ref 0.5–1.3)
CREAT SERPL-MCNC: 1.65 MG/DL — HIGH (ref 0.5–1.3)
DIFF PNL FLD: NEGATIVE — SIGNIFICANT CHANGE UP
GAS PNL BLDA: SIGNIFICANT CHANGE UP
GAS PNL BLDA: SIGNIFICANT CHANGE UP
GLUCOSE SERPL-MCNC: 109 MG/DL — HIGH (ref 70–99)
GLUCOSE SERPL-MCNC: 123 MG/DL — HIGH (ref 70–99)
GLUCOSE UR QL: NEGATIVE — SIGNIFICANT CHANGE UP
HBA1C BLD-MCNC: 5.4 % — SIGNIFICANT CHANGE UP (ref 4–5.6)
HCO3 BLDA-SCNC: 22 MMOL/L — SIGNIFICANT CHANGE UP (ref 21–28)
HCT VFR BLD CALC: 37.6 % — LOW (ref 39–50)
HCT VFR BLD CALC: 38.2 % — LOW (ref 39–50)
HDLC SERPL-MCNC: 47 MG/DL — SIGNIFICANT CHANGE UP
HGB BLD-MCNC: 12.5 G/DL — LOW (ref 13–17)
HGB BLD-MCNC: 12.9 G/DL — LOW (ref 13–17)
INR BLD: 1.22 — HIGH (ref 0.88–1.16)
INR BLD: 1.22 — HIGH (ref 0.88–1.16)
KETONES UR-MCNC: NEGATIVE — SIGNIFICANT CHANGE UP
LEUKOCYTE ESTERASE UR-ACNC: NEGATIVE — SIGNIFICANT CHANGE UP
LIPID PNL WITH DIRECT LDL SERPL: 60 MG/DL — SIGNIFICANT CHANGE UP
MAGNESIUM SERPL-MCNC: 2 MG/DL — SIGNIFICANT CHANGE UP (ref 1.6–2.6)
MAGNESIUM SERPL-MCNC: 2.1 MG/DL — SIGNIFICANT CHANGE UP (ref 1.6–2.6)
MCHC RBC-ENTMCNC: 31.2 PG — SIGNIFICANT CHANGE UP (ref 27–34)
MCHC RBC-ENTMCNC: 31.7 PG — SIGNIFICANT CHANGE UP (ref 27–34)
MCHC RBC-ENTMCNC: 33.2 G/DL — SIGNIFICANT CHANGE UP (ref 32–36)
MCHC RBC-ENTMCNC: 33.8 G/DL — SIGNIFICANT CHANGE UP (ref 32–36)
MCV RBC AUTO: 93.8 FL — SIGNIFICANT CHANGE UP (ref 80–100)
MCV RBC AUTO: 93.9 FL — SIGNIFICANT CHANGE UP (ref 80–100)
NITRITE UR-MCNC: NEGATIVE — SIGNIFICANT CHANGE UP
NT-PROBNP SERPL-SCNC: 236 PG/ML — SIGNIFICANT CHANGE UP (ref 0–300)
PCO2 BLDA: 36 MMHG — SIGNIFICANT CHANGE UP (ref 35–48)
PH BLDA: 7.4 — SIGNIFICANT CHANGE UP (ref 7.35–7.45)
PH UR: 7 — SIGNIFICANT CHANGE UP (ref 5–8)
PHOSPHATE SERPL-MCNC: 2.5 MG/DL — SIGNIFICANT CHANGE UP (ref 2.5–4.5)
PLATELET # BLD AUTO: 314 K/UL — SIGNIFICANT CHANGE UP (ref 150–400)
PLATELET # BLD AUTO: 326 K/UL — SIGNIFICANT CHANGE UP (ref 150–400)
PO2 BLDA: 81 MMHG — LOW (ref 83–108)
POTASSIUM SERPL-MCNC: 4 MMOL/L — SIGNIFICANT CHANGE UP (ref 3.5–5.3)
POTASSIUM SERPL-MCNC: 4.5 MMOL/L — SIGNIFICANT CHANGE UP (ref 3.5–5.3)
POTASSIUM SERPL-SCNC: 4 MMOL/L — SIGNIFICANT CHANGE UP (ref 3.5–5.3)
POTASSIUM SERPL-SCNC: 4.5 MMOL/L — SIGNIFICANT CHANGE UP (ref 3.5–5.3)
PROT SERPL-MCNC: 7.1 G/DL — SIGNIFICANT CHANGE UP (ref 6–8.3)
PROT SERPL-MCNC: 7.4 G/DL — SIGNIFICANT CHANGE UP (ref 6–8.3)
PROT UR-MCNC: NEGATIVE MG/DL — SIGNIFICANT CHANGE UP
PROTHROM AB SERPL-ACNC: 13.9 SEC — HIGH (ref 10–12.9)
PROTHROM AB SERPL-ACNC: 13.9 SEC — HIGH (ref 10–12.9)
RBC # BLD: 4.01 M/UL — LOW (ref 4.2–5.8)
RBC # BLD: 4.07 M/UL — LOW (ref 4.2–5.8)
RBC # FLD: 12.8 % — SIGNIFICANT CHANGE UP (ref 10.3–16.9)
RBC # FLD: 12.8 % — SIGNIFICANT CHANGE UP (ref 10.3–16.9)
RH IG SCN BLD-IMP: POSITIVE — SIGNIFICANT CHANGE UP
SAO2 % BLDA: 96 % — SIGNIFICANT CHANGE UP (ref 95–100)
SODIUM SERPL-SCNC: 143 MMOL/L — SIGNIFICANT CHANGE UP (ref 135–145)
SODIUM SERPL-SCNC: 144 MMOL/L — SIGNIFICANT CHANGE UP (ref 135–145)
SP GR SPEC: 1.01 — SIGNIFICANT CHANGE UP (ref 1–1.03)
T4 AB SER-ACNC: 5.45 UG/DL — SIGNIFICANT CHANGE UP (ref 3.17–11.72)
TOTAL CHOLESTEROL/HDL RATIO MEASUREMENT: 2.7 RATIO — LOW (ref 3.4–9.6)
TRIGL SERPL-MCNC: 109 MG/DL — SIGNIFICANT CHANGE UP (ref 10–149)
TROPONIN T SERPL-MCNC: <0.01 NG/ML — SIGNIFICANT CHANGE UP (ref 0–0.01)
TSH SERPL-MCNC: 1.08 UIU/ML — SIGNIFICANT CHANGE UP (ref 0.35–4.94)
UROBILINOGEN FLD QL: 0.2 E.U./DL — SIGNIFICANT CHANGE UP
WBC # BLD: 10 K/UL — SIGNIFICANT CHANGE UP (ref 3.8–10.5)
WBC # BLD: 9.3 K/UL — SIGNIFICANT CHANGE UP (ref 3.8–10.5)
WBC # FLD AUTO: 10 K/UL — SIGNIFICANT CHANGE UP (ref 3.8–10.5)
WBC # FLD AUTO: 9.3 K/UL — SIGNIFICANT CHANGE UP (ref 3.8–10.5)

## 2018-12-04 PROCEDURE — 93010 ELECTROCARDIOGRAM REPORT: CPT

## 2018-12-04 PROCEDURE — 36620 INSERTION CATHETER ARTERY: CPT

## 2018-12-04 PROCEDURE — 99291 CRITICAL CARE FIRST HOUR: CPT

## 2018-12-04 PROCEDURE — 71045 X-RAY EXAM CHEST 1 VIEW: CPT | Mod: 26

## 2018-12-04 RX ORDER — SODIUM CHLORIDE 9 MG/ML
3 INJECTION INTRAMUSCULAR; INTRAVENOUS; SUBCUTANEOUS EVERY 8 HOURS
Qty: 0 | Refills: 0 | Status: DISCONTINUED | OUTPATIENT
Start: 2018-12-04 | End: 2018-12-07

## 2018-12-04 RX ORDER — POLYETHYLENE GLYCOL 3350 17 G/17G
17 POWDER, FOR SOLUTION ORAL DAILY
Qty: 0 | Refills: 0 | Status: DISCONTINUED | OUTPATIENT
Start: 2018-12-04 | End: 2018-12-07

## 2018-12-04 RX ORDER — SERTRALINE 25 MG/1
25 TABLET, FILM COATED ORAL DAILY
Qty: 0 | Refills: 0 | Status: DISCONTINUED | OUTPATIENT
Start: 2018-12-04 | End: 2018-12-11

## 2018-12-04 RX ORDER — DOCUSATE SODIUM 100 MG
100 CAPSULE ORAL
Qty: 0 | Refills: 0 | Status: DISCONTINUED | OUTPATIENT
Start: 2018-12-04 | End: 2018-12-07

## 2018-12-04 RX ORDER — HEPARIN SODIUM 5000 [USP'U]/ML
5000 INJECTION INTRAVENOUS; SUBCUTANEOUS EVERY 8 HOURS
Qty: 0 | Refills: 0 | Status: DISCONTINUED | OUTPATIENT
Start: 2018-12-04 | End: 2018-12-07

## 2018-12-04 RX ORDER — HYDRALAZINE HCL 50 MG
10 TABLET ORAL EVERY 4 HOURS
Qty: 0 | Refills: 0 | Status: DISCONTINUED | OUTPATIENT
Start: 2018-12-04 | End: 2018-12-07

## 2018-12-04 RX ORDER — CLEVIDIPINE BUTYRATE 50MG/100ML
1.25 VIAL (ML) INTRAVENOUS
Qty: 25 | Refills: 0 | Status: DISCONTINUED | OUTPATIENT
Start: 2018-12-04 | End: 2018-12-04

## 2018-12-04 RX ORDER — LABETALOL HCL 100 MG
200 TABLET ORAL EVERY 6 HOURS
Qty: 0 | Refills: 0 | Status: DISCONTINUED | OUTPATIENT
Start: 2018-12-04 | End: 2018-12-07

## 2018-12-04 RX ORDER — PANTOPRAZOLE SODIUM 20 MG/1
40 TABLET, DELAYED RELEASE ORAL
Qty: 0 | Refills: 0 | Status: DISCONTINUED | OUTPATIENT
Start: 2018-12-04 | End: 2018-12-07

## 2018-12-04 RX ORDER — SENNA PLUS 8.6 MG/1
2 TABLET ORAL AT BEDTIME
Qty: 0 | Refills: 0 | Status: DISCONTINUED | OUTPATIENT
Start: 2018-12-04 | End: 2018-12-07

## 2018-12-04 RX ORDER — ACETAMINOPHEN 500 MG
650 TABLET ORAL EVERY 6 HOURS
Qty: 0 | Refills: 0 | Status: DISCONTINUED | OUTPATIENT
Start: 2018-12-04 | End: 2018-12-07

## 2018-12-04 RX ADMIN — Medication 4 MILLIGRAM(S): at 12:18

## 2018-12-04 RX ADMIN — SODIUM CHLORIDE 3 MILLILITER(S): 9 INJECTION INTRAMUSCULAR; INTRAVENOUS; SUBCUTANEOUS at 22:10

## 2018-12-04 RX ADMIN — POLYETHYLENE GLYCOL 3350 17 GRAM(S): 17 POWDER, FOR SOLUTION ORAL at 12:11

## 2018-12-04 RX ADMIN — Medication 4 MILLIGRAM(S): at 22:10

## 2018-12-04 RX ADMIN — Medication 10 MILLIGRAM(S): at 09:58

## 2018-12-04 RX ADMIN — Medication 2.5 MG/HR: at 00:18

## 2018-12-04 RX ADMIN — SERTRALINE 25 MILLIGRAM(S): 25 TABLET, FILM COATED ORAL at 12:11

## 2018-12-04 RX ADMIN — HEPARIN SODIUM 5000 UNIT(S): 5000 INJECTION INTRAVENOUS; SUBCUTANEOUS at 23:19

## 2018-12-04 RX ADMIN — HEPARIN SODIUM 5000 UNIT(S): 5000 INJECTION INTRAVENOUS; SUBCUTANEOUS at 14:54

## 2018-12-04 RX ADMIN — SODIUM CHLORIDE 3 MILLILITER(S): 9 INJECTION INTRAMUSCULAR; INTRAVENOUS; SUBCUTANEOUS at 12:43

## 2018-12-04 RX ADMIN — HEPARIN SODIUM 5000 UNIT(S): 5000 INJECTION INTRAVENOUS; SUBCUTANEOUS at 06:14

## 2018-12-04 RX ADMIN — SODIUM CHLORIDE 3 MILLILITER(S): 9 INJECTION INTRAMUSCULAR; INTRAVENOUS; SUBCUTANEOUS at 05:24

## 2018-12-04 RX ADMIN — Medication 200 MILLIGRAM(S): at 09:52

## 2018-12-04 RX ADMIN — SENNA PLUS 2 TABLET(S): 8.6 TABLET ORAL at 22:15

## 2018-12-04 RX ADMIN — PANTOPRAZOLE SODIUM 40 MILLIGRAM(S): 20 TABLET, DELAYED RELEASE ORAL at 06:15

## 2018-12-04 NOTE — PROGRESS NOTE ADULT - SUBJECTIVE AND OBJECTIVE BOX
EPS Device interrogation    Indication: syncope    Device model: Monitor My Meds					Implanting Physician: Dr. Saldana     Functioning Mode: DDD-CLS, 60/130			    Underlying Rhythm: AS/VS    Pacemaker dependency:  No    Battery status: 100% (MANASA), 5y2m  Interrogating parameters:   				RA			RV			LV    Sense:                                           2.3 mV                       6.9  mV (unip), 5.4 mV (bipol)     Threshold:                                     0.9 V @ 0.4 ms          3.8 V @0.4 ms (unip), 3.6 V @ 0.4 ms (bipol)    Pacing Impedance:                               526om                        292   om    Shock Impedance:                                                    n/a    Events/Alert:  none    Parameter change: 	none    Normal function PPM, increase in pacing threshold, form implant, will continue to monitor.     [ ]EPS attending: Interrogation reviewed. Agree with above.

## 2018-12-04 NOTE — H&P ADULT - ASSESSMENT
77 y/o male PMH HTN , CRI (1.65) ,fusiform aneurysmal dilation of descending aorta measuring 5.5cm , syncope, bradycardia, h/o Medtronic PPM (followed by Dr. Sarmiento) who is known to Dr. Garcia previously hospitalized at [facility] for management of descending aortic aneurysm. Currently c/o of another syncopal episode while at home working with home.

## 2018-12-04 NOTE — PROGRESS NOTE ADULT - SUBJECTIVE AND OBJECTIVE BOX
CTICU  CRITICAL  CARE  attending     Hand off received 					   Pertinent clinical, laboratory, radiographic, hemodynamic, echocardiographic, respiratory data, microbiologic data and chart were reviewed and analyzed frequently throughout the course of the day and night  Patient seen and examined with CTS/ SH attending at bedside  Pt is a 78y , Male, HEALTH ISSUES - PROBLEM Dx:  Phlebitis/thrombophlebitis: Phlebitis/thrombophlebitis  Bradycardia: Bradycardia  Renal insufficiency: Renal insufficiency  Hypertension, unspecified type: Hypertension, unspecified type  Syncope, unspecified syncope type: Syncope, unspecified syncope type  Descending aortic aneurysm: Descending aortic aneurysm      , FAMILY HISTORY:  No pertinent family history in first degree relatives  PAST MEDICAL & SURGICAL HISTORY:  Bradycardia  Renal insufficiency  Anxiety  Syncope, unspecified syncope type  Descending aortic aneurysm  Hypertension, unspecified type  H/O cardiac pacemaker: medtronic    Patient is a 78y old  Male who presents with a chief complaint of Preop thoracic aortic aneurysm (05 Dec 2018 13:24)      14 system review was unremarkable  acute changes include acute respiratory failure  Vital signs, hemodynamic and respiratory parameters were reviewed from the bedside nursing flowsheet.  ICU Vital Signs Last 24 Hrs  T(C): 36.4 (05 Dec 2018 09:01), Max: 37.6 (04 Dec 2018 22:15)  T(F): 97.6 (05 Dec 2018 09:01), Max: 99.7 (04 Dec 2018 22:15)  HR: 66 (05 Dec 2018 13:00) (64 - 68)  BP: 115/85 (05 Dec 2018 13:00) (115/84 - 151/105)  BP(mean): 96 (05 Dec 2018 13:00) (96 - 124)  ABP: --  ABP(mean): --  RR: 16 (05 Dec 2018 13:00) (16 - 18)  SpO2: 98% (05 Dec 2018 13:00) (97% - 98%)    Adult Advanced Hemodynamics Last 24 Hrs  CVP(mm Hg): --  CVP(cm H2O): --  CO: --  CI: --  PA: --  PA(mean): --  PCWP: --  SVR: --  SVRI: --  PVR: --  PVRI: --, ABG - ( 04 Dec 2018 02:39 )  pH, Arterial: 7.44  pH, Blood: x     /  pCO2: 35    /  pO2: 76    / HCO3: 23    / Base Excess: -0.2  /  SaO2: 96                  Intake and output was reviewed and the fluid balance was calculated  Daily     Daily   I&O's Summary    04 Dec 2018 07:01  -  05 Dec 2018 07:00  --------------------------------------------------------  IN: 404 mL / OUT: 300 mL / NET: 104 mL    05 Dec 2018 07:01  -  05 Dec 2018 16:14  --------------------------------------------------------  IN: 0 mL / OUT: 200 mL / NET: -200 mL        All lines and drain sites were assessed  Glycemic trend was reviewedCAPILLARY BLOOD GLUCOSE        No acute change in mental status  Auscultation of the chest reveals equal bs  Abdomen is soft  Extremities are warm and well perfused  Wounds appear clean and unremarkable  Antibiotics are periop    labs  CBC Full  -  ( 05 Dec 2018 05:40 )  WBC Count : 7.6 K/uL  Hemoglobin : 12.5 g/dL  Hematocrit : 38.0 %  Platelet Count - Automated : 313 K/uL  Mean Cell Volume : 94.8 fL  Mean Cell Hemoglobin : 31.2 pg  Mean Cell Hemoglobin Concentration : 32.9 g/dL  Auto Neutrophil # : x  Auto Lymphocyte # : x  Auto Monocyte # : x  Auto Eosinophil # : x  Auto Basophil # : x  Auto Neutrophil % : x  Auto Lymphocyte % : x  Auto Monocyte % : x  Auto Eosinophil % : x  Auto Basophil % : x    12-05    143  |  108  |  17  ----------------------------<  104<H>  4.0   |  23  |  1.84<H>    Ca    9.3      05 Dec 2018 05:40  Phos  3.4     12-05  Mg     2.0     12-05    TPro  7.4  /  Alb  3.8  /  TBili  0.5  /  DBili  x   /  AST  45<H>  /  ALT  101<H>  /  AlkPhos  82  12-04    PT/INR - ( 05 Dec 2018 05:40 )   PT: 13.3 sec;   INR: 1.17          PTT - ( 05 Dec 2018 05:40 )  PTT:30.3 sec  The current medications were reviewed   MEDICATIONS  (STANDING):  docusate sodium 100 milliGRAM(s) Oral two times a day  heparin  Injectable 5000 Unit(s) SubCutaneous every 8 hours  influenza   Vaccine 0.5 milliLiter(s) IntraMuscular once  labetalol 200 milliGRAM(s) Oral every 6 hours  pantoprazole    Tablet 40 milliGRAM(s) Oral before breakfast  polyethylene glycol 3350 17 Gram(s) Oral daily  senna 2 Tablet(s) Oral at bedtime  sertraline 25 milliGRAM(s) Oral daily  sodium chloride 0.9% lock flush 3 milliLiter(s) IV Push every 8 hours  testosterone patch 4 mG/24 Hr(s) 4 milliGRAM(s) Transdermal daily    MEDICATIONS  (PRN):  acetaminophen   Tablet .. 650 milliGRAM(s) Oral every 6 hours PRN Mild Pain (1 - 3)  hydrALAZINE Injectable 10 milliGRAM(s) IV Push every 4 hours PRN SBP > 160       PROBLEM LIST/ ASSESSMENT:  HEALTH ISSUES - PROBLEM Dx:  Phlebitis/thrombophlebitis: Phlebitis/thrombophlebitis  Bradycardia: Bradycardia  Renal insufficiency: Renal insufficiency  Hypertension, unspecified type: Hypertension, unspecified type  Syncope, unspecified syncope type: Syncope, unspecified syncope type  Descending aortic aneurysm: Descending aortic aneurysm      ,   Patient is a 78y old  Male who presents with a chief complaint of Preop thoracic aortic aneurysm (05 Dec 2018 13:24)          My plan includes :  close hemodynamic, ventilatory and drain monitoring and management per post op routine    Monitor for arrhythmias and monitor parameters for organ perfusion  monitor neurologic status  Head of the bed should remain elevated to 45 deg .   chest PT and IS will be encouraged  monitor adequacy of oxygenation and ventilation and attempt to wean oxygen  Nutritional goals will be met using po eventually , ensure adequate caloric intake and montior the same  Stress ulcer and VTE prophylaxis will be achieved    Glycemic control is satisfactory  Electrolytes have been repleted as necessary and wound care has been carried out. Pain control has been achieved.   agressive physical therapy and early mobility and ambulation goals will be met   The family was updated about the course and plan  CRITICAL CARE TIME SPENT in evaluation and management, reassessments, review and interpretation of labs and x-rays, ventilator and hemodynamic management, formulating a plan and coordinating care: ___90____ MIN.  Time does not include procedural time.  CTICU ATTENDING     					    Harley Vazquez MD

## 2018-12-04 NOTE — H&P ADULT - NSHPREVIEWOFSYSTEMS_GEN_ALL_CORE
Review of Systems  CONSTITUTIONAL:  Denies Fevers / chills, sweats, fatigue, weight loss, weight gain                                      NEURO:  syncope,                                                                                EYES:  Denies Blurry vision, discharge, pain, loss of vision                                                                                    ENMT:  Denies Difficulty hearing, vertigo, dysphagia, epistaxis, recent dental work                                       CV:  Denies Chest pain, palpitations, MCNALLY, orthopnea                                                                                          RESPIRATORY:  Denies Wheezing, SOB, cough / sputum, hemoptysis                                                                GI:  Denies Nausea, vomiting, diarrhea, constipation, melena, difficulty swallowing                                               : Denies Hematuria, dysuria, urgency, incontinence                                                                                         MUSKULOSKELETAL:  Denies arthritis, joint swelling, muscle weakness                                                             SKIN/BREAST:  Denies rash, itching, hair loss, masses                                                                                            PSYCH:   depression, anxiety                                                                                           HEME/LYMPH:  Denies bruises easily, enlarged lymph nodes, tender lymph nodes                                        ENDOCRINE:  Denies cold intolerance, heat intolerance, polydipsia

## 2018-12-04 NOTE — H&P ADULT - PROBLEM SELECTOR PLAN 1
BP control with IV clevidipine/B-Blockers  -goal systolic 150's  full labs  CXR  EP follow up to see PPM settings

## 2018-12-04 NOTE — H&P ADULT - PMH
Anxiety    Bradycardia    Descending aortic aneurysm    Hypertension, unspecified type    Renal insufficiency    Syncope, unspecified syncope type

## 2018-12-04 NOTE — H&P ADULT - HISTORY OF PRESENT ILLNESS
79 y/o male PMH HTN , CRI (1.65) ,fusiform aneurysmal dilation of descending aorta measuring 5.5cm , syncope, bradycardia, h/o Medtronic PPM (followed by Dr. Sarmiento) who is known to Dr. Garcia previously hospitalized at [facility] for management of descending aortic aneurysm. 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 He describes that he passed out for a short time after feeling very warm. Denies hitting his head, denies chest pain or back pain. Went to [facility] and subsequently was transferred to [facility] for observation and management.

## 2018-12-05 ENCOUNTER — APPOINTMENT (OUTPATIENT)
Dept: CARDIOTHORACIC SURGERY | Facility: CLINIC | Age: 78
End: 2018-12-05

## 2018-12-05 DIAGNOSIS — Z95.0 PRESENCE OF CARDIAC PACEMAKER: ICD-10-CM

## 2018-12-05 PROBLEM — N28.9 DISORDER OF KIDNEY AND URETER, UNSPECIFIED: Chronic | Status: ACTIVE | Noted: 2018-12-04

## 2018-12-05 PROBLEM — R55 SYNCOPE AND COLLAPSE: Chronic | Status: ACTIVE | Noted: 2018-12-04

## 2018-12-05 PROBLEM — R00.1 BRADYCARDIA, UNSPECIFIED: Chronic | Status: ACTIVE | Noted: 2018-12-04

## 2018-12-05 PROBLEM — F41.9 ANXIETY DISORDER, UNSPECIFIED: Chronic | Status: ACTIVE | Noted: 2018-12-04

## 2018-12-05 PROBLEM — I71.9 AORTIC ANEURYSM OF UNSPECIFIED SITE, WITHOUT RUPTURE: Chronic | Status: ACTIVE | Noted: 2018-12-04

## 2018-12-05 LAB
ANION GAP SERPL CALC-SCNC: 12 MMOL/L — SIGNIFICANT CHANGE UP (ref 5–17)
APTT BLD: 30.3 SEC — SIGNIFICANT CHANGE UP (ref 27.5–36.3)
BUN SERPL-MCNC: 17 MG/DL — SIGNIFICANT CHANGE UP (ref 7–23)
CALCIUM SERPL-MCNC: 9.3 MG/DL — SIGNIFICANT CHANGE UP (ref 8.4–10.5)
CHLORIDE SERPL-SCNC: 108 MMOL/L — SIGNIFICANT CHANGE UP (ref 96–108)
CO2 SERPL-SCNC: 23 MMOL/L — SIGNIFICANT CHANGE UP (ref 22–31)
CREAT SERPL-MCNC: 1.84 MG/DL — HIGH (ref 0.5–1.3)
GLUCOSE SERPL-MCNC: 104 MG/DL — HIGH (ref 70–99)
HCT VFR BLD CALC: 38 % — LOW (ref 39–50)
HGB BLD-MCNC: 12.5 G/DL — LOW (ref 13–17)
INR BLD: 1.17 — HIGH (ref 0.88–1.16)
MAGNESIUM SERPL-MCNC: 2 MG/DL — SIGNIFICANT CHANGE UP (ref 1.6–2.6)
MCHC RBC-ENTMCNC: 31.2 PG — SIGNIFICANT CHANGE UP (ref 27–34)
MCHC RBC-ENTMCNC: 32.9 G/DL — SIGNIFICANT CHANGE UP (ref 32–36)
MCV RBC AUTO: 94.8 FL — SIGNIFICANT CHANGE UP (ref 80–100)
PHOSPHATE SERPL-MCNC: 3.4 MG/DL — SIGNIFICANT CHANGE UP (ref 2.5–4.5)
PLATELET # BLD AUTO: 313 K/UL — SIGNIFICANT CHANGE UP (ref 150–400)
POTASSIUM SERPL-MCNC: 4 MMOL/L — SIGNIFICANT CHANGE UP (ref 3.5–5.3)
POTASSIUM SERPL-SCNC: 4 MMOL/L — SIGNIFICANT CHANGE UP (ref 3.5–5.3)
PROTHROM AB SERPL-ACNC: 13.3 SEC — HIGH (ref 10–12.9)
RBC # BLD: 4.01 M/UL — LOW (ref 4.2–5.8)
RBC # FLD: 13.2 % — SIGNIFICANT CHANGE UP (ref 10.3–16.9)
SODIUM SERPL-SCNC: 143 MMOL/L — SIGNIFICANT CHANGE UP (ref 135–145)
WBC # BLD: 7.6 K/UL — SIGNIFICANT CHANGE UP (ref 3.8–10.5)
WBC # FLD AUTO: 7.6 K/UL — SIGNIFICANT CHANGE UP (ref 3.8–10.5)

## 2018-12-05 PROCEDURE — 72100 X-RAY EXAM L-S SPINE 2/3 VWS: CPT | Mod: 26

## 2018-12-05 RX ADMIN — Medication 4 MILLIGRAM(S): at 19:37

## 2018-12-05 RX ADMIN — Medication 4 MILLIGRAM(S): at 11:53

## 2018-12-05 RX ADMIN — Medication 200 MILLIGRAM(S): at 22:43

## 2018-12-05 RX ADMIN — SENNA PLUS 2 TABLET(S): 8.6 TABLET ORAL at 22:43

## 2018-12-05 RX ADMIN — SERTRALINE 25 MILLIGRAM(S): 25 TABLET, FILM COATED ORAL at 11:25

## 2018-12-05 RX ADMIN — HEPARIN SODIUM 5000 UNIT(S): 5000 INJECTION INTRAVENOUS; SUBCUTANEOUS at 06:09

## 2018-12-05 RX ADMIN — Medication 200 MILLIGRAM(S): at 03:23

## 2018-12-05 RX ADMIN — Medication 4 MILLIGRAM(S): at 09:29

## 2018-12-05 RX ADMIN — SODIUM CHLORIDE 3 MILLILITER(S): 9 INJECTION INTRAMUSCULAR; INTRAVENOUS; SUBCUTANEOUS at 15:19

## 2018-12-05 RX ADMIN — SODIUM CHLORIDE 3 MILLILITER(S): 9 INJECTION INTRAMUSCULAR; INTRAVENOUS; SUBCUTANEOUS at 05:55

## 2018-12-05 RX ADMIN — Medication 4 MILLIGRAM(S): at 11:52

## 2018-12-05 RX ADMIN — HEPARIN SODIUM 5000 UNIT(S): 5000 INJECTION INTRAVENOUS; SUBCUTANEOUS at 22:43

## 2018-12-05 RX ADMIN — SODIUM CHLORIDE 3 MILLILITER(S): 9 INJECTION INTRAMUSCULAR; INTRAVENOUS; SUBCUTANEOUS at 22:27

## 2018-12-05 RX ADMIN — Medication 100 MILLIGRAM(S): at 06:08

## 2018-12-05 RX ADMIN — PANTOPRAZOLE SODIUM 40 MILLIGRAM(S): 20 TABLET, DELAYED RELEASE ORAL at 06:08

## 2018-12-05 NOTE — PROGRESS NOTE ADULT - SUBJECTIVE AND OBJECTIVE BOX
Patient discussed on morning rounds with Dr. Garcia     Operation / Date: preop descending thoracic aortic aneurysm     SUBJECTIVE ASSESSMENT:  No events overnight. Pt feeling well, denies any dizziness/ syncopal events/ palpitations, chest pain.     Vital Signs Last 24 Hrs  T(C): 36.4 (05 Dec 2018 09:01), Max: 37.6 (04 Dec 2018 22:15)  T(F): 97.6 (05 Dec 2018 09:01), Max: 99.7 (04 Dec 2018 22:15)  HR: 66 (05 Dec 2018 13:00) (64 - 77)  BP: 147/98 (05 Dec 2018 09:00) (113/83 - 165/90)  BP(mean): 124 (05 Dec 2018 09:00) (97 - 124)  RR: 16 (05 Dec 2018 13:00) (12 - 18)  SpO2: 98% (05 Dec 2018 13:00) (97% - 98%)  I&O's Detail    04 Dec 2018 07:01  -  05 Dec 2018 07:00  --------------------------------------------------------  IN:    clevidipine Infusion: 4 mL    Oral Fluid: 400 mL  Total IN: 404 mL    OUT:    Voided: 300 mL  Total OUT: 300 mL    Total NET: 104 mL      05 Dec 2018 07:01  -  05 Dec 2018 13:25  --------------------------------------------------------  IN:  Total IN: 0 mL    OUT:    Voided: 200 mL  Total OUT: 200 mL    Total NET: -200 mL          CHEST TUBE:  No.   BRUNA DRAIN:  No.  EPICARDIAL WIRES: No.  TIE DOWNS: No.  CASILLAS: No.    PHYSICAL EXAM:    General: NAD, sitting in bed comfortably     Neurological: AAOx3, no pain    Cardiovascular: RRR, no murmurs    Respiratory: CTA b/l, unlabored on room air    Gastrointestinal: soft, NTND, no aneurysm felt     Extremities: WWP    Vascular: +2 pedal pulses, +2 femoral pulses b/l       LABS:                        12.5   7.6   )-----------( 313      ( 05 Dec 2018 05:40 )             38.0       COUMADIN:  No.     PT/INR - ( 05 Dec 2018 05:40 )   PT: 13.3 sec;   INR: 1.17          PTT - ( 05 Dec 2018 05:40 )  PTT:30.3 sec    12    143  |  108  |  17  ----------------------------<  104<H>  4.0   |  23  |  1.84<H>    Ca    9.3      05 Dec 2018 05:40  Phos  3.4     12-  Mg     2.0         TPro  7.4  /  Alb  3.8  /  TBili  0.5  /  DBili  x   /  AST  45<H>  /  ALT  101<H>  /  AlkPhos  82  12      Urinalysis Basic - ( 04 Dec 2018 00:15 )    Color: Yellow / Appearance: Clear / S.010 / pH: x  Gluc: x / Ketone: NEGATIVE  / Bili: Negative / Urobili: 0.2 E.U./dL   Blood: x / Protein: NEGATIVE mg/dL / Nitrite: NEGATIVE   Leuk Esterase: NEGATIVE / RBC: x / WBC x   Sq Epi: x / Non Sq Epi: x / Bacteria: x        MEDICATIONS  (STANDING):  docusate sodium 100 milliGRAM(s) Oral two times a day  heparin  Injectable 5000 Unit(s) SubCutaneous every 8 hours  influenza   Vaccine 0.5 milliLiter(s) IntraMuscular once  labetalol 200 milliGRAM(s) Oral every 6 hours  pantoprazole    Tablet 40 milliGRAM(s) Oral before breakfast  polyethylene glycol 3350 17 Gram(s) Oral daily  senna 2 Tablet(s) Oral at bedtime  sertraline 25 milliGRAM(s) Oral daily  sodium chloride 0.9% lock flush 3 milliLiter(s) IV Push every 8 hours  testosterone patch 4 mG/24 Hr(s) 4 milliGRAM(s) Transdermal daily    MEDICATIONS  (PRN):  acetaminophen   Tablet .. 650 milliGRAM(s) Oral every 6 hours PRN Mild Pain (1 - 3)  hydrALAZINE Injectable 10 milliGRAM(s) IV Push every 4 hours PRN SBP > 160        RADIOLOGY & ADDITIONAL TESTS:

## 2018-12-05 NOTE — PROGRESS NOTE ADULT - ASSESSMENT
79 y/o male PMH HTN, CKD (1.65) ,fusiform aneurysmal dilation of descending aorta measuring 5.5cm, syncope, bradycardia, h/o Medtronic PPM (followed by Dr. Sarmiento) who is known to Dr. Garcia previously hospitalized at [facility] for management of descending aortic aneurysm. Currently c/o of another syncopal episode while at home working with home, admitted for further evaluation and preop workup.    Neuro:   - no pain, AAOx3   - syncope: EP interrogated PPM w/o no events. F/u Neuro consult to be seen today     Cards:   - descending thoracic aneurysm: tight BP control, cont labetalol 200mg q6h, with hydralazine PRN  - possible OR friday for TEVAR     Resp:   - no issues, 97% on room air    Renal:   - h/o CKD, creat slightly above baseline. avoid nephrotoxins   - monitor I/o    Dispo pending intervention

## 2018-12-05 NOTE — CONSULT NOTE ADULT - SUBJECTIVE AND OBJECTIVE BOX
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 Sarmiento) who is known to Dr. Garcia previously hospitalized at [facility] for management of descending aortic aneurysm. 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 He describes that he passed out for a short time after feeling very warm. Denies hitting his head, denies chest pain or back pain. Went to [facility] and subsequently was transferred to [facility] for observation and management. (04 Dec 2018 00:08)      PAST MEDICAL & SURGICAL HISTORY:  Bradycardia  Renal insufficiency  Anxiety  Syncope, unspecified syncope type  Descending aortic aneurysm  Hypertension, unspecified type  H/O cardiac pacemaker: medtronic      FAMILY HISTORY:  No pertinent family history in first degree relatives      Social History:    Review of Systems:  Constitutional: No fever, weight loss or fatigue  Eyes: No eye pain, visual disturbances, or discharge  ENMT:  No difficulty hearing, tinnitus, vertigo; No sinus or throat pain  Neck: No pain or stiffness  Respiratory: No cough, wheezing, chills or hemoptysis  Cardiovascular: No chest pain, palpitations, shortness of breath, dizziness or leg swelling  Gastrointestinal: No abdominal pain. No nausea, vomiting or hematemesis; No diarrhea or constipation. Nohematochezia.  Genitourinary: No dysuria, frequency, hematuria or incontinence  Neurological: As per HPI  Skin: No itching, burning, rashes or lesions   Endocrine: No heat or cold intolerance; No hair loss  Musculoskeletal: No joint pain or swelling; No muscle, back or extremity pain  Psychiatric: No depression, anxiety, mood swings or difficulty sleeping  Heme/Lymph: No easy bruising or bleeding gums    MEDICATIONS  (STANDING):  docusate sodium 100 milliGRAM(s) Oral two times a day  heparin  Injectable 5000 Unit(s) SubCutaneous every 8 hours  influenza   Vaccine 0.5 milliLiter(s) IntraMuscular once  labetalol 200 milliGRAM(s) Oral every 6 hours  pantoprazole    Tablet 40 milliGRAM(s) Oral before breakfast  polyethylene glycol 3350 17 Gram(s) Oral daily  senna 2 Tablet(s) Oral at bedtime  sertraline 25 milliGRAM(s) Oral daily  sodium chloride 0.9% lock flush 3 milliLiter(s) IV Push every 8 hours  testosterone patch 4 mG/24 Hr(s) 4 milliGRAM(s) Transdermal daily    MEDICATIONS  (PRN):  acetaminophen   Tablet .. 650 milliGRAM(s) Oral every 6 hours PRN Mild Pain (1 - 3)  hydrALAZINE Injectable 10 milliGRAM(s) IV Push every 4 hours PRN SBP > 160      Allergies    No Known Allergies    Intolerances        Vital Signs Last 24 Hrs  T(C): 36.4 (05 Dec 2018 17:57), Max: 37.6 (04 Dec 2018 22:15)  T(F): 97.5 (05 Dec 2018 17:57), Max: 99.7 (04 Dec 2018 22:15)  HR: 62 (05 Dec 2018 17:27) (62 - 68)  BP: 157/102 (05 Dec 2018 17:27) (115/84 - 157/102)  BP(mean): 126 (05 Dec 2018 17:27) (96 - 126)  RR: 16 (05 Dec 2018 17:27) (16 - 18)  SpO2: 98% (05 Dec 2018 17:27) (97% - 98%)    Neurologic Exam:  Neuro:  Mental status: Awake, alert and oriented x3.  mild dysarthria  Cranial nerves: Fundoscopic exam demonstrated no abnormalities, pupils equally round and reactive to light, visual fields full, no nystagmus, extraocular muscles intact, V1 through V3 intact bilaterally and symmetric, face symmetric, hearing intact to finger rub, palate elevation symmetric, tongue was midline, sternocleidomastoid/shoulder shrug strength bilaterally 5/5.    Motor:  Normal bulk and tone, strength 5/5 in bilateral upper and lower extremities.   strength 5/5.  Rapid alternating movements intact and symmetric.   Sensation: Intact to light touch, proprioception, vibration, temperature, pinprick.  No neglect.   Coordination: No dysmetria on finger-to-nose and heel-to-shin.  No clumsiness.  Reflexes: 2+ in upper and lower extremities, absent Babinski bilaterally  Gait: deferred        Labs:                        12.5   7.6   )-----------( 313      ( 05 Dec 2018 05:40 )             38.0     12-05    143  |  108  |  17  ----------------------------<  104<H>  4.0   |  23  |  1.84<H>    Ca    9.3      05 Dec 2018 05:40  Phos  3.4     12-05  Mg     2.0     12-05    TPro  7.4  /  Alb  3.8  /  TBili  0.5  /  DBili  x   /  AST  45<H>  /  ALT  101<H>  /  AlkPhos  82  12-04    PT/INR - ( 05 Dec 2018 05:40 )   PT: 13.3 sec;   INR: 1.17          PTT - ( 05 Dec 2018 05:40 )  PTT:30.3 sec          Radiology and Additional Studies:      Assessment & Plan:  Possible seizure  due to recurrent events that he does not remember and shanice appears to have an aura before each event. Will get VEEG overnight.   Also possible in the differential is orthostatic hypotension  - due to possible autonomic insufficiency -check orthostatics in am  Chronic cerebellar and BG strokes

## 2018-12-06 LAB
ANION GAP SERPL CALC-SCNC: 16 MMOL/L — SIGNIFICANT CHANGE UP (ref 5–17)
BLD GP AB SCN SERPL QL: NEGATIVE — SIGNIFICANT CHANGE UP
BUN SERPL-MCNC: 18 MG/DL — SIGNIFICANT CHANGE UP (ref 7–23)
CALCIUM SERPL-MCNC: 9.5 MG/DL — SIGNIFICANT CHANGE UP (ref 8.4–10.5)
CHLORIDE SERPL-SCNC: 103 MMOL/L — SIGNIFICANT CHANGE UP (ref 96–108)
CO2 SERPL-SCNC: 22 MMOL/L — SIGNIFICANT CHANGE UP (ref 22–31)
CREAT SERPL-MCNC: 1.87 MG/DL — HIGH (ref 0.5–1.3)
GLUCOSE SERPL-MCNC: 103 MG/DL — HIGH (ref 70–99)
HCT VFR BLD CALC: 38 % — LOW (ref 39–50)
HGB BLD-MCNC: 12.4 G/DL — LOW (ref 13–17)
MAGNESIUM SERPL-MCNC: 2.1 MG/DL — SIGNIFICANT CHANGE UP (ref 1.6–2.6)
MCHC RBC-ENTMCNC: 30.8 PG — SIGNIFICANT CHANGE UP (ref 27–34)
MCHC RBC-ENTMCNC: 32.6 G/DL — SIGNIFICANT CHANGE UP (ref 32–36)
MCV RBC AUTO: 94.5 FL — SIGNIFICANT CHANGE UP (ref 80–100)
PLATELET # BLD AUTO: 322 K/UL — SIGNIFICANT CHANGE UP (ref 150–400)
POTASSIUM SERPL-MCNC: 4 MMOL/L — SIGNIFICANT CHANGE UP (ref 3.5–5.3)
POTASSIUM SERPL-SCNC: 4 MMOL/L — SIGNIFICANT CHANGE UP (ref 3.5–5.3)
RBC # BLD: 4.02 M/UL — LOW (ref 4.2–5.8)
RBC # FLD: 12.8 % — SIGNIFICANT CHANGE UP (ref 10.3–16.9)
RH IG SCN BLD-IMP: POSITIVE — SIGNIFICANT CHANGE UP
SODIUM SERPL-SCNC: 141 MMOL/L — SIGNIFICANT CHANGE UP (ref 135–145)
WBC # BLD: 6.8 K/UL — SIGNIFICANT CHANGE UP (ref 3.8–10.5)
WBC # FLD AUTO: 6.8 K/UL — SIGNIFICANT CHANGE UP (ref 3.8–10.5)

## 2018-12-06 PROCEDURE — 62272 THER SPI PNXR DRG CSF: CPT

## 2018-12-06 PROCEDURE — 95951: CPT | Mod: 26

## 2018-12-06 PROCEDURE — 94010 BREATHING CAPACITY TEST: CPT | Mod: 26

## 2018-12-06 RX ORDER — CHLORHEXIDINE GLUCONATE 213 G/1000ML
1 SOLUTION TOPICAL ONCE
Qty: 0 | Refills: 0 | Status: COMPLETED | OUTPATIENT
Start: 2018-12-07 | End: 2018-12-07

## 2018-12-06 RX ORDER — FENTANYL CITRATE 50 UG/ML
100 INJECTION INTRAVENOUS ONCE
Qty: 0 | Refills: 0 | Status: DISCONTINUED | OUTPATIENT
Start: 2018-12-06 | End: 2018-12-06

## 2018-12-06 RX ORDER — MIDAZOLAM HYDROCHLORIDE 1 MG/ML
2 INJECTION, SOLUTION INTRAMUSCULAR; INTRAVENOUS ONCE
Qty: 0 | Refills: 0 | Status: DISCONTINUED | OUTPATIENT
Start: 2018-12-06 | End: 2018-12-06

## 2018-12-06 RX ORDER — SODIUM CHLORIDE 9 MG/ML
1000 INJECTION, SOLUTION INTRAVENOUS
Qty: 0 | Refills: 0 | Status: DISCONTINUED | OUTPATIENT
Start: 2018-12-06 | End: 2018-12-07

## 2018-12-06 RX ORDER — CHLORHEXIDINE GLUCONATE 213 G/1000ML
1 SOLUTION TOPICAL ONCE
Qty: 0 | Refills: 0 | Status: COMPLETED | OUTPATIENT
Start: 2018-12-06 | End: 2018-12-07

## 2018-12-06 RX ORDER — CHLORHEXIDINE GLUCONATE 213 G/1000ML
10 SOLUTION TOPICAL ONCE
Qty: 0 | Refills: 0 | Status: DISCONTINUED | OUTPATIENT
Start: 2018-12-06 | End: 2018-12-07

## 2018-12-06 RX ORDER — SODIUM CHLORIDE 9 MG/ML
1000 INJECTION, SOLUTION INTRAVENOUS
Qty: 0 | Refills: 0 | Status: COMPLETED | OUTPATIENT
Start: 2018-12-06 | End: 2018-12-06

## 2018-12-06 RX ORDER — CHLORHEXIDINE GLUCONATE 213 G/1000ML
1 SOLUTION TOPICAL ONCE
Qty: 0 | Refills: 0 | Status: COMPLETED | OUTPATIENT
Start: 2018-12-06 | End: 2018-12-06

## 2018-12-06 RX ORDER — CEFAZOLIN SODIUM 1 G
1000 VIAL (EA) INJECTION ONCE
Qty: 0 | Refills: 0 | Status: COMPLETED | OUTPATIENT
Start: 2018-12-06 | End: 2018-12-06

## 2018-12-06 RX ADMIN — POLYETHYLENE GLYCOL 3350 17 GRAM(S): 17 POWDER, FOR SOLUTION ORAL at 14:29

## 2018-12-06 RX ADMIN — Medication 100 MILLIGRAM(S): at 10:29

## 2018-12-06 RX ADMIN — Medication 4 MILLIGRAM(S): at 06:24

## 2018-12-06 RX ADMIN — Medication 200 MILLIGRAM(S): at 11:04

## 2018-12-06 RX ADMIN — SODIUM CHLORIDE 3 MILLILITER(S): 9 INJECTION INTRAMUSCULAR; INTRAVENOUS; SUBCUTANEOUS at 06:23

## 2018-12-06 RX ADMIN — Medication 100 MILLIGRAM(S): at 18:03

## 2018-12-06 RX ADMIN — SODIUM CHLORIDE 3 MILLILITER(S): 9 INJECTION INTRAMUSCULAR; INTRAVENOUS; SUBCUTANEOUS at 14:31

## 2018-12-06 RX ADMIN — SODIUM CHLORIDE 3 MILLILITER(S): 9 INJECTION INTRAMUSCULAR; INTRAVENOUS; SUBCUTANEOUS at 21:21

## 2018-12-06 RX ADMIN — PANTOPRAZOLE SODIUM 40 MILLIGRAM(S): 20 TABLET, DELAYED RELEASE ORAL at 06:23

## 2018-12-06 RX ADMIN — HEPARIN SODIUM 5000 UNIT(S): 5000 INJECTION INTRAVENOUS; SUBCUTANEOUS at 14:29

## 2018-12-06 RX ADMIN — FENTANYL CITRATE 100 MICROGRAM(S): 50 INJECTION INTRAVENOUS at 13:40

## 2018-12-06 RX ADMIN — Medication 100 MILLIGRAM(S): at 06:23

## 2018-12-06 RX ADMIN — SODIUM CHLORIDE 75 MILLILITER(S): 9 INJECTION, SOLUTION INTRAVENOUS at 23:52

## 2018-12-06 RX ADMIN — Medication 4 MILLIGRAM(S): at 11:00

## 2018-12-06 RX ADMIN — Medication 200 MILLIGRAM(S): at 04:22

## 2018-12-06 RX ADMIN — FENTANYL CITRATE 100 MICROGRAM(S): 50 INJECTION INTRAVENOUS at 14:00

## 2018-12-06 RX ADMIN — Medication 200 MILLIGRAM(S): at 21:19

## 2018-12-06 RX ADMIN — HEPARIN SODIUM 5000 UNIT(S): 5000 INJECTION INTRAVENOUS; SUBCUTANEOUS at 06:23

## 2018-12-06 RX ADMIN — SENNA PLUS 2 TABLET(S): 8.6 TABLET ORAL at 21:20

## 2018-12-06 RX ADMIN — HEPARIN SODIUM 5000 UNIT(S): 5000 INJECTION INTRAVENOUS; SUBCUTANEOUS at 21:20

## 2018-12-06 RX ADMIN — CHLORHEXIDINE GLUCONATE 1 APPLICATION(S): 213 SOLUTION TOPICAL at 21:20

## 2018-12-06 RX ADMIN — SODIUM CHLORIDE 999 MILLILITER(S): 9 INJECTION, SOLUTION INTRAVENOUS at 22:00

## 2018-12-06 RX ADMIN — Medication 4 MILLIGRAM(S): at 19:00

## 2018-12-06 RX ADMIN — SERTRALINE 25 MILLIGRAM(S): 25 TABLET, FILM COATED ORAL at 14:29

## 2018-12-06 RX ADMIN — Medication 200 MILLIGRAM(S): at 14:29

## 2018-12-06 RX ADMIN — Medication 4 MILLIGRAM(S): at 14:30

## 2018-12-06 RX ADMIN — MIDAZOLAM HYDROCHLORIDE 2 MILLIGRAM(S): 1 INJECTION, SOLUTION INTRAMUSCULAR; INTRAVENOUS at 11:04

## 2018-12-06 NOTE — PROCEDURE NOTE - PROCEDURE
<<-----Click on this checkbox to enter Procedure Lumbar drain implantation  12/06/2018    Active  [id number]

## 2018-12-06 NOTE — PROGRESS NOTE ADULT - SUBJECTIVE AND OBJECTIVE BOX
Planned Date of Surgery:       12/7/18                                                                                                           Surgeon: Dr. Garcia    Procedure: TEVAR    HPI:  Patient states he just experienced some weakness when the nurse stood him up to weigh him.  He reported some dizziness "like he was going to faint" but quickly was placed back in his bed and felt better.  Denies any SOB, pain anywhere, headache, N/V/D, fever or chills.  He states this "has happened" a few times recently, and "this is what brought him here".      PAST MEDICAL & SURGICAL HISTORY:  Bradycardia  Renal insufficiency  Anxiety  Syncope, unspecified syncope type  Descending aortic aneurysm  Hypertension, unspecified type  H/O cardiac pacemaker: medtronic      No Known Allergies      MEDICATIONS  (STANDING):  dextrose 5% + sodium chloride 0.45%. 1000 milliLiter(s) (75 mL/Hr) IV Continuous <Continuous>  docusate sodium 100 milliGRAM(s) Oral two times a day  heparin  Injectable 5000 Unit(s) SubCutaneous every 8 hours  influenza   Vaccine 0.5 milliLiter(s) IntraMuscular once  labetalol 200 milliGRAM(s) Oral every 6 hours  pantoprazole    Tablet 40 milliGRAM(s) Oral before breakfast  polyethylene glycol 3350 17 Gram(s) Oral daily  senna 2 Tablet(s) Oral at bedtime  sertraline 25 milliGRAM(s) Oral daily  sodium chloride 0.9% lock flush 3 milliLiter(s) IV Push every 8 hours  testosterone patch 4 mG/24 Hr(s) 4 milliGRAM(s) Transdermal daily    MEDICATIONS  (PRN):  acetaminophen   Tablet .. 650 milliGRAM(s) Oral every 6 hours PRN Mild Pain (1 - 3)  hydrALAZINE Injectable 10 milliGRAM(s) IV Push every 4 hours PRN SBP > 160      On Beta Blocker? YES    Labs:                        12.4   6.8   )-----------( 322      ( 06 Dec 2018 05:55 )             38.0     12-06    141  |  103  |  18  ----------------------------<  103<H>  4.0   |  22  |  1.87<H>    Ca    9.5      06 Dec 2018 05:56  Phos  3.4     12-05  Mg     2.1     12-06      PT/INR - ( 05 Dec 2018 05:40 )   PT: 13.3 sec;   INR: 1.17          PTT - ( 05 Dec 2018 05:40 )  PTT:30.3 sec    Hemoglobin A1C, Whole Blood: 5.4 % (12-04-18 @ 00:15)      GEN: NAD, looks comfortable. Lying in bed.  EEG monitors in place  Psych: Mood appropriate  Neuro: A&Ox3.  No focal deficits.  Moving all extremities. Upper and lower extremities with 5/5 strength.  At times, takes a little long to answer questions, but when he does he answers well and has no speech abnormalities to my assessment.    HEENT: No obvious abnormalities  CV: S1S2, regular, no murmurs appreciated.  No carotid bruits.  No JVD  Lungs: Clear B/L.  No wheezing, rales or rhonchi  ABD: Soft, non-tender, non-distended.  +Bowel sounds  EXT: Warm and well perfused.  No peripheral edema noted.  Pedal pulses are barely palpable, but good doppler signals of both DPs and right PT.  Left PT couldn't hear w/ doppler.    Musculoskeletal: Moving all extremities with normal ROM, no joint swelling        Hgb A1C: 5.4%    EKG: < from: 12 Lead ECG (12.04.18 @ 00:14) >  Diagnosis Line Normal sinus rhythm  Voltage criteria for left ventricular hypertrophy    < end of copied text >    CXR: < from: Xray Chest 1 View- PORTABLE-Urgent (12.04.18 @ 00:21) >  Heart is normal in size in this patient with dual-lead left-sided   transvenous pacemaker. There has been no gross change in marked   tortuosity of the aorta or in aneurysmal dilatation of the descending   aorta. There is no evidence of pulmonary vascular congestion, focal   infiltrate, mass, pleural fluid, or pneumothorax, and there has otherwise   been no interval change.    IMPRESSION: No significant change from 11/22/2018.    < end of copied text >   < from: Xray Lumbar Spine AP + Lateral (12.05.18 @ 18:40) >    IMPRESSION:     No compression deformity. Mild lumbar spondylosis    < end of copied text >      CT Scans: < from: CT Head No Cont (11.22.18 @ 17:07) >    IMPRESSION:      1. No acute intracranial hemorrhage or acute transcortical infarct.  2. 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 Given this background of chronic microangiopathic disease,   brain MR with diffusion-weighted imaging may be helpful for further   evaluation as this is more sensitive study for detection of acute   infarction.  3. Age-appropriate volume loss.  4. No significant change compared to the prior exam of 11/17/2018.    < end of copied text >   < from: CT Chest No Cont (11.22.18 @ 17:06) >  IMPRESSION: Mild enlargement of the descending thoracic aortic aneurysm.   No secondary findings of acute rupture. Evaluation for dissection is   limited without contrast.    < end of copied text >       Cath Report:      Echo: < from: Echocardiogram (11.19.18 @ 12:43) >  The left atrial size is normal. Right atrial size is normal.There is mild   aortic valve thickening. The aortic valve is trileaflet. No aortic   regurgitation noted. No hemodynamically significant valvular aortic   stenosis.   There is trivial mitral valve thickening. There is trace mitral   regurgitation.    Structurally normal tricuspid valve. There is trace tricuspid   regurgitation.There was insufficient TR detected from which to calculate   pulmonary artery systolic pressure.  The pulmonic valve is not well   visualized. No pulmonic regurgitation noted.The right ventricle is   normal in   size and function.There is mild concentric left ventricular hypertrophy.   The   left ventricular wall motion is normal. The left ventricular ejection   fraction   is 63%. Normal LV diastolic function.  No aortic root dilatation.There   is no   pericardial effusion.No prior study for comparison.    < end of copied text >      PFT's:     Carotid Duplex: < from: US Duplex Carotid Arteries Complete, Bilateral (11.19.18 @ 19:28) >  IMPRESSION:  No evidence of hemodynamically significant stenosis.    < end of copied text >      Consult in Chart?  YES (H&P)  Consent in Chart? PENDING  Pre-op Orders Placed? YES  Blood Prodeucts Ordered? YES  NPO ordered? YES Planned Date of Surgery:       12/7/18                                                                                                           Surgeon: Dr. Garcia    Procedure: TEVAR    HPI:  Patient states he just experienced some weakness when the nurse stood him up to weigh him.  He reported some dizziness "like he was going to faint" but quickly was placed back in his bed and felt better.  Denies any SOB, pain anywhere, headache, N/V/D, fever or chills.  He states this "has happened" a few times recently, and "this is what brought him here".      PAST MEDICAL & SURGICAL HISTORY:  Bradycardia  Renal insufficiency  Anxiety  Syncope, unspecified syncope type  Descending aortic aneurysm  Hypertension, unspecified type  H/O cardiac pacemaker: medtronic      No Known Allergies      MEDICATIONS  (STANDING):  dextrose 5% + sodium chloride 0.45%. 1000 milliLiter(s) (75 mL/Hr) IV Continuous <Continuous>  docusate sodium 100 milliGRAM(s) Oral two times a day  heparin  Injectable 5000 Unit(s) SubCutaneous every 8 hours  influenza   Vaccine 0.5 milliLiter(s) IntraMuscular once  labetalol 200 milliGRAM(s) Oral every 6 hours  pantoprazole    Tablet 40 milliGRAM(s) Oral before breakfast  polyethylene glycol 3350 17 Gram(s) Oral daily  senna 2 Tablet(s) Oral at bedtime  sertraline 25 milliGRAM(s) Oral daily  sodium chloride 0.9% lock flush 3 milliLiter(s) IV Push every 8 hours  testosterone patch 4 mG/24 Hr(s) 4 milliGRAM(s) Transdermal daily    MEDICATIONS  (PRN):  acetaminophen   Tablet .. 650 milliGRAM(s) Oral every 6 hours PRN Mild Pain (1 - 3)  hydrALAZINE Injectable 10 milliGRAM(s) IV Push every 4 hours PRN SBP > 160      On Beta Blocker? YES    Labs:                        12.4   6.8   )-----------( 322      ( 06 Dec 2018 05:55 )             38.0     12-06    141  |  103  |  18  ----------------------------<  103<H>  4.0   |  22  |  1.87<H>    Ca    9.5      06 Dec 2018 05:56  Phos  3.4     12-05  Mg     2.1     12-06      PT/INR - ( 05 Dec 2018 05:40 )   PT: 13.3 sec;   INR: 1.17          PTT - ( 05 Dec 2018 05:40 )  PTT:30.3 sec    Hemoglobin A1C, Whole Blood: 5.4 % (12-04-18 @ 00:15)      GEN: NAD, looks comfortable. Lying in bed.  EEG monitors in place  Psych: Mood appropriate  Neuro: A&Ox3.  No focal deficits.  Moving all extremities. Upper and lower extremities with 5/5 strength.  At times, takes a little long to answer questions, but when he does he answers well and has no speech abnormalities to my assessment.    HEENT: No obvious abnormalities  CV: S1S2, regular, no murmurs appreciated.  No carotid bruits.  No JVD  Lungs: Clear B/L.  No wheezing, rales or rhonchi  ABD: Soft, non-tender, non-distended.  +Bowel sounds  EXT: Warm and well perfused.  No peripheral edema noted.  Pedal pulses are barely palpable, but good doppler signals of both DPs and right PT.  Left PT couldn't hear w/ doppler.    Musculoskeletal: Moving all extremities with normal ROM, no joint swelling        Hgb A1C: 5.4%    EKG: < from: 12 Lead ECG (12.04.18 @ 00:14) >  Diagnosis Line Normal sinus rhythm  Voltage criteria for left ventricular hypertrophy    < end of copied text >    CXR: < from: Xray Chest 1 View- PORTABLE-Urgent (12.04.18 @ 00:21) >  Heart is normal in size in this patient with dual-lead left-sided   transvenous pacemaker. There has been no gross change in marked   tortuosity of the aorta or in aneurysmal dilatation of the descending   aorta. There is no evidence of pulmonary vascular congestion, focal   infiltrate, mass, pleural fluid, or pneumothorax, and there has otherwise   been no interval change.    IMPRESSION: No significant change from 11/22/2018.    < end of copied text >   < from: Xray Lumbar Spine AP + Lateral (12.05.18 @ 18:40) >    IMPRESSION:     No compression deformity. Mild lumbar spondylosis    < end of copied text >      CT Scans: < from: CT Head No Cont (11.22.18 @ 17:07) >    IMPRESSION:      1. No acute intracranial hemorrhage or acute transcortical infarct.  2. 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 Given this background of chronic microangiopathic disease,   brain MR with diffusion-weighted imaging may be helpful for further   evaluation as this is more sensitive study for detection of acute   infarction.  3. Age-appropriate volume loss.  4. No significant change compared to the prior exam of 11/17/2018.    < end of copied text >   < from: CT Chest No Cont (11.22.18 @ 17:06) >  IMPRESSION: Mild enlargement of the descending thoracic aortic aneurysm.   No secondary findings of acute rupture. Evaluation for dissection is   limited without contrast.    < end of copied text >       Cath Report: Not done     Echo: < from: Echocardiogram (11.19.18 @ 12:43) >  The left atrial size is normal. Right atrial size is normal.There is mild   aortic valve thickening. The aortic valve is trileaflet. No aortic   regurgitation noted. No hemodynamically significant valvular aortic   stenosis.   There is trivial mitral valve thickening. There is trace mitral   regurgitation.    Structurally normal tricuspid valve. There is trace tricuspid   regurgitation.There was insufficient TR detected from which to calculate   pulmonary artery systolic pressure.  The pulmonic valve is not well   visualized. No pulmonic regurgitation noted.The right ventricle is   normal in   size and function.There is mild concentric left ventricular hypertrophy.   The   left ventricular wall motion is normal. The left ventricular ejection   fraction   is 63%. Normal LV diastolic function.  No aortic root dilatation.There   is no   pericardial effusion.No prior study for comparison.    < end of copied text >      PFT's: FEV1/FVC 98% predicted    Carotid Duplex: < from: US Duplex Carotid Arteries Complete, Bilateral (11.19.18 @ 19:28) >  IMPRESSION:  No evidence of hemodynamically significant stenosis.    < end of copied text >      Consult in Chart?  YES (H&P)  Consent in Chart? Yes  Pre-op Orders Placed? YES  Blood Prodeucts Ordered? YES  NPO ordered? YES

## 2018-12-06 NOTE — CONSULT NOTE ADULT - SUBJECTIVE AND OBJECTIVE BOX
78M admitted for TEVAR procedure, neurosurgery consulted for lumbar drain placement pre-op. Pt denies hx of spine or brain surgery in past. CTH reviewed, no SDH or signs of herniation.     PE:   AOx3, NAD, fluent speech   CN II-XII intact   MAEx4, 5/5 throughout   SILT     A/P:   78M pre-op for TEVAR, lumbar drain placement today 78M admitted for TEVAR procedure, neurosurgery consulted for lumbar drain placement pre-op. Pt denies hx of spine or brain surgery in past. CTH reviewed, no SDH or signs of herniation.     PE:   AOx3, NAD, fluent speech   EEG in place   CN II-XII intact   MAEx4, 5/5 throughout   SILT   No spinal tenderness to palpation   Decubitus ulcer dressing in place     A/P:   78M pre-op for TEVAR, lumbar drain placement today without difficulty.   - Drainage and removal of drain as per CT surgery   - Will follow  - D/w Dr. Gee

## 2018-12-06 NOTE — DIETITIAN INITIAL EVALUATION ADULT. - OTHER INFO
79 yo/male with PMHx HTN, fusiform, aneurysmal dilation of descending aorta, mx syncopal episodes, bradycardia, admitted s/p syncopal episode. Now pre-op for TEVAR. Pt seen in room, awake, alert. Endorses not having too large of an appetite but does note that this is his usual. Currently NPO for lumbar drain. Pt denies N/V but reports no BM x 2 days. NKFA. No difficulty chewing or swallowing reported. Skin w/surgical wound. Pt amenable to ONS; discussed low sodium diet as well.

## 2018-12-06 NOTE — DIETITIAN INITIAL EVALUATION ADULT. - ENERGY NEEDS
Height 67"; .4# (suspect inaccurate, RN to take new weight); #; 144%IBW  BMI 33.4  Ideal body weight used for calculations as unsure what pt's accurate weight is. Adjusted for pre-op

## 2018-12-06 NOTE — PROGRESS NOTE ADULT - PROBLEM SELECTOR PLAN 1
Pain controlled.   Continue w/ BP controlled, currently 130's systolic. On beta blocker.   Hydrate w/ 75cc/hr IVF in light of his dizziness, and currently NPO for lumbar drain.  Neurosurg at bedside placing lumbar drain currently.   Keep on 9LA/mini-ICU care.    OR tomorrow, consent to follow later today. Pain controlled.   Continue w/ BP controlled, currently 130's systolic. On beta blocker. ***Orthostatics checked earlier this am.  Around 9:30 BP was 152/101.  After patient stood up BP was 145/91 and he felt dizzy.    Hydrate w/ 75cc/hr IVF in light of his dizziness, and currently NPO for lumbar drain.  Neurosurg at bedside placing lumbar drain currently.   Keep on 9LA/mini-ICU care.    OR tomorrow, consent to follow later today. Pain controlled.   Continue w/ BP controlled, currently 130's systolic. On beta blocker. ***Orthostatics checked earlier this am.  Around 9:30 BP was 152/101.  After patient stood up BP was 145/91 and he felt dizzy.    Hydrate w/ 75cc/hr IVF in light of his dizziness, and currently NPO for lumbar drain.  Neurosurg at bedside placing lumbar drain currently.   Keep on 9LA/mini-ICU care.    OR tomorrow, consent done.

## 2018-12-06 NOTE — PROCEDURE NOTE - ADDITIONAL PROCEDURE DETAILS
lumbar drain advanced just past 15cm, secured into place with sutures, sterile dressing applied, drainage noted at each step

## 2018-12-06 NOTE — PROGRESS NOTE ADULT - ASSESSMENT
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 Sarmiento) who is known to Dr. Garcia for his aortic aneurysm.  He presented to an OSH after having a syncopal episode at home.  He was supposed to have a F/U appt this week with Dr. Garcia, but light of his recent symptoms he was admitted to [facility] and will be having his TEVAR tomorrow.  EPS saw him and interrogated his device, with no record of recent arrhythmias.  Neurology team saw him, and recommended EEG monitoring.  They will follow along.  Pre-op for tomorrow.      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 Sarmiento) who is known to Dr. Garcia for his aortic aneurysm.  He presented to an OSH after having a syncopal episode at home.  He was supposed to have a F/U appt this week with Dr. Garcia, but light of his recent symptoms he was admitted to [facility] and will be having his TEVAR tomorrow.  EPS saw him and interrogated his device, with no record of recent arrhythmias.  Neurology team saw him, and recommended EEG monitoring.  They will follow along.  Pre-op for tomorrow.      F/U PFTs and 2nd T&S- done

## 2018-12-07 ENCOUNTER — APPOINTMENT (OUTPATIENT)
Dept: CARDIOTHORACIC SURGERY | Facility: HOSPITAL | Age: 78
End: 2018-12-07
Payer: MEDICARE

## 2018-12-07 ENCOUNTER — APPOINTMENT (OUTPATIENT)
Dept: NEUROLOGY | Facility: CLINIC | Age: 78
End: 2018-12-07

## 2018-12-07 LAB
ALBUMIN SERPL ELPH-MCNC: 3.7 G/DL — SIGNIFICANT CHANGE UP (ref 3.3–5)
ALP SERPL-CCNC: 80 U/L — SIGNIFICANT CHANGE UP (ref 40–120)
ALP SERPL-CCNC: 93 U/L — SIGNIFICANT CHANGE UP (ref 40–120)
ALP SERPL-CCNC: 93 U/L — SIGNIFICANT CHANGE UP (ref 40–120)
ALT FLD-CCNC: 101 U/L — HIGH (ref 10–45)
ALT FLD-CCNC: 101 U/L — HIGH (ref 10–45)
ALT FLD-CCNC: 106 U/L — HIGH (ref 10–45)
ANION GAP SERPL CALC-SCNC: 10 MMOL/L — SIGNIFICANT CHANGE UP (ref 5–17)
ANION GAP SERPL CALC-SCNC: 11 MMOL/L — SIGNIFICANT CHANGE UP (ref 5–17)
ANION GAP SERPL CALC-SCNC: 11 MMOL/L — SIGNIFICANT CHANGE UP (ref 5–17)
ANION GAP SERPL CALC-SCNC: 15 MMOL/L — SIGNIFICANT CHANGE UP (ref 5–17)
ANION GAP SERPL CALC-SCNC: 15 MMOL/L — SIGNIFICANT CHANGE UP (ref 5–17)
APTT BLD: 30.5 SEC — SIGNIFICANT CHANGE UP (ref 27.5–36.3)
APTT BLD: 30.5 SEC — SIGNIFICANT CHANGE UP (ref 27.5–36.3)
APTT BLD: 30.9 SEC — SIGNIFICANT CHANGE UP (ref 27.5–36.3)
APTT BLD: 31.8 SEC — SIGNIFICANT CHANGE UP (ref 27.5–36.3)
AST SERPL-CCNC: 43 U/L — HIGH (ref 10–40)
AST SERPL-CCNC: 51 U/L — HIGH (ref 10–40)
AST SERPL-CCNC: 52 U/L — HIGH (ref 10–40)
BASE EXCESS BLDV CALC-SCNC: -0.6 MMOL/L — SIGNIFICANT CHANGE UP
BASOPHILS NFR BLD AUTO: 0.2 % — SIGNIFICANT CHANGE UP (ref 0–2)
BILIRUB DIRECT SERPL-MCNC: 0.2 MG/DL — SIGNIFICANT CHANGE UP (ref 0–0.2)
BILIRUB DIRECT SERPL-MCNC: 0.3 MG/DL — HIGH (ref 0–0.2)
BILIRUB INDIRECT FLD-MCNC: 0.5 MG/DL — SIGNIFICANT CHANGE UP (ref 0.2–1)
BILIRUB INDIRECT FLD-MCNC: 0.6 MG/DL — SIGNIFICANT CHANGE UP (ref 0.2–1)
BILIRUB SERPL-MCNC: 0.8 MG/DL — SIGNIFICANT CHANGE UP (ref 0.2–1.2)
BUN SERPL-MCNC: 11 MG/DL — SIGNIFICANT CHANGE UP (ref 7–23)
BUN SERPL-MCNC: 11 MG/DL — SIGNIFICANT CHANGE UP (ref 7–23)
BUN SERPL-MCNC: 12 MG/DL — SIGNIFICANT CHANGE UP (ref 7–23)
BUN SERPL-MCNC: 14 MG/DL — SIGNIFICANT CHANGE UP (ref 7–23)
BUN SERPL-MCNC: 14 MG/DL — SIGNIFICANT CHANGE UP (ref 7–23)
CALCIUM SERPL-MCNC: 9.1 MG/DL — SIGNIFICANT CHANGE UP (ref 8.4–10.5)
CALCIUM SERPL-MCNC: 9.2 MG/DL — SIGNIFICANT CHANGE UP (ref 8.4–10.5)
CALCIUM SERPL-MCNC: 9.2 MG/DL — SIGNIFICANT CHANGE UP (ref 8.4–10.5)
CHLORIDE SERPL-SCNC: 102 MMOL/L — SIGNIFICANT CHANGE UP (ref 96–108)
CHLORIDE SERPL-SCNC: 104 MMOL/L — SIGNIFICANT CHANGE UP (ref 96–108)
CHLORIDE SERPL-SCNC: 106 MMOL/L — SIGNIFICANT CHANGE UP (ref 96–108)
CHLORIDE SERPL-SCNC: 107 MMOL/L — SIGNIFICANT CHANGE UP (ref 96–108)
CHLORIDE SERPL-SCNC: 108 MMOL/L — SIGNIFICANT CHANGE UP (ref 96–108)
CO2 SERPL-SCNC: 21 MMOL/L — LOW (ref 22–31)
CO2 SERPL-SCNC: 21 MMOL/L — LOW (ref 22–31)
CO2 SERPL-SCNC: 22 MMOL/L — SIGNIFICANT CHANGE UP (ref 22–31)
CREAT SERPL-MCNC: 1.58 MG/DL — HIGH (ref 0.5–1.3)
CREAT SERPL-MCNC: 1.61 MG/DL — HIGH (ref 0.5–1.3)
CREAT SERPL-MCNC: 1.62 MG/DL — HIGH (ref 0.5–1.3)
CREAT SERPL-MCNC: 1.66 MG/DL — HIGH (ref 0.5–1.3)
CREAT SERPL-MCNC: 1.69 MG/DL — HIGH (ref 0.5–1.3)
EOSINOPHIL NFR BLD AUTO: 0.5 % — SIGNIFICANT CHANGE UP (ref 0–6)
GAS PNL BLDA: SIGNIFICANT CHANGE UP
GLUCOSE BLDC GLUCOMTR-MCNC: 107 MG/DL — HIGH (ref 70–99)
GLUCOSE BLDC GLUCOMTR-MCNC: 118 MG/DL — HIGH (ref 70–99)
GLUCOSE BLDC GLUCOMTR-MCNC: 128 MG/DL — HIGH (ref 70–99)
GLUCOSE SERPL-MCNC: 111 MG/DL — HIGH (ref 70–99)
GLUCOSE SERPL-MCNC: 115 MG/DL — HIGH (ref 70–99)
GLUCOSE SERPL-MCNC: 118 MG/DL — HIGH (ref 70–99)
GLUCOSE SERPL-MCNC: 129 MG/DL — HIGH (ref 70–99)
GLUCOSE SERPL-MCNC: 132 MG/DL — HIGH (ref 70–99)
HCO3 BLDV-SCNC: 24 MMOL/L — SIGNIFICANT CHANGE UP (ref 20–27)
HCT VFR BLD CALC: 35.9 % — LOW (ref 39–50)
HCT VFR BLD CALC: 35.9 % — LOW (ref 39–50)
HCT VFR BLD CALC: 36.2 % — LOW (ref 39–50)
HCT VFR BLD CALC: 36.2 % — LOW (ref 39–50)
HCT VFR BLD CALC: 37 % — LOW (ref 39–50)
HGB BLD-MCNC: 11.9 G/DL — LOW (ref 13–17)
HGB BLD-MCNC: 11.9 G/DL — LOW (ref 13–17)
HGB BLD-MCNC: 12 G/DL — LOW (ref 13–17)
HGB BLD-MCNC: 12 G/DL — LOW (ref 13–17)
HGB BLD-MCNC: 12.1 G/DL — LOW (ref 13–17)
INR BLD: 1.17 — HIGH (ref 0.88–1.16)
INR BLD: 1.17 — HIGH (ref 0.88–1.16)
INR BLD: 1.21 — HIGH (ref 0.88–1.16)
INR BLD: 1.21 — HIGH (ref 0.88–1.16)
LACTATE SERPL-SCNC: 0.8 MMOL/L — SIGNIFICANT CHANGE UP (ref 0.5–2)
LYMPHOCYTES # BLD AUTO: 5.4 % — LOW (ref 13–44)
MAGNESIUM SERPL-MCNC: 1.7 MG/DL — SIGNIFICANT CHANGE UP (ref 1.6–2.6)
MAGNESIUM SERPL-MCNC: 1.8 MG/DL — SIGNIFICANT CHANGE UP (ref 1.6–2.6)
MAGNESIUM SERPL-MCNC: 2.2 MG/DL — SIGNIFICANT CHANGE UP (ref 1.6–2.6)
MAGNESIUM SERPL-MCNC: 2.4 MG/DL — SIGNIFICANT CHANGE UP (ref 1.6–2.6)
MCHC RBC-ENTMCNC: 30.4 PG — SIGNIFICANT CHANGE UP (ref 27–34)
MCHC RBC-ENTMCNC: 31 PG — SIGNIFICANT CHANGE UP (ref 27–34)
MCHC RBC-ENTMCNC: 31.3 PG — SIGNIFICANT CHANGE UP (ref 27–34)
MCHC RBC-ENTMCNC: 31.4 PG — SIGNIFICANT CHANGE UP (ref 27–34)
MCHC RBC-ENTMCNC: 31.4 PG — SIGNIFICANT CHANGE UP (ref 27–34)
MCHC RBC-ENTMCNC: 32.4 G/DL — SIGNIFICANT CHANGE UP (ref 32–36)
MCHC RBC-ENTMCNC: 32.9 G/DL — SIGNIFICANT CHANGE UP (ref 32–36)
MCHC RBC-ENTMCNC: 33.1 G/DL — SIGNIFICANT CHANGE UP (ref 32–36)
MCHC RBC-ENTMCNC: 33.4 G/DL — SIGNIFICANT CHANGE UP (ref 32–36)
MCHC RBC-ENTMCNC: 33.4 G/DL — SIGNIFICANT CHANGE UP (ref 32–36)
MCV RBC AUTO: 93.7 FL — SIGNIFICANT CHANGE UP (ref 80–100)
MCV RBC AUTO: 93.7 FL — SIGNIFICANT CHANGE UP (ref 80–100)
MCV RBC AUTO: 94 FL — SIGNIFICANT CHANGE UP (ref 80–100)
MCV RBC AUTO: 94.3 FL — SIGNIFICANT CHANGE UP (ref 80–100)
MCV RBC AUTO: 94.7 FL — SIGNIFICANT CHANGE UP (ref 80–100)
MONOCYTES NFR BLD AUTO: 3.3 % — SIGNIFICANT CHANGE UP (ref 2–14)
NEUTROPHILS NFR BLD AUTO: 90.6 % — HIGH (ref 43–77)
PCO2 BLDV: 38 MMHG — LOW (ref 41–51)
PH BLDV: 7.41 — SIGNIFICANT CHANGE UP (ref 7.32–7.43)
PHOSPHATE SERPL-MCNC: 2.7 MG/DL — SIGNIFICANT CHANGE UP (ref 2.5–4.5)
PHOSPHATE SERPL-MCNC: 3.4 MG/DL — SIGNIFICANT CHANGE UP (ref 2.5–4.5)
PLATELET # BLD AUTO: 261 K/UL — SIGNIFICANT CHANGE UP (ref 150–400)
PLATELET # BLD AUTO: 263 K/UL — SIGNIFICANT CHANGE UP (ref 150–400)
PLATELET # BLD AUTO: 268 K/UL — SIGNIFICANT CHANGE UP (ref 150–400)
PLATELET # BLD AUTO: 298 K/UL — SIGNIFICANT CHANGE UP (ref 150–400)
PLATELET # BLD AUTO: 299 K/UL — SIGNIFICANT CHANGE UP (ref 150–400)
PO2 BLDV: 70 MMHG — SIGNIFICANT CHANGE UP
POTASSIUM SERPL-MCNC: 3.9 MMOL/L — SIGNIFICANT CHANGE UP (ref 3.5–5.3)
POTASSIUM SERPL-MCNC: 4 MMOL/L — SIGNIFICANT CHANGE UP (ref 3.5–5.3)
POTASSIUM SERPL-MCNC: 4.3 MMOL/L — SIGNIFICANT CHANGE UP (ref 3.5–5.3)
POTASSIUM SERPL-MCNC: 4.6 MMOL/L — SIGNIFICANT CHANGE UP (ref 3.5–5.3)
POTASSIUM SERPL-MCNC: 4.8 MMOL/L — SIGNIFICANT CHANGE UP (ref 3.5–5.3)
POTASSIUM SERPL-SCNC: 3.9 MMOL/L — SIGNIFICANT CHANGE UP (ref 3.5–5.3)
POTASSIUM SERPL-SCNC: 4 MMOL/L — SIGNIFICANT CHANGE UP (ref 3.5–5.3)
POTASSIUM SERPL-SCNC: 4.3 MMOL/L — SIGNIFICANT CHANGE UP (ref 3.5–5.3)
POTASSIUM SERPL-SCNC: 4.6 MMOL/L — SIGNIFICANT CHANGE UP (ref 3.5–5.3)
POTASSIUM SERPL-SCNC: 4.8 MMOL/L — SIGNIFICANT CHANGE UP (ref 3.5–5.3)
PROT SERPL-MCNC: 6.6 G/DL — SIGNIFICANT CHANGE UP (ref 6–8.3)
PROT SERPL-MCNC: 6.7 G/DL — SIGNIFICANT CHANGE UP (ref 6–8.3)
PROT SERPL-MCNC: 6.7 G/DL — SIGNIFICANT CHANGE UP (ref 6–8.3)
PROTHROM AB SERPL-ACNC: 13.3 SEC — HIGH (ref 10–12.9)
PROTHROM AB SERPL-ACNC: 13.3 SEC — HIGH (ref 10–12.9)
PROTHROM AB SERPL-ACNC: 13.7 SEC — HIGH (ref 10–12.9)
PROTHROM AB SERPL-ACNC: 13.8 SEC — HIGH (ref 10–12.9)
RBC # BLD: 3.79 M/UL — LOW (ref 4.2–5.8)
RBC # BLD: 3.83 M/UL — LOW (ref 4.2–5.8)
RBC # BLD: 3.84 M/UL — LOW (ref 4.2–5.8)
RBC # BLD: 3.85 M/UL — LOW (ref 4.2–5.8)
RBC # BLD: 3.95 M/UL — LOW (ref 4.2–5.8)
RBC # FLD: 12.6 % — SIGNIFICANT CHANGE UP (ref 10.3–16.9)
RBC # FLD: 12.7 % — SIGNIFICANT CHANGE UP (ref 10.3–16.9)
RBC # FLD: 12.8 % — SIGNIFICANT CHANGE UP (ref 10.3–16.9)
RBC # FLD: 12.9 % — SIGNIFICANT CHANGE UP (ref 10.3–16.9)
RBC # FLD: 13 % — SIGNIFICANT CHANGE UP (ref 10.3–16.9)
SAO2 % BLDV: 94 % — SIGNIFICANT CHANGE UP
SODIUM SERPL-SCNC: 138 MMOL/L — SIGNIFICANT CHANGE UP (ref 135–145)
SODIUM SERPL-SCNC: 139 MMOL/L — SIGNIFICANT CHANGE UP (ref 135–145)
SODIUM SERPL-SCNC: 139 MMOL/L — SIGNIFICANT CHANGE UP (ref 135–145)
SODIUM SERPL-SCNC: 140 MMOL/L — SIGNIFICANT CHANGE UP (ref 135–145)
SODIUM SERPL-SCNC: 141 MMOL/L — SIGNIFICANT CHANGE UP (ref 135–145)
WBC # BLD: 12.8 K/UL — HIGH (ref 3.8–10.5)
WBC # BLD: 16 K/UL — HIGH (ref 3.8–10.5)
WBC # BLD: 16 K/UL — HIGH (ref 3.8–10.5)
WBC # BLD: 6.9 K/UL — SIGNIFICANT CHANGE UP (ref 3.8–10.5)
WBC # BLD: 7.6 K/UL — SIGNIFICANT CHANGE UP (ref 3.8–10.5)
WBC # FLD AUTO: 12.8 K/UL — HIGH (ref 3.8–10.5)
WBC # FLD AUTO: 16 K/UL — HIGH (ref 3.8–10.5)
WBC # FLD AUTO: 16 K/UL — HIGH (ref 3.8–10.5)
WBC # FLD AUTO: 6.9 K/UL — SIGNIFICANT CHANGE UP (ref 3.8–10.5)
WBC # FLD AUTO: 7.6 K/UL — SIGNIFICANT CHANGE UP (ref 3.8–10.5)

## 2018-12-07 PROCEDURE — 36200 PLACE CATHETER IN AORTA: CPT

## 2018-12-07 PROCEDURE — 93010 ELECTROCARDIOGRAM REPORT: CPT

## 2018-12-07 PROCEDURE — 33881 EVASC RPR TA NDGFT XCOV LSA: CPT | Mod: 62

## 2018-12-07 PROCEDURE — 71045 X-RAY EXAM CHEST 1 VIEW: CPT | Mod: 26,76

## 2018-12-07 PROCEDURE — 34713 PERQ ACCESS & CLSR FEM ART: CPT | Mod: 62

## 2018-12-07 PROCEDURE — 36620 INSERTION CATHETER ARTERY: CPT

## 2018-12-07 PROCEDURE — 75957: CPT | Mod: 26,80

## 2018-12-07 PROCEDURE — 34713 PERQ ACCESS & CLSR FEM ART: CPT | Mod: GC,62

## 2018-12-07 PROCEDURE — 62272 THER SPI PNXR DRG CSF: CPT

## 2018-12-07 PROCEDURE — 76937 US GUIDE VASCULAR ACCESS: CPT | Mod: 26

## 2018-12-07 PROCEDURE — 99291 CRITICAL CARE FIRST HOUR: CPT

## 2018-12-07 PROCEDURE — 36556 INSERT NON-TUNNEL CV CATH: CPT

## 2018-12-07 PROCEDURE — 75957: CPT | Mod: 26,GC

## 2018-12-07 PROCEDURE — 33881 EVASC RPR TA NDGFT XCOV LSA: CPT | Mod: GC,62

## 2018-12-07 RX ORDER — DEXTROSE 50 % IN WATER 50 %
50 SYRINGE (ML) INTRAVENOUS
Qty: 0 | Refills: 0 | Status: DISCONTINUED | OUTPATIENT
Start: 2018-12-07 | End: 2018-12-07

## 2018-12-07 RX ORDER — HEPARIN SODIUM 5000 [USP'U]/ML
5000 INJECTION INTRAVENOUS; SUBCUTANEOUS EVERY 8 HOURS
Qty: 0 | Refills: 0 | Status: DISCONTINUED | OUTPATIENT
Start: 2018-12-07 | End: 2018-12-08

## 2018-12-07 RX ORDER — CHLORHEXIDINE GLUCONATE 213 G/1000ML
1 SOLUTION TOPICAL DAILY
Qty: 0 | Refills: 0 | Status: DISCONTINUED | OUTPATIENT
Start: 2018-12-07 | End: 2018-12-10

## 2018-12-07 RX ORDER — GLUCAGON INJECTION, SOLUTION 0.5 MG/.1ML
1 INJECTION, SOLUTION SUBCUTANEOUS ONCE
Qty: 0 | Refills: 0 | Status: DISCONTINUED | OUTPATIENT
Start: 2018-12-07 | End: 2018-12-10

## 2018-12-07 RX ORDER — FENTANYL CITRATE 50 UG/ML
25 INJECTION INTRAVENOUS ONCE
Qty: 0 | Refills: 0 | Status: DISCONTINUED | OUTPATIENT
Start: 2018-12-07 | End: 2018-12-07

## 2018-12-07 RX ORDER — PANTOPRAZOLE SODIUM 20 MG/1
40 TABLET, DELAYED RELEASE ORAL DAILY
Qty: 0 | Refills: 0 | Status: DISCONTINUED | OUTPATIENT
Start: 2018-12-07 | End: 2018-12-07

## 2018-12-07 RX ORDER — DEXTROSE 50 % IN WATER 50 %
25 SYRINGE (ML) INTRAVENOUS
Qty: 0 | Refills: 0 | Status: DISCONTINUED | OUTPATIENT
Start: 2018-12-07 | End: 2018-12-07

## 2018-12-07 RX ORDER — ACETAMINOPHEN 500 MG
1000 TABLET ORAL ONCE
Qty: 0 | Refills: 0 | Status: COMPLETED | OUTPATIENT
Start: 2018-12-07 | End: 2018-12-07

## 2018-12-07 RX ORDER — INSULIN LISPRO 100/ML
VIAL (ML) SUBCUTANEOUS
Qty: 0 | Refills: 0 | Status: DISCONTINUED | OUTPATIENT
Start: 2018-12-07 | End: 2018-12-10

## 2018-12-07 RX ORDER — ACETAMINOPHEN 500 MG
1000 TABLET ORAL ONCE
Qty: 0 | Refills: 0 | Status: DISCONTINUED | OUTPATIENT
Start: 2018-12-07 | End: 2018-12-10

## 2018-12-07 RX ORDER — DEXTROSE 50 % IN WATER 50 %
12.5 SYRINGE (ML) INTRAVENOUS ONCE
Qty: 0 | Refills: 0 | Status: DISCONTINUED | OUTPATIENT
Start: 2018-12-07 | End: 2018-12-10

## 2018-12-07 RX ORDER — DEXTROSE 50 % IN WATER 50 %
25 SYRINGE (ML) INTRAVENOUS ONCE
Qty: 0 | Refills: 0 | Status: DISCONTINUED | OUTPATIENT
Start: 2018-12-07 | End: 2018-12-10

## 2018-12-07 RX ORDER — MAGNESIUM SULFATE 500 MG/ML
2 VIAL (ML) INJECTION ONCE
Qty: 0 | Refills: 0 | Status: COMPLETED | OUTPATIENT
Start: 2018-12-07 | End: 2018-12-07

## 2018-12-07 RX ORDER — POLYETHYLENE GLYCOL 3350 17 G/17G
17 POWDER, FOR SOLUTION ORAL DAILY
Qty: 0 | Refills: 0 | Status: DISCONTINUED | OUTPATIENT
Start: 2018-12-07 | End: 2018-12-13

## 2018-12-07 RX ORDER — SODIUM CHLORIDE 9 MG/ML
1000 INJECTION INTRAMUSCULAR; INTRAVENOUS; SUBCUTANEOUS
Qty: 0 | Refills: 0 | Status: DISCONTINUED | OUTPATIENT
Start: 2018-12-07 | End: 2018-12-10

## 2018-12-07 RX ORDER — NICARDIPINE HYDROCHLORIDE 30 MG/1
2 CAPSULE, EXTENDED RELEASE ORAL
Qty: 40 | Refills: 0 | Status: DISCONTINUED | OUTPATIENT
Start: 2018-12-07 | End: 2018-12-10

## 2018-12-07 RX ORDER — PANTOPRAZOLE SODIUM 20 MG/1
40 TABLET, DELAYED RELEASE ORAL
Qty: 0 | Refills: 0 | Status: DISCONTINUED | OUTPATIENT
Start: 2018-12-07 | End: 2018-12-13

## 2018-12-07 RX ORDER — DEXTROSE 50 % IN WATER 50 %
15 SYRINGE (ML) INTRAVENOUS ONCE
Qty: 0 | Refills: 0 | Status: DISCONTINUED | OUTPATIENT
Start: 2018-12-07 | End: 2018-12-10

## 2018-12-07 RX ORDER — MIDAZOLAM HYDROCHLORIDE 1 MG/ML
1 INJECTION, SOLUTION INTRAMUSCULAR; INTRAVENOUS ONCE
Qty: 0 | Refills: 0 | Status: DISCONTINUED | OUTPATIENT
Start: 2018-12-07 | End: 2018-12-07

## 2018-12-07 RX ORDER — SODIUM CHLORIDE 9 MG/ML
1000 INJECTION, SOLUTION INTRAVENOUS
Qty: 0 | Refills: 0 | Status: DISCONTINUED | OUTPATIENT
Start: 2018-12-07 | End: 2018-12-10

## 2018-12-07 RX ORDER — CEFAZOLIN SODIUM 1 G
2000 VIAL (EA) INJECTION EVERY 8 HOURS
Qty: 0 | Refills: 0 | Status: COMPLETED | OUTPATIENT
Start: 2018-12-07 | End: 2018-12-09

## 2018-12-07 RX ORDER — INSULIN HUMAN 100 [IU]/ML
2 INJECTION, SOLUTION SUBCUTANEOUS
Qty: 100 | Refills: 0 | Status: DISCONTINUED | OUTPATIENT
Start: 2018-12-07 | End: 2018-12-07

## 2018-12-07 RX ADMIN — Medication 400 MILLIGRAM(S): at 11:20

## 2018-12-07 RX ADMIN — HEPARIN SODIUM 5000 UNIT(S): 5000 INJECTION INTRAVENOUS; SUBCUTANEOUS at 21:09

## 2018-12-07 RX ADMIN — Medication 100 MILLIGRAM(S): at 16:24

## 2018-12-07 RX ADMIN — NICARDIPINE HYDROCHLORIDE 10 MG/HR: 30 CAPSULE, EXTENDED RELEASE ORAL at 11:45

## 2018-12-07 RX ADMIN — Medication 100 MILLIGRAM(S): at 06:27

## 2018-12-07 RX ADMIN — Medication 1000 MILLIGRAM(S): at 12:00

## 2018-12-07 RX ADMIN — MIDAZOLAM HYDROCHLORIDE 1 MILLIGRAM(S): 1 INJECTION, SOLUTION INTRAMUSCULAR; INTRAVENOUS at 03:00

## 2018-12-07 RX ADMIN — CHLORHEXIDINE GLUCONATE 1 APPLICATION(S): 213 SOLUTION TOPICAL at 05:45

## 2018-12-07 RX ADMIN — PANTOPRAZOLE SODIUM 40 MILLIGRAM(S): 20 TABLET, DELAYED RELEASE ORAL at 16:30

## 2018-12-07 RX ADMIN — SODIUM CHLORIDE 3 MILLILITER(S): 9 INJECTION INTRAMUSCULAR; INTRAVENOUS; SUBCUTANEOUS at 06:27

## 2018-12-07 RX ADMIN — HEPARIN SODIUM 5000 UNIT(S): 5000 INJECTION INTRAVENOUS; SUBCUTANEOUS at 06:27

## 2018-12-07 RX ADMIN — Medication 4 MILLIGRAM(S): at 06:27

## 2018-12-07 RX ADMIN — Medication 100 MILLIGRAM(S): at 23:47

## 2018-12-07 RX ADMIN — FENTANYL CITRATE 25 MICROGRAM(S): 50 INJECTION INTRAVENOUS at 02:00

## 2018-12-07 RX ADMIN — Medication 200 MILLIGRAM(S): at 05:13

## 2018-12-07 RX ADMIN — Medication 50 GRAM(S): at 14:02

## 2018-12-07 RX ADMIN — CHLORHEXIDINE GLUCONATE 1 APPLICATION(S): 213 SOLUTION TOPICAL at 05:31

## 2018-12-07 RX ADMIN — Medication 4 MILLIGRAM(S): at 13:55

## 2018-12-07 RX ADMIN — PANTOPRAZOLE SODIUM 40 MILLIGRAM(S): 20 TABLET, DELAYED RELEASE ORAL at 06:27

## 2018-12-07 RX ADMIN — FENTANYL CITRATE 25 MICROGRAM(S): 50 INJECTION INTRAVENOUS at 03:00

## 2018-12-07 NOTE — BRIEF OPERATIVE NOTE - PROCEDURE
<<-----Click on this checkbox to enter Procedure Thoracic endovascular aortic repair without revascularization of left subclavian artery  12/07/2018    Active  [email or web address]

## 2018-12-07 NOTE — PROGRESS NOTE ADULT - SUBJECTIVE AND OBJECTIVE BOX
CTICU  CRITICAL  CARE  attending     Hand off received 					   Pertinent clinical, laboratory, radiographic, hemodynamic, echocardiographic, respiratory data, microbiologic data and chart were reviewed and analyzed frequently throughout the course of the day and night  Patient seen and examined with CTS/ SH attending at bedside  Pt is a 78y , Male, s/p TEVAR for desc thoracic aneurysm  s/p lumbar drain placement yesterday    post procedure:    extubated in the OR  ISP 10-15  draining 10 cc of CSF/hr  auto diuresing        Phlebitis/thrombophlebitis: Phlebitis/thrombophlebitis  Bradycardia: Bradycardia  Renal insufficiency: Renal insufficiency  Hypertension, unspecified type: Hypertension, unspecified type  Syncope, unspecified syncope type: Syncope, unspecified syncope type  Descending aortic aneurysm: Descending aortic aneurysm      , FAMILY HISTORY:  No pertinent family history in first degree relatives  PAST MEDICAL & SURGICAL HISTORY:  Bradycardia  Renal insufficiency  Anxiety  Syncope, unspecified syncope type  Descending aortic aneurysm  Hypertension, unspecified type  H/O cardiac pacemaker: Periscapetronic    Patient is a 78y old  Male who presents with a chief complaint of s/p TEVAR (07 Dec 2018 14:32)      14 system review was unremarkable  acute changes include acute respiratory failure  Vital signs, hemodynamic and respiratory parameters were reviewed from the bedside nursing flowsheet.  ICU Vital Signs Last 24 Hrs  T(C): 36.5 (07 Dec 2018 14:00), Max: 37.2 (06 Dec 2018 21:33)  T(F): 97.7 (07 Dec 2018 14:00), Max: 99 (06 Dec 2018 21:33)  HR: 76 (07 Dec 2018 17:00) (60 - 78)  BP: 147/106 (07 Dec 2018 11:15) (130/83 - 174/84)  BP(mean): 121 (07 Dec 2018 11:15) (100 - 125)  ABP: 144/74 (07 Dec 2018 17:00) (132/72 - 180/92)  ABP(mean): 98 (07 Dec 2018 17:00) (90 - 126)  RR: 13 (07 Dec 2018 17:00) (10 - 21)  SpO2: 100% (07 Dec 2018 17:00) (94% - 100%)    Adult Advanced Hemodynamics Last 24 Hrs  CVP(mm Hg): --  CVP(cm H2O): --  CO: --  CI: --  PA: --  PA(mean): --  PCWP: --  SVR: --  SVRI: --  PVR: --  PVRI: --, ABG - ( 07 Dec 2018 11:15 )  pH, Arterial: 7.39  pH, Blood: x     /  pCO2: 38    /  pO2: 76    / HCO3: 22    / Base Excess: -2.3  /  SaO2: 96                  Intake and output was reviewed and the fluid balance was calculated  Daily Height in cm: 171.4 (07 Dec 2018 05:23)    Daily   I&O's Summary    06 Dec 2018 07:01  -  07 Dec 2018 07:00  --------------------------------------------------------  IN: 3750 mL / OUT: 2210 mL / NET: 1540 mL    07 Dec 2018 07:01  -  07 Dec 2018 17:46  --------------------------------------------------------  IN: 660 mL / OUT: 1985 mL / NET: -1325 mL        All lines and drain sites were assessed  [facility]      POCT Blood Glucose.: 128 mg/dL (07 Dec 2018 12:49)    No acute change in mental status  Auscultation of the chest reveals equal bs  Abdomen is soft  Extremities are warm and well perfused  Wounds appear clean and unremarkable  Antibiotics are periop    labs  CBC Full  -  ( 07 Dec 2018 11:22 )  WBC Count : 12.8 K/uL  Hemoglobin : 11.9 g/dL  Hematocrit : 35.9 %  Platelet Count - Automated : 261 K/uL  Mean Cell Volume : 94.7 fL  Mean Cell Hemoglobin : 31.4 pg  Mean Cell Hemoglobin Concentration : 33.1 g/dL  Auto Neutrophil # : x  Auto Lymphocyte # : x  Auto Monocyte # : x  Auto Eosinophil # : x  Auto Basophil # : x  Auto Neutrophil % : 90.6 %  Auto Lymphocyte % : 5.4 %  Auto Monocyte % : 3.3 %  Auto Eosinophil % : 0.5 %  Auto Basophil % : 0.2 %    12-07    140  |  107  |  12  ----------------------------<  132<H>  4.3   |  22  |  1.61<H>    Ca    9.2      07 Dec 2018 11:22  Phos  2.7     12-07  Mg     1.7     12-07    TPro  6.6  /  Alb  3.7  /  TBili  0.8  /  DBili  0.2  /  AST  52<H>  /  ALT  106<H>  /  AlkPhos  80  12-07    PT/INR - ( 07 Dec 2018 11:22 )   PT: 13.8 sec;   INR: 1.21          PTT - ( 07 Dec 2018 11:22 )  PTT:31.8 sec  The current medications were reviewed   MEDICATIONS  (STANDING):  ceFAZolin   IVPB 2000 milliGRAM(s) IV Intermittent every 8 hours  chlorhexidine 2% Cloths 1 Application(s) Topical daily  dextrose 50% Injectable 50 milliLiter(s) IV Push every 15 minutes  dextrose 50% Injectable 25 milliLiter(s) IV Push every 15 minutes  heparin  Injectable 5000 Unit(s) SubCutaneous every 8 hours  insulin Infusion 2 Unit(s)/Hr (2 mL/Hr) IV Continuous <Continuous>  niCARdipine Infusion 2 mG/Hr (10 mL/Hr) IV Continuous <Continuous>  pantoprazole  Injectable 40 milliGRAM(s) IV Push daily  sertraline 25 milliGRAM(s) Oral daily  sodium chloride 0.9%. 1000 milliLiter(s) (10 mL/Hr) IV Continuous <Continuous>    MEDICATIONS  (PRN):       PROBLEM LIST/ ASSESSMENT:  HEALTH ISSUES - PROBLEM Dx:  s/p TEVAR  Phlebitis/thrombophlebitis: Phlebitis/thrombophlebitis  Bradycardia: Bradycardia  Renal insufficiency: Renal insufficiency  Hypertension, unspecified type: Hypertension, unspecified type  Syncope, unspecified syncope type: Syncope, unspecified syncope type  Descending aortic aneurysm: Descending aortic aneurysm      ,   Patient is a 78y old  Male who presents with a chief complaint of s/p TEVAR (07 Dec 2018 14:32)     s/p TEVAR      My plan includes :  close hemodynamic, ventilatory and drain monitoring and management per post op routine    Monitor for arrhythmias and monitor parameters for organ perfusion  monitor neurologic status  Head of the bed should remain elevated to 45 deg .   chest PT and IS will be encouraged  monitor adequacy of oxygenation and ventilation and attempt to wean oxygen  Nutritional goals will be met using po eventually , ensure adequate caloric intake and montior the same  Stress ulcer and VTE prophylaxis will be achieved    Glycemic control is satisfactory  Electrolytes have been repleted as necessary and wound care has been carried out. Pain control has been achieved.   agressive physical therapy and early mobility and ambulation goals will be met   The family was updated about the course and plan  CRITICAL CARE TIME SPENT in evaluation and management, reassessments, review and interpretation of labs and x-rays, ventilator and hemodynamic management, formulating a plan and coordinating care: ___55___ MIN.  Time does not include procedural time.  CTICU ATTENDING     					    Chapito Ortega MD

## 2018-12-07 NOTE — PROCEDURE NOTE - NSPROCDETAILS_GEN_ALL_CORE
all materials/supplies accounted for at end of procedure/connected to a pressurized flush line/Seldinger technique/sutured in place/hemostasis with direct pressure, dressing applied
ultrasound guidance
location identified, draped/prepped, sterile technique used, needle inserted/introduced/area cleaned in sterile fashion/CSF Obtained
connected to a pressurized flush line/Seldinger technique/all materials/supplies accounted for at end of procedure/location identified, draped/prepped, sterile technique used, needle inserted/introduced/sutured in place

## 2018-12-07 NOTE — PROGRESS NOTE ADULT - SUBJECTIVE AND OBJECTIVE BOX
77 y/o male PMH HTN , CRI (1.65) ,fusiform aneurysmal dilation of descending aorta measuring 5.5cm , syncope, bradycardia, h/o Medtronic PPM (followed by Dr. Sarmiento) who is known to Dr. Garcia previously hospitalized at [facility] for management of descending aortic aneurysm.     Patient is now s/p TEVAR   patient seen and examined at bedside   pain controlled   resting comfortably in bed     ICU Vital Signs Last 24 Hrs  T(C): 36.5 (07 Dec 2018 14:00), Max: 37.2 (06 Dec 2018 21:33)  T(F): 97.7 (07 Dec 2018 14:00), Max: 99 (06 Dec 2018 21:33)  HR: 78 (07 Dec 2018 14:30) (60 - 78)  BP: 147/106 (07 Dec 2018 11:15) (125/86 - 174/84)  BP(mean): 121 (07 Dec 2018 11:15) (100 - 125)  ABP: 148/84 (07 Dec 2018 14:30) (132/72 - 180/92)  ABP(mean): 104 (07 Dec 2018 14:30) (90 - 126)  RR: 21 (07 Dec 2018 14:30) (10 - 21)  SpO2: 100% (07 Dec 2018 14:30) (94% - 100%)      I&O's Summary    06 Dec 2018 07:01  -  07 Dec 2018 07:00  --------------------------------------------------------  IN: 3750 mL / OUT: 2210 mL / NET: 1540 mL    07 Dec 2018 07:01  -  07 Dec 2018 14:34  --------------------------------------------------------  IN: 255 mL / OUT: 1210 mL / NET: -955 mL      Gen: NAD   Resp: CTA BL , no increased WOB   CV: RRR  abd: soft, nt / nd   Groin: left groin site of access c/d/i no evidence of hematoma   DP palpable bilaterally     77 y/o male s/p TEVAR   - care per CT ICU   - vascular surgery following

## 2018-12-07 NOTE — H&P ADULT - NSHPPHYSICALEXAM_GEN_ALL_CORE
Physical Exam  CONSTITUTIONAL:                                                              WNL  NEURO:                                                                       WNL                      EYES:                                                                                WNL  ENMT:                                                                               WNL  CV:                                                                                   WNL  RESPIRATORY:                                                                 WNL  GI:                                                                                     WNL  : CASILLAS + / -                                                                  WNL  MUSKULOSKELETAL:                                                       left arm tenderness  SKIN / BREAST:                                                                  left forearm phlebitis with cord/left anterior upper chest PPM site clean
123.8

## 2018-12-07 NOTE — PROCEDURE NOTE - NSINDICATIONS_GEN_A_CORE
monitoring purposes/cannulation purposes
venous access
therapeutic reduction in CSF
monitoring purposes

## 2018-12-08 LAB
ALBUMIN SERPL ELPH-MCNC: 3.9 G/DL — SIGNIFICANT CHANGE UP (ref 3.3–5)
ALBUMIN SERPL ELPH-MCNC: 4 G/DL — SIGNIFICANT CHANGE UP (ref 3.3–5)
ALP SERPL-CCNC: 93 U/L — SIGNIFICANT CHANGE UP (ref 40–120)
ALP SERPL-CCNC: 94 U/L — SIGNIFICANT CHANGE UP (ref 40–120)
ALT FLD-CCNC: 52 U/L — HIGH (ref 10–45)
ALT FLD-CCNC: 87 U/L — HIGH (ref 10–45)
ANION GAP SERPL CALC-SCNC: 11 MMOL/L — SIGNIFICANT CHANGE UP (ref 5–17)
ANION GAP SERPL CALC-SCNC: 11 MMOL/L — SIGNIFICANT CHANGE UP (ref 5–17)
ANION GAP SERPL CALC-SCNC: 16 MMOL/L — SIGNIFICANT CHANGE UP (ref 5–17)
APTT BLD: 28.4 SEC — SIGNIFICANT CHANGE UP (ref 27.5–36.3)
APTT BLD: 32.2 SEC — SIGNIFICANT CHANGE UP (ref 27.5–36.3)
APTT BLD: 33.9 SEC — SIGNIFICANT CHANGE UP (ref 27.5–36.3)
AST SERPL-CCNC: 43 U/L — HIGH (ref 10–40)
AST SERPL-CCNC: 44 U/L — HIGH (ref 10–40)
BILIRUB SERPL-MCNC: 0.7 MG/DL — SIGNIFICANT CHANGE UP (ref 0.2–1.2)
BILIRUB SERPL-MCNC: 0.8 MG/DL — SIGNIFICANT CHANGE UP (ref 0.2–1.2)
BUN SERPL-MCNC: 12 MG/DL — SIGNIFICANT CHANGE UP (ref 7–23)
CALCIUM SERPL-MCNC: 9.3 MG/DL — SIGNIFICANT CHANGE UP (ref 8.4–10.5)
CALCIUM SERPL-MCNC: 9.4 MG/DL — SIGNIFICANT CHANGE UP (ref 8.4–10.5)
CALCIUM SERPL-MCNC: 9.5 MG/DL — SIGNIFICANT CHANGE UP (ref 8.4–10.5)
CHLORIDE SERPL-SCNC: 100 MMOL/L — SIGNIFICANT CHANGE UP (ref 96–108)
CHLORIDE SERPL-SCNC: 102 MMOL/L — SIGNIFICANT CHANGE UP (ref 96–108)
CHLORIDE SERPL-SCNC: 105 MMOL/L — SIGNIFICANT CHANGE UP (ref 96–108)
CO2 SERPL-SCNC: 23 MMOL/L — SIGNIFICANT CHANGE UP (ref 22–31)
CREAT SERPL-MCNC: 1.5 MG/DL — HIGH (ref 0.5–1.3)
CREAT SERPL-MCNC: 1.65 MG/DL — HIGH (ref 0.5–1.3)
CREAT SERPL-MCNC: 1.68 MG/DL — HIGH (ref 0.5–1.3)
GAS PNL BLDA: SIGNIFICANT CHANGE UP
GLUCOSE BLDC GLUCOMTR-MCNC: 108 MG/DL — HIGH (ref 70–99)
GLUCOSE BLDC GLUCOMTR-MCNC: 129 MG/DL — HIGH (ref 70–99)
GLUCOSE BLDC GLUCOMTR-MCNC: 140 MG/DL — HIGH (ref 70–99)
GLUCOSE BLDC GLUCOMTR-MCNC: 146 MG/DL — HIGH (ref 70–99)
GLUCOSE SERPL-MCNC: 107 MG/DL — HIGH (ref 70–99)
GLUCOSE SERPL-MCNC: 160 MG/DL — HIGH (ref 70–99)
GLUCOSE SERPL-MCNC: 164 MG/DL — HIGH (ref 70–99)
HCT VFR BLD CALC: 35 % — LOW (ref 39–50)
HCT VFR BLD CALC: 35.3 % — LOW (ref 39–50)
HCT VFR BLD CALC: 36.6 % — LOW (ref 39–50)
HGB BLD-MCNC: 11.7 G/DL — LOW (ref 13–17)
HGB BLD-MCNC: 11.9 G/DL — LOW (ref 13–17)
HGB BLD-MCNC: 12.3 G/DL — LOW (ref 13–17)
INR BLD: 1.21 — HIGH (ref 0.88–1.16)
INR BLD: 1.24 — HIGH (ref 0.88–1.16)
INR BLD: 1.24 — HIGH (ref 0.88–1.16)
LACTATE SERPL-SCNC: 0.9 MMOL/L — SIGNIFICANT CHANGE UP (ref 0.5–2)
LACTATE SERPL-SCNC: 1 MMOL/L — SIGNIFICANT CHANGE UP (ref 0.5–2)
MAGNESIUM SERPL-MCNC: 2 MG/DL — SIGNIFICANT CHANGE UP (ref 1.6–2.6)
MAGNESIUM SERPL-MCNC: 2 MG/DL — SIGNIFICANT CHANGE UP (ref 1.6–2.6)
MAGNESIUM SERPL-MCNC: 2.1 MG/DL — SIGNIFICANT CHANGE UP (ref 1.6–2.6)
MCHC RBC-ENTMCNC: 31.3 PG — SIGNIFICANT CHANGE UP (ref 27–34)
MCHC RBC-ENTMCNC: 31.5 PG — SIGNIFICANT CHANGE UP (ref 27–34)
MCHC RBC-ENTMCNC: 31.5 PG — SIGNIFICANT CHANGE UP (ref 27–34)
MCHC RBC-ENTMCNC: 33.1 G/DL — SIGNIFICANT CHANGE UP (ref 32–36)
MCHC RBC-ENTMCNC: 33.6 G/DL — SIGNIFICANT CHANGE UP (ref 32–36)
MCHC RBC-ENTMCNC: 34 G/DL — SIGNIFICANT CHANGE UP (ref 32–36)
MCV RBC AUTO: 92.6 FL — SIGNIFICANT CHANGE UP (ref 80–100)
MCV RBC AUTO: 93.8 FL — SIGNIFICANT CHANGE UP (ref 80–100)
MCV RBC AUTO: 94.4 FL — SIGNIFICANT CHANGE UP (ref 80–100)
PHOSPHATE SERPL-MCNC: 2.5 MG/DL — SIGNIFICANT CHANGE UP (ref 2.5–4.5)
PHOSPHATE SERPL-MCNC: 2.7 MG/DL — SIGNIFICANT CHANGE UP (ref 2.5–4.5)
PHOSPHATE SERPL-MCNC: 2.8 MG/DL — SIGNIFICANT CHANGE UP (ref 2.5–4.5)
PLATELET # BLD AUTO: 261 K/UL — SIGNIFICANT CHANGE UP (ref 150–400)
PLATELET # BLD AUTO: 262 K/UL — SIGNIFICANT CHANGE UP (ref 150–400)
PLATELET # BLD AUTO: 273 K/UL — SIGNIFICANT CHANGE UP (ref 150–400)
POTASSIUM SERPL-MCNC: 4 MMOL/L — SIGNIFICANT CHANGE UP (ref 3.5–5.3)
POTASSIUM SERPL-MCNC: 4 MMOL/L — SIGNIFICANT CHANGE UP (ref 3.5–5.3)
POTASSIUM SERPL-MCNC: 4.1 MMOL/L — SIGNIFICANT CHANGE UP (ref 3.5–5.3)
POTASSIUM SERPL-SCNC: 4 MMOL/L — SIGNIFICANT CHANGE UP (ref 3.5–5.3)
POTASSIUM SERPL-SCNC: 4 MMOL/L — SIGNIFICANT CHANGE UP (ref 3.5–5.3)
POTASSIUM SERPL-SCNC: 4.1 MMOL/L — SIGNIFICANT CHANGE UP (ref 3.5–5.3)
PROT SERPL-MCNC: 6.7 G/DL — SIGNIFICANT CHANGE UP (ref 6–8.3)
PROT SERPL-MCNC: 7.4 G/DL — SIGNIFICANT CHANGE UP (ref 6–8.3)
PROTHROM AB SERPL-ACNC: 13.8 SEC — HIGH (ref 10–12.9)
PROTHROM AB SERPL-ACNC: 14.1 SEC — HIGH (ref 10–12.9)
PROTHROM AB SERPL-ACNC: 14.1 SEC — HIGH (ref 10–12.9)
RBC # BLD: 3.74 M/UL — LOW (ref 4.2–5.8)
RBC # BLD: 3.78 M/UL — LOW (ref 4.2–5.8)
RBC # BLD: 3.9 M/UL — LOW (ref 4.2–5.8)
RBC # FLD: 12.9 % — SIGNIFICANT CHANGE UP (ref 10.3–16.9)
RBC # FLD: 12.9 % — SIGNIFICANT CHANGE UP (ref 10.3–16.9)
RBC # FLD: 13.2 % — SIGNIFICANT CHANGE UP (ref 10.3–16.9)
SODIUM SERPL-SCNC: 136 MMOL/L — SIGNIFICANT CHANGE UP (ref 135–145)
SODIUM SERPL-SCNC: 139 MMOL/L — SIGNIFICANT CHANGE UP (ref 135–145)
SODIUM SERPL-SCNC: 139 MMOL/L — SIGNIFICANT CHANGE UP (ref 135–145)
WBC # BLD: 11.5 K/UL — HIGH (ref 3.8–10.5)
WBC # BLD: 12.3 K/UL — HIGH (ref 3.8–10.5)
WBC # BLD: 13.4 K/UL — HIGH (ref 3.8–10.5)
WBC # FLD AUTO: 11.5 K/UL — HIGH (ref 3.8–10.5)
WBC # FLD AUTO: 12.3 K/UL — HIGH (ref 3.8–10.5)
WBC # FLD AUTO: 13.4 K/UL — HIGH (ref 3.8–10.5)

## 2018-12-08 PROCEDURE — 99292 CRITICAL CARE ADDL 30 MIN: CPT

## 2018-12-08 PROCEDURE — 71045 X-RAY EXAM CHEST 1 VIEW: CPT | Mod: 26

## 2018-12-08 PROCEDURE — 99291 CRITICAL CARE FIRST HOUR: CPT

## 2018-12-08 PROCEDURE — 93010 ELECTROCARDIOGRAM REPORT: CPT

## 2018-12-08 RX ADMIN — SERTRALINE 25 MILLIGRAM(S): 25 TABLET, FILM COATED ORAL at 11:53

## 2018-12-08 RX ADMIN — NICARDIPINE HYDROCHLORIDE 10 MG/HR: 30 CAPSULE, EXTENDED RELEASE ORAL at 06:20

## 2018-12-08 RX ADMIN — HEPARIN SODIUM 5000 UNIT(S): 5000 INJECTION INTRAVENOUS; SUBCUTANEOUS at 05:37

## 2018-12-08 RX ADMIN — CHLORHEXIDINE GLUCONATE 1 APPLICATION(S): 213 SOLUTION TOPICAL at 05:37

## 2018-12-08 RX ADMIN — NICARDIPINE HYDROCHLORIDE 10 MG/HR: 30 CAPSULE, EXTENDED RELEASE ORAL at 00:51

## 2018-12-08 RX ADMIN — Medication 100 MILLIGRAM(S): at 07:05

## 2018-12-08 RX ADMIN — Medication 100 MILLIGRAM(S): at 15:56

## 2018-12-08 RX ADMIN — POLYETHYLENE GLYCOL 3350 17 GRAM(S): 17 POWDER, FOR SOLUTION ORAL at 11:53

## 2018-12-08 RX ADMIN — PANTOPRAZOLE SODIUM 40 MILLIGRAM(S): 20 TABLET, DELAYED RELEASE ORAL at 06:20

## 2018-12-08 NOTE — PROGRESS NOTE ADULT - SUBJECTIVE AND OBJECTIVE BOX
Called by CT ICU team today due to spotting on dressing overlying the lumbar drain. The drain continues to work well with clear CSF output, managed by CT ICU team. At bedside, patient without new neurological complaints. Lumbar drain dressing with spotting at drain entry site. Minimal trickle of bleeding witnessed which stopped after some manipulation of the drain tubing with new clean dressing applied. Plan at this time is for clamp trial of LD tomorrow. Will continue to monitor.

## 2018-12-08 NOTE — PROGRESS NOTE ADULT - SUBJECTIVE AND OBJECTIVE BOX
ID: 79 y/o male s/p TEVAR 12/7      SUBJECTIVE: Patient with no complaints      OBJECTIVE:    Vital Signs:  Vital Signs Last 24 Hrs  T(C): 36.9 (08 Dec 2018 13:30), Max: 36.9 (08 Dec 2018 05:00)  T(F): 98.4 (08 Dec 2018 13:30), Max: 98.5 (08 Dec 2018 05:00)  HR: 86 (08 Dec 2018 14:00) (64 - 88)  BP: 126/82 (08 Dec 2018 07:00) (126/82 - 135/91)  BP(mean): 98 (08 Dec 2018 07:00) (98 - 103)  RR: 21 (08 Dec 2018 14:00) (11 - 22)  SpO2: 98% (08 Dec 2018 14:00) (93% - 100%)    Physical Exam:  General: NAD  Pulmonary: Nonlabored breathing, no respiratory distress  Cardiovascular: No heaves/thrills  Abdominal: Soft, NT/ND, groin wound soft and incision CDI  Upper extremities: Radial pulses palpable bilaterally  Lower extremities: DP/PT pulses palpable bilaterally  Neuro: AAO x3, no focal deficits    Lines/Drains/Tubes:    Ins and Outs:  I&O's Summary    07 Dec 2018 07:01  -  08 Dec 2018 07:00  --------------------------------------------------------  IN: 1540 mL / OUT: 4560 mL / NET: -3020 mL    08 Dec 2018 07:01  -  08 Dec 2018 14:39  --------------------------------------------------------  IN: 1090 mL / OUT: 855 mL / NET: 235 mL        Labs:                        11.9   11.5  )-----------( 273      ( 08 Dec 2018 10:01 )             35.0     12-08    136  |  102  |  12  ----------------------------<  164<H>  4.0   |  23  |  1.65<H>    Ca    9.3      08 Dec 2018 10:01  Phos  2.7     12-08  Mg     2.0     12-08    TPro  6.7  /  Alb  3.9  /  TBili  0.8  /  DBili  x   /  AST  44<H>  /  ALT  87<H>  /  AlkPhos  93  12-08    PT/INR - ( 08 Dec 2018 10:01 )   PT: 14.1 sec;   INR: 1.24          PTT - ( 08 Dec 2018 10:01 )  PTT:33.9 sec    CAPILLARY BLOOD GLUCOSE      POCT Blood Glucose.: 146 mg/dL (08 Dec 2018 10:59)  POCT Blood Glucose.: 108 mg/dL (08 Dec 2018 06:16)  POCT Blood Glucose.: 107 mg/dL (07 Dec 2018 21:42)  POCT Blood Glucose.: 118 mg/dL (07 Dec 2018 18:22)    LIVER FUNCTIONS - ( 08 Dec 2018 03:42 )  Alb: 3.9 g/dL / Pro: 6.7 g/dL / ALK PHOS: 93 U/L / ALT: 87 U/L / AST: 44 U/L / GGT: x             Radiology & Additional Studies: ID: 77 y/o male s/p TEVAR 12/7      SUBJECTIVE: Patient with no complaints      OBJECTIVE:    Vital Signs:  Vital Signs Last 24 Hrs  T(C): 36.9 (08 Dec 2018 13:30), Max: 36.9 (08 Dec 2018 05:00)  T(F): 98.4 (08 Dec 2018 13:30), Max: 98.5 (08 Dec 2018 05:00)  HR: 86 (08 Dec 2018 14:00) (64 - 88)  BP: 126/82 (08 Dec 2018 07:00) (126/82 - 135/91)  BP(mean): 98 (08 Dec 2018 07:00) (98 - 103)  RR: 21 (08 Dec 2018 14:00) (11 - 22)  SpO2: 98% (08 Dec 2018 14:00) (93% - 100%)    Physical Exam:  General: NAD  Pulmonary: Nonlabored breathing, no respiratory distress  Cardiovascular: No heaves/thrills  Abdominal: Soft, NT/ND, groin wound soft and incision CDI  Back: Drain site soft and covered with gauze  : Hutson draining yellow urine  Upper extremities: Radial pulses palpable bilaterally  Lower extremities: DP/PT pulses palpable bilaterally  Neuro: AAO x3, no focal deficits    Lines/Drains/Tubes:    Ins and Outs:  I&O's Summary    07 Dec 2018 07:01  -  08 Dec 2018 07:00  --------------------------------------------------------  IN: 1540 mL / OUT: 4560 mL / NET: -3020 mL    08 Dec 2018 07:01  -  08 Dec 2018 14:39  --------------------------------------------------------  IN: 1090 mL / OUT: 855 mL / NET: 235 mL        Labs:                        11.9   11.5  )-----------( 273      ( 08 Dec 2018 10:01 )             35.0     12-08    136  |  102  |  12  ----------------------------<  164<H>  4.0   |  23  |  1.65<H>    Ca    9.3      08 Dec 2018 10:01  Phos  2.7     12-08  Mg     2.0     12-08    TPro  6.7  /  Alb  3.9  /  TBili  0.8  /  DBili  x   /  AST  44<H>  /  ALT  87<H>  /  AlkPhos  93  12-08    PT/INR - ( 08 Dec 2018 10:01 )   PT: 14.1 sec;   INR: 1.24          PTT - ( 08 Dec 2018 10:01 )  PTT:33.9 sec    CAPILLARY BLOOD GLUCOSE      POCT Blood Glucose.: 146 mg/dL (08 Dec 2018 10:59)  POCT Blood Glucose.: 108 mg/dL (08 Dec 2018 06:16)  POCT Blood Glucose.: 107 mg/dL (07 Dec 2018 21:42)  POCT Blood Glucose.: 118 mg/dL (07 Dec 2018 18:22)    LIVER FUNCTIONS - ( 08 Dec 2018 03:42 )  Alb: 3.9 g/dL / Pro: 6.7 g/dL / ALK PHOS: 93 U/L / ALT: 87 U/L / AST: 44 U/L / GGT: x             Radiology & Additional Studies:

## 2018-12-08 NOTE — PROGRESS NOTE ADULT - ASSESSMENT
A/P: 79 y/o male s/p TEVAR 12/7    - Spinal EDA drain per neurosurgery  - BP goals per CT ICU  - Care per CT ICU  - Vascular surgery will continue to follow A/P: 77 y/o male s/p TEVAR 12/7    - Spinal drain per neurosurgery  - BP goals per CT ICU  - Care per CT ICU  - Vascular surgery will continue to follow

## 2018-12-08 NOTE — PROGRESS NOTE ADULT - SUBJECTIVE AND OBJECTIVE BOX
INTERVAL HPI/OVERNIGHT EVENTS:    POD#1 TEVAR  D#3 lumbar drain    79yo male with Hx HTN, CKD (Cr1.65), PPM - syncope/[place], known descending aorta sneurysm (5.5cm) presenting again with syncope    s/p PPM interrogation  CT imaging 11/22 -  No acute intracranial hemorrhage or acute transcortical infarct. Mod-severe chronic microangiopathic disease including lacunar infarcts in the basal ganglia bilaterally, cerebellum bilaterally, and right mary ann    Neuro following - ?partial/absence seizures    lumbar drain placed 12/6 and taken to OR    uneventful intraop course reported - arrived extubated  neurologically intact and moving all extremities post-op    No acute event reported     PMHx include but is not limited to:   PPM (medtronic)  Renal insufficiency  Anxiety  Syncope, unspecified syncope type  Descending aortic aneurysm  Hypertension,    ICU Vital Signs Last 24 Hrs  T(C): 36.7 (08 Dec 2018 10:28), Max: 36.9 (08 Dec 2018 05:00)  T(F): 98 (08 Dec 2018 10:28), Max: 98.5 (08 Dec 2018 05:00)  HR: 76 (08 Dec 2018 11:00) (62 - 82) sinus  BP: 126/82 (08 Dec 2018 07:00) (126/82 - 135/91)  BP(mean): 98 (08 Dec 2018 07:00) (98 - 103)  ABP: 154/76 (08 Dec 2018 11:00) (108/75 - 156/78)  ABP(mean): 100 (08 Dec 2018 11:00) (88 - 104)  SpO2: 97% (08 Dec 2018 11:00) (93% - 100%) RA    Qtts: Cardene (2)    I&O's Summary    07 Dec 2018 07:01  -  08 Dec 2018 07:00  --------------------------------------------------------  IN: 1540 mL / OUT: 4560 mL / NET: -3020 mL    08 Dec 2018 07:01  -  08 Dec 2018 12:03  --------------------------------------------------------  IN: 985 mL / OUT: 585 mL / NET: 400 mL    Physical Exam    Heart - regular (-)rub/gallop  Lungs - CTA anterior (-)r/r/w  Abd - (+)BS soft NTND (-)r/r/g  Ext - warm to touch; no cyanosis/clubbing  Neuro - alert/oriented and interactive - moving all extremities and non-focal  Skin - no rash    LABS:                        11.9   11.5  )-----------( 273      ( 08 Dec 2018 10:01 )             35.0     12-08    136  |  102  |  12  ----------------------------<  164<H>  4.0   |  23  |  1.65<H>    Ca    9.3      08 Dec 2018 10:01  Phos  2.7     12-08  Mg     2.0     12-08    TPro  6.7  /  Alb  3.9  /  TBili  0.8  /  DBili  x   /  AST  44<H>  /  ALT  87<H>  /  AlkPhos  93  12-08    PT/INR - ( 08 Dec 2018 10:01 )   PT: 14.1 sec;   INR: 1.24     PTT - ( 08 Dec 2018 10:01 )  PTT:33.9 sec    ABG - ( 08 Dec 2018 09:49 ) 7.44/34/69/95    RADIOLOGY & ADDITIONAL STUDIES: reviewed    Patient with known descending aortic aneurysm now s/p elective TEVAR    1. CV  hemodynamically stable  sinus rhythm  cardene as needed for BP control - per protocol  sinus rhythm  complete periop Abx prophylaxis    2. Vascular  ongoing neuro checks  lumbar drainage per protocol  no pharm DVT prophy with lumbar drain in place    3. Renal   CKD at baseline - Cr 1.64 - currently 1.65  cont to monitor and avoid nephrotoxins as much as able    4. Neuro  prior syncope  PPM functioning well and imaging not revealing new/acute findings - prior scattered lacunar infarcts noted  Neuro following - partial/absence seizures being considered for etiology of syncope dylan in light of prodromw/aura sxs  no recurrence reported to date    maintain glycemic control - /108    d/w patient/staff/vascular and CTS    I have spent/provided stated minutes of critical care time to this patient: 60  min

## 2018-12-08 NOTE — PROGRESS NOTE ADULT - SUBJECTIVE AND OBJECTIVE BOX
CTICU  CRITICAL  CARE  attending     Hand off received 					   Pertinent clinical, laboratory, radiographic, hemodynamic, echocardiographic, respiratory data, microbiologic data and chart were reviewed and analyzed frequently throughout the course of the day and night  Patient seen and examined with CTS/ SH attending at bedside  Pt is a 78y , Male, post procedure day # 1 s/p TEVAR    has LD in place; inserted pre-procedure    today:    increasing lethargy  non focal  period of headache  ISP 8-12  CT head non con with no acute pathology  CSF not drained for ISP < 10      Phlebitis/thrombophlebitis: Phlebitis/thrombophlebitis  Bradycardia: Bradycardia  Renal insufficiency: Renal insufficiency  Hypertension, unspecified type: Hypertension, unspecified type  Syncope, unspecified syncope type: Syncope, unspecified syncope type  Descending aortic aneurysm: Descending aortic aneurysm      , FAMILY HISTORY:  No pertinent family history in first degree relatives  PAST MEDICAL & SURGICAL HISTORY:  Bradycardia  Renal insufficiency  Anxiety  Syncope, unspecified syncope type  Descending aortic aneurysm  Hypertension, unspecified type  H/O cardiac pacemaker: medtronic    Patient is a 78y old  Male who presents with a chief complaint of s/p TEVAR (07 Dec 2018 14:32)      14 system review was unremarkable  acute changes include acute respiratory failure  Vital signs, hemodynamic and respiratory parameters were reviewed from the bedside nursing flowsheet.  ICU Vital Signs Last 24 Hrs  T(C): 37.6 (09 Dec 2018 05:01), Max: 37.6 (09 Dec 2018 05:01)  T(F): 99.6 (09 Dec 2018 05:01), Max: 99.6 (09 Dec 2018 05:01)  HR: 88 (09 Dec 2018 06:00) (72 - 90)  BP: 148/90 (08 Dec 2018 23:00) (126/82 - 151/90)  BP(mean): 111 (08 Dec 2018 23:00) (98 - 111)  ABP: 158/78 (09 Dec 2018 06:00) (128/76 - 176/82)  ABP(mean): 104 (09 Dec 2018 06:00) (96 - 112)  RR: 20 (09 Dec 2018 06:00) (15 - 26)  SpO2: 95% (09 Dec 2018 06:00) (95% - 100%)    Adult Advanced Hemodynamics Last 24 Hrs  CVP(mm Hg): 5 (09 Dec 2018 06:00) (-2 - 12)  CVP(cm H2O): --  CO: --  CI: --  PA: --  PA(mean): --  PCWP: --  SVR: --  SVRI: --  PVR: --  PVRI: --, ABG - ( 09 Dec 2018 02:06 )  pH, Arterial: 7.48  pH, Blood: x     /  pCO2: 31    /  pO2: 70    / HCO3: 23    / Base Excess: -0.2  /  SaO2: 95                  Intake and output was reviewed and the fluid balance was calculated  Daily     Daily   I&O's Summary    07 Dec 2018 07:01  -  08 Dec 2018 07:00  --------------------------------------------------------  IN: 1540 mL / OUT: 4560 mL / NET: -3020 mL    08 Dec 2018 07:01  -  09 Dec 2018 06:37  --------------------------------------------------------  IN: 1965 mL / OUT: 2672 mL / NET: -707 mL        All lines and drain sites were assessed  Glycemic trend was reviewedCAPILLARY BLOOD GLUCOSE      POCT Blood Glucose.: 121 mg/dL (09 Dec 2018 06:24)    No acute change in mental status  Auscultation of the chest reveals equal bs  Abdomen is soft  Extremities are warm and well perfused  Wounds appear clean and unremarkable  Antibiotics are periop    labs  CBC Full  -  ( 09 Dec 2018 02:06 )  WBC Count : 13.4 K/uL  Hemoglobin : 12.3 g/dL  Hematocrit : 37.0 %  Platelet Count - Automated : 261 K/uL  Mean Cell Volume : 93.4 fL  Mean Cell Hemoglobin : 31.1 pg  Mean Cell Hemoglobin Concentration : 33.2 g/dL  Auto Neutrophil # : x  Auto Lymphocyte # : x  Auto Monocyte # : x  Auto Eosinophil # : x  Auto Basophil # : x  Auto Neutrophil % : x  Auto Lymphocyte % : x  Auto Monocyte % : x  Auto Eosinophil % : x  Auto Basophil % : x    12-09    138  |  99  |  11  ----------------------------<  131<H>  4.0   |  22  |  1.48<H>    Ca    9.5      09 Dec 2018 02:06  Phos  3.0     12-09  Mg     1.9     12-09    TPro  7.6  /  Alb  3.9  /  TBili  0.8  /  DBili  x   /  AST  42<H>  /  ALT  46<H>  /  AlkPhos  94  12-09    PT/INR - ( 09 Dec 2018 02:06 )   PT: 13.9 sec;   INR: 1.22          PTT - ( 09 Dec 2018 02:06 )  PTT:28.6 sec  The current medications were reviewed   MEDICATIONS  (STANDING):  acetaminophen  IVPB .. 1000 milliGRAM(s) IV Intermittent once  chlorhexidine 2% Cloths 1 Application(s) Topical daily  dextrose 5%. 1000 milliLiter(s) (50 mL/Hr) IV Continuous <Continuous>  dextrose 50% Injectable 12.5 Gram(s) IV Push once  dextrose 50% Injectable 25 Gram(s) IV Push once  dextrose 50% Injectable 25 Gram(s) IV Push once  insulin lispro (HumaLOG) corrective regimen sliding scale   SubCutaneous Before meals and at bedtime  niCARdipine Infusion 2 mG/Hr (10 mL/Hr) IV Continuous <Continuous>  pantoprazole    Tablet 40 milliGRAM(s) Oral before breakfast  polyethylene glycol 3350 17 Gram(s) Oral daily  sertraline 25 milliGRAM(s) Oral daily  sodium chloride 0.9%. 1000 milliLiter(s) (10 mL/Hr) IV Continuous <Continuous>    MEDICATIONS  (PRN):  dextrose 40% Gel 15 Gram(s) Oral once PRN Blood Glucose LESS THAN 70 milliGRAM(s)/deciliter  glucagon  Injectable 1 milliGRAM(s) IntraMuscular once PRN Glucose LESS THAN 70 milligrams/deciliter       PROBLEM LIST/ ASSESSMENT:  HEALTH ISSUES - PROBLEM Dx:  lethargy  s/p TEVAR  Phlebitis/thrombophlebitis: Phlebitis/thrombophlebitis  Bradycardia: Bradycardia  Renal insufficiency: Renal insufficiency  Hypertension, unspecified type: Hypertension, unspecified type  Syncope, unspecified syncope type: Syncope, unspecified syncope type  Descending aortic aneurysm: Descending aortic aneurysm      ,   Patient is a 78y old  Male who presents with a chief complaint of s/p TEVAR (07 Dec 2018 14:32)     s/p TEVAR      My plan includes :  close hemodynamic, ventilatory and drain monitoring and management per post op routine    Monitor for arrhythmias and monitor parameters for organ perfusion  monitor neurologic status  Head of the bed should remain elevated to 45 deg .   chest PT and IS will be encouraged  monitor adequacy of oxygenation and ventilation and attempt to wean oxygen  Nutritional goals will be met using po eventually , ensure adequate caloric intake and montior the same  Stress ulcer and VTE prophylaxis will be achieved    Glycemic control is satisfactory  Electrolytes have been repleted as necessary and wound care has been carried out. Pain control has been achieved.   agressive physical therapy and early mobility and ambulation goals will be met   The family was updated about the course and plan  CRITICAL CARE TIME SPENT in evaluation and management, reassessments, review and interpretation of labs and x-rays, ventilator and hemodynamic management, formulating a plan and coordinating care: ___30___ MIN.  Time does not include procedural time.  CTICU ATTENDING     					    Chapito Ortega MD

## 2018-12-09 LAB
ALBUMIN SERPL ELPH-MCNC: 3.9 G/DL — SIGNIFICANT CHANGE UP (ref 3.3–5)
ALP SERPL-CCNC: 94 U/L — SIGNIFICANT CHANGE UP (ref 40–120)
ALT FLD-CCNC: 46 U/L — HIGH (ref 10–45)
ANION GAP SERPL CALC-SCNC: 17 MMOL/L — SIGNIFICANT CHANGE UP (ref 5–17)
APTT BLD: 28.6 SEC — SIGNIFICANT CHANGE UP (ref 27.5–36.3)
AST SERPL-CCNC: 42 U/L — HIGH (ref 10–40)
BILIRUB SERPL-MCNC: 0.8 MG/DL — SIGNIFICANT CHANGE UP (ref 0.2–1.2)
BUN SERPL-MCNC: 11 MG/DL — SIGNIFICANT CHANGE UP (ref 7–23)
CALCIUM SERPL-MCNC: 9.5 MG/DL — SIGNIFICANT CHANGE UP (ref 8.4–10.5)
CHLORIDE SERPL-SCNC: 99 MMOL/L — SIGNIFICANT CHANGE UP (ref 96–108)
CO2 SERPL-SCNC: 22 MMOL/L — SIGNIFICANT CHANGE UP (ref 22–31)
CREAT SERPL-MCNC: 1.48 MG/DL — HIGH (ref 0.5–1.3)
GAS PNL BLDA: SIGNIFICANT CHANGE UP
GLUCOSE BLDC GLUCOMTR-MCNC: 121 MG/DL — HIGH (ref 70–99)
GLUCOSE BLDC GLUCOMTR-MCNC: 122 MG/DL — HIGH (ref 70–99)
GLUCOSE BLDC GLUCOMTR-MCNC: 129 MG/DL — HIGH (ref 70–99)
GLUCOSE BLDC GLUCOMTR-MCNC: 136 MG/DL — HIGH (ref 70–99)
GLUCOSE SERPL-MCNC: 131 MG/DL — HIGH (ref 70–99)
HCT VFR BLD CALC: 37 % — LOW (ref 39–50)
HGB BLD-MCNC: 12.3 G/DL — LOW (ref 13–17)
INR BLD: 1.22 — HIGH (ref 0.88–1.16)
LACTATE SERPL-SCNC: 0.7 MMOL/L — SIGNIFICANT CHANGE UP (ref 0.5–2)
MAGNESIUM SERPL-MCNC: 1.9 MG/DL — SIGNIFICANT CHANGE UP (ref 1.6–2.6)
MCHC RBC-ENTMCNC: 31.1 PG — SIGNIFICANT CHANGE UP (ref 27–34)
MCHC RBC-ENTMCNC: 33.2 G/DL — SIGNIFICANT CHANGE UP (ref 32–36)
MCV RBC AUTO: 93.4 FL — SIGNIFICANT CHANGE UP (ref 80–100)
PHOSPHATE SERPL-MCNC: 3 MG/DL — SIGNIFICANT CHANGE UP (ref 2.5–4.5)
PLATELET # BLD AUTO: 261 K/UL — SIGNIFICANT CHANGE UP (ref 150–400)
POTASSIUM SERPL-MCNC: 4 MMOL/L — SIGNIFICANT CHANGE UP (ref 3.5–5.3)
POTASSIUM SERPL-SCNC: 4 MMOL/L — SIGNIFICANT CHANGE UP (ref 3.5–5.3)
PROT SERPL-MCNC: 7.6 G/DL — SIGNIFICANT CHANGE UP (ref 6–8.3)
PROTHROM AB SERPL-ACNC: 13.9 SEC — HIGH (ref 10–12.9)
RBC # BLD: 3.96 M/UL — LOW (ref 4.2–5.8)
RBC # FLD: 13 % — SIGNIFICANT CHANGE UP (ref 10.3–16.9)
SODIUM SERPL-SCNC: 138 MMOL/L — SIGNIFICANT CHANGE UP (ref 135–145)
WBC # BLD: 13.4 K/UL — HIGH (ref 3.8–10.5)
WBC # FLD AUTO: 13.4 K/UL — HIGH (ref 3.8–10.5)

## 2018-12-09 PROCEDURE — 99291 CRITICAL CARE FIRST HOUR: CPT

## 2018-12-09 PROCEDURE — 70450 CT HEAD/BRAIN W/O DYE: CPT | Mod: 26

## 2018-12-09 PROCEDURE — 71045 X-RAY EXAM CHEST 1 VIEW: CPT | Mod: 26

## 2018-12-09 RX ORDER — LABETALOL HCL 100 MG
100 TABLET ORAL EVERY 6 HOURS
Qty: 0 | Refills: 0 | Status: DISCONTINUED | OUTPATIENT
Start: 2018-12-09 | End: 2018-12-10

## 2018-12-09 RX ORDER — HYDRALAZINE HCL 50 MG
25 TABLET ORAL EVERY 8 HOURS
Qty: 0 | Refills: 0 | Status: DISCONTINUED | OUTPATIENT
Start: 2018-12-09 | End: 2018-12-11

## 2018-12-09 RX ORDER — MAGNESIUM SULFATE 500 MG/ML
1 VIAL (ML) INJECTION ONCE
Qty: 0 | Refills: 0 | Status: COMPLETED | OUTPATIENT
Start: 2018-12-09 | End: 2018-12-09

## 2018-12-09 RX ORDER — MAGNESIUM SULFATE 500 MG/ML
2 VIAL (ML) INJECTION ONCE
Qty: 0 | Refills: 0 | Status: COMPLETED | OUTPATIENT
Start: 2018-12-09 | End: 2018-12-09

## 2018-12-09 RX ADMIN — POLYETHYLENE GLYCOL 3350 17 GRAM(S): 17 POWDER, FOR SOLUTION ORAL at 12:07

## 2018-12-09 RX ADMIN — CHLORHEXIDINE GLUCONATE 1 APPLICATION(S): 213 SOLUTION TOPICAL at 06:00

## 2018-12-09 RX ADMIN — Medication 100 GRAM(S): at 08:20

## 2018-12-09 RX ADMIN — Medication 25 MILLIGRAM(S): at 22:06

## 2018-12-09 RX ADMIN — Medication 100 MILLIGRAM(S): at 00:53

## 2018-12-09 RX ADMIN — NICARDIPINE HYDROCHLORIDE 10 MG/HR: 30 CAPSULE, EXTENDED RELEASE ORAL at 11:02

## 2018-12-09 RX ADMIN — SERTRALINE 25 MILLIGRAM(S): 25 TABLET, FILM COATED ORAL at 12:07

## 2018-12-09 RX ADMIN — Medication 100 MILLIGRAM(S): at 12:07

## 2018-12-09 RX ADMIN — Medication 100 MILLIGRAM(S): at 17:13

## 2018-12-09 RX ADMIN — PANTOPRAZOLE SODIUM 40 MILLIGRAM(S): 20 TABLET, DELAYED RELEASE ORAL at 06:29

## 2018-12-09 NOTE — PROGRESS NOTE ADULT - SUBJECTIVE AND OBJECTIVE BOX
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 Mod-severe chronic microangiopathic disease including lacunar infarcts in the basal ganglia bilaterally, cerebellum bilaterally, and right mary ann    Neuro following - ?partial/absence seizures    lumbar drain placed 12/6 and taken to OR 12/7    uneventful intraop course reported - arrived extubated  neurologically intact and moving all extremities post-op    transient report of HA yesterday - and inc lethargy overnight prompting CT Head  CT Head 12/9 -  No acute intracranial findings. Microvascular ischemic white matter disease. 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    patient awake and interactive this am - oriented x 3 and moving all extremities    PMHx include but is not limited to:   PPM (medtronic)  Renal insufficiency  Anxiety  Syncope, unspecified syncope type  Descending aortic aneurysm  Hypertension    ICU Vital Signs Last 24 Hrs  T(C): 36.4 (09 Dec 2018 09:57), Max: 37.6 (09 Dec 2018 05:01)  T(F): 97.6 (09 Dec 2018 09:57), Max: 99.6 (09 Dec 2018 05:01)  HR: 90 (09 Dec 2018 07:00) (72 - 90)  BP: 148/90 (08 Dec 2018 23:00) (148/90 - 151/90)  BP(mean): 111 (08 Dec 2018 23:00) (111 - 111)  ABP: 144/80 (09 Dec 2018 07:00) (144/70 - 176/82)  ABP(mean): 102 (09 Dec 2018 07:00) (96 - 112)  SpO2: 96% (09 Dec 2018 07:00) (95% - 98%)    Qtts: Bridget    I&O's Summary    08 Dec 2018 07:01  -  09 Dec 2018 07:00  --------------------------------------------------------  IN: 2050 mL / OUT: 2822 mL / NET: -772 mL    09 Dec 2018 07:01  -  09 Dec 2018 10:11  --------------------------------------------------------  IN: 35 mL / OUT: 0 mL / NET: 35 mL    Physical Exam    Heart - regular (-)rub/gallop  Lungs - CTA anterior (-)rub/gallop  Abd - (+)BS soft NTND (-)r/r/g  Ext - warm to touch no cyanosis/clubbing  Neuro - alert/oriented and interactive. moving all extremities and non-focal  Skin - no rash    LABS:                        12.3   13.4  )-----------( 261      ( 09 Dec 2018 02:06 )             37.0     12-09    138  |  99  |  11  ----------------------------<  131<H>  4.0   |  22  |  1.48<H>    Ca    9.5      09 Dec 2018 02:06  Phos  3.0     12-09  Mg     1.9     12-09    TPro  7.6  /  Alb  3.9  /  TBili  0.8  /  DBili  x   /  AST  42<H>  /  ALT  46<H>  /  AlkPhos  94  12-09    PT/INR - ( 09 Dec 2018 02:06 )   PT: 13.9 sec;   INR: 1.22     PTT - ( 09 Dec 2018 02:06 )  PTT:28.6 sec    ABG - ( 09 Dec 2018 02:06 ) 7.48/31/70/95    RADIOLOGY & ADDITIONAL STUDIES: reviewed    Patient with known descending aortic aneurysm now s/p elective TEVAR - POD#    1. CV  hemodynamically stable  sinus rhythm  cardene as needed for BP control - per protocol  sinus rhythm  complete periop Abx prophylaxis  LA 0.7      2. Vascular  ongoing neuro checks  lumbar drainage per protocol - capped this am and will assess for removal  no pharm DVT prophy with lumbar drain in place    3. Renal   CKD at baseline - Cr 1.64 - currently 1.48  cont to monitor and avoid nephrotoxins as much as able    4. Neuro  prior syncope  PPM functioning well and imaging not revealing new/acute findings - prior scattered lacunar infarcts noted  Neuro following - partial/absence seizures being considered for etiology of syncope dylan in light of prodromw/aura sxs  no recurrence reported to date    maintain glycemic control -     d/w patient/staff/vascular and CTS    I have spent/provided stated minutes of critical care time to this patient: 60

## 2018-12-10 LAB
ALBUMIN SERPL ELPH-MCNC: 3.5 G/DL — SIGNIFICANT CHANGE UP (ref 3.3–5)
ALP SERPL-CCNC: 93 U/L — SIGNIFICANT CHANGE UP (ref 40–120)
ALT FLD-CCNC: 40 U/L — SIGNIFICANT CHANGE UP (ref 10–45)
ANION GAP SERPL CALC-SCNC: 17 MMOL/L — SIGNIFICANT CHANGE UP (ref 5–17)
APTT BLD: 28.5 SEC — SIGNIFICANT CHANGE UP (ref 27.5–36.3)
AST SERPL-CCNC: 46 U/L — HIGH (ref 10–40)
BILIRUB SERPL-MCNC: 1.1 MG/DL — SIGNIFICANT CHANGE UP (ref 0.2–1.2)
BUN SERPL-MCNC: 18 MG/DL — SIGNIFICANT CHANGE UP (ref 7–23)
CALCIUM SERPL-MCNC: 9.6 MG/DL — SIGNIFICANT CHANGE UP (ref 8.4–10.5)
CHLORIDE SERPL-SCNC: 102 MMOL/L — SIGNIFICANT CHANGE UP (ref 96–108)
CO2 SERPL-SCNC: 21 MMOL/L — LOW (ref 22–31)
CREAT SERPL-MCNC: 1.44 MG/DL — HIGH (ref 0.5–1.3)
GAS PNL BLDA: SIGNIFICANT CHANGE UP
GLUCOSE BLDC GLUCOMTR-MCNC: 118 MG/DL — HIGH (ref 70–99)
GLUCOSE BLDC GLUCOMTR-MCNC: 170 MG/DL — HIGH (ref 70–99)
GLUCOSE SERPL-MCNC: 131 MG/DL — HIGH (ref 70–99)
HCT VFR BLD CALC: 35.8 % — LOW (ref 39–50)
HGB BLD-MCNC: 11.8 G/DL — LOW (ref 13–17)
INR BLD: 1.32 — HIGH (ref 0.88–1.16)
MAGNESIUM SERPL-MCNC: 2.2 MG/DL — SIGNIFICANT CHANGE UP (ref 1.6–2.6)
MCHC RBC-ENTMCNC: 31.1 PG — SIGNIFICANT CHANGE UP (ref 27–34)
MCHC RBC-ENTMCNC: 33 G/DL — SIGNIFICANT CHANGE UP (ref 32–36)
MCV RBC AUTO: 94.5 FL — SIGNIFICANT CHANGE UP (ref 80–100)
PHOSPHATE SERPL-MCNC: 4 MG/DL — SIGNIFICANT CHANGE UP (ref 2.5–4.5)
PLATELET # BLD AUTO: 241 K/UL — SIGNIFICANT CHANGE UP (ref 150–400)
POTASSIUM SERPL-MCNC: 4.5 MMOL/L — SIGNIFICANT CHANGE UP (ref 3.5–5.3)
POTASSIUM SERPL-SCNC: 4.5 MMOL/L — SIGNIFICANT CHANGE UP (ref 3.5–5.3)
PROT SERPL-MCNC: 7.3 G/DL — SIGNIFICANT CHANGE UP (ref 6–8.3)
PROTHROM AB SERPL-ACNC: 15 SEC — HIGH (ref 10–12.9)
RBC # BLD: 3.79 M/UL — LOW (ref 4.2–5.8)
RBC # FLD: 13.3 % — SIGNIFICANT CHANGE UP (ref 10.3–16.9)
SODIUM SERPL-SCNC: 140 MMOL/L — SIGNIFICANT CHANGE UP (ref 135–145)
WBC # BLD: 15.1 K/UL — HIGH (ref 3.8–10.5)
WBC # FLD AUTO: 15.1 K/UL — HIGH (ref 3.8–10.5)

## 2018-12-10 PROCEDURE — 71045 X-RAY EXAM CHEST 1 VIEW: CPT | Mod: 26

## 2018-12-10 PROCEDURE — 70450 CT HEAD/BRAIN W/O DYE: CPT | Mod: 26

## 2018-12-10 RX ORDER — SODIUM CHLORIDE 9 MG/ML
3 INJECTION INTRAMUSCULAR; INTRAVENOUS; SUBCUTANEOUS EVERY 8 HOURS
Qty: 0 | Refills: 0 | Status: DISCONTINUED | OUTPATIENT
Start: 2018-12-10 | End: 2018-12-13

## 2018-12-10 RX ORDER — SODIUM CHLORIDE 9 MG/ML
1000 INJECTION INTRAMUSCULAR; INTRAVENOUS; SUBCUTANEOUS ONCE
Qty: 0 | Refills: 0 | Status: COMPLETED | OUTPATIENT
Start: 2018-12-10 | End: 2018-12-10

## 2018-12-10 RX ORDER — FUROSEMIDE 40 MG
20 TABLET ORAL ONCE
Qty: 0 | Refills: 0 | Status: COMPLETED | OUTPATIENT
Start: 2018-12-10 | End: 2018-12-10

## 2018-12-10 RX ORDER — LABETALOL HCL 100 MG
200 TABLET ORAL EVERY 6 HOURS
Qty: 0 | Refills: 0 | Status: DISCONTINUED | OUTPATIENT
Start: 2018-12-10 | End: 2018-12-11

## 2018-12-10 RX ADMIN — Medication 25 MILLIGRAM(S): at 05:27

## 2018-12-10 RX ADMIN — Medication 25 MILLIGRAM(S): at 22:02

## 2018-12-10 RX ADMIN — Medication 100 MILLIGRAM(S): at 05:27

## 2018-12-10 RX ADMIN — Medication 200 MILLIGRAM(S): at 23:32

## 2018-12-10 RX ADMIN — SERTRALINE 25 MILLIGRAM(S): 25 TABLET, FILM COATED ORAL at 15:17

## 2018-12-10 RX ADMIN — Medication 100 MILLIGRAM(S): at 16:40

## 2018-12-10 RX ADMIN — PANTOPRAZOLE SODIUM 40 MILLIGRAM(S): 20 TABLET, DELAYED RELEASE ORAL at 06:01

## 2018-12-10 RX ADMIN — SODIUM CHLORIDE 3 MILLILITER(S): 9 INJECTION INTRAMUSCULAR; INTRAVENOUS; SUBCUTANEOUS at 21:58

## 2018-12-10 RX ADMIN — Medication 100 MILLIGRAM(S): at 00:08

## 2018-12-10 RX ADMIN — CHLORHEXIDINE GLUCONATE 1 APPLICATION(S): 213 SOLUTION TOPICAL at 05:28

## 2018-12-10 RX ADMIN — Medication 20 MILLIGRAM(S): at 05:27

## 2018-12-10 RX ADMIN — SODIUM CHLORIDE 2000 MILLILITER(S): 9 INJECTION INTRAMUSCULAR; INTRAVENOUS; SUBCUTANEOUS at 14:57

## 2018-12-10 RX ADMIN — SODIUM CHLORIDE 3 MILLILITER(S): 9 INJECTION INTRAMUSCULAR; INTRAVENOUS; SUBCUTANEOUS at 14:57

## 2018-12-10 RX ADMIN — Medication 200 MILLIGRAM(S): at 17:51

## 2018-12-10 NOTE — PHYSICAL THERAPY INITIAL EVALUATION ADULT - IMPAIRMENTS FOUND, PT EVAL
arousal, attention, and cognition/gait, locomotion, and balance/muscle strength/posture/aerobic capacity/endurance/ergonomics and body mechanics

## 2018-12-10 NOTE — PROGRESS NOTE ADULT - ASSESSMENT
Patient with known descending aortic aneurysm now s/p elective TEVAR - POD#3    1. CV  hemodynamically stable  sinus rhythm  cardene as needed for BP control - per protocol  Recurrent syncopal episodes s/p PPM on last admission       2. Vascular  ongoing neuro checks  lumbar drainage per protocol - capped overnight, removal this AM       3. Renal   CKD at baseline - Cr 1.64 - currently 1.48  oliguria today - will monitor post lasix   cont to monitor and avoid nephrotoxins as much as able    4. Neuro  prior syncope  PPM functioning well and imaging not revealing new/acute findings - prior scattered lacunar infarcts noted  Neuro following - partial/absence seizures being considered for etiology of syncope dylan in light of prodrome/aura sxs  no recurrence reported to date    GI/DVT proph  Mobilization   IS   Discharge planning  I have spent/provided stated minutes of critical care time to this patient: 35 min

## 2018-12-10 NOTE — PROCEDURE NOTE - NSPOSTCAREGUIDE_GEN_A_CORE
Verbal/written post procedure instructions were given to patient/caregiver/flat for 2 hours
Verbal/written post procedure instructions were given to patient/caregiver
Instructed patient/caregiver to follow-up with primary care physician/Care for catheter as per unit/ICU protocols

## 2018-12-10 NOTE — PROGRESS NOTE ADULT - SUBJECTIVE AND OBJECTIVE BOX
POD #3 s/p TEVAR  Doing well no acute issues  tolerated lumbar drain capping yesterday, no neuro deficit    Issues with oliguria this AM, appears to be responding to lasix.     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  Marital Status:  ( x  )    (   ) Single    (   )    (  )   Lives with: (  ) alone  (  ) children   (  ) spouse   (  ) parents  (  ) other  Recent Travel: No recent travel  Occupation:    Substance Use (street drugs): ( x ) never used  (  ) other:  Tobacco Usage:  ( x  ) never smoked   (   ) former smoker   (   ) current smoker  (     ) pack year  Alcohol Usage: None     FAMILY HISTORY:  No pertinent family history in first degree relatives    ICU Vital Signs Last 24 Hrs  T(C): 36.9 (12-10-18 @ 09:01), Max: 37.2 (12-10-18 @ 01:01)  T(F): 98.4 (12-10-18 @ 09:01), Max: 98.9 (12-10-18 @ 01:01)  HR: 76 (12-10-18 @ 10:00) (76 - 96)  BP: 140/88 (12-10-18 @ 08:00) (134/87 - 161/87)  BP(mean): 105 (12-10-18 @ 08:00) (102 - 117)  ABP: 114/72 (12-10-18 @ 10:00) (110/90 - 168/88)  ABP(mean): 86 (12-10-18 @ 10:00) (86 - 126)  RR: 20 (12-10-18 @ 10:00) (13 - 25)  SpO2: 97% (12-10-18 @ 10:00) (95% - 97%)    I&O's Summary    09 Dec 2018 07:01  -  10 Dec 2018 07:00  --------------------------------------------------------  IN: 652.5 mL / OUT: 1400 mL / NET: -747.5 mL    10 Dec 2018 07:01  -  10 Dec 2018 10:42  --------------------------------------------------------  IN: 130 mL / OUT: 95 mL / NET: 35 mL        ABG - ( 10 Dec 2018 02:07 )  pH: 7.46  /  pCO2: 32    /  pO2: 75    / HCO3: 22    / Base Excess: -1.1  /  SaO2: 96                              11.8   15.1  )-----------( 241      ( 10 Dec 2018 02:18 )             35.8     10 Dec 2018 02:17    140    |  102    |  18     ----------------------------<  131    4.5     |  21     |  1.44     Ca    9.6        10 Dec 2018 02:17  Phos  4.0       10 Dec 2018 02:17  Mg     2.2       10 Dec 2018 02:17    TPro  7.3    /  Alb  3.5    /  TBili  1.1    /  DBili  x      /  AST  46     /  ALT  40     /  AlkPhos  93     10 Dec 2018 02:17    PT/INR - ( 10 Dec 2018 02:18 )   PT: 15.0 sec;   INR: 1.32     PTT - ( 10 Dec 2018 02:18 )  PTT:28.5 sec    MEDICATIONS  (STANDING):  hydrALAZINE 25 milliGRAM(s) Oral every 8 hours  labetalol 100 milliGRAM(s) Oral every 6 hours  pantoprazole    Tablet 40 milliGRAM(s) Oral before breakfast  polyethylene glycol 3350 17 Gram(s) Oral daily  sertraline 25 milliGRAM(s) Oral daily      Home Medications:  docusate sodium 100 mg oral capsule: 1 cap(s) orally 3 times a day (23 Nov 2018 12:18)  fludrocortisone 0.1 mg oral tablet: 1 tab(s) orally every 12 hours x 5 days (End date: 11/27/18). Then 1 tab PO QD (start date 11/28/18) (23 Nov 2018 12:18)  hydrALAZINE 25 mg oral tablet: 1 tab(s) orally 2 times a day, As Needed for systolic BP &gt; 180 (23 Nov 2018 12:18)  pantoprazole 40 mg oral delayed release tablet: 1 tab(s) orally once a day (before a meal) (23 Nov 2018 12:18)  polyethylene glycol 3350 oral powder for reconstitution: 17 gram(s) orally once a day, As needed, Constipation (23 Nov 2018 12:18)  senna oral tablet: 2 tab(s) orally once a day (at bedtime) (23 Nov 2018 12:18)  sertraline 25 mg oral tablet: 1 tab(s) orally once a day (23 Nov 2018 12:18)  testosterone 4 mg/24 hr transdermal film, extended release: 1 patch transdermal once a day x 10 days (23 Nov 2018 12:18)    PHYSICAL EXAM:  Gen : no acute distress  Neck: No LAD, No JVD  Respiratory: decreased in the bases  Cardiovascular: S1 and S2, RRR, no M/G/R  Gastrointestinal: BS+, soft, NT/ND  Extremities: No peripheral edema  Vascular: 2+ peripheral pulses  Neurological: A/O x 3, no focal deficits  Skin : no rash or lesions   MSK : no weakness, normal gait   Lines: no sign of infection

## 2018-12-10 NOTE — PHYSICAL THERAPY INITIAL EVALUATION ADULT - PLANNED THERAPY INTERVENTIONS, PT EVAL
manual therapy techniques/ROM/strengthening/transfer training/balance training/bed mobility training/gait training/postural re-education

## 2018-12-10 NOTE — PROCEDURE NOTE - NSINFORMCONSENT_GEN_A_CORE
Benefits, risks, and possible complications of procedure explained to patient/caregiver who verbalized understanding and gave verbal consent.
Benefits, risks, and possible complications of procedure explained to patient/caregiver who verbalized understanding and gave verbal consent.
Benefits, risks, and possible complications of procedure explained to patient/caregiver who verbalized understanding and gave written consent.
Benefits, risks, and possible complications of procedure explained to patient/caregiver who verbalized understanding and gave verbal consent.
Benefits, risks, and possible complications of procedure explained to patient/caregiver who verbalized understanding and gave verbal consent.

## 2018-12-10 NOTE — PROGRESS NOTE ADULT - ASSESSMENT
A/P: 79 y/o male s/p TEVAR 12/7    - Spinal drain removed  - wbc trending up 15.1 [13.4] [12.3]  - net negative 700 cc, w/ concentrated urine  - CT head ordered yesterday; no evidence of new infarcts or hemorrhage    - no neuro deficits, but somnolence is concerning  - primary team to order repeat CT head    Recommendations:  - BP goals per CT ICU  - Care per CT ICU  - Vascular surgery will continue to follow

## 2018-12-10 NOTE — PROGRESS NOTE ADULT - SUBJECTIVE AND OBJECTIVE BOX
Weekend and overnight events:  Spinal drain removed over the weekend. Patient has been having intermittent episodes of "being out it" and somnolent. Net negative -700 in last 24 hours. WBC trending up for several days.       Patient seen at bedside. Sleeping but was arousable. Oriented to person, time, and place when able to stay awake. No complaints. Only tired. Complete ROS of systems unobtainable due to somnolence.          PMH:  Bradycardia  Renal insufficiency  Anxiety  Syncope, unspecified syncope type  Descending aortic aneurysm  Hypertension, unspecified type      Medication:   hydrALAZINE 25  labetalol 100        Vital Signs Last 24 Hrs  T(C): 36.9 (10 Dec 2018 09:01), Max: 37.2 (10 Dec 2018 01:01)  T(F): 98.4 (10 Dec 2018 09:01), Max: 98.9 (10 Dec 2018 01:01)  HR: 76 (10 Dec 2018 12:09) (60 - 96)  BP: 149/95 (10 Dec 2018 12:09) (120/79 - 161/87)  BP(mean): 114 (10 Dec 2018 12:09) (102 - 117)  RR: 26 (10 Dec 2018 12:09) (13 - 26)  SpO2: 96% (10 Dec 2018 12:09) (95% - 98%)  I&O's Summary    09 Dec 2018 07:01  -  10 Dec 2018 07:00  --------------------------------------------------------  IN: 652.5 mL / OUT: 1400 mL / NET: -747.5 mL    10 Dec 2018 07:01  -  10 Dec 2018 12:47  --------------------------------------------------------  IN: 130 mL / OUT: 185 mL / NET: -55 mL        Physical Exam:  Gen:  NAD, resting comfortably, appears tired and difficulty staying awake  Neuro: axo x3  Pulm:  no respiratory distress, nonlabored breathing  C/V: normal sinus rhythm, normotensive  Abd: soft, NT/ND  : gardiner in place w/ concentrated urine  Groin: bilateral groins soft  Extrem: WWP, non-edematous, no motor-sensory deficits  Pulse exam:   - palpable DP bilaterally, R PT biphasic, L PT triphasic      LABS:                        11.8   15.1  )-----------( 241      ( 10 Dec 2018 02:18 )             35.8     12-10    140  |  102  |  18  ----------------------------<  131<H>  4.5   |  21<L>  |  1.44<H>    Ca    9.6      10 Dec 2018 02:17  Phos  4.0     12-10  Mg     2.2     12-10    TPro  7.3  /  Alb  3.5  /  TBili  1.1  /  DBili  x   /  AST  46<H>  /  ALT  40  /  AlkPhos  93  12-10    PT/INR - ( 10 Dec 2018 02:18 )   PT: 15.0 sec;   INR: 1.32          PTT - ( 10 Dec 2018 02:18 )  PTT:28.5 sec    Radiology and Additional Studies:

## 2018-12-10 NOTE — PROCEDURE NOTE - GENERAL PROCEDURE DETAILS
Lumbar drained verified clamped. Patient positioned in left lateral decubitus fetal position. Insertion site dressing removed and area prepped with chlorohexidine. Anchoring sutures removed, catheter pulled without any resistance, tip visualized; 3-0 Monocryl absorbable suture placed. no CSF drainage noted.

## 2018-12-10 NOTE — PHYSICAL THERAPY INITIAL EVALUATION ADULT - GENERAL OBSERVATIONS, REHAB EVAL
Patient encountered supine in bed, NAD, +CB, as per RN Foreign and MD Nathan lumbar drain has been removed and patient is cleared to ambulate, +EKG, surgical site C/D/I.

## 2018-12-10 NOTE — PROGRESS NOTE ADULT - SUBJECTIVE AND OBJECTIVE BOX
HPI:  77 y/o male PMH HTN , CRI (1.65) ,fusiform aneurysmal dilation of descending aorta measuring 5.5cm , syncope, bradycardia, h/o Medtronic PPM (followed by Dr. Sarmiento) who is known to Dr. Garcia previously hospitalized at [facility] for management of descending aortic aneurysm. 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 He describes that he passed out for a short time after feeling very warm. Denies hitting his head, denies chest pain or back pain. Went to [facility] and subsequently was transferred to [facility] for observation and management. (04 Dec 2018 00:08)    Vital Signs Last 24 Hrs  T(C): 36.9 (10 Dec 2018 09:01), Max: 37.2 (10 Dec 2018 01:01)  T(F): 98.4 (10 Dec 2018 09:01), Max: 98.9 (10 Dec 2018 01:01)  HR: 76 (10 Dec 2018 10:00) (76 - 96)  BP: 140/88 (10 Dec 2018 08:00) (134/87 - 161/87)  BP(mean): 105 (10 Dec 2018 08:00) (102 - 117)  RR: 20 (10 Dec 2018 10:00) (13 - 25)  SpO2: 97% (10 Dec 2018 10:00) (95% - 97%)    I&O's Summary    09 Dec 2018 07:01  -  10 Dec 2018 07:00  --------------------------------------------------------  IN: 652.5 mL / OUT: 1400 mL / NET: -747.5 mL    10 Dec 2018 07:01  -  10 Dec 2018 10:40  --------------------------------------------------------  IN: 130 mL / OUT: 95 mL / NET: 35 mL        PHYSICAL EXAM:  AAOx3, FC, moving all extremities.     LABS:                        11.8   15.1  )-----------( 241      ( 10 Dec 2018 02:18 )             35.8     12-10    140  |  102  |  18  ----------------------------<  131<H>  4.5   |  21<L>  |  1.44<H>    Ca    9.6      10 Dec 2018 02:17  Phos  4.0     12-10  Mg     2.2     12-10    TPro  7.3  /  Alb  3.5  /  TBili  1.1  /  DBili  x   /  AST  46<H>  /  ALT  40  /  AlkPhos  93  12-10    PT/INR - ( 10 Dec 2018 02:18 )   PT: 15.0 sec;   INR: 1.32          PTT - ( 10 Dec 2018 02:18 )  PTT:28.5 sec        CAPILLARY BLOOD GLUCOSE      POCT Blood Glucose.: 118 mg/dL (10 Dec 2018 06:04)  POCT Blood Glucose.: 129 mg/dL (09 Dec 2018 22:03)  POCT Blood Glucose.: 122 mg/dL (09 Dec 2018 17:01)  POCT Blood Glucose.: 136 mg/dL (09 Dec 2018 11:15)      Drug Levels: [] N/A    CSF Analysis: [] N/A      Allergies    No Known Allergies    Intolerances      MEDICATIONS:  Antibiotics:    Neuro:  sertraline 25 milliGRAM(s) Oral daily    Anticoagulation:    OTHER:  hydrALAZINE 25 milliGRAM(s) Oral every 8 hours  labetalol 100 milliGRAM(s) Oral every 6 hours  pantoprazole    Tablet 40 milliGRAM(s) Oral before breakfast  polyethylene glycol 3350 17 Gram(s) Oral daily    IVF:  sodium chloride 0.9% lock flush 3 milliLiter(s) IV Push every 8 hours      ASSESSMENT:  78y Male with known descending aortic aneurysm now s/p TEVAR; LD placed 12/6, clamped since 12/9 neurosurgery called to remove LD today.       - LD removed, no complication.   - flat for 2 hours   - monitor for leak/neuro status  - neurosurgery signing off, reconsult prn.     d/w Dr. Gee.

## 2018-12-10 NOTE — PROGRESS NOTE ADULT - SUBJECTIVE AND OBJECTIVE BOX
Patient discussed on morning rounds with Dr. 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 Sarmiento) who is known to Dr. Garcia for his aortic aneurysm.  He presented to an OSH after having a syncopal episode at home.  He was supposed to have a F/U appt this week with Dr. Garcia, but light of his recent symptoms he was admitted to [facility] and will be having his TEVAR tomorrow.  EPS saw him and interrogated his device, with no record of recent arrhythmias.  Neurology team saw, and recommended EEG monitoring and CT head (negative for acute bleed, chronic lacunar infarcts). Pt was transferred to CTICU, lumbar drain placed on 12/6, now s/p TEVAR on 12/7 with Dr. Garcia. Post op transferred to CTICU extubated, on cardene for BP control.  Neurosurgery clamped lumbar drain on 12/9, removed on 12/10. Neurology continued to follow as pt still with episodes of disorientation/ ?syncope post op. EEG replaced on 12/10 for concern of seizures, with plan to keep in place for several days. Pt transferred to  in stable condition, currently AAOx3, though states he is tired. C/o abdominal "gas" pain, +BM on 12/9.         Vital Signs Last 24 Hrs  T(C): 36.5 (10 Dec 2018 17:38), Max: 37.2 (10 Dec 2018 01:01)  T(F): 97.7 (10 Dec 2018 17:38), Max: 98.9 (10 Dec 2018 01:01)  HR: 88 (10 Dec 2018 17:38) (60 - 96)  BP: 155/95 (10 Dec 2018 17:38) (120/79 - 166/93)  BP(mean): 118 (10 Dec 2018 15:00) (100 - 118)  RR: 18 (10 Dec 2018 17:38) (13 - 26)  SpO2: 97% (10 Dec 2018 17:38) (94% - 98%)  I&O's Detail    09 Dec 2018 07:01  -  10 Dec 2018 07:00  --------------------------------------------------------  IN:    Lactated Ringers IV Bolus: 10 mL    niCARdipine Infusion: 122.5 mL    Oral Fluid: 300 mL    sodium chloride 0.9%: 220 mL  Total IN: 652.5 mL    OUT:    Indwelling Catheter - Urethral: 1400 mL  Total OUT: 1400 mL    Total NET: -747.5 mL      10 Dec 2018 07:01  -  10 Dec 2018 18:47  --------------------------------------------------------  IN:    Oral Fluid: 120 mL    sodium chloride 0.9%: 10 mL    Sodium Chloride 0.9% IV Bolus: 1000 mL  Total IN: 1130 mL    OUT:    Indwelling Catheter - Urethral: 505 mL  Total OUT: 505 mL    Total NET: 625 mL          CHEST TUBE:  No.    BRUNA DRAIN:  No.  EPICARDIAL WIRES: No.  TIE DOWNS: yes  CASILLAS: No.    PHYSICAL EXAM:    General: NAD, sitting up in bed eating dinner with EEG in place    Neurological: AAOx3 currently, moving all extremities w/ 5/5 strength in upper and lower extremities b/l.     Cardiovascular: RRR    Respiratory: CTA b/l, unlabored on room air     Gastrointestinal: +BS b/l, slight tenderness to LUQ    Extremities: WWP, no edema b/l    Vascular: +2 pulses in LE b/l    Incision Sites: groin CDI    LABS:                        11.8   15.1  )-----------( 241      ( 10 Dec 2018 02:18 )             35.8       COUMADIN:  Yes/No. REASON: .    PT/INR - ( 10 Dec 2018 02:18 )   PT: 15.0 sec;   INR: 1.32          PTT - ( 10 Dec 2018 02:18 )  PTT:28.5 sec    12-10    140  |  102  |  18  ----------------------------<  131<H>  4.5   |  21<L>  |  1.44<H>    Ca    9.6      10 Dec 2018 02:17  Phos  4.0     12-10  Mg     2.2     12-10    TPro  7.3  /  Alb  3.5  /  TBili  1.1  /  DBili  x   /  AST  46<H>  /  ALT  40  /  AlkPhos  93  12-10          MEDICATIONS  (STANDING):  hydrALAZINE 25 milliGRAM(s) Oral every 8 hours  labetalol 200 milliGRAM(s) Oral every 6 hours  pantoprazole    Tablet 40 milliGRAM(s) Oral before breakfast  polyethylene glycol 3350 17 Gram(s) Oral daily  sertraline 25 milliGRAM(s) Oral daily  sodium chloride 0.9% lock flush 3 milliLiter(s) IV Push every 8 hours    MEDICATIONS  (PRN):        RADIOLOGY & ADDITIONAL TESTS:

## 2018-12-10 NOTE — PROGRESS NOTE ADULT - ASSESSMENT
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 Sarmiento) who is known to Dr. Garcia for his aortic aneurysm. Presented with syncope, continues to undergo workup for possible seizures, currently with EEG in place. Now s/p TEVAR on 12/7 with Dr. Garcia.     Neuro:   - syncope: appreciate neuro consult, EEG in place. ?seizure activity  - avoid narcotics  - cont zoloft    Cards:  - s/p TEVAR: aggressive BP control, labetalol increased to 200mg q6h, cont hydralazine    - GI: cont PPI, bowel regimen    Renal:  - Creat 1.4, downtrending from 1.5, cont to monitor    Heme/ ID  - WBC elevated at 15, afebrile, monitor     - PT/OT when able  discharge planning pending neuro workup

## 2018-12-10 NOTE — PHYSICAL THERAPY INITIAL EVALUATION ADULT - PERTINENT HX OF CURRENT PROBLEM, REHAB EVAL
Patient is a 77 y/o M with chief c/o syncopal episode in the home while working with home PT. Patient previously hospitalized at [facility] for management of descending aortic aneurysm.

## 2018-12-10 NOTE — PHYSICAL THERAPY INITIAL EVALUATION ADULT - CRITERIA FOR SKILLED THERAPEUTIC INTERVENTIONS
risk reduction/prevention/therapy frequency/anticipated discharge recommendation/predicted duration of therapy intervention/functional limitations in following categories/rehab potential/impairments found/anticipated equipment needs at discharge

## 2018-12-11 LAB
ALBUMIN SERPL ELPH-MCNC: 3.5 G/DL — SIGNIFICANT CHANGE UP (ref 3.3–5)
ALP SERPL-CCNC: 104 U/L — SIGNIFICANT CHANGE UP (ref 40–120)
ALT FLD-CCNC: 38 U/L — SIGNIFICANT CHANGE UP (ref 10–45)
ANION GAP SERPL CALC-SCNC: 14 MMOL/L — SIGNIFICANT CHANGE UP (ref 5–17)
ANION GAP SERPL CALC-SCNC: 18 MMOL/L — HIGH (ref 5–17)
APPEARANCE UR: ABNORMAL
APTT BLD: 26.1 SEC — LOW (ref 27.5–36.3)
APTT BLD: 26.8 SEC — LOW (ref 27.5–36.3)
AST SERPL-CCNC: 28 U/L — SIGNIFICANT CHANGE UP (ref 10–40)
BACTERIA # UR AUTO: PRESENT /HPF
BILIRUB SERPL-MCNC: 1.1 MG/DL — SIGNIFICANT CHANGE UP (ref 0.2–1.2)
BILIRUB UR-MCNC: ABNORMAL
BUN SERPL-MCNC: 29 MG/DL — HIGH (ref 7–23)
BUN SERPL-MCNC: 33 MG/DL — HIGH (ref 7–23)
CALCIUM SERPL-MCNC: 9.6 MG/DL — SIGNIFICANT CHANGE UP (ref 8.4–10.5)
CALCIUM SERPL-MCNC: 9.8 MG/DL — SIGNIFICANT CHANGE UP (ref 8.4–10.5)
CHLORIDE SERPL-SCNC: 102 MMOL/L — SIGNIFICANT CHANGE UP (ref 96–108)
CHLORIDE SERPL-SCNC: 105 MMOL/L — SIGNIFICANT CHANGE UP (ref 96–108)
CO2 SERPL-SCNC: 20 MMOL/L — LOW (ref 22–31)
CO2 SERPL-SCNC: 22 MMOL/L — SIGNIFICANT CHANGE UP (ref 22–31)
COLOR SPEC: YELLOW — SIGNIFICANT CHANGE UP
COMMENT - URINE: SIGNIFICANT CHANGE UP
CREAT SERPL-MCNC: 1.72 MG/DL — HIGH (ref 0.5–1.3)
CREAT SERPL-MCNC: 1.93 MG/DL — HIGH (ref 0.5–1.3)
DIFF PNL FLD: ABNORMAL
EPI CELLS # UR: SIGNIFICANT CHANGE UP /HPF (ref 0–5)
EXTRA BLUE TOP TUBE: SIGNIFICANT CHANGE UP
EXTRA GREEN TOP TUBE: SIGNIFICANT CHANGE UP
EXTRA GREEN TOP TUBE: SIGNIFICANT CHANGE UP
EXTRA LAVENDER TOP TUBE: SIGNIFICANT CHANGE UP
GLUCOSE BLDC GLUCOMTR-MCNC: 141 MG/DL — HIGH (ref 70–99)
GLUCOSE BLDC GLUCOMTR-MCNC: 147 MG/DL — HIGH (ref 70–99)
GLUCOSE BLDC GLUCOMTR-MCNC: 220 MG/DL — HIGH (ref 70–99)
GLUCOSE SERPL-MCNC: 150 MG/DL — HIGH (ref 70–99)
GLUCOSE SERPL-MCNC: 161 MG/DL — HIGH (ref 70–99)
GLUCOSE UR QL: 100
GRAN CASTS # UR COMP ASSIST: ABNORMAL /LPF
HCT VFR BLD CALC: 33.4 % — LOW (ref 39–50)
HCT VFR BLD CALC: 34.8 % — LOW (ref 39–50)
HGB BLD-MCNC: 11.1 G/DL — LOW (ref 13–17)
HGB BLD-MCNC: 11.4 G/DL — LOW (ref 13–17)
INR BLD: 1.28 — HIGH (ref 0.88–1.16)
INR BLD: 1.32 — HIGH (ref 0.88–1.16)
KETONES UR-MCNC: NEGATIVE — SIGNIFICANT CHANGE UP
LACTATE SERPL-SCNC: 1.2 MMOL/L — SIGNIFICANT CHANGE UP (ref 0.5–2)
LEUKOCYTE ESTERASE UR-ACNC: NEGATIVE — SIGNIFICANT CHANGE UP
LYMPHOCYTES # BLD AUTO: 3.3 % — LOW (ref 13–44)
MAGNESIUM SERPL-MCNC: 2.2 MG/DL — SIGNIFICANT CHANGE UP (ref 1.6–2.6)
MAGNESIUM SERPL-MCNC: 2.3 MG/DL — SIGNIFICANT CHANGE UP (ref 1.6–2.6)
MCHC RBC-ENTMCNC: 30.8 PG — SIGNIFICANT CHANGE UP (ref 27–34)
MCHC RBC-ENTMCNC: 31.4 PG — SIGNIFICANT CHANGE UP (ref 27–34)
MCHC RBC-ENTMCNC: 32.8 G/DL — SIGNIFICANT CHANGE UP (ref 32–36)
MCHC RBC-ENTMCNC: 33.2 G/DL — SIGNIFICANT CHANGE UP (ref 32–36)
MCV RBC AUTO: 94.1 FL — SIGNIFICANT CHANGE UP (ref 80–100)
MCV RBC AUTO: 94.4 FL — SIGNIFICANT CHANGE UP (ref 80–100)
MONOCYTES NFR BLD AUTO: 9.3 % — SIGNIFICANT CHANGE UP (ref 2–14)
NEUTROPHILS NFR BLD AUTO: 87.4 % — HIGH (ref 43–77)
NITRITE UR-MCNC: NEGATIVE — SIGNIFICANT CHANGE UP
PH UR: 6 — SIGNIFICANT CHANGE UP (ref 5–8)
PHOSPHATE SERPL-MCNC: 4.7 MG/DL — HIGH (ref 2.5–4.5)
PLATELET # BLD AUTO: 238 K/UL — SIGNIFICANT CHANGE UP (ref 150–400)
PLATELET # BLD AUTO: 243 K/UL — SIGNIFICANT CHANGE UP (ref 150–400)
POTASSIUM SERPL-MCNC: 4.3 MMOL/L — SIGNIFICANT CHANGE UP (ref 3.5–5.3)
POTASSIUM SERPL-MCNC: 4.4 MMOL/L — SIGNIFICANT CHANGE UP (ref 3.5–5.3)
POTASSIUM SERPL-SCNC: 4.3 MMOL/L — SIGNIFICANT CHANGE UP (ref 3.5–5.3)
POTASSIUM SERPL-SCNC: 4.4 MMOL/L — SIGNIFICANT CHANGE UP (ref 3.5–5.3)
PROCALCITONIN SERPL-MCNC: 2.16 NG/ML — HIGH (ref 0.02–0.1)
PROT SERPL-MCNC: 7.2 G/DL — SIGNIFICANT CHANGE UP (ref 6–8.3)
PROT UR-MCNC: 30 MG/DL
PROTHROM AB SERPL-ACNC: 14.6 SEC — HIGH (ref 10–12.9)
PROTHROM AB SERPL-ACNC: 15.1 SEC — HIGH (ref 10–12.9)
RBC # BLD: 3.54 M/UL — LOW (ref 4.2–5.8)
RBC # BLD: 3.7 M/UL — LOW (ref 4.2–5.8)
RBC # FLD: 13.2 % — SIGNIFICANT CHANGE UP (ref 10.3–16.9)
RBC # FLD: 13.3 % — SIGNIFICANT CHANGE UP (ref 10.3–16.9)
RBC CASTS # UR COMP ASSIST: ABNORMAL /HPF
SODIUM SERPL-SCNC: 140 MMOL/L — SIGNIFICANT CHANGE UP (ref 135–145)
SODIUM SERPL-SCNC: 141 MMOL/L — SIGNIFICANT CHANGE UP (ref 135–145)
SP GR SPEC: 1.02 — SIGNIFICANT CHANGE UP (ref 1–1.03)
UROBILINOGEN FLD QL: 1 E.U./DL — SIGNIFICANT CHANGE UP
WBC # BLD: 23.7 K/UL — HIGH (ref 3.8–10.5)
WBC # BLD: 24.2 K/UL — HIGH (ref 3.8–10.5)
WBC # FLD AUTO: 23.7 K/UL — HIGH (ref 3.8–10.5)
WBC # FLD AUTO: 24.2 K/UL — HIGH (ref 3.8–10.5)
WBC UR QL: < 5 /HPF — SIGNIFICANT CHANGE UP

## 2018-12-11 PROCEDURE — 99291 CRITICAL CARE FIRST HOUR: CPT

## 2018-12-11 PROCEDURE — 95951: CPT | Mod: 26

## 2018-12-11 RX ORDER — SERTRALINE 25 MG/1
50 TABLET, FILM COATED ORAL DAILY
Qty: 0 | Refills: 0 | Status: DISCONTINUED | OUTPATIENT
Start: 2018-12-11 | End: 2018-12-13

## 2018-12-11 RX ORDER — FLUDROCORTISONE ACETATE 0.1 MG/1
0.1 TABLET ORAL EVERY 12 HOURS
Qty: 0 | Refills: 0 | Status: DISCONTINUED | OUTPATIENT
Start: 2018-12-11 | End: 2018-12-13

## 2018-12-11 RX ORDER — MIDODRINE HYDROCHLORIDE 2.5 MG/1
5 TABLET ORAL EVERY 8 HOURS
Qty: 0 | Refills: 0 | Status: DISCONTINUED | OUTPATIENT
Start: 2018-12-11 | End: 2018-12-12

## 2018-12-11 RX ORDER — HYDRALAZINE HCL 50 MG
25 TABLET ORAL EVERY 8 HOURS
Qty: 0 | Refills: 0 | Status: DISCONTINUED | OUTPATIENT
Start: 2018-12-11 | End: 2018-12-11

## 2018-12-11 RX ORDER — HEPARIN SODIUM 5000 [USP'U]/ML
5000 INJECTION INTRAVENOUS; SUBCUTANEOUS EVERY 8 HOURS
Qty: 0 | Refills: 0 | Status: DISCONTINUED | OUTPATIENT
Start: 2018-12-11 | End: 2018-12-13

## 2018-12-11 RX ORDER — SENNA PLUS 8.6 MG/1
2 TABLET ORAL AT BEDTIME
Qty: 0 | Refills: 0 | Status: DISCONTINUED | OUTPATIENT
Start: 2018-12-11 | End: 2018-12-13

## 2018-12-11 RX ORDER — PIPERACILLIN AND TAZOBACTAM 4; .5 G/20ML; G/20ML
3.38 INJECTION, POWDER, LYOPHILIZED, FOR SOLUTION INTRAVENOUS EVERY 6 HOURS
Qty: 0 | Refills: 0 | Status: DISCONTINUED | OUTPATIENT
Start: 2018-12-11 | End: 2018-12-12

## 2018-12-11 RX ORDER — DOCUSATE SODIUM 100 MG
100 CAPSULE ORAL
Qty: 0 | Refills: 0 | Status: DISCONTINUED | OUTPATIENT
Start: 2018-12-11 | End: 2018-12-13

## 2018-12-11 RX ORDER — VANCOMYCIN HCL 1 G
1000 VIAL (EA) INTRAVENOUS ONCE
Qty: 0 | Refills: 0 | Status: COMPLETED | OUTPATIENT
Start: 2018-12-11 | End: 2018-12-11

## 2018-12-11 RX ORDER — LABETALOL HCL 100 MG
200 TABLET ORAL EVERY 6 HOURS
Qty: 0 | Refills: 0 | Status: DISCONTINUED | OUTPATIENT
Start: 2018-12-11 | End: 2018-12-11

## 2018-12-11 RX ADMIN — Medication 100 MILLIGRAM(S): at 18:36

## 2018-12-11 RX ADMIN — PIPERACILLIN AND TAZOBACTAM 200 GRAM(S): 4; .5 INJECTION, POWDER, LYOPHILIZED, FOR SOLUTION INTRAVENOUS at 18:35

## 2018-12-11 RX ADMIN — SODIUM CHLORIDE 3 MILLILITER(S): 9 INJECTION INTRAMUSCULAR; INTRAVENOUS; SUBCUTANEOUS at 05:59

## 2018-12-11 RX ADMIN — FLUDROCORTISONE ACETATE 0.1 MILLIGRAM(S): 0.1 TABLET ORAL at 21:23

## 2018-12-11 RX ADMIN — HEPARIN SODIUM 5000 UNIT(S): 5000 INJECTION INTRAVENOUS; SUBCUTANEOUS at 15:09

## 2018-12-11 RX ADMIN — Medication 25 MILLIGRAM(S): at 06:03

## 2018-12-11 RX ADMIN — Medication 4 MILLIGRAM(S): at 15:07

## 2018-12-11 RX ADMIN — SODIUM CHLORIDE 3 MILLILITER(S): 9 INJECTION INTRAMUSCULAR; INTRAVENOUS; SUBCUTANEOUS at 15:00

## 2018-12-11 RX ADMIN — SODIUM CHLORIDE 3 MILLILITER(S): 9 INJECTION INTRAMUSCULAR; INTRAVENOUS; SUBCUTANEOUS at 21:11

## 2018-12-11 RX ADMIN — SERTRALINE 50 MILLIGRAM(S): 25 TABLET, FILM COATED ORAL at 15:06

## 2018-12-11 RX ADMIN — MIDODRINE HYDROCHLORIDE 5 MILLIGRAM(S): 2.5 TABLET ORAL at 18:36

## 2018-12-11 RX ADMIN — PIPERACILLIN AND TAZOBACTAM 200 GRAM(S): 4; .5 INJECTION, POWDER, LYOPHILIZED, FOR SOLUTION INTRAVENOUS at 23:50

## 2018-12-11 RX ADMIN — Medication 200 MILLIGRAM(S): at 06:03

## 2018-12-11 RX ADMIN — PANTOPRAZOLE SODIUM 40 MILLIGRAM(S): 20 TABLET, DELAYED RELEASE ORAL at 07:13

## 2018-12-11 RX ADMIN — SENNA PLUS 2 TABLET(S): 8.6 TABLET ORAL at 21:23

## 2018-12-11 RX ADMIN — MIDODRINE HYDROCHLORIDE 5 MILLIGRAM(S): 2.5 TABLET ORAL at 09:57

## 2018-12-11 RX ADMIN — HEPARIN SODIUM 5000 UNIT(S): 5000 INJECTION INTRAVENOUS; SUBCUTANEOUS at 21:23

## 2018-12-11 RX ADMIN — Medication 250 MILLIGRAM(S): at 18:36

## 2018-12-11 RX ADMIN — FLUDROCORTISONE ACETATE 0.1 MILLIGRAM(S): 0.1 TABLET ORAL at 09:57

## 2018-12-11 RX ADMIN — POLYETHYLENE GLYCOL 3350 17 GRAM(S): 17 POWDER, FOR SOLUTION ORAL at 15:06

## 2018-12-11 NOTE — PROGRESS NOTE ADULT - ASSESSMENT
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 Garcia for aortic aneurysm and was being evaluated for TEVAR.  Prior to planned TEVAR, patient was admitted to [facility] under the care of Dr. Garcia after a syncopal episode at his rehab facility.  EPS and neuro teams consulted prior to procedure with no record of arrhythmia noted on PPM and no evidence of acute CVA on CT head and negative EEG.  On 12/6/18, he was transferred to CTICU for lumbar drain placement and on 12/7 underwent uncomplicated TEVAR.  He arrived to CTICU extubated and hypertensive on cardene. Lumbar drain clamped on 12/9 and removed on 12/10. Post-operative course has been significant for intermittent disorientation with questionable syncope.  On 12/10, he was transferred to floor care and EEG was placed to r/o seizure-like activity.  He is now POD 4, no acute events overnight.     Neurovascular: Lethargic but oriented.  Intermittent syncope with disorientation during this hospital course.   -EEG completed today, no evidence of seizures.  Removed per neurology.   -Dr. Jang, neurology following, continue to appreciate recommendations.   -C/w PRNs for Pain control, avoid narcotics given variable sensorium.  -Monitor neuro status    Respiratory: Saturates well on room air.   -AM CXR stable, repeat in AM  -Encourage IS 10x/hour while awake, Cough and deep breathing exercises  -Monitor respiratory status via SpO2  -Continue to wean NC as tolerated    Cardiovascular: History above.  POD 4 s/p TEVAR, EF 60%.   -Per Dr. Vazquez, permissive HTN for cerebral perfusion.  Hydralazine and Lopressor D/C'd this AM.  Midodrine 5mg Q8h started.   -Fludocortisone resumed.   -Monitor HR/BP/Tele    GI: Tolerating PO  -Prophylaxis: Protonix   -C/w bowel regimen    /Renal:   -BUN/Cr: 29/1.72  -Trend Cr on AM labs  -Replete electrolytes as needed    ID: Afebrile, asymptomatic  -WCC: 23.7, trending up. Continue to trend on AM labs.   -No empiric abx per Dr. Vazquez.  Low threshold to start should patient become febrile or symptomatic.   -Continue to monitor for SIRS/Sepsis syndrome while inpatient    Endo: No acute issues.   -A1C: 5.4  -TSH: 1.081    Heme:   -H/H: 11.1/33.4  -CBC, chem in AM  -DVT ppx: HSQ 5000 u q8h and SCDs    Disposition: Home vs. Rehab pending

## 2018-12-11 NOTE — CHART NOTE - NSCHARTNOTEFT_GEN_A_CORE
Admitting Diagnosis:   Patient is a 78y old  Male who presents with a chief complaint of TEVAR (11 Dec 2018 07:47)      PAST MEDICAL & SURGICAL HISTORY:  Bradycardia  Renal insufficiency  Anxiety  Syncope, unspecified syncope type  Descending aortic aneurysm  Hypertension, unspecified type  H/O cardiac pacemaker: medtronic      Current Nutrition Order:   Diet, NPO:   NPO for Procedure/Test    PO Intake: NPO    GI Issues: No apparent GI distress per RN, last BM 12/9 per EMR    Pain: Per RN, pt w/ abdominal pain     Skin Integrity: No edema, +surgical incision    Labs: POCT: 12/11: 141, 12/10: 118-170, 12/9: 122-136  12-11    141  |  105  |  33<H>  ----------------------------<  161<H>  4.4   |  22  |  1.93<H>    Ca    9.8      11 Dec 2018 12:16  Phos  4.7     12-11  Mg     2.3     12-11    TPro  7.2  /  Alb  3.5  /  TBili  1.1  /  DBili  x   /  AST  28  /  ALT  38  /  AlkPhos  104  12-11    CAPILLARY BLOOD GLUCOSE      POCT Blood Glucose.: 141 mg/dL (11 Dec 2018 08:38)      Medications:  MEDICATIONS  (STANDING):  docusate sodium 100 milliGRAM(s) Oral two times a day  fludroCORTISONE 0.1 milliGRAM(s) Oral every 12 hours  heparin  Injectable 5000 Unit(s) SubCutaneous every 8 hours  midodrine 5 milliGRAM(s) Oral every 8 hours  pantoprazole    Tablet 40 milliGRAM(s) Oral before breakfast  polyethylene glycol 3350 17 Gram(s) Oral daily  senna 2 Tablet(s) Oral at bedtime  sertraline 50 milliGRAM(s) Oral daily  sodium chloride 0.9% lock flush 3 milliLiter(s) IV Push every 8 hours  testosterone patch 4 mG/24 Hr(s) 4 milliGRAM(s) Transdermal daily    MEDICATIONS  (PRN):      Weight:  66.5kg (admit wt)  69.9kg (12/3)    Weight Change: Wt increase noted; Weekly wts    Nutrition Focused Physical Exam: Completed [   ]  Not Pertinent [ x  ]  Muscle Wasting- Temporal [   ]  Clavicle/Pectoral [   ]  Shoulder/Deltoid [   ]  Scapula [   ]  Interosseous [   ]  Quadriceps [   ]  Gastrocnemius [   ]  Fat Wasting- Orbital [   ]  Buccal [   ]  Triceps [   ]  Rib [   ]    Estimated energy needs:   Height 67"; .4# (suspect inaccurate, RN to take new weight); #; 144%IBW  BMI 33.4  Ideal body weight used for calculations as unsure what pt's accurate weight is. Adjusted for TEVAR  20-25kcal/kg= 1342-1677kcal  1.2-1.4gms pro/kg= 80-94gms pro  30-35ml/kg= 2013-2348ml    Subjective:   Pt seen for follow up. 77 yo/male with PMHx HTN, fusiform, aneurysmal dilation of descending aorta, mx syncopal episodes, bradycardia, admitted s/p syncopal episode. Now S/P TEVAR 12/7, S/P central line insertion 12/7, undergoing workup for possible seizures w/ EEG in place, spinal drain now removed. Pt noted w/ bouts of somnolence, S/P CT head w/ no new infarcts or hemorrhage. Pt seen in bed, sleeping, discussed pt with RN. Pt is DASH/TLC diet, per RN pt reports being hungry however is very lethargic. Per last RD note, pt w/ persistent lack of appetite (which is pts baseline), unable to further assess PO intake at this time. Per RN, no apparent GI distress, last BM 12/9 per EMR, pt endorsing abdominal pain to RN. RD to follow up per protocol.    Previous Nutrition Diagnosis:  Increased nutrient needs (specify) RT increased demand for protein intake AEB pre-op for TEVAR    Active [ x  ]  Resolved [   ]    If resolved, new PES:     Goal: Pt will meet % of EER per day with good tolerance    Recommendations:  1. Recommend continue current diet order: DASH/TLC diet  2. Recommend add Ensure Enlive x1/day (providing 350kcal and 20gms protein)  3. Add daily MVI  4. Provide assistance at meal time as able  5. Honor food preferences as requested by pt     Education: RD to reinforce DASH/TLC diet education upon follow up    Risk Level: High [ x  ] Moderate [   ] Low [   ]

## 2018-12-11 NOTE — PROGRESS NOTE ADULT - SUBJECTIVE AND OBJECTIVE BOX
Patient seen and examined   patient doing well   pain controlled   no acute complaints   patient currently getting EEG     Vital Signs Last 24 Hrs  T(C): 37.2 (11 Dec 2018 05:01), Max: 37.8 (11 Dec 2018 01:01)  T(F): 98.9 (11 Dec 2018 05:01), Max: 100.1 (11 Dec 2018 01:01)  HR: 86 (11 Dec 2018 05:00) (60 - 88)  BP: 128/89 (11 Dec 2018 05:00) (120/79 - 166/93)  BP(mean): 102 (11 Dec 2018 05:00) (100 - 118)  RR: 20 (11 Dec 2018 05:00) (17 - 26)  SpO2: 96% (11 Dec 2018 05:00) (94% - 98%)    I&O's Summary    10 Dec 2018 07:01  -  11 Dec 2018 07:00  --------------------------------------------------------  IN: 1330 mL / OUT: 930 mL / NET: 400 mL        Physical Exam:  Gen:  NAD  Pulm:  no respiratory distress  C/V: normal sinus rhythm, normotensive  Abd: soft, NT/ND  : gardiner in place  Groin: bilateral groins soft  Extrem: WWP, non-edematous, no motor-sensory deficits  Pulse exam: palpable DP bilaterally, R PT biphasic, L PT triphasic                          11.1   23.7  )-----------( 243      ( 11 Dec 2018 06:24 )             33.4         12-11    140  |  102  |  29<H>  ----------------------------<  150<H>  4.3   |  20<L>  |  1.72<H>    Ca    9.6      11 Dec 2018 06:24  Phos  4.0     12-10  Mg     2.2     12-11    TPro  7.3  /  Alb  3.5  /  TBili  1.1  /  DBili  x   /  AST  46<H>  /  ALT  40  /  AlkPhos  93  12-10

## 2018-12-11 NOTE — PROGRESS NOTE ADULT - SUBJECTIVE AND OBJECTIVE BOX
Patient discussed on morning rounds with Dr. Panda    Operation / Date: TEVAR on 12/7/18    SUBJECTIVE ASSESSMENT:  78y Male seen at bedside this AM.  He feels tired but denies any other acute complaints at this time.  Denies HA, AMS, CP, palpitations, SOB, cough, hemoptysis, n/v/d, fever.     Vital Signs Last 24 Hrs  T(C): 36.9 (11 Dec 2018 13:30), Max: 37.8 (11 Dec 2018 01:01)  T(F): 98.4 (11 Dec 2018 13:30), Max: 100.1 (11 Dec 2018 01:01)  HR: 82 (11 Dec 2018 14:54) (76 - 88)  BP: 132/91 (11 Dec 2018 14:54) (113/77 - 165/91)  BP(mean): 104 (11 Dec 2018 14:54) (88 - 112)  RR: 17 (11 Dec 2018 14:54) (16 - 20)  SpO2: 96% (11 Dec 2018 14:54) (95% - 98%)  I&O's Detail    10 Dec 2018 07:01  -  11 Dec 2018 07:00  --------------------------------------------------------  IN:    Oral Fluid: 320 mL    sodium chloride 0.9%: 10 mL    Sodium Chloride 0.9% IV Bolus: 1000 mL  Total IN: 1330 mL    OUT:    Indwelling Catheter - Urethral: 930 mL  Total OUT: 930 mL    Total NET: 400 mL      11 Dec 2018 07:01  -  11 Dec 2018 15:44  --------------------------------------------------------  IN:    Oral Fluid: 240 mL  Total IN: 240 mL    OUT:    Indwelling Catheter - Urethral: 300 mL  Total OUT: 300 mL    Total NET: -60 mL    CHEST TUBE:  No.   BRUNA DRAIN:  No.  EPICARDIAL WIRES: No.  TIE DOWNS: No.  CASILLAS: No.     PHYSICAL EXAM:  General: Resting comfortably in bed, no acute distress.   Neurological: AAOx3, no AMS or focal deficits.  Responds appropriately to verbal and physical stimuli.   Cardiovascular: RRR, S1/S2, no m/r/g.   Respiratory: No acute distress, CTA b/l, no w/r/r.   Gastrointestinal: ND, NBS, non-TTP.  Extremities: Warm and well perfused, no calf ttp b/l.  No edema b/l.   Vascular: Pulses 2+ throughout.     LABS:                        11.4   24.2  )-----------( 238      ( 11 Dec 2018 12:16 )             34.8       COUMADIN:  No    PT/INR - ( 11 Dec 2018 12:16 )   PT: 15.1 sec;   INR: 1.32          PTT - ( 11 Dec 2018 12:16 )  PTT:26.8 sec    12-11    141  |  105  |  33<H>  ----------------------------<  161<H>  4.4   |  22  |  1.93<H>    Ca    9.8      11 Dec 2018 12:16  Phos  4.7     12-11  Mg     2.3     12-11    TPro  7.2  /  Alb  3.5  /  TBili  1.1  /  DBili  x   /  AST  28  /  ALT  38  /  AlkPhos  104  12-11    MEDICATIONS  (STANDING):  docusate sodium 100 milliGRAM(s) Oral two times a day  fludroCORTISONE 0.1 milliGRAM(s) Oral every 12 hours  heparin  Injectable 5000 Unit(s) SubCutaneous every 8 hours  midodrine 5 milliGRAM(s) Oral every 8 hours  pantoprazole    Tablet 40 milliGRAM(s) Oral before breakfast  polyethylene glycol 3350 17 Gram(s) Oral daily  senna 2 Tablet(s) Oral at bedtime  sertraline 50 milliGRAM(s) Oral daily  sodium chloride 0.9% lock flush 3 milliLiter(s) IV Push every 8 hours  testosterone patch 4 mG/24 Hr(s) 4 milliGRAM(s) Transdermal daily    MEDICATIONS  (PRN):    RADIOLOGY & ADDITIONAL TESTS:    < from: Xray Chest 1 View- PORTABLE-Routine (12.10.18 @ 03:31) >  EXAM:  XR CHEST PORTABLE ROUTINE 1V                          PROCEDURE DATE:  12/10/2018          INTERPRETATION:    PORTABLE CHEST X-RAY    Indication: Follow Up.    Bedside examination of the chest dated 12/10/2018 3:31 AM is compared to   the previous study of 12/9/2018.    Findings: Patient rotated, limiting study. Dual-lead left-sided permanent   pacemaker and right-sided central line remain in place. Again post   thoracic endovascular aneurysm repair. Small linear atelectasis at both   lung bases. No pleural effusion. Cardiomediastinal silhouette otherwise   unremarkable.    Impression: Small bibasilar linear atelectasis.    < end of copied text >

## 2018-12-11 NOTE — EEG REPORT - NS EEG TEXT BOX
[facility] Department of Neurology  Inpatient Continuous video-Electroencephalography Report    Patient Name:	SARAH JIMÉNEZ    :	1940  MRN:	[id number]    Study Start Date/Time:  12/10/2018, 4:07:00 PM  Study End Date/Time:	18, 3.15:00 PM    Referred by: Maxine Berrios MD    Brief Clinical History:  SARAH JIMÉNEZ is a 78 year old Male.      Diagnosis Code:   R56.9 convulsions/seizure  CPT: 83689 vEEG 12-24 hours    Pertinent Medications on Initiation      n/a    Acquisition Details:  Electroencephalography was acquired using a minimum of 21 channels on an [employer]Picker Neurology system v 8.5.1 with electrode placement according to the standard International 10-20 system following ACNS (American Clinical Neurophysiology Society) guidelines for Long-Term Video EEG monitoring.  Anterior temporal T1 and T2 electrodes were utilized whenever possible.   The XLTEK automated spike & seizure detections were all reviewed in detail, in addition to extensive portions of raw EEG.    Day 1: 12/10/2018 @ 4:07:00 PM to next day,    Background:  continuous, with predominantly alpha    and    theta  frequencies.  Symmetry:  No persistent asymmetries of voltage or frequency.  Posterior Dominant Rhythm: symmetric, well-regulated 7-8 Hz.  Organization: normal anterior-posterior voltage-frequency gradient.  Voltage:  Normal (20+ uV)  Variability: Yes. 						  Reactivity: Yes.  N2 sleep: symmetric, synchronous sleep spindles/K-complexes.  Spontaneous Activity:  No epileptiform discharges.  Periodic/rhythmic activity:  None.  Events:  No electrographic seizures or significant clinical events.  Provocations:  Hyperventilation and Photic stimulation: was not performed.  Daily Summary:    Mild background slowing is a non-specific indicator of cerebral dysfunction.  No epileptiform activity and no events occurred.  Read by:  Lizet Humphries MD                 FINAL Summary:  Abnormal continuous av-EEG study.  1.	Background     slowing  FINAL Clinical Correlation:  There were no findings of active epilepsy, however this alone does not rule out the diagnosis. The    study    is   consistent     with     diffuse    cerebral    dysfunction.     Lizet Humphries MD  Attending Neurologist, Division of Epilepsy

## 2018-12-11 NOTE — PROGRESS NOTE ADULT - ASSESSMENT
A/P: 79 y/o male s/p TEVAR 12/7  - groin soft, hemodynamically stable   - neuro work up pending   - vascular surgery following

## 2018-12-11 NOTE — PROGRESS NOTE ADULT - SUBJECTIVE AND OBJECTIVE BOX
CTICU  CRITICAL  CARE  attending     Hand off received 					   Pertinent clinical, laboratory, radiographic, hemodynamic, echocardiographic, respiratory data, microbiologic data and chart were reviewed and analyzed frequently throughout the course of the day and night  Patient seen and examined with CTS/ SH attending at bedside  Pt is a 78y , Male, HEALTH ISSUES - PROBLEM Dx:  Phlebitis/thrombophlebitis: Phlebitis/thrombophlebitis  Bradycardia: Bradycardia  Renal insufficiency: Renal insufficiency  Hypertension, unspecified type: Hypertension, unspecified type  Syncope, unspecified syncope type: Syncope, unspecified syncope type  Descending aortic aneurysm: Descending aortic aneurysm      , FAMILY HISTORY:  No pertinent family history in first degree relatives  PAST MEDICAL & SURGICAL HISTORY:  Bradycardia  Renal insufficiency  Anxiety  Syncope, unspecified syncope type  Descending aortic aneurysm  Hypertension, unspecified type  H/O cardiac pacemaker: medtronic    Patient is a 78y old  Male who presents with a chief complaint of Syncope s/p TEVAR (11 Dec 2018 15:43)      14 system review was unremarkable  acute changes include acute respiratory failure  Vital signs, hemodynamic and respiratory parameters were reviewed from the bedside nursing flowsheet.  ICU Vital Signs Last 24 Hrs  T(C): 36.9 (11 Dec 2018 13:30), Max: 37.8 (11 Dec 2018 01:01)  T(F): 98.4 (11 Dec 2018 13:30), Max: 100.1 (11 Dec 2018 01:01)  HR: 92 (11 Dec 2018 19:15) (76 - 92)  BP: 131/67 (11 Dec 2018 19:15) (113/77 - 137/86)  BP(mean): 84 (11 Dec 2018 19:15) (84 - 104)  ABP: --  ABP(mean): --  RR: 16 (11 Dec 2018 19:15) (16 - 20)  SpO2: 97% (11 Dec 2018 19:15) (95% - 97%)    Adult Advanced Hemodynamics Last 24 Hrs  CVP(mm Hg): --  CVP(cm H2O): --  CO: --  CI: --  PA: --  PA(mean): --  PCWP: --  SVR: --  SVRI: --  PVR: --  PVRI: --, ABG - ( 10 Dec 2018 02:07 )  pH, Arterial: 7.46  pH, Blood: x     /  pCO2: 32    /  pO2: 75    / HCO3: 22    / Base Excess: -1.1  /  SaO2: 96                  Intake and output was reviewed and the fluid balance was calculated  Daily     Daily   I&O's Summary    10 Dec 2018 07:01  -  11 Dec 2018 07:00  --------------------------------------------------------  IN: 1330 mL / OUT: 930 mL / NET: 400 mL    11 Dec 2018 07:01  -  11 Dec 2018 20:54  --------------------------------------------------------  IN: 360 mL / OUT: 300 mL / NET: 60 mL        All lines and drain sites were assessed  Glycemic trend was reviewedCAPILLARY BLOOD GLUCOSE      POCT Blood Glucose.: 147 mg/dL (11 Dec 2018 17:05)    No acute change in mental status  Auscultation of the chest reveals equal bs  Abdomen is soft  Extremities are warm and well perfused  Wounds appear clean and unremarkable  Antibiotics are periop    labs  CBC Full  -  ( 11 Dec 2018 12:16 )  WBC Count : 24.2 K/uL  Hemoglobin : 11.4 g/dL  Hematocrit : 34.8 %  Platelet Count - Automated : 238 K/uL  Mean Cell Volume : 94.1 fL  Mean Cell Hemoglobin : 30.8 pg  Mean Cell Hemoglobin Concentration : 32.8 g/dL  Auto Neutrophil # : x  Auto Lymphocyte # : x  Auto Monocyte # : x  Auto Eosinophil # : x  Auto Basophil # : x  Auto Neutrophil % : 87.4 %  Auto Lymphocyte % : 3.3 %  Auto Monocyte % : 9.3 %  Auto Eosinophil % : x  Auto Basophil % : x    12-11    141  |  105  |  33<H>  ----------------------------<  161<H>  4.4   |  22  |  1.93<H>    Ca    9.8      11 Dec 2018 12:16  Phos  4.7     12-11  Mg     2.3     12-11    TPro  7.2  /  Alb  3.5  /  TBili  1.1  /  DBili  x   /  AST  28  /  ALT  38  /  AlkPhos  104  12-11    PT/INR - ( 11 Dec 2018 12:16 )   PT: 15.1 sec;   INR: 1.32          PTT - ( 11 Dec 2018 12:16 )  PTT:26.8 sec  The current medications were reviewed   MEDICATIONS  (STANDING):  docusate sodium 100 milliGRAM(s) Oral two times a day  fludroCORTISONE 0.1 milliGRAM(s) Oral every 12 hours  heparin  Injectable 5000 Unit(s) SubCutaneous every 8 hours  midodrine 5 milliGRAM(s) Oral every 8 hours  pantoprazole    Tablet 40 milliGRAM(s) Oral before breakfast  piperacillin/tazobactam IVPB. 3.375 Gram(s) IV Intermittent every 6 hours  polyethylene glycol 3350 17 Gram(s) Oral daily  senna 2 Tablet(s) Oral at bedtime  sertraline 50 milliGRAM(s) Oral daily  sodium chloride 0.9% lock flush 3 milliLiter(s) IV Push every 8 hours  testosterone patch 4 mG/24 Hr(s) 4 milliGRAM(s) Transdermal daily    MEDICATIONS  (PRN):       PROBLEM LIST/ ASSESSMENT:  HEALTH ISSUES - PROBLEM Dx:  Phlebitis/thrombophlebitis: Phlebitis/thrombophlebitis  Bradycardia: Bradycardia  Renal insufficiency: Renal insufficiency  Hypertension, unspecified type: Hypertension, unspecified type  Syncope, unspecified syncope type: Syncope, unspecified syncope type  Descending aortic aneurysm: Descending aortic aneurysm      ,   Patient is a 78y old  Male who presents with a chief complaint of Syncope s/p TEVAR (11 Dec 2018 15:43)     s/p tevar      My plan includes :  close hemodynamic, ventilatory and drain monitoring and management per post op routine    Monitor for arrhythmias and monitor parameters for organ perfusion  monitor neurologic status  Head of the bed should remain elevated to 45 deg .   chest PT and IS will be encouraged  monitor adequacy of oxygenation and ventilation and attempt to wean oxygen  Nutritional goals will be met using po eventually , ensure adequate caloric intake and montior the same  Stress ulcer and VTE prophylaxis will be achieved    Glycemic control is satisfactory  Electrolytes have been repleted as necessary and wound care has been carried out. Pain control has been achieved.   agressive physical therapy and early mobility and ambulation goals will be met   The family was updated about the course and plan  CRITICAL CARE TIME SPENT in evaluation and management, reassessments, review and interpretation of labs and x-rays, ventilator and hemodynamic management, formulating a plan and coordinating care: ___90____ MIN.  Time does not include procedural time.  CTICU ATTENDING     					    Harley Vazquez MD

## 2018-12-11 NOTE — CHART NOTE - NSCHARTNOTEFT_GEN_A_CORE
EPS PA brief note:     Pt was scheduled for tilt table study today as per Neurology team.  He has a pacemaker but has been having syncope. Neuro would like EP to perform a TTT.  However, pt is lethargic, unable to stand up on its own.  Dr. Sellers was at bedside to evaluate.  Decision is to defer TTT for now.

## 2018-12-12 ENCOUNTER — TRANSCRIPTION ENCOUNTER (OUTPATIENT)
Age: 78
End: 2018-12-12

## 2018-12-12 LAB
ANION GAP SERPL CALC-SCNC: 18 MMOL/L — HIGH (ref 5–17)
APTT BLD: 25.8 SEC — LOW (ref 27.5–36.3)
BUN SERPL-MCNC: 36 MG/DL — HIGH (ref 7–23)
CALCIUM SERPL-MCNC: 9.7 MG/DL — SIGNIFICANT CHANGE UP (ref 8.4–10.5)
CHLORIDE SERPL-SCNC: 101 MMOL/L — SIGNIFICANT CHANGE UP (ref 96–108)
CO2 SERPL-SCNC: 21 MMOL/L — LOW (ref 22–31)
CREAT SERPL-MCNC: 1.9 MG/DL — HIGH (ref 0.5–1.3)
GLUCOSE SERPL-MCNC: 161 MG/DL — HIGH (ref 70–99)
HCT VFR BLD CALC: 32.8 % — LOW (ref 39–50)
HGB BLD-MCNC: 11 G/DL — LOW (ref 13–17)
INR BLD: 1.29 — HIGH (ref 0.88–1.16)
MAGNESIUM SERPL-MCNC: 2.6 MG/DL — SIGNIFICANT CHANGE UP (ref 1.6–2.6)
MCHC RBC-ENTMCNC: 31.3 PG — SIGNIFICANT CHANGE UP (ref 27–34)
MCHC RBC-ENTMCNC: 33.5 G/DL — SIGNIFICANT CHANGE UP (ref 32–36)
MCV RBC AUTO: 93.2 FL — SIGNIFICANT CHANGE UP (ref 80–100)
PLATELET # BLD AUTO: 247 K/UL — SIGNIFICANT CHANGE UP (ref 150–400)
POTASSIUM SERPL-MCNC: 3.8 MMOL/L — SIGNIFICANT CHANGE UP (ref 3.5–5.3)
POTASSIUM SERPL-SCNC: 3.8 MMOL/L — SIGNIFICANT CHANGE UP (ref 3.5–5.3)
PROTHROM AB SERPL-ACNC: 14.7 SEC — HIGH (ref 10–12.9)
RBC # BLD: 3.52 M/UL — LOW (ref 4.2–5.8)
RBC # FLD: 13.3 % — SIGNIFICANT CHANGE UP (ref 10.3–16.9)
SODIUM SERPL-SCNC: 140 MMOL/L — SIGNIFICANT CHANGE UP (ref 135–145)
WBC # BLD: 20.1 K/UL — HIGH (ref 3.8–10.5)
WBC # FLD AUTO: 20.1 K/UL — HIGH (ref 3.8–10.5)

## 2018-12-12 PROCEDURE — 71045 X-RAY EXAM CHEST 1 VIEW: CPT | Mod: 26

## 2018-12-12 PROCEDURE — 99232 SBSQ HOSP IP/OBS MODERATE 35: CPT

## 2018-12-12 RX ORDER — SODIUM CHLORIDE 9 MG/ML
1000 INJECTION, SOLUTION INTRAVENOUS ONCE
Qty: 0 | Refills: 0 | Status: COMPLETED | OUTPATIENT
Start: 2018-12-12 | End: 2018-12-12

## 2018-12-12 RX ORDER — LABETALOL HCL 100 MG
10 TABLET ORAL ONCE
Qty: 0 | Refills: 0 | Status: COMPLETED | OUTPATIENT
Start: 2018-12-12 | End: 2018-12-12

## 2018-12-12 RX ORDER — ACETAMINOPHEN 500 MG
1000 TABLET ORAL ONCE
Qty: 0 | Refills: 0 | Status: COMPLETED | OUTPATIENT
Start: 2018-12-12 | End: 2018-12-12

## 2018-12-12 RX ORDER — LEVETIRACETAM 250 MG/1
250 TABLET, FILM COATED ORAL
Qty: 0 | Refills: 0 | Status: DISCONTINUED | OUTPATIENT
Start: 2018-12-12 | End: 2018-12-13

## 2018-12-12 RX ORDER — LABETALOL HCL 100 MG
100 TABLET ORAL THREE TIMES A DAY
Qty: 0 | Refills: 0 | Status: DISCONTINUED | OUTPATIENT
Start: 2018-12-12 | End: 2018-12-13

## 2018-12-12 RX ORDER — LABETALOL HCL 100 MG
5 TABLET ORAL ONCE
Qty: 0 | Refills: 0 | Status: COMPLETED | OUTPATIENT
Start: 2018-12-12 | End: 2018-12-12

## 2018-12-12 RX ORDER — POTASSIUM CHLORIDE 20 MEQ
20 PACKET (EA) ORAL ONCE
Qty: 0 | Refills: 0 | Status: COMPLETED | OUTPATIENT
Start: 2018-12-12 | End: 2018-12-12

## 2018-12-12 RX ORDER — PIPERACILLIN AND TAZOBACTAM 4; .5 G/20ML; G/20ML
2.25 INJECTION, POWDER, LYOPHILIZED, FOR SOLUTION INTRAVENOUS EVERY 6 HOURS
Qty: 0 | Refills: 0 | Status: DISCONTINUED | OUTPATIENT
Start: 2018-12-12 | End: 2018-12-13

## 2018-12-12 RX ADMIN — Medication 20 MILLIEQUIVALENT(S): at 08:55

## 2018-12-12 RX ADMIN — PIPERACILLIN AND TAZOBACTAM 200 GRAM(S): 4; .5 INJECTION, POWDER, LYOPHILIZED, FOR SOLUTION INTRAVENOUS at 12:16

## 2018-12-12 RX ADMIN — Medication 100 MILLIGRAM(S): at 05:58

## 2018-12-12 RX ADMIN — PIPERACILLIN AND TAZOBACTAM 200 GRAM(S): 4; .5 INJECTION, POWDER, LYOPHILIZED, FOR SOLUTION INTRAVENOUS at 18:05

## 2018-12-12 RX ADMIN — SODIUM CHLORIDE 3 MILLILITER(S): 9 INJECTION INTRAMUSCULAR; INTRAVENOUS; SUBCUTANEOUS at 13:58

## 2018-12-12 RX ADMIN — Medication 400 MILLIGRAM(S): at 15:38

## 2018-12-12 RX ADMIN — Medication 100 MILLIGRAM(S): at 22:08

## 2018-12-12 RX ADMIN — SODIUM CHLORIDE 3 MILLILITER(S): 9 INJECTION INTRAMUSCULAR; INTRAVENOUS; SUBCUTANEOUS at 05:57

## 2018-12-12 RX ADMIN — Medication 4 MILLIGRAM(S): at 06:17

## 2018-12-12 RX ADMIN — FLUDROCORTISONE ACETATE 0.1 MILLIGRAM(S): 0.1 TABLET ORAL at 22:08

## 2018-12-12 RX ADMIN — Medication 1000 MILLIGRAM(S): at 17:00

## 2018-12-12 RX ADMIN — POLYETHYLENE GLYCOL 3350 17 GRAM(S): 17 POWDER, FOR SOLUTION ORAL at 12:17

## 2018-12-12 RX ADMIN — PANTOPRAZOLE SODIUM 40 MILLIGRAM(S): 20 TABLET, DELAYED RELEASE ORAL at 06:17

## 2018-12-12 RX ADMIN — HEPARIN SODIUM 5000 UNIT(S): 5000 INJECTION INTRAVENOUS; SUBCUTANEOUS at 13:58

## 2018-12-12 RX ADMIN — LEVETIRACETAM 250 MILLIGRAM(S): 250 TABLET, FILM COATED ORAL at 18:14

## 2018-12-12 RX ADMIN — SODIUM CHLORIDE 1000 MILLILITER(S): 9 INJECTION, SOLUTION INTRAVENOUS at 13:58

## 2018-12-12 RX ADMIN — SERTRALINE 50 MILLIGRAM(S): 25 TABLET, FILM COATED ORAL at 12:17

## 2018-12-12 RX ADMIN — Medication 10 MILLIGRAM(S): at 18:04

## 2018-12-12 RX ADMIN — HEPARIN SODIUM 5000 UNIT(S): 5000 INJECTION INTRAVENOUS; SUBCUTANEOUS at 22:08

## 2018-12-12 RX ADMIN — FLUDROCORTISONE ACETATE 0.1 MILLIGRAM(S): 0.1 TABLET ORAL at 08:55

## 2018-12-12 RX ADMIN — HEPARIN SODIUM 5000 UNIT(S): 5000 INJECTION INTRAVENOUS; SUBCUTANEOUS at 05:58

## 2018-12-12 RX ADMIN — PIPERACILLIN AND TAZOBACTAM 200 GRAM(S): 4; .5 INJECTION, POWDER, LYOPHILIZED, FOR SOLUTION INTRAVENOUS at 05:58

## 2018-12-12 RX ADMIN — SENNA PLUS 2 TABLET(S): 8.6 TABLET ORAL at 22:08

## 2018-12-12 RX ADMIN — MIDODRINE HYDROCHLORIDE 5 MILLIGRAM(S): 2.5 TABLET ORAL at 01:01

## 2018-12-12 RX ADMIN — SODIUM CHLORIDE 3 MILLILITER(S): 9 INJECTION INTRAMUSCULAR; INTRAVENOUS; SUBCUTANEOUS at 22:09

## 2018-12-12 RX ADMIN — Medication 5 MILLIGRAM(S): at 14:12

## 2018-12-12 NOTE — CONSULT NOTE ADULT - ATTENDING COMMENTS
Patient seen and examined with PA.  Agree with above.  vEEG read - background slowing   Discussed case with Dr. Martin who requested that since patient has multiple similar events, he would like patient started on Keppra 250mg BID.  Patient to follow up with Dr. Martin as outpatient.

## 2018-12-12 NOTE — DISCHARGE NOTE ADULT - PATIENT PORTAL LINK FT
You can access the [email or web address] Patient Portal, offered by [facility], by registering with the following website: [email or web address]

## 2018-12-12 NOTE — DISCHARGE NOTE ADULT - MEDICATION SUMMARY - MEDICATIONS TO STOP TAKING
I will STOP taking the medications listed below when I get home from the hospital:    fludrocortisone 0.1 mg oral tablet  -- 1 tab(s) by mouth every 12 hours x 5 days (End date: 11/27/18). Then 1 tab PO QD (start date 11/28/18)    hydrALAZINE 25 mg oral tablet  -- 1 tab(s) by mouth 2 times a day, As Needed for systolic BP > 180

## 2018-12-12 NOTE — DISCHARGE NOTE ADULT - MEDICATION SUMMARY - MEDICATIONS TO TAKE
I will START or STAY ON the medications listed below when I get home from the hospital:    fludrocortisone 0.1 mg oral tablet  -- 1 tab(s) by mouth once a day  -- Indication: For Steroid    aspirin 81 mg oral delayed release tablet  -- 1 tab(s) by mouth once a day  -- Indication: For S/p TEVAR    levETIRAcetam 500 mg oral tablet, extended release  -- 0.5 tab(s) by mouth once a day  -- Indication: For ANti-seizure    sertraline 50 mg oral tablet  -- 1 tab(s) by mouth once a day  -- Indication: For ANtidepressant    labetalol 100 mg oral tablet  -- 1 tab(s) by mouth every 12 hours, As Needed for systolic BP > 180  -- Indication: For Blood pressure, AS NEEDED    docusate sodium 100 mg oral capsule  -- 1 cap(s) by mouth 2 times a day- hold for loose stool  -- Indication: For Stool softener- hold for loose stool    senna oral tablet  -- 2 tab(s) by mouth once a day (at bedtime)- hold for loose stool  -- Indication: For laxative- hold for loose stool    polyethylene glycol 3350 oral powder for reconstitution  -- 17 gram(s) by mouth once a day, As Needed for constipation  -- Indication: For As needed for constipation    Augmentin 500 mg-125 mg oral tablet  -- 1 tab(s) by mouth every 12 hours x 5 days  -- Indication: For ANtibiotic x 5 days    pantoprazole 40 mg oral delayed release tablet  -- 1 tab(s) by mouth once a day (before a meal)  -- Indication: For Stomach protection    testosterone 4 mg/24 hr transdermal film, extended release  -- 1 patch by transdermal patch once a day x 10 days  -- Indication: For ANdrogen patch

## 2018-12-12 NOTE — PROGRESS NOTE ADULT - SUBJECTIVE AND OBJECTIVE BOX
Patient seen and examined   patient doing well   pain controlled   no acute complaints   patient remains lethargic in chair   neuro workup negative so far       Vital Signs Last 24 Hrs  T(C): 36.4 (12 Dec 2018 09:49), Max: 37.3 (11 Dec 2018 11:44)  T(F): 97.6 (12 Dec 2018 09:49), Max: 99.2 (11 Dec 2018 11:44)  HR: 70 (12 Dec 2018 08:59) (70 - 92)  BP: 164/99 (12 Dec 2018 08:59) (118/88 - 169/100)  BP(mean): 122 (12 Dec 2018 08:59) (84 - 122)  RR: 16 (12 Dec 2018 08:59) (16 - 18)  SpO2: 95% (12 Dec 2018 08:59) (92% - 97%)      Physical Exam:  Gen:  NAD  Pulm:  no respiratory distress  C/V: normal sinus rhythm, normotensive  Abd: soft, NT/ND  : gardiner in place  Groin: bilateral groins soft  Extrem: WWP, non-edematous, no motor-sensory deficits  Pulse exam: palpable DP bilaterally, R PT biphasic, L PT triphasic                                  11.0   20.1  )-----------( 247      ( 12 Dec 2018 06:43 )             32.8     12-12    140  |  101  |  36<H>  ----------------------------<  161<H>  3.8   |  21<L>  |  1.90<H>    Ca    9.7      12 Dec 2018 06:43  Phos  4.7     12-11  Mg     2.6     12-12    TPro  7.2  /  Alb  3.5  /  TBili  1.1  /  DBili  x   /  AST  28  /  ALT  38  /  AlkPhos  104  12-11

## 2018-12-12 NOTE — DISCHARGE NOTE ADULT - CARE PROVIDERS DIRECT ADDRESSES
[email or web address]

## 2018-12-12 NOTE — DISCHARGE NOTE ADULT - CARE PROVIDER_API CALL
Mitchell Garcia), Surgery; Thoracic and Cardiac Surgery  [address]  [address] [place] [address]  Phone: [phone]  Fax: [phone]    Kwame Martin), Neurology; Vascular Neurology  [address] [address]  Phone: [phone]  Fax: [phone]    Suzy Sellers), Cardiac Electrophysiology; Cardiovascular Disease; Internal Medicine  [address] [place] [address]  Phone: [phone]  Fax: [phone]

## 2018-12-12 NOTE — DISCHARGE NOTE ADULT - HOSPITAL COURSE
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 Garcia for aortic aneurysm and was being evaluated for TEVAR.  Prior to planned TEVAR, patient was admitted to [facility] under the care of Dr. Garcia after a syncopal episode at his rehab facility.  EPS and neuro teams consulted prior to procedure with no record of arrhythmia noted on PPM and no evidence of acute CVA on CT head and negative EEG.  On 12/6/18, he was transferred to CTICU for lumbar drain placement and on 12/7 underwent uncomplicated TEVAR.  He arrived to CTICU extubated and hypertensive on cardene. Lumbar drain clamped on 12/9 and removed on 12/10. Post-operative course has been significant for intermittent disorientation with questionable syncope.  On 12/10/POD3, he was transferred to floor care and EEG was placed to r/o seizure-like activity.  POD4, started on midodrine and antihypertensives on hold. Pan cx 2/2 WBC w/ left shift, cx resulted as negative and abx de-escalated.  EEG with background slowing, started on Keppra BID. POD5, midodrine d/c 2/2 hypertension and restarted on labetolol 100mg TID.  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 Garcia for aortic aneurysm and was being evaluated for TEVAR.  Prior to planned TEVAR, patient was admitted to [facility] under the care of Dr. Garcia after a syncopal episode at his rehab facility.  EPS and neuro teams consulted prior to procedure with no record of arrhythmia noted on PPM and no evidence of acute CVA on CT head and negative EEG.  On 12/6/18, he was transferred to CTICU for lumbar drain placement and on 12/7 underwent uncomplicated TEVAR.  He arrived to CTICU extubated and hypertensive on cardene. Lumbar drain clamped on 12/9 and removed on 12/10. Post-operative course has been significant for intermittent disorientation with questionable syncope.  On 12/10/POD3, he was transferred to floor care and EEG was placed to r/o seizure-like activity.  POD4, started on midodrine and antihypertensives on hold. Pan cx 2/2 WBC w/ left shift, cx resulted as negative and abx de-escalated.  EEG with background slowing, started on Keppra BID. POD5, midodrine d/c 2/2 hypertension and restarted on labetolol 100mg TID.  Today POD6, patient is ambulating with PT, using IS pulling 750cc, tolerating PO diet, +BM this AM. WBC trending down. Per Dr. Panda patient is ready for discharge. BUN/Cr 40/1.94, given 1L 1/2 NS per Dr. Vazquez. Per Dr. Vazquez patient d/c on Labetolol 100mg BID PRN Systolic BP > 180, Keppra 250mg QD, Augmentin 500mg q12 x 5 days, fludrocortisone 0.1mg qd, Androgen patch q24 x 7 days, Zoloft 50mg daily.  Patient being discharged back to [facility].

## 2018-12-12 NOTE — DISCHARGE NOTE ADULT - CARE PLAN
Principal Discharge DX:	Descending aortic aneurysm  Goal:	To recover from surgery  Assessment and plan of treatment:	-Please follow up with Dr. Garcia on 12/26/18 at 11:00am.  The office is located at [facility], 4th floor. Call us with any questions, #[phone].  -Please follow up with Dr. Martin (neurologist on 12/21/18 at 3:30pm. Office information is listed below.  -Please follow up with Dr. Sellers (Electrophysiologist) on 1/3/18 at 11:30am. Office information is listed below.    -Walk daily as tolerated and use your incentive spirometer every hour.    -No driving or strenuous activity/exercise for 6 weeks, or until cleared by your surgeon.    -You may shower.  Be sure to gently clean your incisions with anti-bacterial soap and water daily, pat dry.  You may leave them open to air.    -Call your doctor if you have shortness of breath, chest pain not relieved by pain medication, dizziness, fever >101.5, or increased redness or drainage from incisions.

## 2018-12-12 NOTE — PROGRESS NOTE ADULT - SUBJECTIVE AND OBJECTIVE BOX
Patient discussed on morning rounds with Dr. Garcia    Operation / Date: 18 TEVAR, EF Nl    SUBJECTIVE ASSESSMENT:  78y Male seen and examined. Patient reports feel tired, but no acute complaints. He denies fever, chest pain, palpitaitons, SOB, abdominal pain, n/v, dizziness, lightheadedness, or weakness. He did not ambulate yesterday, he is using IS, tolerating PO diet, last BM was 2 days. He knows he needs to ambulate today.        Vital Signs Last 24 Hrs  T(C): 36.4 (12 Dec 2018 09:49), Max: 37.3 (11 Dec 2018 11:44)  T(F): 97.6 (12 Dec 2018 09:49), Max: 99.2 (11 Dec 2018 11:44)  HR: 70 (12 Dec 2018 08:59) (70 - 92)  BP: 164/99 (12 Dec 2018 08:59) (118/88 - 169/100)  BP(mean): 122 (12 Dec 2018 08:59) (84 - 122)  RR: 16 (12 Dec 2018 08:59) (16 - 18)  SpO2: 95% (12 Dec 2018 08:59) (92% - 97%)  I&O's Detail    11 Dec 2018 07:01  -  12 Dec 2018 07:00  --------------------------------------------------------  IN:    IV PiggyBack: 200 mL    Oral Fluid: 360 mL  Total IN: 560 mL    OUT:    Indwelling Catheter - Urethral: 1410 mL  Total OUT: 1410 mL    Total NET: -850 mL      12 Dec 2018 07:01  -  12 Dec 2018 09:59  --------------------------------------------------------  IN:    Oral Fluid: 120 mL  Total IN: 120 mL    OUT:    Indwelling Catheter - Urethral: 100 mL  Total OUT: 100 mL    Total NET: 20 mL          CHEST TUBE:  No  BRUNA DRAIN:  No.  EPICARDIAL WIRES: No.  TIE DOWNS: No.  CASILLAS: Yes    PHYSICAL EXAM:  General: Patient lying comfortably in bed, no acute distress     Neurological: Alert and oriented x 3.  No focal neurological deficits b/l.  Responding appropriately.      Cardiovascular: S1S2, RRR, no murmurs appreciated on exam     Respiratory: Clear to ausculation bilaterally, no wheeze/rhonchi/rales    Gastrointestinal: + BS, soft, non tender, non distended     Extremities: Warm and well perfused. No edema, no calf tenderness     Vascular: 2+ Peripheral pulses b/l     Incision Sites: b/l groin: CDI, no signs of infection/dehiscence.     LABS:                        11.0   20.1  )-----------( 247      ( 12 Dec 2018 06:43 )             32.8       COUMADIN:  No    PT/INR - ( 12 Dec 2018 06:43 )   PT: 14.7 sec;   INR: 1.29          PTT - ( 12 Dec 2018 06:43 )  PTT:25.8 sec        140  |  101  |  36<H>  ----------------------------<  161<H>  3.8   |  21<L>  |  1.90<H>    Ca    9.7      12 Dec 2018 06:43  Phos  4.7     12-  Mg     2.6     12-    TPro  7.2  /  Alb  3.5  /  TBili  1.1  /  DBili  x   /  AST  28  /  ALT  38  /  AlkPhos  104  12-11      Urinalysis Basic - ( 11 Dec 2018 20:09 )    Color: Yellow / Appearance: SL Cloudy / S.025 / pH: x  Gluc: x / Ketone: NEGATIVE  / Bili: Small / Urobili: 1.0 E.U./dL   Blood: x / Protein: 30 mg/dL / Nitrite: NEGATIVE   Leuk Esterase: NEGATIVE / RBC: 5-10 /HPF / WBC < 5 /HPF   Sq Epi: x / Non Sq Epi: 0-5 /HPF / Bacteria: Present /HPF        MEDICATIONS  (STANDING):  docusate sodium 100 milliGRAM(s) Oral two times a day  fludroCORTISONE 0.1 milliGRAM(s) Oral every 12 hours  heparin  Injectable 5000 Unit(s) SubCutaneous every 8 hours  midodrine 5 milliGRAM(s) Oral every 8 hours  pantoprazole    Tablet 40 milliGRAM(s) Oral before breakfast  piperacillin/tazobactam IVPB. 3.375 Gram(s) IV Intermittent every 6 hours  polyethylene glycol 3350 17 Gram(s) Oral daily  senna 2 Tablet(s) Oral at bedtime  sertraline 50 milliGRAM(s) Oral daily  sodium chloride 0.9% lock flush 3 milliLiter(s) IV Push every 8 hours  testosterone patch 4 mG/24 Hr(s) 4 milliGRAM(s) Transdermal daily    MEDICATIONS  (PRN):        RADIOLOGY & ADDITIONAL TESTS:

## 2018-12-12 NOTE — CONSULT NOTE ADULT - SUBJECTIVE AND OBJECTIVE BOX
Neurology Stroke Consult Note    HPI  77 y/o male PMH HTN , CRI (1.65) ,fusiform aneurysmal dilation of descending aorta measuring 5.5cm , syncope, bradycardia, h/o Medtronic PPM (followed by Dr. Sarmiento) who is known to Dr. Garcia previously hospitalized at [facility] for management of descending aortic aneurysm. 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 He describes that he passed out for a short time after feeling very warm. Denies hitting his head, denies chest pain or back pain. Went to [facility] and subsequently was transferred to [facility] for observation.    Neurology consulted for syncope.     Pt states he is somewhat sleepy, knows he is in the hospital, indicates that his leg feels weak.     MEDICATIONS  (STANDING):  acetaminophen  IVPB .. 1000 milliGRAM(s) IV Intermittent once  docusate sodium 100 milliGRAM(s) Oral two times a day  fludroCORTISONE 0.1 milliGRAM(s) Oral every 12 hours  heparin  Injectable 5000 Unit(s) SubCutaneous every 8 hours  labetalol Injectable 5 milliGRAM(s) IV Push once  lactated ringers Bolus 1000 milliLiter(s) IV Bolus once  levETIRAcetam 250 milliGRAM(s) Oral two times a day  pantoprazole    Tablet 40 milliGRAM(s) Oral before breakfast  piperacillin/tazobactam IVPB. 2.25 Gram(s) IV Intermittent every 6 hours  polyethylene glycol 3350 17 Gram(s) Oral daily  senna 2 Tablet(s) Oral at bedtime  sertraline 50 milliGRAM(s) Oral daily  sodium chloride 0.9% lock flush 3 milliLiter(s) IV Push every 8 hours  testosterone patch 4 mG/24 Hr(s) 4 milliGRAM(s) Transdermal daily    MEDICATIONS  (PRN):      Allergies    No Known Allergies        ROS: As per HPI, otherwise negative    Vital Signs Last 24 Hrs  T(C): 36.4 (12 Dec 2018 09:49), Max: 36.8 (12 Dec 2018 01:01)  T(F): 97.6 (12 Dec 2018 09:49), Max: 98.3 (12 Dec 2018 01:01)  HR: 72 (12 Dec 2018 12:41) (70 - 92)  BP: 179/98 (12 Dec 2018 12:41) (118/88 - 179/98)  BP(mean): 148 (12 Dec 2018 12:41) (84 - 148)  RR: 16 (12 Dec 2018 12:41) (16 - 18)  SpO2: 96% (12 Dec 2018 12:41) (92% - 97%)    Physical exam:  General: awake and alert, sitting comfortably, no acute distress  CV: RRR, no murmurs  Pulm: CTA bilaterally  Neurologic:  Mental status: sleepy but awakens easily, oriented to person and place. speech is fluent. Follows commands. Attention/concentration intact. No dysarthria, no aphasia.  Cranial nerves:   II: visual fields are full to confrontation. pupils equally round and reactive to light,   III, IV, VI: EOMI without nystagmus  V:  V1-V3 sensation intact,   VII: no facial droop, facie is symmetric with normal eye closure and smile  VIII: hearing is intact to finger rub  IX, X: Uvula is midline and soft palate rises symmetrically  XI: head turning and shoulder shrug are intact.  XII: tongue midline  Motor: Normal bulk and tone, strength 4+/5 in b/l UE and LE,  strength 5/5. No pronator drift  Sensation: intact to light touch  Coordination: No dysmetria on finger-to-nose  Reflexes: 2+ in upper and lower extremities, downgoing toes bilaterally  Gait: deferred        LABS:                        11.0   20.1  )-----------( 247      ( 12 Dec 2018 06:43 )             32.8     12-12    140  |  101  |  36<H>  ----------------------------<  161<H>  3.8   |  21<L>  |  1.90<H>    Ca    9.7      12 Dec 2018 06:43  Phos  4.7     12-11  Mg     2.6     12-12    TPro  7.2  /  Alb  3.5  /  TBili  1.1  /  DBili  x   /  AST  28  /  ALT  38  /  AlkPhos  104  12-11    PT/INR - ( 12 Dec 2018 06:43 )   PT: 14.7 sec;   INR: 1.29          PTT - ( 12 Dec 2018 06:43 )  PTT:25.8 sec  Urinalysis Basic - ( 11 Dec 2018 20:09 )    Color: Yellow / Appearance: SL Cloudy / S.025 / pH: x  Gluc: x / Ketone: NEGATIVE  / Bili: Small / Urobili: 1.0 E.U./dL   Blood: x / Protein: 30 mg/dL / Nitrite: NEGATIVE   Leuk Esterase: NEGATIVE / RBC: 5-10 /HPF / WBC < 5 /HPF   Sq Epi: x / Non Sq Epi: 0-5 /HPF / Bacteria: Present /HPF        RADIOLOGY & ADDITIONAL TESTS:  < from: CT Head No Cont (12.10.18 @ 13:26) >  IMPRESSION: No intracranial hemorrhage or acute transcortical infarct.   Stable exam.    < end of copied text >    < from: CT Head No Cont (18 @ 01:10) >  IMPRESSION: No acute intracranial findings. Findings consistent with   significant microvascular ischemic white matter disease. 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    < end of copied text >      Assessment and Plan  77 y/o male PMH HTN , CRI (1.65) ,fusiform aneurysmal dilation of descending aorta measuring 5.5cm , syncope, bradycardia, h/o Medtronic PPM, admitted to [facility] for syncope    -EEG with diffuse background slowing  -Start Keppra 250 BID    DVT prophylaxis   -Heparin SQ TID and SCDs

## 2018-12-12 NOTE — PROGRESS NOTE ADULT - ASSESSMENT
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 Garcia for aortic aneurysm and was being evaluated for TEVAR.  Prior to planned TEVAR, patient was admitted to [facility] under the care of Dr. Garcia after a syncopal episode at his rehab facility.  EPS and neuro teams consulted prior to procedure with no record of arrhythmia noted on PPM and no evidence of acute CVA on CT head and negative EEG.  On 12/6/18, he was transferred to CTICU for lumbar drain placement and on 12/7 underwent uncomplicated TEVAR.  He arrived to CTICU extubated and hypertensive on cardene. Lumbar drain clamped on 12/9 and removed on 12/10. Post-operative course has been significant for intermittent disorientation with questionable syncope.  On 12/10, he was transferred to floor care and EEG was placed to r/o seizure-like activity.  EEG negative. He is now POD 5, no acute events overnight.     Neurovascular: Lethargic but oriented.  Intermittent syncope with disorientation during this hospital course.   -EEG completed yesterday, no evidence of seizures.  Removed per neurology.   -Dr. Jang, neurology following, continue to appreciate recommendations.   -C/w PRNs for Pain control, avoid narcotics given variable sensorium.  -Monitor neuro status    Respiratory: Saturates well on room air.   -AM CXR stable, repeat in AM  -Encourage IS 10x/hour while awake, Cough and deep breathing exercises  -Monitor respiratory status via SpO2  -Continue to wean NC as tolerated    Cardiovascular: History above.  POD 5 s/p TEVAR, EF 60%.   -Permissive HTN for cerebral perfusion.  Hydralazine and Lopressor D/C'd yesterday.  Midodrine 5mg Q8h started yesterday, continue and monitor BP, may need to decrease dose/d/c. Monitor BP, Goal 150.  -Fludocortisone resumed.   -Monitor HR/BP/Tele    GI: Tolerating PO  -Prophylaxis: Protonix   -C/w bowel regimen    /Renal:   -BUN/Cr: 36/1.96 (Baseline 1.6)  -Trend Cr on AM labs  -Replete electrolytes as needed  -Haresh removed this AM, f/u TOV    ID: Afebrile, asymptomatic  -WBC 20 from 23  -No empiric abx_IV abx started yestrday, f/u cx, will continue to trend WBC, and desecalate if needed  -Continue to monitor for SIRS/Sepsis syndrome while inpatient    Endo: No acute issues.   -A1C: 5.4  -TSH: 1.081    Heme:   -H/H: 11/32  -CBC, chem in AM  -DVT ppx: HSQ 5000 u q8h and SCDs    Disposition:   Home vs. 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 Garcia for aortic aneurysm and was being evaluated for TEVAR.  Prior to planned TEVAR, patient was admitted to [facility] under the care of Dr. Garcia after a syncopal episode at his rehab facility.  EPS and neuro teams consulted prior to procedure with no record of arrhythmia noted on PPM and no evidence of acute CVA on CT head and negative EEG.  On 12/6/18, he was transferred to CTICU for lumbar drain placement and on 12/7 underwent uncomplicated TEVAR.  He arrived to CTICU extubated and hypertensive on cardene. Lumbar drain clamped on 12/9 and removed on 12/10. Post-operative course has been significant for intermittent disorientation with questionable syncope.  On 12/10, he was transferred to floor care and EEG was placed to r/o seizure-like activity.  EEG negative. He is now POD 5, no acute events overnight.     Neurovascular: Lethargic but oriented.  Intermittent syncope with disorientation during this hospital course.   -EEG completed yesterday, no evidence of seizures.  Removed per neurology.   -Dr. Jang, neurology following, continue to appreciate recommendations.   -C/w PRNs for Pain control, avoid narcotics given variable sensorium.  -Monitor neuro status    Respiratory: Saturates well on room air.   -AM CXR stable, repeat in AM  -Encourage IS 10x/hour while awake, Cough and deep breathing exercises  -Monitor respiratory status via SpO2  -Continue to wean NC as tolerated    Cardiovascular: History above.  POD 5 s/p TEVAR, EF 60%.   -Permissive HTN for cerebral perfusion.  Hydralazine and Lopressor D/C'd yesterday.  Midodrine 5mg Q8h,  at 13:00, evaluated by Dr. Vazquez, repeat BP was 160, d/c midodrine per Dr. Vazquez, continue to monitor, BP goal 160.   -Fludocortisone resumed.   -Monitor HR/BP/Tele    GI: Tolerating PO  -Prophylaxis: Protonix   -C/w bowel regimen    /Renal:   -BUN/Cr: 36/1.96 (Baseline 1.6)  -Trend Cr on AM labs  -Replete electrolytes as needed  -Hutson removed this AM, f/u TOV  -1 L LR per Dr. Vazquez    ID: Afebrile, asymptomatic  -WBC 20 from 23  -Per Dr. Vazquez, d/c vanc, continue zosyn 2.25mg IV q 6  -Blood cx negative, f/u Urine cx.   -Continue to monitor for SIRS/Sepsis syndrome while inpatient    Endo: No acute issues.   -A1C: 5.4  -TSH: 1.081    Heme:   -H/H: 11/32  -CBC, chem in AM  -DVT ppx: HSQ 5000 u q8h and SCDs    Disposition:   Home vs. Rehab pending

## 2018-12-12 NOTE — DISCHARGE NOTE ADULT - PLAN OF CARE
To recover from surgery -Please follow up with Dr. Garcia on 12/26/18 at 11:00am.  The office is located at [facility], [facility], 4th floor. Call us with any questions, #[phone].  -Please follow up with Dr. Martin (neurologist on 12/21/18 at 3:30pm. Office information is listed below.  -Please follow up with Dr. Sellers (Electrophysiologist) on 1/3/18 at 11:30am. Office information is listed below.    -Walk daily as tolerated and use your incentive spirometer every hour.    -No driving or strenuous activity/exercise for 6 weeks, or until cleared by your surgeon.    -You may shower.  Be sure to gently clean your incisions with anti-bacterial soap and water daily, pat dry.  You may leave them open to air.    -Call your doctor if you have shortness of breath, chest pain not relieved by pain medication, dizziness, fever >101.5, or increased redness or drainage from incisions.

## 2018-12-12 NOTE — PROGRESS NOTE ADULT - ASSESSMENT
A/P: 79 y/o male s/p TEVAR 12/7  - care per CT surgery   - re consult vascular with any new / acute issues

## 2018-12-13 VITALS
DIASTOLIC BLOOD PRESSURE: 106 MMHG | HEART RATE: 78 BPM | SYSTOLIC BLOOD PRESSURE: 166 MMHG | RESPIRATION RATE: 418 BRPM | OXYGEN SATURATION: 95 %

## 2018-12-13 LAB
ANION GAP SERPL CALC-SCNC: 19 MMOL/L — HIGH (ref 5–17)
BUN SERPL-MCNC: 40 MG/DL — HIGH (ref 7–23)
CALCIUM SERPL-MCNC: 10.2 MG/DL — SIGNIFICANT CHANGE UP (ref 8.4–10.5)
CHLORIDE SERPL-SCNC: 105 MMOL/L — SIGNIFICANT CHANGE UP (ref 96–108)
CO2 SERPL-SCNC: 21 MMOL/L — LOW (ref 22–31)
CREAT SERPL-MCNC: 1.94 MG/DL — HIGH (ref 0.5–1.3)
GLUCOSE SERPL-MCNC: 116 MG/DL — HIGH (ref 70–99)
HCT VFR BLD CALC: 36.7 % — LOW (ref 39–50)
HGB BLD-MCNC: 11.8 G/DL — LOW (ref 13–17)
MAGNESIUM SERPL-MCNC: 2.6 MG/DL — SIGNIFICANT CHANGE UP (ref 1.6–2.6)
MCHC RBC-ENTMCNC: 30.6 PG — SIGNIFICANT CHANGE UP (ref 27–34)
MCHC RBC-ENTMCNC: 32.2 G/DL — SIGNIFICANT CHANGE UP (ref 32–36)
MCV RBC AUTO: 95.1 FL — SIGNIFICANT CHANGE UP (ref 80–100)
PLATELET # BLD AUTO: 274 K/UL — SIGNIFICANT CHANGE UP (ref 150–400)
POTASSIUM SERPL-MCNC: 4.5 MMOL/L — SIGNIFICANT CHANGE UP (ref 3.5–5.3)
POTASSIUM SERPL-SCNC: 4.5 MMOL/L — SIGNIFICANT CHANGE UP (ref 3.5–5.3)
RBC # BLD: 3.86 M/UL — LOW (ref 4.2–5.8)
RBC # FLD: 13.7 % — SIGNIFICANT CHANGE UP (ref 10.3–16.9)
SODIUM SERPL-SCNC: 145 MMOL/L — SIGNIFICANT CHANGE UP (ref 135–145)
WBC # BLD: 17 K/UL — HIGH (ref 3.8–10.5)
WBC # FLD AUTO: 17 K/UL — HIGH (ref 3.8–10.5)

## 2018-12-13 PROCEDURE — 83880 ASSAY OF NATRIURETIC PEPTIDE: CPT

## 2018-12-13 PROCEDURE — C1760: CPT

## 2018-12-13 PROCEDURE — 86923 COMPATIBILITY TEST ELECTRIC: CPT

## 2018-12-13 PROCEDURE — 95951: CPT

## 2018-12-13 PROCEDURE — 86850 RBC ANTIBODY SCREEN: CPT

## 2018-12-13 PROCEDURE — 70450 CT HEAD/BRAIN W/O DYE: CPT

## 2018-12-13 PROCEDURE — 86900 BLOOD TYPING SEROLOGIC ABO: CPT

## 2018-12-13 PROCEDURE — C1889: CPT

## 2018-12-13 PROCEDURE — C1769: CPT

## 2018-12-13 PROCEDURE — 85025 COMPLETE CBC W/AUTO DIFF WBC: CPT

## 2018-12-13 PROCEDURE — 80076 HEPATIC FUNCTION PANEL: CPT

## 2018-12-13 PROCEDURE — C1887: CPT

## 2018-12-13 PROCEDURE — 94150 VITAL CAPACITY TEST: CPT

## 2018-12-13 PROCEDURE — 82803 BLOOD GASES ANY COMBINATION: CPT

## 2018-12-13 PROCEDURE — 82962 GLUCOSE BLOOD TEST: CPT

## 2018-12-13 PROCEDURE — 86901 BLOOD TYPING SEROLOGIC RH(D): CPT

## 2018-12-13 PROCEDURE — 84132 ASSAY OF SERUM POTASSIUM: CPT

## 2018-12-13 PROCEDURE — 85027 COMPLETE CBC AUTOMATED: CPT

## 2018-12-13 PROCEDURE — 85730 THROMBOPLASTIN TIME PARTIAL: CPT

## 2018-12-13 PROCEDURE — 97116 GAIT TRAINING THERAPY: CPT

## 2018-12-13 PROCEDURE — 99232 SBSQ HOSP IP/OBS MODERATE 35: CPT

## 2018-12-13 PROCEDURE — C1894: CPT

## 2018-12-13 PROCEDURE — 80061 LIPID PANEL: CPT

## 2018-12-13 PROCEDURE — 80053 COMPREHEN METABOLIC PANEL: CPT

## 2018-12-13 PROCEDURE — 82330 ASSAY OF CALCIUM: CPT

## 2018-12-13 PROCEDURE — C9248: CPT

## 2018-12-13 PROCEDURE — 84484 ASSAY OF TROPONIN QUANT: CPT

## 2018-12-13 PROCEDURE — 81001 URINALYSIS AUTO W/SCOPE: CPT

## 2018-12-13 PROCEDURE — 83036 HEMOGLOBIN GLYCOSYLATED A1C: CPT

## 2018-12-13 PROCEDURE — 83735 ASSAY OF MAGNESIUM: CPT

## 2018-12-13 PROCEDURE — 72100 X-RAY EXAM L-S SPINE 2/3 VWS: CPT

## 2018-12-13 PROCEDURE — 84443 ASSAY THYROID STIM HORMONE: CPT

## 2018-12-13 PROCEDURE — 84295 ASSAY OF SERUM SODIUM: CPT

## 2018-12-13 PROCEDURE — 85610 PROTHROMBIN TIME: CPT

## 2018-12-13 PROCEDURE — 84436 ASSAY OF TOTAL THYROXINE: CPT

## 2018-12-13 PROCEDURE — 83605 ASSAY OF LACTIC ACID: CPT

## 2018-12-13 PROCEDURE — 76000 FLUOROSCOPY <1 HR PHYS/QHP: CPT

## 2018-12-13 PROCEDURE — 84100 ASSAY OF PHOSPHORUS: CPT

## 2018-12-13 PROCEDURE — C1874: CPT

## 2018-12-13 PROCEDURE — 81003 URINALYSIS AUTO W/O SCOPE: CPT

## 2018-12-13 PROCEDURE — 84145 PROCALCITONIN (PCT): CPT

## 2018-12-13 PROCEDURE — 71045 X-RAY EXAM CHEST 1 VIEW: CPT

## 2018-12-13 PROCEDURE — 80048 BASIC METABOLIC PNL TOTAL CA: CPT

## 2018-12-13 PROCEDURE — 36415 COLL VENOUS BLD VENIPUNCTURE: CPT

## 2018-12-13 PROCEDURE — 87040 BLOOD CULTURE FOR BACTERIA: CPT

## 2018-12-13 PROCEDURE — 93005 ELECTROCARDIOGRAM TRACING: CPT

## 2018-12-13 RX ORDER — ASPIRIN/CALCIUM CARB/MAGNESIUM 324 MG
81 TABLET ORAL DAILY
Qty: 0 | Refills: 0 | Status: DISCONTINUED | OUTPATIENT
Start: 2018-12-13 | End: 2018-12-13

## 2018-12-13 RX ORDER — SODIUM CHLORIDE 9 MG/ML
1000 INJECTION, SOLUTION INTRAVENOUS
Qty: 0 | Refills: 0 | Status: DISCONTINUED | OUTPATIENT
Start: 2018-12-13 | End: 2018-12-13

## 2018-12-13 RX ORDER — FLUDROCORTISONE ACETATE 0.1 MG/1
1 TABLET ORAL
Qty: 0 | Refills: 0 | DISCHARGE
Start: 2018-12-13

## 2018-12-13 RX ORDER — ASPIRIN/CALCIUM CARB/MAGNESIUM 324 MG
1 TABLET ORAL
Qty: 0 | Refills: 0 | DISCHARGE
Start: 2018-12-13

## 2018-12-13 RX ORDER — SENNA PLUS 8.6 MG/1
2 TABLET ORAL
Qty: 0 | Refills: 0 | DISCHARGE
Start: 2018-12-13

## 2018-12-13 RX ORDER — SERTRALINE 25 MG/1
1 TABLET, FILM COATED ORAL
Qty: 0 | Refills: 0 | DISCHARGE
Start: 2018-12-13

## 2018-12-13 RX ORDER — HYDRALAZINE HCL 50 MG
1 TABLET ORAL
Qty: 0 | Refills: 0 | COMMUNITY

## 2018-12-13 RX ORDER — POLYETHYLENE GLYCOL 3350 17 G/17G
17 POWDER, FOR SOLUTION ORAL
Qty: 0 | Refills: 0 | DISCHARGE
Start: 2018-12-13

## 2018-12-13 RX ORDER — LABETALOL HCL 100 MG
1 TABLET ORAL
Qty: 0 | Refills: 0 | DISCHARGE
Start: 2018-12-13

## 2018-12-13 RX ORDER — PANTOPRAZOLE SODIUM 20 MG/1
1 TABLET, DELAYED RELEASE ORAL
Qty: 0 | Refills: 0 | DISCHARGE
Start: 2018-12-13

## 2018-12-13 RX ORDER — DOCUSATE SODIUM 100 MG
1 CAPSULE ORAL
Qty: 0 | Refills: 0 | DISCHARGE
Start: 2018-12-13

## 2018-12-13 RX ADMIN — FLUDROCORTISONE ACETATE 0.1 MILLIGRAM(S): 0.1 TABLET ORAL at 09:01

## 2018-12-13 RX ADMIN — Medication 4 MILLIGRAM(S): at 12:25

## 2018-12-13 RX ADMIN — SODIUM CHLORIDE 1000 MILLILITER(S): 9 INJECTION, SOLUTION INTRAVENOUS at 12:19

## 2018-12-13 RX ADMIN — SODIUM CHLORIDE 3 MILLILITER(S): 9 INJECTION INTRAMUSCULAR; INTRAVENOUS; SUBCUTANEOUS at 05:51

## 2018-12-13 RX ADMIN — SERTRALINE 50 MILLIGRAM(S): 25 TABLET, FILM COATED ORAL at 12:20

## 2018-12-13 RX ADMIN — HEPARIN SODIUM 5000 UNIT(S): 5000 INJECTION INTRAVENOUS; SUBCUTANEOUS at 06:00

## 2018-12-13 RX ADMIN — LEVETIRACETAM 250 MILLIGRAM(S): 250 TABLET, FILM COATED ORAL at 06:00

## 2018-12-13 RX ADMIN — PIPERACILLIN AND TAZOBACTAM 200 GRAM(S): 4; .5 INJECTION, POWDER, LYOPHILIZED, FOR SOLUTION INTRAVENOUS at 00:51

## 2018-12-13 RX ADMIN — PIPERACILLIN AND TAZOBACTAM 200 GRAM(S): 4; .5 INJECTION, POWDER, LYOPHILIZED, FOR SOLUTION INTRAVENOUS at 12:19

## 2018-12-13 RX ADMIN — Medication 100 MILLIGRAM(S): at 06:01

## 2018-12-13 RX ADMIN — Medication 81 MILLIGRAM(S): at 12:30

## 2018-12-13 RX ADMIN — PIPERACILLIN AND TAZOBACTAM 200 GRAM(S): 4; .5 INJECTION, POWDER, LYOPHILIZED, FOR SOLUTION INTRAVENOUS at 06:01

## 2018-12-13 RX ADMIN — SODIUM CHLORIDE 3 MILLILITER(S): 9 INJECTION INTRAMUSCULAR; INTRAVENOUS; SUBCUTANEOUS at 12:00

## 2018-12-13 RX ADMIN — PANTOPRAZOLE SODIUM 40 MILLIGRAM(S): 20 TABLET, DELAYED RELEASE ORAL at 06:00

## 2018-12-13 RX ADMIN — Medication 100 MILLIGRAM(S): at 06:00

## 2018-12-13 NOTE — PROGRESS NOTE ADULT - SUBJECTIVE AND OBJECTIVE BOX
Neurology Stroke Follow Up     Interval History:  Patient seen and examined.    Medications:  MEDICATIONS  (STANDING):  aspirin enteric coated 81 milliGRAM(s) Oral daily  docusate sodium 100 milliGRAM(s) Oral two times a day  fludroCORTISONE 0.1 milliGRAM(s) Oral every 12 hours  heparin  Injectable 5000 Unit(s) SubCutaneous every 8 hours  labetalol 100 milliGRAM(s) Oral three times a day  levETIRAcetam 250 milliGRAM(s) Oral two times a day  pantoprazole    Tablet 40 milliGRAM(s) Oral before breakfast  piperacillin/tazobactam IVPB. 2.25 Gram(s) IV Intermittent every 6 hours  polyethylene glycol 3350 17 Gram(s) Oral daily  senna 2 Tablet(s) Oral at bedtime  sertraline 50 milliGRAM(s) Oral daily  sodium chloride 0.45%. 1000 milliLiter(s) (1000 mL/Hr) IV Continuous <Continuous>  sodium chloride 0.9% lock flush 3 milliLiter(s) IV Push every 8 hours  testosterone patch 4 mG/24 Hr(s) 4 milliGRAM(s) Transdermal daily    Allergies  No Known Allergies    Exam:  Vital Signs Last 24 Hrs  T(C): 36.4 (13 Dec 2018 14:08), Max: 37.1 (13 Dec 2018 01:01)  T(F): 97.6 (13 Dec 2018 14:08), Max: 98.7 (13 Dec 2018 01:01)  HR: 78 (13 Dec 2018 16:10) (64 - 79)  BP: 166/106 (13 Dec 2018 16:10) (137/92 - 175/105)  BP(mean): 118 (13 Dec 2018 16:10) (106 - 129)  RR: 418 (13 Dec 2018 16:10) (16 - 418)  SpO2: 95% (13 Dec 2018 16:10) (93% - 96%)    Physical exam:  General: sitting in chair, NAD  CV: RRR  Eyes: anicteric sclera  Extremities: 2+ radial pulses bilaterally  Neurologic:  Mental status: awake, alert, oriented to person and place. speech is fluent. Follows commands. Attention/concentration intact. No dysarthria, no aphasia.  Cranial nerves:   II: visual fields are full to confrontation. pupils equally round and reactive to light,   III, IV, VI: EOMI without nystagmus  V:  V1-V3 sensation intact,   VII: no facial droop, face is symmetric with normal eye closure and smile  XII: tongue midline  Motor: Normal bulk and tone, strength at least 4+/5 in b/l UE and LE,  strength 5/5. No pronator drift  Sensation: intact to light touch  Coordination: No dysmetria on finger-to-nose  Reflexes: downgoing toes bilaterally  Gait: deferred    Labs:                        11.8   17.0  )-----------( 274      ( 13 Dec 2018 06:03 )             36.7         145  |  105  |  40<H>  ----------------------------<  116<H>  4.5   |  21<L>  |  1.94<H>    Ca    10.2      13 Dec 2018 06:03  Mg     2.6         PT/INR - ( 12 Dec 2018 06:43 )   PT: 14.7 sec;   INR: 1.29     PTT - ( 12 Dec 2018 06:43 )  PTT:25.8 sec    Urinalysis Basic - ( 11 Dec 2018 20:09 )    Color: Yellow / Appearance: SL Cloudy / S.025 / pH: x  Gluc: x / Ketone: NEGATIVE  / Bili: Small / Urobili: 1.0 E.U./dL   Blood: x / Protein: 30 mg/dL / Nitrite: NEGATIVE   Leuk Esterase: NEGATIVE / RBC: 5-10 /HPF / WBC < 5 /HPF   Sq Epi: x / Non Sq Epi: 0-5 /HPF / Bacteria: Present /HPF      Hemoglobin A1C, Whole Blood: 5.4 % ( @ 00:15)  Cholesterol, Serum: 129 mg/dL ( @ 00:15)  HDL Cholesterol, Serum: 47 mg/dL ( @ 00:15)  Triglycerides, Serum: 109 mg/dL ( @ 00:15)  LDL 60    Assessment and Plan  79 y/o male PMH HTN , CRI (1.65) ,fusiform aneurysmal dilation of descending aorta measuring 5.5cm , syncope, bradycardia, h/o Medtronic PPM, admitted to [facility] for syncope.    - Since multiple episodes of similar symptoms, started Keppra yesterday.  Contacted by CT Surgery team regarding her dose given renal disease - Agreed to decrease dose to 250mg qday extended release.

## 2018-12-13 NOTE — PROGRESS NOTE ADULT - ASSESSMENT
-Please follow up with Dr. Garcia on 12/26/18 at 11:00am.  The office is located at [facility], [facility], 4th floor. Call us with any questions, #[phone].  -Please follow up with Dr. Martin (neurologist on 12/21/18 at 3:30pm. Office information is listed below.  -Please follow up with Dr. Sellers (Electrophysiologist) on 1/3/18 at 11:30am. Office information is listed below.  -Walk daily as tolerated and use your incentive spirometer every hour.  -No driving or strenuous activity/exercise for 6 weeks, or until cleared by your surgeon.  -You may shower.  Be sure to gently clean your incisions with anti-bacterial soap and water daily, pat dry.  You may leave them open to air.  -Call your doctor if you have shortness of breath, chest pain not relieved by pain medication, dizziness, fever >101.5, or increased redness or drainage from incisions.

## 2018-12-13 NOTE — PROGRESS NOTE ADULT - SUBJECTIVE AND OBJECTIVE BOX
Patient discussed on morning rounds with Dr. Panda/Dr. Vazquez    Operation / Date: 18 TEVAR, EF Nl    Surgeon: Dr. Garcia    Referring Physician: Dr. Sellers    SUBJECTIVE ASSESSMENT:  78y Male seen and examined. Patient feels well, he reports feeling tired, he is ambulating, using IS pulling 750cc, tolerating po diet, + BM this AM. Denies fever, chest pain, palpitations, SOB, abdominal pain, n/v, weakness, lightheadedness, AMS, dizziness.     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 Garcia for aortic aneurysm and was being evaluated for TEVAR.  Prior to planned TEVAR, patient was admitted to [facility] under the care of Dr. Garcia after a syncopal episode at his rehab facility.  EPS and neuro teams consulted prior to procedure with no record of arrhythmia noted on PPM and no evidence of acute CVA on CT head and negative EEG.  On 18, he was transferred to CTICU for lumbar drain placement and on  underwent uncomplicated TEVAR.  He arrived to CTICU extubated and hypertensive on cardene. Lumbar drain clamped on  and removed on 12/10. Post-operative course has been significant for intermittent disorientation with questionable syncope.  On 12/10/POD3, he was transferred to floor care and EEG was placed to r/o seizure-like activity.  POD4, started on midodrine and antihypertensives on hold. Pan cx 2/2 WBC w/ left shift, cx resulted as negative and abx de-escalated.  EEG with background slowing, started on Keppra BID. POD5, midodrine d/c 2/2 hypertension and restarted on labetolol 100mg TID.  Today POD6, patient is ambulating with PT, using IS pulling 750cc, tolerating PO diet, +BM this AM. WBC trending down. Per Dr. Panda patient is ready for discharge. BUN/Cr 40/1.94, given 1L 1/2 NS per Dr. Vazquez. Per Dr. Vazquez patient d/c on Labetolol 100mg BID PRN Systolic BP > 180, Keppra 250mg QD, Augmentin 500mg q12 x 5 days, fludrocortisone 0.1mg qd, Androgen patch q24 x 7 days, Zoloft 50mg daily.  Patient being discharged back to [facility].       Vital Signs Last 24 Hrs  T(C): 35.8 (13 Dec 2018 10:20), Max: 37.1 (13 Dec 2018 01:01)  T(F): 96.5 (13 Dec 2018 10:20), Max: 98.7 (13 Dec 2018 01:01)  HR: 66 (13 Dec 2018 12:06) (64 - 79)  BP: 144/98 (13 Dec 2018 12:06) (137/92 - 203/108)  BP(mean): 114 (13 Dec 2018 12:06) (106 - 148)  RR: 18 (13 Dec 2018 12:06) (16 - 19)  SpO2: 94% (13 Dec 2018 12:06) (93% - 97%)  I&O's Detail    12 Dec 2018 07:01  -  13 Dec 2018 07:00  --------------------------------------------------------  IN:    IV PiggyBack: 100 mL    Oral Fluid: 830 mL  Total IN: 930 mL    OUT:    Indwelling Catheter - Urethral: 100 mL    Stool: 1 mL    Voided: 1150 mL  Total OUT: 1251 mL    Total NET: -321 mL      PHYSICAL EXAM:    General: Patient lying comfortably in bed, no acute distress     Neurological: Alert and oriented x 3.  No focal neurological deficits b/l.  Responding appropriately.      Cardiovascular: S1S2, RRR, no murmurs appreciated on exam     Respiratory: Clear to ausculation bilaterally, no wheeze/rhonchi/rales    Gastrointestinal: + BS, soft, non tender, non distended     Extremities: Warm and well perfused. No edema, no calf tenderness     Vascular: 2+ Peripheral pulses b/l     Incision Sites: b/l groin: CDI, no signs of infection/dehiscence.     LABS:                        11.8   17.0  )-----------( 274      ( 13 Dec 2018 06:03 )             36.7       COUMADIN:  No    PT/INR - ( 12 Dec 2018 06:43 )   PT: 14.7 sec;   INR: 1.29          PTT - ( 12 Dec 2018 06:43 )  PTT:25.8 sec        145  |  105  |  40<H>  ----------------------------<  116<H>  4.5   |  21<L>  |  1.94<H>    Ca    10.2      13 Dec 2018 06:03  Mg     2.6             Urinalysis Basic - ( 11 Dec 2018 20:09 )    Color: Yellow / Appearance: SL Cloudy / S.025 / pH: x  Gluc: x / Ketone: NEGATIVE  / Bili: Small / Urobili: 1.0 E.U./dL   Blood: x / Protein: 30 mg/dL / Nitrite: NEGATIVE   Leuk Esterase: NEGATIVE / RBC: 5-10 /HPF / WBC < 5 /HPF   Sq Epi: x / Non Sq Epi: 0-5 /HPF / Bacteria: Present /HPF        MEDICATIONS  (STANDING):  fludrocortisone 0.1 mg oral tablet  -- 1 tab(s) by mouth once a day  -- Indication: For Steroid    aspirin 81 mg oral delayed release tablet  -- 1 tab(s) by mouth once a day  -- Indication: For S/p TEVAR    levETIRAcetam 500 mg oral tablet, extended release  -- 0.5 tab(s) by mouth once a day  -- Indication: For ANti-seizure    sertraline 50 mg oral tablet  -- 1 tab(s) by mouth once a day  -- Indication: For ANtidepressant    labetalol 100 mg oral tablet  -- 1 tab(s) by mouth every 12 hours, As Needed for systolic BP > 180  -- Indication: For Blood pressure, AS NEEDED    docusate sodium 100 mg oral capsule  -- 1 cap(s) by mouth 2 times a day- hold for loose stool  -- Indication: For Stool softener- hold for loose stool    senna oral tablet  -- 2 tab(s) by mouth once a day (at bedtime)- hold for loose stool  -- Indication: For laxative- hold for loose stool    polyethylene glycol 3350 oral powder for reconstitution  -- 17 gram(s) by mouth once a day, As Needed for constipation  -- Indication: For As needed for constipation    Augmentin 500 mg-125 mg oral tablet  -- 1 tab(s) by mouth every 12 hours x 5 days  -- Indication: For ANtibiotic x 5 days    pantoprazole 40 mg oral delayed release tablet  -- 1 tab(s) by mouth once a day (before a meal)  -- Indication: For Stomach protection    testosterone 4 mg/24 hr transdermal film, extended release  -- 1 patch by transdermal patch once a day x 10 days  -- Indication: For ANdrogen patch.         Discharge CXR:  < from: Xray Chest 1 View-PORTABLE IMMEDIATE (18 @ 19:09) >  Normal heart size. Dual-lead left chest wall pacemaker in place. There is   again a stent within the descending thoracic aorta. The lungs are clear.   No pleural effusions or pneumothorax.    IMPRESSION:  No interval change.    < end of copied text >

## 2018-12-14 PROCEDURE — 82553 CREATINE MB FRACTION: CPT

## 2018-12-14 PROCEDURE — 71045 X-RAY EXAM CHEST 1 VIEW: CPT

## 2018-12-14 PROCEDURE — 80048 BASIC METABOLIC PNL TOTAL CA: CPT

## 2018-12-14 PROCEDURE — 83735 ASSAY OF MAGNESIUM: CPT

## 2018-12-14 PROCEDURE — 84100 ASSAY OF PHOSPHORUS: CPT

## 2018-12-14 PROCEDURE — 93005 ELECTROCARDIOGRAM TRACING: CPT

## 2018-12-14 PROCEDURE — 84443 ASSAY THYROID STIM HORMONE: CPT

## 2018-12-14 PROCEDURE — 97116 GAIT TRAINING THERAPY: CPT

## 2018-12-14 PROCEDURE — 85610 PROTHROMBIN TIME: CPT

## 2018-12-14 PROCEDURE — 97161 PT EVAL LOW COMPLEX 20 MIN: CPT

## 2018-12-14 PROCEDURE — 94640 AIRWAY INHALATION TREATMENT: CPT

## 2018-12-14 PROCEDURE — 86850 RBC ANTIBODY SCREEN: CPT

## 2018-12-14 PROCEDURE — 93880 EXTRACRANIAL BILAT STUDY: CPT

## 2018-12-14 PROCEDURE — 71046 X-RAY EXAM CHEST 2 VIEWS: CPT

## 2018-12-14 PROCEDURE — C1898: CPT

## 2018-12-14 PROCEDURE — 85730 THROMBOPLASTIN TIME PARTIAL: CPT

## 2018-12-14 PROCEDURE — 85027 COMPLETE CBC AUTOMATED: CPT

## 2018-12-14 PROCEDURE — 82330 ASSAY OF CALCIUM: CPT

## 2018-12-14 PROCEDURE — 82550 ASSAY OF CK (CPK): CPT

## 2018-12-14 PROCEDURE — C1785: CPT

## 2018-12-14 PROCEDURE — 82803 BLOOD GASES ANY COMBINATION: CPT

## 2018-12-14 PROCEDURE — 84295 ASSAY OF SERUM SODIUM: CPT

## 2018-12-14 PROCEDURE — 83036 HEMOGLOBIN GLYCOSYLATED A1C: CPT

## 2018-12-14 PROCEDURE — 84484 ASSAY OF TROPONIN QUANT: CPT

## 2018-12-14 PROCEDURE — 80053 COMPREHEN METABOLIC PANEL: CPT

## 2018-12-14 PROCEDURE — 80061 LIPID PANEL: CPT

## 2018-12-14 PROCEDURE — 70450 CT HEAD/BRAIN W/O DYE: CPT

## 2018-12-14 PROCEDURE — 85025 COMPLETE CBC W/AUTO DIFF WBC: CPT

## 2018-12-14 PROCEDURE — C1894: CPT

## 2018-12-14 PROCEDURE — 94150 VITAL CAPACITY TEST: CPT

## 2018-12-14 PROCEDURE — 71250 CT THORAX DX C-: CPT

## 2018-12-14 PROCEDURE — P9045: CPT

## 2018-12-14 PROCEDURE — 82962 GLUCOSE BLOOD TEST: CPT

## 2018-12-14 PROCEDURE — 81001 URINALYSIS AUTO W/SCOPE: CPT

## 2018-12-14 PROCEDURE — 86900 BLOOD TYPING SEROLOGIC ABO: CPT

## 2018-12-14 PROCEDURE — 93306 TTE W/DOPPLER COMPLETE: CPT

## 2018-12-14 PROCEDURE — 83880 ASSAY OF NATRIURETIC PEPTIDE: CPT

## 2018-12-14 PROCEDURE — 83605 ASSAY OF LACTIC ACID: CPT

## 2018-12-14 PROCEDURE — 86901 BLOOD TYPING SEROLOGIC RH(D): CPT

## 2018-12-14 PROCEDURE — 84132 ASSAY OF SERUM POTASSIUM: CPT

## 2018-12-14 PROCEDURE — 36415 COLL VENOUS BLD VENIPUNCTURE: CPT

## 2018-12-14 PROCEDURE — 87040 BLOOD CULTURE FOR BACTERIA: CPT

## 2018-12-16 LAB
CULTURE RESULTS: SIGNIFICANT CHANGE UP
CULTURE RESULTS: SIGNIFICANT CHANGE UP
SPECIMEN SOURCE: SIGNIFICANT CHANGE UP
SPECIMEN SOURCE: SIGNIFICANT CHANGE UP

## 2018-12-17 ENCOUNTER — INBOUND DOCUMENT (OUTPATIENT)
Age: 78
End: 2018-12-17

## 2018-12-17 DIAGNOSIS — I12.9 HYPERTENSIVE CHRONIC KIDNEY DISEASE WITH STAGE 1 THROUGH STAGE 4 CHRONIC KIDNEY DISEASE, OR UNSPECIFIED CHRONIC KIDNEY DISEASE: ICD-10-CM

## 2018-12-17 DIAGNOSIS — N18.9 CHRONIC KIDNEY DISEASE, UNSPECIFIED: ICD-10-CM

## 2018-12-17 DIAGNOSIS — R55 SYNCOPE AND COLLAPSE: ICD-10-CM

## 2018-12-17 DIAGNOSIS — R00.1 BRADYCARDIA, UNSPECIFIED: ICD-10-CM

## 2018-12-17 DIAGNOSIS — Z28.21 IMMUNIZATION NOT CARRIED OUT BECAUSE OF PATIENT REFUSAL: ICD-10-CM

## 2018-12-17 DIAGNOSIS — Z95.0 PRESENCE OF CARDIAC PACEMAKER: ICD-10-CM

## 2018-12-17 DIAGNOSIS — I80.9 PHLEBITIS AND THROMBOPHLEBITIS OF UNSPECIFIED SITE: ICD-10-CM

## 2018-12-17 DIAGNOSIS — I71.9 AORTIC ANEURYSM OF UNSPECIFIED SITE, WITHOUT RUPTURE: ICD-10-CM

## 2018-12-17 DIAGNOSIS — I71.2 THORACIC AORTIC ANEURYSM, WITHOUT RUPTURE: ICD-10-CM

## 2018-12-21 ENCOUNTER — APPOINTMENT (OUTPATIENT)
Dept: NEUROLOGY | Facility: CLINIC | Age: 78
End: 2018-12-21
Payer: SELF-PAY

## 2018-12-21 VITALS — OXYGEN SATURATION: 98 % | DIASTOLIC BLOOD PRESSURE: 87 MMHG | HEART RATE: 75 BPM | SYSTOLIC BLOOD PRESSURE: 126 MMHG

## 2018-12-21 DIAGNOSIS — R94.01 ABNORMAL ELECTROENCEPHALOGRAM [EEG]: ICD-10-CM

## 2018-12-21 PROCEDURE — 99214 OFFICE O/P EST MOD 30 MIN: CPT

## 2018-12-24 PROBLEM — Z09 POSTOP CHECK: Status: ACTIVE | Noted: 2018-12-24

## 2018-12-24 PROBLEM — Z98.890 S/P AORTIC ANEURYSM REPAIR: Status: ACTIVE | Noted: 2018-12-24

## 2018-12-24 PROBLEM — Z98.890 H/O THORACIC AORTIC ANEURYSM REPAIR: Status: RESOLVED | Noted: 2018-12-24 | Resolved: 2018-12-24

## 2018-12-24 PROBLEM — Z95.828 HISTORY OF ENDOVASCULAR STENT GRAFT FOR ABDOMINAL AORTIC ANEURYSM (AAA): Status: RESOLVED | Noted: 2018-12-24 | Resolved: 2018-12-24

## 2018-12-24 RX ORDER — ASPIRIN ENTERIC COATED TABLETS 81 MG 81 MG/1
81 TABLET, DELAYED RELEASE ORAL DAILY
Qty: 30 | Refills: 3 | Status: ACTIVE | COMMUNITY

## 2018-12-24 RX ORDER — LEVETIRACETAM 500 MG/1
500 TABLET, FILM COATED ORAL
Refills: 0 | Status: ACTIVE | COMMUNITY

## 2018-12-24 RX ORDER — LABETALOL HYDROCHLORIDE 200 MG/1
200 TABLET, FILM COATED ORAL
Refills: 0 | Status: ACTIVE | COMMUNITY

## 2018-12-24 RX ORDER — TESTOSTERONE 40.5 MG/2.5G
40.5 MG/2.5GM GEL TOPICAL
Refills: 0 | Status: ACTIVE | COMMUNITY

## 2018-12-24 RX ORDER — FLUDROCORTISONE ACETATE 0.1 MG/1
0.1 TABLET ORAL
Refills: 0 | Status: ACTIVE | COMMUNITY

## 2018-12-24 RX ORDER — SERTRALINE HYDROCHLORIDE 50 MG/1
50 TABLET, FILM COATED ORAL DAILY
Refills: 0 | Status: ACTIVE | COMMUNITY

## 2018-12-24 RX ORDER — PANTOPRAZOLE 40 MG/1
40 TABLET, DELAYED RELEASE ORAL DAILY
Qty: 30 | Refills: 3 | Status: ACTIVE | COMMUNITY

## 2018-12-25 ENCOUNTER — FORM ENCOUNTER (OUTPATIENT)
Age: 78
End: 2018-12-25

## 2018-12-26 ENCOUNTER — APPOINTMENT (OUTPATIENT)
Dept: CARDIOTHORACIC SURGERY | Facility: CLINIC | Age: 78
End: 2018-12-26
Payer: MEDICARE

## 2018-12-26 ENCOUNTER — OUTPATIENT (OUTPATIENT)
Dept: OUTPATIENT SERVICES | Facility: HOSPITAL | Age: 78
LOS: 1 days | End: 2018-12-26
Payer: SELF-PAY

## 2018-12-26 VITALS
RESPIRATION RATE: 18 BRPM | TEMPERATURE: 97.3 F | SYSTOLIC BLOOD PRESSURE: 127 MMHG | OXYGEN SATURATION: 97 % | HEART RATE: 76 BPM | WEIGHT: 146 LBS | DIASTOLIC BLOOD PRESSURE: 88 MMHG

## 2018-12-26 VITALS — BODY MASS INDEX: 20.9 KG/M2 | HEIGHT: 70.08 IN

## 2018-12-26 DIAGNOSIS — Z95.828 PRESENCE OF OTHER VASCULAR IMPLANTS AND GRAFTS: ICD-10-CM

## 2018-12-26 DIAGNOSIS — Z95.0 PRESENCE OF CARDIAC PACEMAKER: Chronic | ICD-10-CM

## 2018-12-26 DIAGNOSIS — Z86.79 OTHER SPECIFIED POSTPROCEDURAL STATES: ICD-10-CM

## 2018-12-26 DIAGNOSIS — Z98.890 OTHER SPECIFIED POSTPROCEDURAL STATES: ICD-10-CM

## 2018-12-26 DIAGNOSIS — Z09 ENCOUNTER FOR FOLLOW-UP EXAMINATION AFTER COMPLETED TREATMENT FOR CONDITIONS OTHER THAN MALIGNANT NEOPLASM: ICD-10-CM

## 2018-12-26 DIAGNOSIS — Z87.891 PERSONAL HISTORY OF NICOTINE DEPENDENCE: ICD-10-CM

## 2018-12-26 PROBLEM — R94.01 EEG ABNORMAL: Status: ACTIVE | Noted: 2018-12-26

## 2018-12-26 LAB
ALBUMIN SERPL ELPH-MCNC: 3.8 G/DL — SIGNIFICANT CHANGE UP (ref 3.3–5)
ALP SERPL-CCNC: 99 U/L — SIGNIFICANT CHANGE UP (ref 40–120)
ALT FLD-CCNC: 44 U/L — SIGNIFICANT CHANGE UP (ref 10–45)
ANION GAP SERPL CALC-SCNC: 18 MMOL/L — HIGH (ref 5–17)
AST SERPL-CCNC: 31 U/L — SIGNIFICANT CHANGE UP (ref 10–40)
BILIRUB SERPL-MCNC: 0.5 MG/DL — SIGNIFICANT CHANGE UP (ref 0.2–1.2)
BUN SERPL-MCNC: 26 MG/DL — HIGH (ref 7–23)
CALCIUM SERPL-MCNC: 9.8 MG/DL — SIGNIFICANT CHANGE UP (ref 8.4–10.5)
CHLORIDE SERPL-SCNC: 99 MMOL/L — SIGNIFICANT CHANGE UP (ref 96–108)
CO2 SERPL-SCNC: 22 MMOL/L — SIGNIFICANT CHANGE UP (ref 22–31)
CREAT SERPL-MCNC: 1.81 MG/DL — HIGH (ref 0.5–1.3)
GLUCOSE SERPL-MCNC: 121 MG/DL — HIGH (ref 70–99)
POTASSIUM SERPL-MCNC: 4.5 MMOL/L — SIGNIFICANT CHANGE UP (ref 3.5–5.3)
POTASSIUM SERPL-SCNC: 4.5 MMOL/L — SIGNIFICANT CHANGE UP (ref 3.5–5.3)
PROT SERPL-MCNC: 8.2 G/DL — SIGNIFICANT CHANGE UP (ref 6–8.3)
SODIUM SERPL-SCNC: 139 MMOL/L — SIGNIFICANT CHANGE UP (ref 135–145)

## 2018-12-26 PROCEDURE — 36415 COLL VENOUS BLD VENIPUNCTURE: CPT

## 2018-12-26 PROCEDURE — 71046 X-RAY EXAM CHEST 2 VIEWS: CPT | Mod: 26

## 2018-12-26 PROCEDURE — 71046 X-RAY EXAM CHEST 2 VIEWS: CPT

## 2018-12-26 PROCEDURE — 80053 COMPREHEN METABOLIC PANEL: CPT

## 2018-12-26 PROCEDURE — 99024 POSTOP FOLLOW-UP VISIT: CPT

## 2018-12-26 RX ORDER — LORATADINE 10 MG
17 TABLET,DISINTEGRATING ORAL DAILY
Refills: 0 | Status: ACTIVE | COMMUNITY

## 2018-12-26 RX ORDER — LEVETIRACETAM 500 MG/1
500 TABLET, FILM COATED ORAL TWICE DAILY
Qty: 60 | Refills: 6 | Status: ACTIVE | COMMUNITY

## 2018-12-26 RX ORDER — DOCUSATE SODIUM 100 MG/1
100 CAPSULE ORAL TWICE DAILY
Refills: 0 | Status: ACTIVE | COMMUNITY

## 2019-01-03 ENCOUNTER — APPOINTMENT (OUTPATIENT)
Dept: HEART AND VASCULAR | Facility: CLINIC | Age: 79
End: 2019-01-03

## 2019-01-14 ENCOUNTER — OTHER (OUTPATIENT)
Age: 79
End: 2019-01-14

## 2019-01-16 ENCOUNTER — APPOINTMENT (OUTPATIENT)
Dept: CARDIOTHORACIC SURGERY | Facility: CLINIC | Age: 79
End: 2019-01-16

## 2019-01-16 ENCOUNTER — APPOINTMENT (OUTPATIENT)
Dept: CT IMAGING | Facility: HOSPITAL | Age: 79
End: 2019-01-16

## 2019-01-30 ENCOUNTER — OTHER (OUTPATIENT)
Age: 79
End: 2019-01-30

## 2019-02-22 ENCOUNTER — APPOINTMENT (OUTPATIENT)
Dept: NEUROLOGY | Facility: CLINIC | Age: 79
End: 2019-02-22

## 2019-03-05 ENCOUNTER — OTHER (OUTPATIENT)
Age: 79
End: 2019-03-05

## 2019-03-27 ENCOUNTER — APPOINTMENT (OUTPATIENT)
Dept: CARDIOTHORACIC SURGERY | Facility: CLINIC | Age: 79
End: 2019-03-27

## 2019-04-17 ENCOUNTER — APPOINTMENT (OUTPATIENT)
Dept: CARDIOTHORACIC SURGERY | Facility: CLINIC | Age: 79
End: 2019-04-17

## 2020-02-19 NOTE — PROCEDURE NOTE - NSNEEDLEGAUGE_GEN_A_CORE
Jada Latham [phone]      Called number on file and pt not living at the residence anymore    Patient may call release of information at [phone] for records    Reviewed with Dr. Early and pt may call other cataract surgeons.   and Dr. Early worked with hospital for overnight stay on surgical day and may be hard for pt to find another provider able to accommodate.    Pt may call direct triage number if call backs to review    [phone]    Devante Moncada RN 4:17 PM 02/19/20        [facility] Call Center    Phone Message    May a detailed message be left on voicemail: yes     Reason for Call: Other:     Pt would like a list of every test that was done on her eyes.  She wants to know what all was put in her eyes.     Please mail this list to her PO BOX on file.     Also if you have someone that you can refer her to that would be able to figure out what is wrong with her that would be great.       Action Taken: Message routed to:  [facility] ([facility] Eye    Travel Screening: Not Applicable                                                                      
20

## 2020-12-31 NOTE — BRIEF OPERATIVE NOTE - OPERATION/FINDINGS
Placement of 36x200 Terumo device.   Percutaneous R groin access.   No endoleak on completion angio. Statement Selected

## 2022-09-11 ENCOUNTER — INPATIENT (INPATIENT)
Facility: HOSPITAL | Age: 82
LOS: 22 days | Discharge: HOSPICE | End: 2022-10-04
Attending: FAMILY MEDICINE | Admitting: HOSPITALIST

## 2022-09-11 VITALS
HEIGHT: 67.48 IN | OXYGEN SATURATION: 89 % | HEART RATE: 100 BPM | WEIGHT: 176.37 LBS | DIASTOLIC BLOOD PRESSURE: 129 MMHG | RESPIRATION RATE: 24 BRPM | SYSTOLIC BLOOD PRESSURE: 152 MMHG

## 2022-09-11 DIAGNOSIS — A41.89 OTHER SPECIFIED SEPSIS: ICD-10-CM

## 2022-09-11 DIAGNOSIS — Z79.82 LONG TERM (CURRENT) USE OF ASPIRIN: ICD-10-CM

## 2022-09-11 DIAGNOSIS — N39.0 URINARY TRACT INFECTION, SITE NOT SPECIFIED: ICD-10-CM

## 2022-09-11 DIAGNOSIS — J96.01 ACUTE RESPIRATORY FAILURE WITH HYPOXIA: ICD-10-CM

## 2022-09-11 DIAGNOSIS — R34 ANURIA AND OLIGURIA: ICD-10-CM

## 2022-09-11 DIAGNOSIS — G93.1 ANOXIC BRAIN DAMAGE, NOT ELSEWHERE CLASSIFIED: ICD-10-CM

## 2022-09-11 DIAGNOSIS — R56.9 UNSPECIFIED CONVULSIONS: ICD-10-CM

## 2022-09-11 DIAGNOSIS — F03.90 UNSPECIFIED DEMENTIA WITHOUT BEHAVIORAL DISTURBANCE: ICD-10-CM

## 2022-09-11 DIAGNOSIS — N18.5 CHRONIC KIDNEY DISEASE, STAGE 5: ICD-10-CM

## 2022-09-11 DIAGNOSIS — R41.82 ALTERED MENTAL STATUS, UNSPECIFIED: ICD-10-CM

## 2022-09-11 DIAGNOSIS — G93.6 CEREBRAL EDEMA: ICD-10-CM

## 2022-09-11 DIAGNOSIS — J15.6 PNEUMONIA DUE TO OTHER GRAM-NEGATIVE BACTERIA: ICD-10-CM

## 2022-09-11 DIAGNOSIS — B96.5 PSEUDOMONAS (AERUGINOSA) (MALLEI) (PSEUDOMALLEI) AS THE CAUSE OF DISEASES CLASSIFIED ELSEWHERE: ICD-10-CM

## 2022-09-11 DIAGNOSIS — R57.9 SHOCK, UNSPECIFIED: ICD-10-CM

## 2022-09-11 DIAGNOSIS — Z95.0 PRESENCE OF CARDIAC PACEMAKER: Chronic | ICD-10-CM

## 2022-09-11 DIAGNOSIS — E87.0 HYPEROSMOLALITY AND HYPERNATREMIA: ICD-10-CM

## 2022-09-11 DIAGNOSIS — G93.41 METABOLIC ENCEPHALOPATHY: ICD-10-CM

## 2022-09-11 DIAGNOSIS — E86.1 HYPOVOLEMIA: ICD-10-CM

## 2022-09-11 DIAGNOSIS — Z95.0 PRESENCE OF CARDIAC PACEMAKER: ICD-10-CM

## 2022-09-11 DIAGNOSIS — Z95.828 PRESENCE OF OTHER VASCULAR IMPLANTS AND GRAFTS: ICD-10-CM

## 2022-09-11 DIAGNOSIS — R63.0 ANOREXIA: ICD-10-CM

## 2022-09-11 DIAGNOSIS — N17.0 ACUTE KIDNEY FAILURE WITH TUBULAR NECROSIS: ICD-10-CM

## 2022-09-11 DIAGNOSIS — I71.43 INFRARENAL ABDOMINAL AORTIC ANEURYSM, WITHOUT RUPTURE: ICD-10-CM

## 2022-09-11 DIAGNOSIS — Z66 DO NOT RESUSCITATE: ICD-10-CM

## 2022-09-11 DIAGNOSIS — E87.5 HYPERKALEMIA: ICD-10-CM

## 2022-09-11 DIAGNOSIS — Z51.5 ENCOUNTER FOR PALLIATIVE CARE: ICD-10-CM

## 2022-09-11 DIAGNOSIS — I63.81 OTHER CEREBRAL INFARCTION DUE TO OCCLUSION OR STENOSIS OF SMALL ARTERY: ICD-10-CM

## 2022-09-11 DIAGNOSIS — F41.9 ANXIETY DISORDER, UNSPECIFIED: ICD-10-CM

## 2022-09-11 DIAGNOSIS — U07.1 COVID-19: ICD-10-CM

## 2022-09-11 DIAGNOSIS — I12.0 HYPERTENSIVE CHRONIC KIDNEY DISEASE WITH STAGE 5 CHRONIC KIDNEY DISEASE OR END STAGE RENAL DISEASE: ICD-10-CM

## 2022-09-11 LAB
ALBUMIN SERPL ELPH-MCNC: 2.3 G/DL — LOW (ref 3.3–5)
ALP SERPL-CCNC: 72 U/L — SIGNIFICANT CHANGE UP (ref 40–120)
ALT FLD-CCNC: 47 U/L — SIGNIFICANT CHANGE UP (ref 12–78)
ANION GAP SERPL CALC-SCNC: 9 MMOL/L — SIGNIFICANT CHANGE UP (ref 5–17)
APPEARANCE UR: ABNORMAL
APTT BLD: 23.5 SEC — LOW (ref 27.5–35.5)
AST SERPL-CCNC: 71 U/L — HIGH (ref 15–37)
BACTERIA # UR AUTO: ABNORMAL
BASE EXCESS BLDA CALC-SCNC: -6.6 MMOL/L — LOW (ref -2–3)
BASE EXCESS BLDV CALC-SCNC: -6.4 MMOL/L — LOW (ref -2–3)
BASOPHILS # BLD AUTO: 0 K/UL — SIGNIFICANT CHANGE UP (ref 0–0.2)
BASOPHILS NFR BLD AUTO: 0 % — SIGNIFICANT CHANGE UP (ref 0–2)
BILIRUB SERPL-MCNC: 1 MG/DL — SIGNIFICANT CHANGE UP (ref 0.2–1.2)
BILIRUB UR-MCNC: NEGATIVE — SIGNIFICANT CHANGE UP
BLOOD GAS COMMENTS ARTERIAL: SIGNIFICANT CHANGE UP
BLOOD GAS COMMENTS, VENOUS: SIGNIFICANT CHANGE UP
BUN SERPL-MCNC: 103 MG/DL — HIGH (ref 7–23)
CALCIUM SERPL-MCNC: 10 MG/DL — SIGNIFICANT CHANGE UP (ref 8.5–10.1)
CHLORIDE BLDV-SCNC: 116 MMOL/L — HIGH (ref 98–107)
CHLORIDE SERPL-SCNC: 123 MMOL/L — HIGH (ref 96–108)
CO2 BLDA-SCNC: 18 MMOL/L — LOW (ref 19–24)
CO2 BLDV-SCNC: 19 MMOL/L — LOW (ref 22–26)
CO2 SERPL-SCNC: 20 MMOL/L — LOW (ref 22–31)
COLOR SPEC: YELLOW — SIGNIFICANT CHANGE UP
CREAT SERPL-MCNC: 7.15 MG/DL — HIGH (ref 0.5–1.3)
DIFF PNL FLD: ABNORMAL
EGFR: 7 ML/MIN/1.73M2 — LOW
EOSINOPHIL # BLD AUTO: 0 K/UL — SIGNIFICANT CHANGE UP (ref 0–0.5)
EOSINOPHIL NFR BLD AUTO: 0 % — SIGNIFICANT CHANGE UP (ref 0–6)
EPI CELLS # UR: SIGNIFICANT CHANGE UP
FLUAV AG NPH QL: SIGNIFICANT CHANGE UP
FLUBV AG NPH QL: SIGNIFICANT CHANGE UP
GAS PNL BLDA: SIGNIFICANT CHANGE UP
GAS PNL BLDV: 145 MMOL/L — SIGNIFICANT CHANGE UP (ref 136–145)
GAS PNL BLDV: SIGNIFICANT CHANGE UP
GAS PNL BLDV: SIGNIFICANT CHANGE UP
GLUCOSE BLDV-MCNC: 259 MG/DL — HIGH (ref 65–95)
GLUCOSE SERPL-MCNC: 178 MG/DL — HIGH (ref 70–99)
GLUCOSE UR QL: 50 MG/DL
HCO3 BLDA-SCNC: 17 MMOL/L — LOW (ref 21–28)
HCO3 BLDV-SCNC: 18 MMOL/L — LOW (ref 22–28)
HCT VFR BLD CALC: 41 % — SIGNIFICANT CHANGE UP (ref 39–50)
HCT VFR BLDA CALC: 34 % — LOW (ref 37–47)
HGB BLD CALC-MCNC: 11.2 G/DL — LOW (ref 12.6–17.4)
HGB BLD-MCNC: 12.5 G/DL — LOW (ref 13–17)
HOROWITZ INDEX BLDA+IHG-RTO: 100 — SIGNIFICANT CHANGE UP
HOROWITZ INDEX BLDV+IHG-RTO: 0.4 — SIGNIFICANT CHANGE UP
IMM GRANULOCYTES NFR BLD AUTO: 0.4 % — SIGNIFICANT CHANGE UP (ref 0–1.5)
INR BLD: 1.39 RATIO — HIGH (ref 0.88–1.16)
KETONES UR-MCNC: ABNORMAL
LACTATE BLDV-MCNC: 3.5 MMOL/L — HIGH (ref 0.56–1.39)
LACTATE SERPL-SCNC: 4.4 MMOL/L — CRITICAL HIGH (ref 0.7–2)
LEUKOCYTE ESTERASE UR-ACNC: ABNORMAL
LYMPHOCYTES # BLD AUTO: 0.51 K/UL — LOW (ref 1–3.3)
LYMPHOCYTES # BLD AUTO: 11.4 % — LOW (ref 13–44)
MAGNESIUM SERPL-MCNC: 3.7 MG/DL — HIGH (ref 1.6–2.6)
MCHC RBC-ENTMCNC: 30.3 PG — SIGNIFICANT CHANGE UP (ref 27–34)
MCHC RBC-ENTMCNC: 30.5 G/DL — LOW (ref 32–36)
MCV RBC AUTO: 99.3 FL — SIGNIFICANT CHANGE UP (ref 80–100)
MONOCYTES # BLD AUTO: 0.34 K/UL — SIGNIFICANT CHANGE UP (ref 0–0.9)
MONOCYTES NFR BLD AUTO: 7.6 % — SIGNIFICANT CHANGE UP (ref 2–14)
NEUTROPHILS # BLD AUTO: 3.6 K/UL — SIGNIFICANT CHANGE UP (ref 1.8–7.4)
NEUTROPHILS NFR BLD AUTO: 80.6 % — HIGH (ref 43–77)
NITRITE UR-MCNC: NEGATIVE — SIGNIFICANT CHANGE UP
NRBC # BLD: 0 /100 WBCS — SIGNIFICANT CHANGE UP (ref 0–0)
NT-PROBNP SERPL-SCNC: 1588 PG/ML — HIGH (ref 0–450)
PCO2 BLDA: 27 MMHG — LOW (ref 32–46)
PCO2 BLDV: 31 MMHG — LOW (ref 42–55)
PH BLDA: 7.4 — SIGNIFICANT CHANGE UP (ref 7.35–7.45)
PH BLDV: 7.37 — SIGNIFICANT CHANGE UP (ref 7.32–7.43)
PH UR: 5 — SIGNIFICANT CHANGE UP (ref 5–8)
PLATELET # BLD AUTO: 323 K/UL — SIGNIFICANT CHANGE UP (ref 150–400)
PO2 BLDA: 161 MMHG — HIGH (ref 83–108)
PO2 BLDV: 55 MMHG — HIGH (ref 25–45)
POTASSIUM BLDV-SCNC: 5.4 MMOL/L — HIGH (ref 3.5–5.1)
POTASSIUM SERPL-MCNC: 5.7 MMOL/L — HIGH (ref 3.5–5.3)
POTASSIUM SERPL-SCNC: 5.7 MMOL/L — HIGH (ref 3.5–5.3)
PROT SERPL-MCNC: 8 GM/DL — SIGNIFICANT CHANGE UP (ref 6–8.3)
PROT UR-MCNC: 100 MG/DL
PROTHROM AB SERPL-ACNC: 16.7 SEC — HIGH (ref 10.5–13.4)
RBC # BLD: 4.13 M/UL — LOW (ref 4.2–5.8)
RBC # FLD: 14.5 % — SIGNIFICANT CHANGE UP (ref 10.3–14.5)
RBC CASTS # UR COMP ASSIST: SIGNIFICANT CHANGE UP /HPF (ref 0–4)
SAO2 % BLDA: 99.5 % — HIGH (ref 94–98)
SAO2 % BLDV: 87 % — LOW (ref 94–98)
SARS-COV-2 RNA SPEC QL NAA+PROBE: DETECTED
SODIUM SERPL-SCNC: 152 MMOL/L — HIGH (ref 135–145)
SP GR SPEC: 1.02 — SIGNIFICANT CHANGE UP (ref 1.01–1.02)
UROBILINOGEN FLD QL: 1 MG/DL
WBC # BLD: 4.47 K/UL — SIGNIFICANT CHANGE UP (ref 3.8–10.5)
WBC # FLD AUTO: 4.47 K/UL — SIGNIFICANT CHANGE UP (ref 3.8–10.5)
WBC UR QL: SIGNIFICANT CHANGE UP

## 2022-09-11 PROCEDURE — 99291 CRITICAL CARE FIRST HOUR: CPT | Mod: 25

## 2022-09-11 PROCEDURE — 70450 CT HEAD/BRAIN W/O DYE: CPT | Mod: 26,MA

## 2022-09-11 PROCEDURE — 99285 EMERGENCY DEPT VISIT HI MDM: CPT | Mod: CS

## 2022-09-11 PROCEDURE — 36556 INSERT NON-TUNNEL CV CATH: CPT | Mod: 59

## 2022-09-11 PROCEDURE — 93010 ELECTROCARDIOGRAM REPORT: CPT

## 2022-09-11 PROCEDURE — 71045 X-RAY EXAM CHEST 1 VIEW: CPT | Mod: 26

## 2022-09-11 PROCEDURE — 74177 CT ABD & PELVIS W/CONTRAST: CPT | Mod: 26,MA

## 2022-09-11 PROCEDURE — 71275 CT ANGIOGRAPHY CHEST: CPT | Mod: 26,MA

## 2022-09-11 RX ORDER — PIPERACILLIN AND TAZOBACTAM 4; .5 G/20ML; G/20ML
3.38 INJECTION, POWDER, LYOPHILIZED, FOR SOLUTION INTRAVENOUS ONCE
Refills: 0 | Status: COMPLETED | OUTPATIENT
Start: 2022-09-11 | End: 2022-09-11

## 2022-09-11 RX ORDER — CEFTRIAXONE 500 MG/1
1000 INJECTION, POWDER, FOR SOLUTION INTRAMUSCULAR; INTRAVENOUS ONCE
Refills: 0 | Status: COMPLETED | OUTPATIENT
Start: 2022-09-11 | End: 2022-09-11

## 2022-09-11 RX ORDER — VANCOMYCIN HCL 1 G
1000 VIAL (EA) INTRAVENOUS ONCE
Refills: 0 | Status: COMPLETED | OUTPATIENT
Start: 2022-09-11 | End: 2022-09-11

## 2022-09-11 RX ORDER — CHLORHEXIDINE GLUCONATE 213 G/1000ML
15 SOLUTION TOPICAL EVERY 12 HOURS
Refills: 0 | Status: ACTIVE | OUTPATIENT
Start: 2022-09-11 | End: 2023-08-10

## 2022-09-11 RX ORDER — DEXAMETHASONE 0.5 MG/5ML
6 ELIXIR ORAL ONCE
Refills: 0 | Status: COMPLETED | OUTPATIENT
Start: 2022-09-11 | End: 2022-09-11

## 2022-09-11 RX ORDER — SODIUM CHLORIDE 9 MG/ML
1000 INJECTION, SOLUTION INTRAVENOUS
Refills: 0 | Status: DISCONTINUED | OUTPATIENT
Start: 2022-09-11 | End: 2022-09-13

## 2022-09-11 RX ORDER — CHLORHEXIDINE GLUCONATE 213 G/1000ML
1 SOLUTION TOPICAL
Refills: 0 | Status: DISCONTINUED | OUTPATIENT
Start: 2022-09-11 | End: 2022-09-25

## 2022-09-11 RX ORDER — FENTANYL CITRATE 50 UG/ML
0.5 INJECTION INTRAVENOUS
Qty: 2500 | Refills: 0 | Status: DISCONTINUED | OUTPATIENT
Start: 2022-09-11 | End: 2022-09-13

## 2022-09-11 RX ORDER — HEPARIN SODIUM 5000 [USP'U]/ML
5000 INJECTION INTRAVENOUS; SUBCUTANEOUS EVERY 12 HOURS
Refills: 0 | Status: DISCONTINUED | OUTPATIENT
Start: 2022-09-11 | End: 2022-10-04

## 2022-09-11 RX ORDER — SODIUM CHLORIDE 9 MG/ML
1000 INJECTION, SOLUTION INTRAVENOUS ONCE
Refills: 0 | Status: COMPLETED | OUTPATIENT
Start: 2022-09-11 | End: 2022-09-11

## 2022-09-11 RX ORDER — REMDESIVIR 5 MG/ML
INJECTION INTRAVENOUS
Refills: 0 | Status: DISCONTINUED | OUTPATIENT
Start: 2022-09-11 | End: 2022-09-11

## 2022-09-11 RX ORDER — ACETAMINOPHEN 500 MG
975 TABLET ORAL ONCE
Refills: 0 | Status: COMPLETED | OUTPATIENT
Start: 2022-09-11 | End: 2022-09-11

## 2022-09-11 RX ORDER — PIPERACILLIN AND TAZOBACTAM 4; .5 G/20ML; G/20ML
3.38 INJECTION, POWDER, LYOPHILIZED, FOR SOLUTION INTRAVENOUS EVERY 12 HOURS
Refills: 0 | Status: COMPLETED | OUTPATIENT
Start: 2022-09-11 | End: 2022-09-18

## 2022-09-11 RX ORDER — SODIUM CHLORIDE 9 MG/ML
1000 INJECTION, SOLUTION INTRAVENOUS
Refills: 0 | Status: DISCONTINUED | OUTPATIENT
Start: 2022-09-11 | End: 2022-09-11

## 2022-09-11 RX ORDER — SODIUM CHLORIDE 9 MG/ML
2000 INJECTION, SOLUTION INTRAVENOUS ONCE
Refills: 0 | Status: COMPLETED | OUTPATIENT
Start: 2022-09-11 | End: 2022-09-11

## 2022-09-11 RX ORDER — DEXAMETHASONE 0.5 MG/5ML
6 ELIXIR ORAL DAILY
Refills: 0 | Status: DISCONTINUED | OUTPATIENT
Start: 2022-09-12 | End: 2022-09-15

## 2022-09-11 RX ORDER — SODIUM CHLORIDE 9 MG/ML
10 INJECTION INTRAMUSCULAR; INTRAVENOUS; SUBCUTANEOUS
Refills: 0 | Status: DISCONTINUED | OUTPATIENT
Start: 2022-09-11 | End: 2022-09-18

## 2022-09-11 RX ORDER — NOREPINEPHRINE BITARTRATE/D5W 8 MG/250ML
1 PLASTIC BAG, INJECTION (ML) INTRAVENOUS
Qty: 8 | Refills: 0 | Status: DISCONTINUED | OUTPATIENT
Start: 2022-09-11 | End: 2022-09-14

## 2022-09-11 RX ORDER — CHLORHEXIDINE GLUCONATE 213 G/1000ML
1 SOLUTION TOPICAL
Refills: 0 | Status: DISCONTINUED | OUTPATIENT
Start: 2022-09-11 | End: 2022-09-12

## 2022-09-11 RX ADMIN — Medication 150 MICROGRAM(S)/KG/MIN: at 18:21

## 2022-09-11 RX ADMIN — SODIUM CHLORIDE 2000 MILLILITER(S): 9 INJECTION, SOLUTION INTRAVENOUS at 19:05

## 2022-09-11 RX ADMIN — Medication 975 MILLIGRAM(S): at 19:31

## 2022-09-11 RX ADMIN — CEFTRIAXONE 1000 MILLIGRAM(S): 500 INJECTION, POWDER, FOR SOLUTION INTRAMUSCULAR; INTRAVENOUS at 18:36

## 2022-09-11 RX ADMIN — Medication 250 MILLIGRAM(S): at 19:05

## 2022-09-11 RX ADMIN — Medication 6 MILLIGRAM(S): at 21:40

## 2022-09-11 RX ADMIN — CEFTRIAXONE 100 MILLIGRAM(S): 500 INJECTION, POWDER, FOR SOLUTION INTRAMUSCULAR; INTRAVENOUS at 18:06

## 2022-09-11 RX ADMIN — SODIUM CHLORIDE 2000 MILLILITER(S): 9 INJECTION, SOLUTION INTRAVENOUS at 18:05

## 2022-09-11 RX ADMIN — FENTANYL CITRATE 4 MICROGRAM(S)/KG/HR: 50 INJECTION INTRAVENOUS at 20:14

## 2022-09-11 RX ADMIN — SODIUM CHLORIDE 1000 MILLILITER(S): 9 INJECTION, SOLUTION INTRAVENOUS at 19:05

## 2022-09-11 RX ADMIN — Medication 975 MILLIGRAM(S): at 18:31

## 2022-09-11 RX ADMIN — Medication 1000 MILLIGRAM(S): at 20:05

## 2022-09-11 RX ADMIN — SODIUM CHLORIDE 1000 MILLILITER(S): 9 INJECTION, SOLUTION INTRAVENOUS at 20:05

## 2022-09-11 NOTE — PATIENT PROFILE ADULT - CAREGIVER NAME
Notes recorded by Casimiro Wilson MD on 8/29/2019 at 2:26 PM CDT  Reviewed results   :     1.  Previously described asymmetry in the right breast does persist mammographically without ultrasound correlate.  Because it does persist on several projections [facility] Funmilayo Carrasquillo

## 2022-09-11 NOTE — ED ADULT NURSE NOTE - ED STAT RN HANDOFF DETAILS 4
Deshawn Flor and ZARIA Gan at bedside.    Fentanyl was stop by ZARIA Gan because of pt status. was told to leave medication at bedside incase pt status change.  Levophed was decreased By Md Shaw to 3.

## 2022-09-11 NOTE — H&P ADULT - NSHPLABSRESULTS_GEN_ALL_CORE
12.5   4.47  )-----------( 323      ( 11 Sep 2022 17:50 )             41.0     09-11    152<H>  |  123<H>  |  103<H>  ----------------------------<  178<H>  5.7<H>   |  20<L>  |  7.15<H>    Ca    10.0      11 Sep 2022 17:50  Mg     3.7     09-11    TPro  8.0  /  Alb  2.3<L>  /  TBili  1.0  /  DBili  x   /  AST  71<H>  /  ALT  47  /  AlkPhos  72  09-11    Flu With COVID-19 By AUSTIN (09.11.22 @ 18:00)    SARS-CoV-2 Result: Detected: EUA/IVD    Influenza A Result: NotDetec: EUA/IVD    Influenza B Result: NotDetec: EUA/IVD    < from: CT Abdomen and Pelvis w/ IV Cont (09.11.22 @ 18:55) >  IMPRESSION:  Diffuse bilateral groundglass infiltrates suggestive of Covid pneumonia.   Other infectious and inflammatory processes are not excluded.  No evidence of pulmonary embolism.  Mild gastric distention. Recommend NG tube.  Status post thoracic aortic dissection repair with stent graft. Stable   Infrarenal abdominal aortic aneurysm.  Mild constipation.  < end of copied text >    < from: CT Head No Cont (09.11.22 @ 18:51) >  IMPRESSION:  No evidence of acute transcortical infarct, acute intracranial   hemorrhage, or mass effect.  < end of copied text >

## 2022-09-11 NOTE — ED PROVIDER NOTE - CLINICAL SUMMARY MEDICAL DECISION MAKING FREE TEXT BOX
Likely hypoxic event secondary to pneumonia vs pe. Will give antibiotics and perform CT scan. Likely hypoxic event secondary to pneumonia vs pe. Will give antibiotics and perform CT scan.  labs and ct consistent with covid pna. admit start remdes and dex. Likely hypoxic event secondary to pneumonia vs pe. Will give antibiotics and perform CT scan.  labs and ct consistent with covid pna. admit start remdes and dex.  I read ekg as nsr rate 100, no st elevation or depression, qtc 436, narrow qrs, normal axis.

## 2022-09-11 NOTE — ED ADULT NURSE NOTE - OBJECTIVE STATEMENT
81M PMHx dementia, pacemaker presents to the ED un 81M PMHx dementia, pacemaker presents to the ED s/p period of unresponsive and respiratory failure at home after being changed as per wife. as per wife patient was seen at [facility] on 8/28 for fever of unknown source. patient intubated at the scene with 7.0 ET tube, 22 lip line. rsi drugs etomidate and valium at 1715 and 1721 hours respectively.

## 2022-09-11 NOTE — H&P ADULT - HISTORY OF PRESENT ILLNESS
82yo M, PMH of CKD (baseline Cr appears to be ~1.8?), on Eliquis (unclear why), BIBEMS to ED after being found unresponsive at home. On EMS arrival pt obtunded and not breathing. Pt intubated in the field. On arrival in ED, pt found to be Covid+. CTH with no acute pathology. CT chest with diffuse b/l GGO. ICU consulted for further management.   80yo M, PMH dementia, htn, TEVAR, PPM for syncopal bradycardia, ? sizures?and CKD (baseline Cr appears to be ~1.8?), on Eliquis (unclear why), BIBEMS to ED after being found unresponsive at home. On EMS arrival pt obtunded and not breathing. Pt intubated in the field. On arrival in ED, pt found to be Covid+. CTH with no acute pathology. CT chest with diffuse b/l GGO. ICU consulted for further management.      He has been weak and not eating since discharge from [facility] for unclear reasons.  He was normal with moving all his extremities and talking and moving with no issues just weak and had no apatite

## 2022-09-11 NOTE — ED ADULT NURSE NOTE - ED STAT RN HANDOFF DETAILS 3
report was given to ANA Coy. pt will be going to [facility]. PT vitals are stable levo running. all pending labs and medications sent report was given to ANA Coy. pt will be going to [facility]. PT vitals are stable levophed running. all pending labs and medications sent  Pt skin intact

## 2022-09-11 NOTE — ED ADULT TRIAGE NOTE - CHIEF COMPLAINT QUOTE
BIBA as per EMS patient found unresponsive by family. hypotensive, satting 68% on RA, breathing rapid and irregular. Pt intubated, given 24 atomidate, 10 valium and 500cc NS in route.   hx; HTN, hypothyroid, DM

## 2022-09-11 NOTE — PATIENT PROFILE ADULT - VISION (WITH CORRECTIVE LENSES IF THE PATIENT USUALLY WEARS THEM):
karri, pt intubated karri, pt intubated/Normal vision: sees adequately in most situations; can see medication labels, newsprint

## 2022-09-11 NOTE — H&P ADULT - ASSESSMENT
80yo M, PMH dementia, htn, TEVAR, PPM for syncopal bradycardia, ? sizures?and CKD (baseline Cr appears to be ~1.8?), on Eliquis (unclear why), BIBEMS to ED after being found unresponsive at home. On EMS arrival pt obtunded and not breathing. Pt intubated in the field. On arrival in ED, pt found to be Covid+.     Neuro:  unresponsive except to deep stimuli, unclear cause,   - Uremia? hypernatremia? septic? non convulsive siezure?  - will start with treating renal failure and hypernatreamai, restarting home keppra  - EEG if not waking up, keep of sedation    Resp:  Covid with what looks like super imposed PNA in the setting of recent hospitalization for unclear reason.   - minimal vent requirmetns just for AMS  - Will wait for cultures  - wena when he wakes up    CV: in shock? onclear reason  pocus with normal LVF indeteminate IVC  - LR for 1 mor L given clinical hypovolemia  - Cultures procal sent  - PPM appears functional, no issues with AOrtic stent  - Home asa for?!?  - Levo to map 65      Renal: VASILE on CKD last cr was 1.6 2 weeks ago per niece, no acidosis, mild hyperkelemia, clinicall hypovolemia, if bun not improving would call for HD  - moniro BMP and gas  - Hutson and urine output  - IVF LR @125    GI: OGT and start feed    Heme no issues  - HSQ  Ethics: wife deffered to niece but niece does not know his code status.

## 2022-09-11 NOTE — ED PROVIDER NOTE - OBJECTIVE STATEMENT
81y M with negative PMHx who presents to the ED BIBEMS. Family found the patient unresponsive and called EMS x1 hour ago. EMS intubated in field and used Valium and Etomidate.    HPI limited due to unresponsiveness.

## 2022-09-11 NOTE — ED PROVIDER NOTE - IV ALTEPLASE ADMIN OUTSIDE HIDDEN
[Examination Of The Breasts] : a normal appearance [No Masses] : no breast masses were palpable [Labia Majora] : normal [Labia Minora] : normal [Normal] : normal [Uterine Adnexae] : normal show

## 2022-09-11 NOTE — PATIENT PROFILE ADULT - FALL HARM RISK - HARM RISK INTERVENTIONS

## 2022-09-11 NOTE — ED PROVIDER NOTE - PHYSICAL EXAMINATION
PE:  CONSTITUTIONAL: Unresponsive  HEAD: Normocephalic; atraumatic  EYES: 2mm, not responsive to light   ENMT: Et tube and mouth. Intubated being bagged.   NECK: Supple; non-tender; no cervical lymphadenopathy  CARD: Tachycardia  RESP: Normal chest excursion with respiration; breath sounds clear and equal bilaterally; no wheezes, rhonchi, or rales  ABD: Soft, non-distended; non-tender; no palpable organomegaly, no palpable hernias  EXT: Normal ROM in all four extremities; non-tender to palpation; distal pulses intact  MSK: extremities no sign on fracture.   ENDO: Blood sugar 200s  SKIN: Warm, dry, no rash  NEURO:  Unresponsive gag reflex

## 2022-09-11 NOTE — ED ADULT TRIAGE NOTE - BMI (KG/M2)
I let Av know Dr. Huff said it was ok that he is seeing Dr. smallwood in a month and a half. I let him know it is good he is on the wait list.    27.2

## 2022-09-11 NOTE — ED ADULT NURSE NOTE - ED STAT RN HANDOFF DETAILS
Report given to receiving zuly PEREZ pts history, current condition and reason for admission discussed, safety concerns addressed and reviewed, pt currently in stable condition, IV flushes for patency and site shows no signs or symptoms of infiltrate, dressing is clean dry and intact,

## 2022-09-11 NOTE — H&P ADULT - NSICDXPASTMEDICALHX_GEN_ALL_CORE_FT
PAST MEDICAL HISTORY:  Anxiety     Bradycardia     Descending aortic aneurysm     Hypertension, unspecified type     Renal insufficiency     Syncope, unspecified syncope type

## 2022-09-12 LAB
ALBUMIN SERPL ELPH-MCNC: 1.9 G/DL — LOW (ref 3.3–5)
ALP SERPL-CCNC: 57 U/L — SIGNIFICANT CHANGE UP (ref 40–120)
ALT FLD-CCNC: 37 U/L — SIGNIFICANT CHANGE UP (ref 12–78)
AMMONIA BLD-MCNC: 17 UMOL/L — SIGNIFICANT CHANGE UP (ref 11–32)
ANION GAP SERPL CALC-SCNC: 10 MMOL/L — SIGNIFICANT CHANGE UP (ref 5–17)
ANION GAP SERPL CALC-SCNC: 8 MMOL/L — SIGNIFICANT CHANGE UP (ref 5–17)
ANION GAP SERPL CALC-SCNC: 9 MMOL/L — SIGNIFICANT CHANGE UP (ref 5–17)
ANION GAP SERPL CALC-SCNC: 9 MMOL/L — SIGNIFICANT CHANGE UP (ref 5–17)
APTT BLD: 25.7 SEC — LOW (ref 27.5–35.5)
AST SERPL-CCNC: 60 U/L — HIGH (ref 15–37)
BASOPHILS # BLD AUTO: 0.02 K/UL — SIGNIFICANT CHANGE UP (ref 0–0.2)
BASOPHILS NFR BLD AUTO: 0.2 % — SIGNIFICANT CHANGE UP (ref 0–2)
BILIRUB SERPL-MCNC: 0.8 MG/DL — SIGNIFICANT CHANGE UP (ref 0.2–1.2)
BUN SERPL-MCNC: 109 MG/DL — HIGH (ref 7–23)
BUN SERPL-MCNC: 111 MG/DL — HIGH (ref 7–23)
BUN SERPL-MCNC: 113 MG/DL — HIGH (ref 7–23)
BUN SERPL-MCNC: 122 MG/DL — HIGH (ref 7–23)
CALCIUM SERPL-MCNC: 9.1 MG/DL — SIGNIFICANT CHANGE UP (ref 8.5–10.1)
CALCIUM SERPL-MCNC: 9.1 MG/DL — SIGNIFICANT CHANGE UP (ref 8.5–10.1)
CALCIUM SERPL-MCNC: 9.2 MG/DL — SIGNIFICANT CHANGE UP (ref 8.5–10.1)
CALCIUM SERPL-MCNC: 9.6 MG/DL — SIGNIFICANT CHANGE UP (ref 8.5–10.1)
CHLORIDE SERPL-SCNC: 119 MMOL/L — HIGH (ref 96–108)
CHLORIDE SERPL-SCNC: 119 MMOL/L — HIGH (ref 96–108)
CHLORIDE SERPL-SCNC: 120 MMOL/L — HIGH (ref 96–108)
CHLORIDE SERPL-SCNC: 122 MMOL/L — HIGH (ref 96–108)
CO2 SERPL-SCNC: 20 MMOL/L — LOW (ref 22–31)
CO2 SERPL-SCNC: 20 MMOL/L — LOW (ref 22–31)
CO2 SERPL-SCNC: 21 MMOL/L — LOW (ref 22–31)
CO2 SERPL-SCNC: 22 MMOL/L — SIGNIFICANT CHANGE UP (ref 22–31)
CREAT SERPL-MCNC: 6.69 MG/DL — HIGH (ref 0.5–1.3)
CREAT SERPL-MCNC: 6.93 MG/DL — HIGH (ref 0.5–1.3)
CREAT SERPL-MCNC: 6.98 MG/DL — HIGH (ref 0.5–1.3)
CREAT SERPL-MCNC: 7.06 MG/DL — HIGH (ref 0.5–1.3)
EGFR: 7 ML/MIN/1.73M2 — LOW
EGFR: 7 ML/MIN/1.73M2 — LOW
EGFR: 8 ML/MIN/1.73M2 — LOW
EGFR: 8 ML/MIN/1.73M2 — LOW
EOSINOPHIL # BLD AUTO: 0 K/UL — SIGNIFICANT CHANGE UP (ref 0–0.5)
EOSINOPHIL NFR BLD AUTO: 0 % — SIGNIFICANT CHANGE UP (ref 0–6)
GAS PNL BLDA: SIGNIFICANT CHANGE UP
GAS PNL BLDA: SIGNIFICANT CHANGE UP
GLUCOSE BLDC GLUCOMTR-MCNC: 138 MG/DL — HIGH (ref 70–99)
GLUCOSE BLDC GLUCOMTR-MCNC: 147 MG/DL — HIGH (ref 70–99)
GLUCOSE BLDC GLUCOMTR-MCNC: 223 MG/DL — HIGH (ref 70–99)
GLUCOSE SERPL-MCNC: 187 MG/DL — HIGH (ref 70–99)
GLUCOSE SERPL-MCNC: 271 MG/DL — HIGH (ref 70–99)
GLUCOSE SERPL-MCNC: 273 MG/DL — HIGH (ref 70–99)
GLUCOSE SERPL-MCNC: 283 MG/DL — HIGH (ref 70–99)
GRAM STN FLD: SIGNIFICANT CHANGE UP
HCT VFR BLD CALC: 35.3 % — LOW (ref 39–50)
HGB BLD-MCNC: 10.7 G/DL — LOW (ref 13–17)
IMM GRANULOCYTES NFR BLD AUTO: 0.7 % — SIGNIFICANT CHANGE UP (ref 0–1.5)
INR BLD: 1.33 RATIO — HIGH (ref 0.88–1.16)
LACTATE SERPL-SCNC: 2.3 MMOL/L — HIGH (ref 0.7–2)
LACTATE SERPL-SCNC: 2.3 MMOL/L — HIGH (ref 0.7–2)
LACTATE SERPL-SCNC: 4 MMOL/L — CRITICAL HIGH (ref 0.7–2)
LYMPHOCYTES # BLD AUTO: 0.79 K/UL — LOW (ref 1–3.3)
LYMPHOCYTES # BLD AUTO: 8.9 % — LOW (ref 13–44)
MAGNESIUM SERPL-MCNC: 3.3 MG/DL — HIGH (ref 1.6–2.6)
MAGNESIUM SERPL-MCNC: 3.3 MG/DL — HIGH (ref 1.6–2.6)
MAGNESIUM SERPL-MCNC: 3.4 MG/DL — HIGH (ref 1.6–2.6)
MCHC RBC-ENTMCNC: 30.3 G/DL — LOW (ref 32–36)
MCHC RBC-ENTMCNC: 30.8 PG — SIGNIFICANT CHANGE UP (ref 27–34)
MCV RBC AUTO: 101.7 FL — HIGH (ref 80–100)
MONOCYTES # BLD AUTO: 0.55 K/UL — SIGNIFICANT CHANGE UP (ref 0–0.9)
MONOCYTES NFR BLD AUTO: 6.2 % — SIGNIFICANT CHANGE UP (ref 2–14)
MRSA PCR RESULT.: SIGNIFICANT CHANGE UP
NEUTROPHILS # BLD AUTO: 7.48 K/UL — HIGH (ref 1.8–7.4)
NEUTROPHILS NFR BLD AUTO: 84 % — HIGH (ref 43–77)
NRBC # BLD: 0 /100 WBCS — SIGNIFICANT CHANGE UP (ref 0–0)
PHOSPHATE SERPL-MCNC: 4.3 MG/DL — SIGNIFICANT CHANGE UP (ref 2.5–4.5)
PHOSPHATE SERPL-MCNC: 4.8 MG/DL — HIGH (ref 2.5–4.5)
PHOSPHATE SERPL-MCNC: 5.2 MG/DL — HIGH (ref 2.5–4.5)
PLATELET # BLD AUTO: 254 K/UL — SIGNIFICANT CHANGE UP (ref 150–400)
POTASSIUM SERPL-MCNC: 4.7 MMOL/L — SIGNIFICANT CHANGE UP (ref 3.5–5.3)
POTASSIUM SERPL-MCNC: 4.9 MMOL/L — SIGNIFICANT CHANGE UP (ref 3.5–5.3)
POTASSIUM SERPL-MCNC: 5.4 MMOL/L — HIGH (ref 3.5–5.3)
POTASSIUM SERPL-MCNC: 5.8 MMOL/L — HIGH (ref 3.5–5.3)
POTASSIUM SERPL-SCNC: 4.7 MMOL/L — SIGNIFICANT CHANGE UP (ref 3.5–5.3)
POTASSIUM SERPL-SCNC: 4.9 MMOL/L — SIGNIFICANT CHANGE UP (ref 3.5–5.3)
POTASSIUM SERPL-SCNC: 5.4 MMOL/L — HIGH (ref 3.5–5.3)
POTASSIUM SERPL-SCNC: 5.8 MMOL/L — HIGH (ref 3.5–5.3)
PROCALCITONIN SERPL-MCNC: 46 NG/ML — HIGH (ref 0.02–0.1)
PROLACTIN SERPL-MCNC: 25.3 NG/ML — HIGH (ref 4.1–18.4)
PROT SERPL-MCNC: 6.3 GM/DL — SIGNIFICANT CHANGE UP (ref 6–8.3)
PROTHROM AB SERPL-ACNC: 16 SEC — HIGH (ref 10.5–13.4)
RBC # BLD: 3.47 M/UL — LOW (ref 4.2–5.8)
RBC # FLD: 14.6 % — HIGH (ref 10.3–14.5)
S AUREUS DNA NOSE QL NAA+PROBE: SIGNIFICANT CHANGE UP
SODIUM SERPL-SCNC: 149 MMOL/L — HIGH (ref 135–145)
SODIUM SERPL-SCNC: 149 MMOL/L — HIGH (ref 135–145)
SODIUM SERPL-SCNC: 150 MMOL/L — HIGH (ref 135–145)
SODIUM SERPL-SCNC: 151 MMOL/L — HIGH (ref 135–145)
SPECIMEN SOURCE: SIGNIFICANT CHANGE UP
WBC # BLD: 8.9 K/UL — SIGNIFICANT CHANGE UP (ref 3.8–10.5)
WBC # FLD AUTO: 8.9 K/UL — SIGNIFICANT CHANGE UP (ref 3.8–10.5)

## 2022-09-12 PROCEDURE — 99291 CRITICAL CARE FIRST HOUR: CPT

## 2022-09-12 PROCEDURE — 71045 X-RAY EXAM CHEST 1 VIEW: CPT | Mod: 26

## 2022-09-12 RX ORDER — POLYETHYLENE GLYCOL 3350 17 G/17G
17 POWDER, FOR SOLUTION ORAL DAILY
Refills: 0 | Status: ACTIVE | OUTPATIENT
Start: 2022-09-12 | End: 2023-08-11

## 2022-09-12 RX ORDER — ASPIRIN/CALCIUM CARB/MAGNESIUM 324 MG
81 TABLET ORAL DAILY
Refills: 0 | Status: DISCONTINUED | OUTPATIENT
Start: 2022-09-12 | End: 2022-09-12

## 2022-09-12 RX ORDER — DEXTROSE 50 % IN WATER 50 %
12.5 SYRINGE (ML) INTRAVENOUS ONCE
Refills: 0 | Status: DISCONTINUED | OUTPATIENT
Start: 2022-09-12 | End: 2022-09-14

## 2022-09-12 RX ORDER — SODIUM CHLORIDE 9 MG/ML
1000 INJECTION, SOLUTION INTRAVENOUS
Refills: 0 | Status: DISCONTINUED | OUTPATIENT
Start: 2022-09-12 | End: 2022-09-14

## 2022-09-12 RX ORDER — SODIUM CHLORIDE 9 MG/ML
500 INJECTION, SOLUTION INTRAVENOUS ONCE
Refills: 0 | Status: COMPLETED | OUTPATIENT
Start: 2022-09-12 | End: 2022-09-12

## 2022-09-12 RX ORDER — SODIUM ZIRCONIUM CYCLOSILICATE 10 G/10G
10 POWDER, FOR SUSPENSION ORAL ONCE
Refills: 0 | Status: COMPLETED | OUTPATIENT
Start: 2022-09-12 | End: 2022-09-12

## 2022-09-12 RX ORDER — INSULIN LISPRO 100/ML
VIAL (ML) SUBCUTANEOUS EVERY 6 HOURS
Refills: 0 | Status: DISCONTINUED | OUTPATIENT
Start: 2022-09-12 | End: 2022-09-14

## 2022-09-12 RX ORDER — PANTOPRAZOLE SODIUM 20 MG/1
40 TABLET, DELAYED RELEASE ORAL
Refills: 0 | Status: DISCONTINUED | OUTPATIENT
Start: 2022-09-12 | End: 2022-09-12

## 2022-09-12 RX ORDER — CHLORHEXIDINE GLUCONATE 213 G/1000ML
1 SOLUTION TOPICAL
Refills: 0 | Status: DISCONTINUED | OUTPATIENT
Start: 2022-09-12 | End: 2022-09-12

## 2022-09-12 RX ORDER — METOPROLOL TARTRATE 50 MG
2.5 TABLET ORAL ONCE
Refills: 0 | Status: DISCONTINUED | OUTPATIENT
Start: 2022-09-12 | End: 2022-09-12

## 2022-09-12 RX ORDER — PANTOPRAZOLE SODIUM 20 MG/1
40 TABLET, DELAYED RELEASE ORAL DAILY
Refills: 0 | Status: DISCONTINUED | OUTPATIENT
Start: 2022-09-12 | End: 2022-09-18

## 2022-09-12 RX ORDER — SODIUM CHLORIDE 9 MG/ML
10 INJECTION INTRAMUSCULAR; INTRAVENOUS; SUBCUTANEOUS
Refills: 0 | Status: DISCONTINUED | OUTPATIENT
Start: 2022-09-12 | End: 2022-09-19

## 2022-09-12 RX ORDER — DEXTROSE 50 % IN WATER 50 %
25 SYRINGE (ML) INTRAVENOUS ONCE
Refills: 0 | Status: DISCONTINUED | OUTPATIENT
Start: 2022-09-12 | End: 2022-09-14

## 2022-09-12 RX ORDER — LEVETIRACETAM 250 MG/1
500 TABLET, FILM COATED ORAL EVERY 12 HOURS
Refills: 0 | Status: DISCONTINUED | OUTPATIENT
Start: 2022-09-12 | End: 2022-09-15

## 2022-09-12 RX ORDER — DEXTROSE 50 % IN WATER 50 %
15 SYRINGE (ML) INTRAVENOUS ONCE
Refills: 0 | Status: DISCONTINUED | OUTPATIENT
Start: 2022-09-12 | End: 2022-09-14

## 2022-09-12 RX ORDER — ASPIRIN/CALCIUM CARB/MAGNESIUM 324 MG
81 TABLET ORAL DAILY
Refills: 0 | Status: DISCONTINUED | OUTPATIENT
Start: 2022-09-12 | End: 2022-10-04

## 2022-09-12 RX ORDER — GLUCAGON INJECTION, SOLUTION 0.5 MG/.1ML
1 INJECTION, SOLUTION SUBCUTANEOUS ONCE
Refills: 0 | Status: DISCONTINUED | OUTPATIENT
Start: 2022-09-12 | End: 2022-10-04

## 2022-09-12 RX ADMIN — PIPERACILLIN AND TAZOBACTAM 25 GRAM(S): 4; .5 INJECTION, POWDER, LYOPHILIZED, FOR SOLUTION INTRAVENOUS at 17:54

## 2022-09-12 RX ADMIN — LEVETIRACETAM 420 MILLIGRAM(S): 250 TABLET, FILM COATED ORAL at 06:06

## 2022-09-12 RX ADMIN — HEPARIN SODIUM 5000 UNIT(S): 5000 INJECTION INTRAVENOUS; SUBCUTANEOUS at 06:05

## 2022-09-12 RX ADMIN — Medication 6 MILLIGRAM(S): at 21:12

## 2022-09-12 RX ADMIN — HEPARIN SODIUM 5000 UNIT(S): 5000 INJECTION INTRAVENOUS; SUBCUTANEOUS at 17:53

## 2022-09-12 RX ADMIN — LEVETIRACETAM 420 MILLIGRAM(S): 250 TABLET, FILM COATED ORAL at 17:54

## 2022-09-12 RX ADMIN — POLYETHYLENE GLYCOL 3350 17 GRAM(S): 17 POWDER, FOR SOLUTION ORAL at 12:05

## 2022-09-12 RX ADMIN — Medication 81 MILLIGRAM(S): at 12:05

## 2022-09-12 RX ADMIN — SODIUM ZIRCONIUM CYCLOSILICATE 10 GRAM(S): 10 POWDER, FOR SUSPENSION ORAL at 06:39

## 2022-09-12 RX ADMIN — SODIUM CHLORIDE 500 MILLILITER(S): 9 INJECTION, SOLUTION INTRAVENOUS at 09:28

## 2022-09-12 RX ADMIN — Medication 2: at 12:06

## 2022-09-12 RX ADMIN — PIPERACILLIN AND TAZOBACTAM 200 GRAM(S): 4; .5 INJECTION, POWDER, LYOPHILIZED, FOR SOLUTION INTRAVENOUS at 01:52

## 2022-09-12 RX ADMIN — PANTOPRAZOLE SODIUM 40 MILLIGRAM(S): 20 TABLET, DELAYED RELEASE ORAL at 12:05

## 2022-09-12 RX ADMIN — PIPERACILLIN AND TAZOBACTAM 25 GRAM(S): 4; .5 INJECTION, POWDER, LYOPHILIZED, FOR SOLUTION INTRAVENOUS at 06:06

## 2022-09-12 RX ADMIN — CHLORHEXIDINE GLUCONATE 15 MILLILITER(S): 213 SOLUTION TOPICAL at 17:54

## 2022-09-12 RX ADMIN — CHLORHEXIDINE GLUCONATE 15 MILLILITER(S): 213 SOLUTION TOPICAL at 06:05

## 2022-09-12 RX ADMIN — SODIUM CHLORIDE 125 MILLILITER(S): 9 INJECTION, SOLUTION INTRAVENOUS at 01:51

## 2022-09-12 RX ADMIN — CHLORHEXIDINE GLUCONATE 1 APPLICATION(S): 213 SOLUTION TOPICAL at 06:06

## 2022-09-12 NOTE — PROGRESS NOTE ADULT - ASSESSMENT
82yo M, PMH dementia, htn, TEVAR, PPM for syncopal bradycardia, seizures, CKD, admitted for airway protection requiring intubation, course c/b poor mental status and covid.    Neuro:  - unresponsive except to deep stimuli, ddx includes uremic encephalopathy, hypernatremia, seizures, anoxic brain injury  - no standing sedation medications  - consider EEG   - c/w keppra    Resp:  - Covid with superimposed PNA  - minimal vent requirements, intubated for AMS    CV:  - wean Levo to map 65  - PPM appears functional, no issues with Aortic stent  - c/w home ASA    Renal:  - VASILE on CKD  - hyperk, improved s/p lokelma  - hyperna, consider free water flushes  - minimal UOP s/p fluid bolus  - nephrology following, consider HD    ID  - elevated procal  - c/w zosyn for PNA  - f/u cultures    GI:  - OGT and start feed    ENDO:  - ISS    Heme no issues  - HSQ    Ethics: full code per son (9/12)

## 2022-09-12 NOTE — PROGRESS NOTE ADULT - SUBJECTIVE AND OBJECTIVE BOX
INTERVAL HPI/OVERNIGHT EVENTS:    Intubated in the field, found unresponsive and obtunded.  Now COVID-19 Positive  On norepinephrine 0.05    Will consult nephrology for VASILE.    CENTRAL LINE: [ x ] YES [ ] NO  LOCATION:   R subclavian 22  CASILLAS: [ x] YES [ ] NO      A-LINE:  [ ] YES [ x] NO  LOCATION:       GLOBAL ISSUE/BEST PRACTICE:  Analgesia: Fentanyl  Sedation:  HOB elevation: yes  Stress ulcer prophylaxis: pantoprazole   Suspension  VTE prophylaxis: HSQ  Oral Care: Chlorhexidine  Glycemic control:   Nutrition:Diet, NPO (22 @ 21:30) [Active]    REVIEW OF SYSTEMS: [x] Unable to obtain because: intubated and sedated    PHYSICAL EXAM:  Gen: intubated and sedated  HEENT: Intubated with ETT  CARD -s1s2, RRR, no M,G,R;   PULM - Coarse vented breath sounds, symmetric breath sounds;   ABD -  +BS, ND, NT, soft, no guarding, no rebound, no masses;   EXT - symmetric pulses, no edema;   NEURO - no focal neuro deficits     ICU Vital Signs Last 24 Hrs  T(C): 35.2 (12 Sep 2022 07:30), Max: 39.4 (11 Sep 2022 19:52)  T(F): 95.3 (12 Sep 2022 07:30), Max: 102.9 (11 Sep 2022 19:52)  HR: 61 (12 Sep 2022 08:30) (61 - 111)  BP: 121/83 (12 Sep 2022 08:30) (59/36 - 152/129)  BP(mean): 96 (12 Sep 2022 08:30) (77 - 118)  ABP: --  ABP(mean): --  RR: 17 (12 Sep 2022 08:30) (0 - 28)  SpO2: 100% (12 Sep 2022 08:30) (89% - 100%)    O2 Parameters below as of 11 Sep 2022 22:20  Patient On (Oxygen Delivery Method): ventilator,tv 450,ac 14,peep 5.    O2 Concentration (%): 40      I&O's Detail    11 Sep 2022 07:01  -  12 Sep 2022 07:00  --------------------------------------------------------  IN:    IV PiggyBack: 600 mL    Lactated Ringers: 750 mL    Norepinephrine: 108.5 mL  Total IN: 1458.5 mL    OUT:    FentaNYL: 0 mL    Indwelling Catheter - Urethral (mL): 235 mL  Total OUT: 235 mL    Total NET: 1223.5 mL        MEDICATIONS  NEURO  Meds: fentaNYL   Infusion. 0.5 MICROgram(s)/kG/Hr (4 mL/Hr) IV Continuous <Continuous>  levETIRAcetam  IVPB 500 milliGRAM(s) IV Intermittent every 12 hours    RESPIRATORY  ABG - ( 12 Sep 2022 08:04 )  pH: x     /  pCO2: x     /  pO2: x     / HCO3: x     / Base Excess: x     /  SaO2: x       Lactate: 2.3    Vent settings: 14/450/5/50  Meds:     CARDIOVASCULAR  Meds: norepinephrine Infusion 1 MICROgram(s)/kG/Min (150 mL/Hr) IV Continuous <Continuous>    GI/NUTRITION  Meds: pantoprazole   Suspension 40 milliGRAM(s) Enteral Tube daily  polyethylene glycol 3350 17 Gram(s) Oral daily    GENITOURINARY  Meds: lactated ringers. 1000 milliLiter(s) IV Continuous <Continuous>  sodium chloride 0.9% lock flush 10 milliLiter(s) IV Push every 1 hour PRN Pre/post blood products, medications, blood draw, and to maintain line patency  sodium chloride 0.9% lock flush 10 milliLiter(s) IV Push every 1 hour PRN Pre/post blood products, medications, blood draw, and to maintain line patency    HEMATOLOGIC  Meds: aspirin  chewable 81 milliGRAM(s) Enteral Tube daily  heparin   Injectable 5000 Unit(s) SubCutaneous every 12 hours    [x] VTE Prophylaxis  INFECTIOUS DISEASES  Meds: piperacillin/tazobactam IVPB.. 3.375 Gram(s) IV Intermittent every 12 hours    ENDOCRINE  CAPILLARY BLOOD GLUCOSE  Meds:     OTHER MEDICATIONS:  chlorhexidine 0.12% Liquid 15 milliLiter(s) Oral Mucosa every 12 hours  chlorhexidine 2% Cloths 1 Application(s) Topical <User Schedule>  :    LABS:                        10.7   8.90  )-----------( 254      ( 12 Sep 2022 04:30 )             35.3      09-12    150<H>  |  119<H>  |  113<H>  ----------------------------<  283<H>  4.9   |  21<L>  |  6.98<H>    Ca    9.2      12 Sep 2022 08:04  Phos  4.8       Mg     3.3         TPro  6.3  /  Alb  1.9<L>  /  TBili  0.8  /  DBili  x   /  AST  60<H>  /  ALT  37  /  AlkPhos  57  12  PT/INR - ( 12 Sep 2022 00:43 )   PT: 16.0 sec;   INR: 1.33 ratio    PTT - ( 12 Sep 2022 00:43 )  PTT:25.7 sec  Urinalysis Basic - ( 11 Sep 2022 21:20 )    Color: Yellow / Appearance: Slightly Turbid / S.020 / pH: x  Gluc: x / Ketone: Trace  / Bili: Negative / Urobili: 1 mg/dL   Blood: x / Protein: 100 mg/dL / Nitrite: Negative   Leuk Esterase: Trace / RBC: 0-2 /HPF / WBC 0-2   Sq Epi: x / Non Sq Epi: Occasional / Bacteria: Occasional    Mode: AC/ CMV (Assist Control/ Continuous Mandatory Ventilation), RR (machine): 14, TV (machine): 450, FiO2: 50, PEEP: 5, ITime: 0.9, MAP: 8, PIP: 18    RADIOLOGY & ADDITIONAL STUDIES:

## 2022-09-12 NOTE — CONSULT NOTE ADULT - SUBJECTIVE AND OBJECTIVE BOX
Patient chart reviewed, full consult to follow.     Cr 1.8 in 12/18; will follow for emergent HD indications.    Thank you for the courtesy of this consultation. [facility] NEPHROLOGY SERVICES, [facility]  NEPHROLOGY AND HYPERTENSION  [address]  [address]  [place] [address]  [phone]    MD TESSY ESPINOSA MD ANDREY GONCHARUK, MD MADHU KORRAPATI, MD YELENA ROSENBERG, MD BINNY KOSHY, MD CHRISTOPHER CAPUTO, MD EDWARD BOVER, MD      Information from chart:  "Patient is a 81y old  Male who presents with a chief complaint of Encephalopathy, acute respiratory failure (12 Sep 2022 09:13)    HPI:  82yo M, PMH dementia, htn, TEVAR, PPM for syncopal bradycardia, ? sizures?and CKD (baseline Cr appears to be ~1.8?), on Eliquis (unclear why), BIBEMS to ED after being found unresponsive at home. On EMS arrival pt obtunded and not breathing. Pt intubated in the field. On arrival in ED, pt found to be Covid+. CTH with no acute pathology. CT chest with diffuse b/l GGO. ICU consulted for further management.      He has been weak and not eating since discharge from [facility] for unclear reasons.  He was normal with moving all his extremities and talking and moving with no issues just weak and had no apatite  (11 Sep 2022 21:45)   "      Intubated; sedated;   Trend Bun/Cr  22 @ 08:04  BUN/CR -  113<H> / 6.98<H>  22 @ 04:30  BUN/CR -  109<H> / 6.93<H>  22 @ 00:15  BUN/CR -  111<H> / 6.69<H>  22 @ 17:50  BUN/CR -  103<H> / 7.15<H>    PAST MEDICAL & SURGICAL HISTORY:  Hypertension, unspecified type      Descending aortic aneurysm      Syncope, unspecified syncope type      Anxiety      Renal insufficiency      Bradycardia      H/O cardiac pacemaker  medtronic        FAMILY HISTORY:    Allergies    No Known Allergies    Intolerances      Home Medications:  aspirin 81 mg oral delayed release tablet: 1 tab(s) orally once a day (13 Dec 2018 12:32)  Augmentin 500 mg-125 mg oral tablet: 1 tab(s) orally every 12 hours x 5 days (13 Dec 2018 12:32)  docusate sodium 100 mg oral capsule: 1 cap(s) orally 2 times a day- hold for loose stool (13 Dec 2018 12:32)  fludrocortisone 0.1 mg oral tablet: 1 tab(s) orally once a day (13 Dec 2018 12:31)  labetalol 100 mg oral tablet: 1 tab(s) orally every 12 hours, As Needed for systolic BP &gt; 180 (13 Dec 2018 12:32)  levETIRAcetam 500 mg oral tablet, extended release: 0.5 tab(s) orally once a day (13 Dec 2018 12:31)  pantoprazole 40 mg oral delayed release tablet: 1 tab(s) orally once a day (before a meal) (13 Dec 2018 12:32)  polyethylene glycol 3350 oral powder for reconstitution: 17 gram(s) orally once a day, As Needed for constipation (13 Dec 2018 12:32)  senna oral tablet: 2 tab(s) orally once a day (at bedtime)- hold for loose stool (13 Dec 2018 12:32)  sertraline 50 mg oral tablet: 1 tab(s) orally once a day (13 Dec 2018 12:32)  testosterone 4 mg/24 hr transdermal film, extended release: 1 patch transdermal once a day x 10 days (13 Dec 2018 12:32)    MEDICATIONS  (STANDING):  aspirin  chewable 81 milliGRAM(s) Enteral Tube daily  chlorhexidine 0.12% Liquid 15 milliLiter(s) Oral Mucosa every 12 hours  chlorhexidine 2% Cloths 1 Application(s) Topical <User Schedule>  dexAMETHasone  Injectable 6 milliGRAM(s) IV Push daily  dextrose 5%. 1000 milliLiter(s) (50 mL/Hr) IV Continuous <Continuous>  dextrose 5%. 1000 milliLiter(s) (100 mL/Hr) IV Continuous <Continuous>  dextrose 50% Injectable 25 Gram(s) IV Push once  dextrose 50% Injectable 12.5 Gram(s) IV Push once  dextrose 50% Injectable 25 Gram(s) IV Push once  fentaNYL   Infusion. 0.5 MICROgram(s)/kG/Hr (4 mL/Hr) IV Continuous <Continuous>  glucagon  Injectable 1 milliGRAM(s) IntraMuscular once  heparin   Injectable 5000 Unit(s) SubCutaneous every 12 hours  insulin lispro (ADMELOG) corrective regimen sliding scale   SubCutaneous every 6 hours  lactated ringers. 1000 milliLiter(s) (125 mL/Hr) IV Continuous <Continuous>  levETIRAcetam  IVPB 500 milliGRAM(s) IV Intermittent every 12 hours  norepinephrine Infusion 1 MICROgram(s)/kG/Min (150 mL/Hr) IV Continuous <Continuous>  pantoprazole   Suspension 40 milliGRAM(s) Enteral Tube daily  piperacillin/tazobactam IVPB.. 3.375 Gram(s) IV Intermittent every 12 hours  polyethylene glycol 3350 17 Gram(s) Oral daily    MEDICATIONS  (PRN):  dextrose Oral Gel 15 Gram(s) Oral once PRN Blood Glucose LESS THAN 70 milliGRAM(s)/deciliter  sodium chloride 0.9% lock flush 10 milliLiter(s) IV Push every 1 hour PRN Pre/post blood products, medications, blood draw, and to maintain line patency  sodium chloride 0.9% lock flush 10 milliLiter(s) IV Push every 1 hour PRN Pre/post blood products, medications, blood draw, and to maintain line patency    Vital Signs Last 24 Hrs  T(C): 35.2 (12 Sep 2022 07:30), Max: 39.4 (11 Sep 2022 19:52)  T(F): 95.3 (12 Sep 2022 07:30), Max: 102.9 (11 Sep 2022 19:52)  HR: 62 (12 Sep 2022 11:30) (61 - 111)  BP: 142/93 (12 Sep 2022 11:30) (59/36 - 152/129)  BP(mean): 109 (12 Sep 2022 11:30) (77 - 118)  RR: 15 (12 Sep 2022 11:30) (0 - 28)  SpO2: 99% (12 Sep 2022 11:30) (89% - 100%)    Parameters below as of 11 Sep 2022 22:20  Patient On (Oxygen Delivery Method): ventilator,tv 450,ac 14,peep 5.    O2 Concentration (%): 40  Mode: AC/ CMV (Assist Control/ Continuous Mandatory Ventilation)  RR (machine): 14  TV (machine): 450  FiO2: 40  PEEP: 5  ITime: 0.9  MAP: 8  PIP: 17    Daily Height in cm: 171.4 (11 Sep 2022 17:42)    Daily     22 @ 07:01  -  22 @ 07:00  --------------------------------------------------------  IN: 1458.5 mL / OUT: 235 mL / NET: 1223.5 mL    22 @ 07:01  -  22 @ 12:00  --------------------------------------------------------  IN: 637.6 mL / OUT: 0 mL / NET: 637.6 mL      CAPILLARY BLOOD GLUCOSE      POCT Blood Glucose.: 223 mg/dL (12 Sep 2022 11:57)    PHYSICAL EXAM:      T(C): 35.2 (22 @ 07:30), Max: 39.4 (22 @ 19:52)  HR: 62 (22 @ 11:30) (61 - 111)  BP: 142/93 (22 @ 11:30) (59/36 - 152/129)  RR: 15 (22 @ 11:30) (0 - 28)  SpO2: 99% (22 @ 11:30) (89% - 100%)  Wt(kg): --  Lungs clear  Heart S1S2  Abd soft NT ND  Extremities:   tr edema                  150<H>  |  119<H>  |  113<H>  ----------------------------<  283<H>  4.9   |  21<L>  |  6.98<H>    Ca    9.2      12 Sep 2022 08:04  Phos  4.8       Mg     3.3         TPro  6.3  /  Alb  1.9<L>  /  TBili  0.8  /  DBili  x   /  AST  60<H>  /  ALT  37  /  AlkPhos  57  12                          10.7   8.90  )-----------( 254      ( 12 Sep 2022 04:30 )             35.3     Creatinine Trend: 6.98<--, 6.93<--, 6.69<--, 7.15<--  Urinalysis Basic - ( 11 Sep 2022 21:20 )    Color: Yellow / Appearance: Slightly Turbid / S.020 / pH: x  Gluc: x / Ketone: Trace  / Bili: Negative / Urobili: 1 mg/dL   Blood: x / Protein: 100 mg/dL / Nitrite: Negative   Leuk Esterase: Trace / RBC: 0-2 /HPF / WBC 0-2   Sq Epi: x / Non Sq Epi: Occasional / Bacteria: Occasional      ABG - ( 12 Sep 2022 09:01 )  pH, Arterial: 7.33  pH, Blood: x     /  pCO2: 33    /  pO2: 94    / HCO3: 17    / Base Excess: -7.6  /  SaO2: 97.8                  Assessment   VASILE CKD sucpected 3-4; pre renal/ risk for ischemic ATN; COVID related tubular injury   Less likely pulm renal disease, will exclude;   Risk for contrast related injury     Plan  Agree with IVF bolus and maintenance   COVID treatment protocol;   Goal MAP >65;   Will follow closely for HD indications    Main Sotelo MD        Thank you for the courtesy of this consultation.

## 2022-09-13 LAB
A1C WITH ESTIMATED AVERAGE GLUCOSE RESULT: 6.7 % — HIGH (ref 4–5.6)
ALBUMIN SERPL ELPH-MCNC: 1.7 G/DL — LOW (ref 3.3–5)
ALP SERPL-CCNC: 57 U/L — SIGNIFICANT CHANGE UP (ref 40–120)
ALT FLD-CCNC: 29 U/L — SIGNIFICANT CHANGE UP (ref 12–78)
ANION GAP SERPL CALC-SCNC: 8 MMOL/L — SIGNIFICANT CHANGE UP (ref 5–17)
AST SERPL-CCNC: 29 U/L — SIGNIFICANT CHANGE UP (ref 15–37)
BILIRUB SERPL-MCNC: 0.6 MG/DL — SIGNIFICANT CHANGE UP (ref 0.2–1.2)
BUN SERPL-MCNC: 128 MG/DL — HIGH (ref 7–23)
CALCIUM SERPL-MCNC: 9.4 MG/DL — SIGNIFICANT CHANGE UP (ref 8.5–10.1)
CHLORIDE SERPL-SCNC: 120 MMOL/L — HIGH (ref 96–108)
CO2 SERPL-SCNC: 22 MMOL/L — SIGNIFICANT CHANGE UP (ref 22–31)
CREAT SERPL-MCNC: 7.25 MG/DL — HIGH (ref 0.5–1.3)
CULTURE RESULTS: NO GROWTH — SIGNIFICANT CHANGE UP
EGFR: 7 ML/MIN/1.73M2 — LOW
ESTIMATED AVERAGE GLUCOSE: 146 MG/DL — HIGH (ref 68–114)
GLUCOSE BLDC GLUCOMTR-MCNC: 157 MG/DL — HIGH (ref 70–99)
GLUCOSE BLDC GLUCOMTR-MCNC: 158 MG/DL — HIGH (ref 70–99)
GLUCOSE BLDC GLUCOMTR-MCNC: 172 MG/DL — HIGH (ref 70–99)
GLUCOSE BLDC GLUCOMTR-MCNC: 192 MG/DL — HIGH (ref 70–99)
GLUCOSE SERPL-MCNC: 138 MG/DL — HIGH (ref 70–99)
HCT VFR BLD CALC: 33.2 % — LOW (ref 39–50)
HGB BLD-MCNC: 10.1 G/DL — LOW (ref 13–17)
MAGNESIUM SERPL-MCNC: 3.5 MG/DL — HIGH (ref 1.6–2.6)
MCHC RBC-ENTMCNC: 30.4 G/DL — LOW (ref 32–36)
MCHC RBC-ENTMCNC: 30.6 PG — SIGNIFICANT CHANGE UP (ref 27–34)
MCV RBC AUTO: 100.6 FL — HIGH (ref 80–100)
NRBC # BLD: 0 /100 WBCS — SIGNIFICANT CHANGE UP (ref 0–0)
PHOSPHATE SERPL-MCNC: 5.7 MG/DL — HIGH (ref 2.5–4.5)
PLATELET # BLD AUTO: 235 K/UL — SIGNIFICANT CHANGE UP (ref 150–400)
POTASSIUM SERPL-MCNC: 4.9 MMOL/L — SIGNIFICANT CHANGE UP (ref 3.5–5.3)
POTASSIUM SERPL-SCNC: 4.9 MMOL/L — SIGNIFICANT CHANGE UP (ref 3.5–5.3)
PROT SERPL-MCNC: 6.3 GM/DL — SIGNIFICANT CHANGE UP (ref 6–8.3)
RBC # BLD: 3.3 M/UL — LOW (ref 4.2–5.8)
RBC # FLD: 14.5 % — SIGNIFICANT CHANGE UP (ref 10.3–14.5)
SODIUM SERPL-SCNC: 150 MMOL/L — HIGH (ref 135–145)
SPECIMEN SOURCE: SIGNIFICANT CHANGE UP
TSH SERPL-MCNC: 0.18 UU/ML — LOW (ref 0.36–3.74)
WBC # BLD: 13.15 K/UL — HIGH (ref 3.8–10.5)
WBC # FLD AUTO: 13.15 K/UL — HIGH (ref 3.8–10.5)

## 2022-09-13 PROCEDURE — 93306 TTE W/DOPPLER COMPLETE: CPT | Mod: 26

## 2022-09-13 PROCEDURE — 99291 CRITICAL CARE FIRST HOUR: CPT | Mod: 25

## 2022-09-13 PROCEDURE — 71045 X-RAY EXAM CHEST 1 VIEW: CPT | Mod: 26

## 2022-09-13 PROCEDURE — 36800 INSERTION OF CANNULA: CPT | Mod: GC

## 2022-09-13 RX ORDER — DESMOPRESSIN ACETATE 0.1 MG/1
20 TABLET ORAL ONCE
Refills: 0 | Status: COMPLETED | OUTPATIENT
Start: 2022-09-13 | End: 2022-09-13

## 2022-09-13 RX ADMIN — DESMOPRESSIN ACETATE 220 MICROGRAM(S): 0.1 TABLET ORAL at 12:10

## 2022-09-13 RX ADMIN — CHLORHEXIDINE GLUCONATE 15 MILLILITER(S): 213 SOLUTION TOPICAL at 17:31

## 2022-09-13 RX ADMIN — CHLORHEXIDINE GLUCONATE 15 MILLILITER(S): 213 SOLUTION TOPICAL at 06:35

## 2022-09-13 RX ADMIN — HEPARIN SODIUM 5000 UNIT(S): 5000 INJECTION INTRAVENOUS; SUBCUTANEOUS at 17:31

## 2022-09-13 RX ADMIN — LEVETIRACETAM 420 MILLIGRAM(S): 250 TABLET, FILM COATED ORAL at 17:32

## 2022-09-13 RX ADMIN — Medication 81 MILLIGRAM(S): at 12:07

## 2022-09-13 RX ADMIN — HEPARIN SODIUM 5000 UNIT(S): 5000 INJECTION INTRAVENOUS; SUBCUTANEOUS at 06:36

## 2022-09-13 RX ADMIN — LEVETIRACETAM 420 MILLIGRAM(S): 250 TABLET, FILM COATED ORAL at 06:35

## 2022-09-13 RX ADMIN — PANTOPRAZOLE SODIUM 40 MILLIGRAM(S): 20 TABLET, DELAYED RELEASE ORAL at 12:14

## 2022-09-13 RX ADMIN — CHLORHEXIDINE GLUCONATE 1 APPLICATION(S): 213 SOLUTION TOPICAL at 06:15

## 2022-09-13 RX ADMIN — PIPERACILLIN AND TAZOBACTAM 25 GRAM(S): 4; .5 INJECTION, POWDER, LYOPHILIZED, FOR SOLUTION INTRAVENOUS at 06:35

## 2022-09-13 RX ADMIN — Medication 1: at 23:52

## 2022-09-13 RX ADMIN — Medication 6 MILLIGRAM(S): at 21:18

## 2022-09-13 RX ADMIN — Medication 150 MICROGRAM(S)/KG/MIN: at 19:25

## 2022-09-13 RX ADMIN — Medication 1: at 17:33

## 2022-09-13 RX ADMIN — POLYETHYLENE GLYCOL 3350 17 GRAM(S): 17 POWDER, FOR SOLUTION ORAL at 12:06

## 2022-09-13 RX ADMIN — Medication 150 MICROGRAM(S)/KG/MIN: at 16:46

## 2022-09-13 RX ADMIN — Medication 1: at 12:07

## 2022-09-13 RX ADMIN — PIPERACILLIN AND TAZOBACTAM 25 GRAM(S): 4; .5 INJECTION, POWDER, LYOPHILIZED, FOR SOLUTION INTRAVENOUS at 17:33

## 2022-09-13 NOTE — DIETITIAN INITIAL EVALUATION ADULT - OTHER INFO
Pt is on COVID-19 isolation, on vent, OGT feeding of Suplena infusing @ 10 ml/hr at present.  Pt c VASILE on CKD, to be initiated on HD as per NP.

## 2022-09-13 NOTE — DIETITIAN INITIAL EVALUATION ADULT - ENTERAL
OGT feeding c Nepro @ 10 mL/hr increase as tolerated by 5 ml q 8 hours to goal rate 40 mL/hr x 24 hrs=960 ml, 1728 polo, 78 gm pro, 701 mL free water, 101% RDIs

## 2022-09-13 NOTE — DIETITIAN INITIAL EVALUATION ADULT - NSFNSGIIOFT_GEN_A_CORE
09-13-22 @ 07:01  -  09-13-22 @ 15:26  --------------------------------------------------------  OUT:  Total OUT: 0 mL    Total NET: 70 mL

## 2022-09-13 NOTE — DIETITIAN INITIAL EVALUATION ADULT - NS FNS WEIGHT CHANGE REASON
As per current adm wt. of 67.4 kg.148.5 LBS, wt. loss of ~ 12 LBS noted from previous adm RD note 11/20/18, wt. of 72.6 kg/160 LBS.

## 2022-09-13 NOTE — PROGRESS NOTE ADULT - SUBJECTIVE AND OBJECTIVE BOX
[facility] NEPHROLOGY SERVICES, [facility]  NEPHROLOGY AND HYPERTENSION  [address]  [address]  [place] [address]  [phone]    MD TESSY ESPINOSA MD ANDREY GONCHARUK, MD MADHU KORRAPATI, MD YELENA ROSENBERG, MD BINNY KOSHY, MD CHRISTOPHER CAPUTO, MD EDWARD BOVER, MD          Patient events noted      MEDICATIONS  (STANDING):  aspirin  chewable 81 milliGRAM(s) Enteral Tube daily  chlorhexidine 0.12% Liquid 15 milliLiter(s) Oral Mucosa every 12 hours  chlorhexidine 2% Cloths 1 Application(s) Topical <User Schedule>  dexAMETHasone  Injectable 6 milliGRAM(s) IV Push daily  dextrose 5%. 1000 milliLiter(s) (100 mL/Hr) IV Continuous <Continuous>  dextrose 5%. 1000 milliLiter(s) (50 mL/Hr) IV Continuous <Continuous>  dextrose 50% Injectable 25 Gram(s) IV Push once  dextrose 50% Injectable 12.5 Gram(s) IV Push once  dextrose 50% Injectable 25 Gram(s) IV Push once  glucagon  Injectable 1 milliGRAM(s) IntraMuscular once  heparin   Injectable 5000 Unit(s) SubCutaneous every 12 hours  insulin lispro (ADMELOG) corrective regimen sliding scale   SubCutaneous every 6 hours  levETIRAcetam  IVPB 500 milliGRAM(s) IV Intermittent every 12 hours  norepinephrine Infusion 1 MICROgram(s)/kG/Min (150 mL/Hr) IV Continuous <Continuous>  pantoprazole   Suspension 40 milliGRAM(s) Enteral Tube daily  piperacillin/tazobactam IVPB.. 3.375 Gram(s) IV Intermittent every 12 hours  polyethylene glycol 3350 17 Gram(s) Oral daily    MEDICATIONS  (PRN):  dextrose Oral Gel 15 Gram(s) Oral once PRN Blood Glucose LESS THAN 70 milliGRAM(s)/deciliter  sodium chloride 0.9% lock flush 10 milliLiter(s) IV Push every 1 hour PRN Pre/post blood products, medications, blood draw, and to maintain line patency  sodium chloride 0.9% lock flush 10 milliLiter(s) IV Push every 1 hour PRN Pre/post blood products, medications, blood draw, and to maintain line patency      09-12-22 @ 07:01  -  09-13-22 @ 07:00  --------------------------------------------------------  IN: 909 mL / OUT: 525 mL / NET: 384 mL    09-13-22 @ 07:01  -  09-13-22 @ 23:01  --------------------------------------------------------  IN: 737.6 mL / OUT: 350 mL / NET: 387.6 mL      PHYSICAL EXAM:      T(C): 36.7 (09-13-22 @ 20:00), Max: 36.7 (09-13-22 @ 20:00)  HR: 79 (09-13-22 @ 21:52) (61 - 84)  BP: 93/67 (09-13-22 @ 21:00) (80/58 - 152/99)  RR: 33 (09-13-22 @ 21:00) (16 - 36)  SpO2: 95% (09-13-22 @ 21:52) (92% - 100%)  Wt(kg): --  Lungs clear  Heart S1S2  Abd soft NT ND  Extremities:   tr edema                                    10.1   13.15 )-----------( 235      ( 13 Sep 2022 03:45 )             33.2     09-13    150<H>  |  120<H>  |  128<H>  ----------------------------<  138<H>  4.9   |  22  |  7.25<H>    Ca    9.4      13 Sep 2022 03:45  Phos  5.7     09-13  Mg     3.5     09-13    TPro  6.3  /  Alb  1.7<L>  /  TBili  0.6  /  DBili  x   /  AST  29  /  ALT  29  /  AlkPhos  57  09-13    ABG - ( 12 Sep 2022 09:01 )  pH, Arterial: 7.33  pH, Blood: x     /  pCO2: 33    /  pO2: 94    / HCO3: 17    / Base Excess: -7.6  /  SaO2: 97.8              LIVER FUNCTIONS - ( 13 Sep 2022 03:45 )  Alb: 1.7 g/dL / Pro: 6.3 gm/dL / ALK PHOS: 57 U/L / ALT: 29 U/L / AST: 29 U/L / GGT: x           Creatinine Trend: 7.25<--, 7.06<--, 6.98<--, 6.93<--, 6.69<--, 7.15<--  Mode: AC/ CMV (Assist Control/ Continuous Mandatory Ventilation)  RR (machine): 14  TV (machine): 450  FiO2: 30  PEEP: 5  ITime: 0.9  MAP: 10  PIP: 23          Assessment   VASILE CKD sucpected 3-4; pre renal/ risk for ischemic ATN; COVID related tubular injury   Less likely pulm renal disease, will exclude;   Risk for contrast related injury   Possible uremic encephalopathy    Plan  Will initiate today  2 hours  Will follow MS  Discussed with niece; benefits, goals and risks; consented.    Main Sotelo MD

## 2022-09-13 NOTE — DIETITIAN INITIAL EVALUATION ADULT - NS FNS DIET ORDER
Diet, NPO with Tube Feed:   Tube Feeding Modality: Nasogastric  Suplena with Carb Steady  Total Volume for 24 Hours (mL): 960  Continuous  Starting Tube Feed Rate {mL per Hour}: 10  Increase Tube Feed Rate by (mL): 10     Every 8 hours  Until Goal Tube Feed Rate (mL per Hour): 40  Tube Feed Duration (in Hours): 24  Tube Feed Start Time: 09:00 (09-13-22 @ 08:22)

## 2022-09-13 NOTE — DIETITIAN INITIAL EVALUATION ADULT - PERTINENT MEDS FT
MEDICATIONS  (STANDING):  aspirin  chewable 81 milliGRAM(s) Enteral Tube daily  chlorhexidine 0.12% Liquid 15 milliLiter(s) Oral Mucosa every 12 hours  chlorhexidine 2% Cloths 1 Application(s) Topical <User Schedule>  dexAMETHasone  Injectable 6 milliGRAM(s) IV Push daily  dextrose 5%. 1000 milliLiter(s) (100 mL/Hr) IV Continuous <Continuous>  dextrose 5%. 1000 milliLiter(s) (50 mL/Hr) IV Continuous <Continuous>  dextrose 50% Injectable 25 Gram(s) IV Push once  dextrose 50% Injectable 12.5 Gram(s) IV Push once  dextrose 50% Injectable 25 Gram(s) IV Push once  glucagon  Injectable 1 milliGRAM(s) IntraMuscular once  heparin   Injectable 5000 Unit(s) SubCutaneous every 12 hours  insulin lispro (ADMELOG) corrective regimen sliding scale   SubCutaneous every 6 hours  levETIRAcetam  IVPB 500 milliGRAM(s) IV Intermittent every 12 hours  norepinephrine Infusion 1 MICROgram(s)/kG/Min (150 mL/Hr) IV Continuous <Continuous>  pantoprazole   Suspension 40 milliGRAM(s) Enteral Tube daily  piperacillin/tazobactam IVPB.. 3.375 Gram(s) IV Intermittent every 12 hours  polyethylene glycol 3350 17 Gram(s) Oral daily    MEDICATIONS  (PRN):  dextrose Oral Gel 15 Gram(s) Oral once PRN Blood Glucose LESS THAN 70 milliGRAM(s)/deciliter  sodium chloride 0.9% lock flush 10 milliLiter(s) IV Push every 1 hour PRN Pre/post blood products, medications, blood draw, and to maintain line patency  sodium chloride 0.9% lock flush 10 milliLiter(s) IV Push every 1 hour PRN Pre/post blood products, medications, blood draw, and to maintain line patency

## 2022-09-13 NOTE — PROGRESS NOTE ADULT - ASSESSMENT
82yo M, PMH dementia, htn, TEVAR, PPM for syncopal bradycardia, seizures, CKD, admitted for airway protection requiring intubation, course c/b poor mental status and COVID.    Neuro: AMS - intubated, not requiring sedation, likely 2/2 uremic encephalopathy; CT head on admission with no acute findings.    - EEG pending; c/w keppra, keep off sedation for now at this time.   Resp:  COVID with superimposed PNA - continue with decadron for now.  On minimal oxygen requirements at this time.   CV: Shock state on norepinephrine - maintain MAP > 65  - PPM appears functional, no issues with Aortic stent  - c/w home ASA  GI - Protonix, start tube feeds in AM.   Renal: VASILE on CKD - may now require HD given patient remains non-responsive possibly 2/2 uremia.  - hyperk, improved s/p lokelma  - hyperna, started on free water boluses  - minimal UOP s/p fluid bolus  - nephrology following, consider HD  ID elevated procal, c/w zosyn for PNA  - f/u cultures  ENDO: ISS  Heme: HSQ  Ethics: full code per son (9/12)  Remains critically ill, intubated, with acute renal failure and intubated for likely metabolic/uremic encephalopathy.

## 2022-09-13 NOTE — DIETITIAN INITIAL EVALUATION ADULT - ORAL INTAKE PTA/DIET HISTORY
As per chart review, pt was unresponsive at home, intubated in the field.  Pt c weakness, not eating since discharge from [facility].

## 2022-09-13 NOTE — DIETITIAN INITIAL EVALUATION ADULT - OTHER CALCULATIONS
Height (cm): 171.4 (09-11)  Weight (kg): 67.4 (09-11)  BMI (kg/m2): 22.9 (09-11)  IBW: 68.4 kg        % IBW:  98%         UBW: 72.6 kg         %UBW: 92%. 09/13, 68.2 kg, wt. gain of 0.8 kg noted since adm. I/O: 909/525(+384)

## 2022-09-13 NOTE — PROGRESS NOTE ADULT - SUBJECTIVE AND OBJECTIVE BOX
INTERVAL HPI/OVERNIGHT EVENTS:    Worsening renal failure, oliguric overnight.      CENTRAL LINE: [ x ] YES [ ] NO  LOCATION:   R subclavian 22  CASILLAS: [ x] YES [ ] NO      A-LINE:  [ ] YES [ x] NO  LOCATION:       GLOBAL ISSUE/BEST PRACTICE:  Analgesia: Fentanyl  Sedation:  HOB elevation: yes  Stress ulcer prophylaxis: pantoprazole   Suspension  VTE prophylaxis: HSQ  Oral Care: Chlorhexidine  Glycemic control:   Nutrition:Diet, NPO (22 @ 21:30) [Active]    REVIEW OF SYSTEMS: [x] Unable to obtain because: intubated and sedated    PHYSICAL EXAM:  Gen: intubated and sedated  HEENT: Intubated with ETT  CARD -s1s2, RRR, no M,G,R;   PULM - Coarse vented breath sounds, symmetric breath sounds;   ABD -  +BS, ND, NT, soft, no guarding, no rebound, no masses;   EXT - symmetric pulses, no edema;   NEURO - no focal neuro deficits   ICU Vital Signs Last 24 Hrs  T(C): 34.2 (13 Sep 2022 08:00), Max: 37.3 (12 Sep 2022 23:00)  T(F): 93.6 (13 Sep 2022 08:00), Max: 99.1 (12 Sep 2022 23:00)  HR: 62 (13 Sep 2022 12:14) (60 - 76)  BP: 132/92 (13 Sep 2022 12:00) (96/74 - 152/99)  BP(mean): 105 (13 Sep 2022 12:00) (82 - 115)  ABP: --  ABP(mean): --  RR: 18 (13 Sep 2022 12:00) (15 - 24)  SpO2: 97% (13 Sep 2022 12:14) (95% - 100%)    O2 Parameters below as of 13 Sep 2022 07:00  Patient On (Oxygen Delivery Method): ventilator          I&O's Detail    12 Sep 2022 07:01  -  13 Sep 2022 07:00  --------------------------------------------------------  IN:    Enteral Tube Flush: 60 mL    IV PiggyBack: 200 mL    Lactated Ringers: 125 mL    Lactated Ringers Bolus: 500 mL    Norepinephrine: 24 mL  Total IN: 909 mL    OUT:    FentaNYL: 0 mL    Indwelling Catheter - Urethral (mL): 525 mL  Total OUT: 525 mL    Total NET: 384 mL      13 Sep 2022 07:01  -  13 Sep 2022 13:40  --------------------------------------------------------  IN:    Enteral Tube Flush: 20 mL    Suplena with Carb Steady: 40 mL  Total IN: 60 mL    OUT:    FentaNYL: 0 mL    Indwelling Catheter - Urethral (mL): 190 mL    Lactated Ringers: 0 mL    Norepinephrine: 0 mL  Total OUT: 190 mL    Total NET: -130 mL        MEDICATIONS  NEURO  Meds: levETIRAcetam  IVPB 500 milliGRAM(s) IV Intermittent every 12 hours    RESPIRATORY  ABG - ( 12 Sep 2022 09:01 )  pH: 7.33  /  pCO2: 33    /  pO2: 94    / HCO3: 17    / Base Excess: -7.6  /  SaO2: 97.8    Lactate: x                Meds:   CARDIOVASCULAR  Meds: norepinephrine Infusion 1 MICROgram(s)/kG/Min (150 mL/Hr) IV Continuous <Continuous>    GI/NUTRITION  Meds: pantoprazole   Suspension 40 milliGRAM(s) Enteral Tube daily  polyethylene glycol 3350 17 Gram(s) Oral daily    GENITOURINARY  Meds: dextrose 5%. 1000 milliLiter(s) IV Continuous <Continuous>  dextrose 5%. 1000 milliLiter(s) IV Continuous <Continuous>  sodium chloride 0.9% lock flush 10 milliLiter(s) IV Push every 1 hour PRN Pre/post blood products, medications, blood draw, and to maintain line patency  sodium chloride 0.9% lock flush 10 milliLiter(s) IV Push every 1 hour PRN Pre/post blood products, medications, blood draw, and to maintain line patency    HEMATOLOGIC  Meds: aspirin  chewable 81 milliGRAM(s) Enteral Tube daily  heparin   Injectable 5000 Unit(s) SubCutaneous every 12 hours    [x] VTE Prophylaxis  INFECTIOUS DISEASES  Meds: piperacillin/tazobactam IVPB.. 3.375 Gram(s) IV Intermittent every 12 hours    ENDOCRINE  CAPILLARY BLOOD GLUCOSE      POCT Blood Glucose.: 172 mg/dL (13 Sep 2022 11:39)  POCT Blood Glucose.: 157 mg/dL (13 Sep 2022 06:31)  POCT Blood Glucose.: 138 mg/dL (12 Sep 2022 23:06)  POCT Blood Glucose.: 147 mg/dL (12 Sep 2022 17:25)    Meds: insulin lispro (ADMELOG) corrective regimen sliding scale   SubCutaneous every 6 hours    OTHER MEDICATIONS:  chlorhexidine 0.12% Liquid 15 milliLiter(s) Oral Mucosa every 12 hours  chlorhexidine 2% Cloths 1 Application(s) Topical <User Schedule>  :    LABS:                        10.1   13.15 )-----------( 235      ( 13 Sep 2022 03:45 )             33.2          150<H>  |  120<H>  |  128<H>  ----------------------------<  138<H>  4.9   |  22  |  7.25<H>    Ca    9.4      13 Sep 2022 03:45  Phos  5.7       Mg     3.5         TPro  6.3  /  Alb  1.7<L>  /  TBili  0.6  /  DBili  x   /  AST  29  /  ALT  29  /  AlkPhos  57  13    PT/INR - ( 12 Sep 2022 00:43 )   PT: 16.0 sec;   INR: 1.33 ratio         PTT - ( 12 Sep 2022 00:43 )  PTT:25.7 sec  Urinalysis Basic - ( 11 Sep 2022 21:20 )    Color: Yellow / Appearance: Slightly Turbid / S.020 / pH: x  Gluc: x / Ketone: Trace  / Bili: Negative / Urobili: 1 mg/dL   Blood: x / Protein: 100 mg/dL / Nitrite: Negative   Leuk Esterase: Trace / RBC: 0-2 /HPF / WBC 0-2   Sq Epi: x / Non Sq Epi: Occasional / Bacteria: Occasional      Culture Results:   No growth ( @ 20:00)  Culture Results:   No growth to date. ( @ 17:50)  Culture Results:   No growth to date. ( @ 17:50)      Mode: CPAP with PS, FiO2: 30, PEEP: 5, PS: 8, MAP: 8      RADIOLOGY & ADDITIONAL STUDIES:

## 2022-09-13 NOTE — DIETITIAN INITIAL EVALUATION ADULT - PERTINENT LABORATORY DATA
09-13    150<H>  |  120<H>  |  128<H>  ----------------------------<  138<H>  4.9   |  22  |  7.25<H>    Ca    9.4      13 Sep 2022 03:45  Phos  5.7     09-13  Mg     3.5     09-13    TPro  6.3  /  Alb  1.7<L>  /  TBili  0.6  /  DBili  x   /  AST  29  /  ALT  29  /  AlkPhos  57  09-13  POCT Blood Glucose.: 172 mg/dL (09-13-22 @ 11:39)  A1C with Estimated Average Glucose Result: 6.7 % <H-DM dx range> (09-13-22 @ 03:45)

## 2022-09-14 LAB
-  AMIKACIN: SIGNIFICANT CHANGE UP
-  AMIKACIN: SIGNIFICANT CHANGE UP
-  AMOXICILLIN/CLAVULANIC ACID: SIGNIFICANT CHANGE UP
-  AMOXICILLIN/CLAVULANIC ACID: SIGNIFICANT CHANGE UP
-  AMPICILLIN/SULBACTAM: SIGNIFICANT CHANGE UP
-  AMPICILLIN/SULBACTAM: SIGNIFICANT CHANGE UP
-  AMPICILLIN: SIGNIFICANT CHANGE UP
-  AMPICILLIN: SIGNIFICANT CHANGE UP
-  AZTREONAM: SIGNIFICANT CHANGE UP
-  AZTREONAM: SIGNIFICANT CHANGE UP
-  CEFAZOLIN: SIGNIFICANT CHANGE UP
-  CEFAZOLIN: SIGNIFICANT CHANGE UP
-  CEFEPIME: SIGNIFICANT CHANGE UP
-  CEFEPIME: SIGNIFICANT CHANGE UP
-  CEFOXITIN: SIGNIFICANT CHANGE UP
-  CEFOXITIN: SIGNIFICANT CHANGE UP
-  CEFTRIAXONE: SIGNIFICANT CHANGE UP
-  CEFTRIAXONE: SIGNIFICANT CHANGE UP
-  CIPROFLOXACIN: SIGNIFICANT CHANGE UP
-  CIPROFLOXACIN: SIGNIFICANT CHANGE UP
-  ERTAPENEM: SIGNIFICANT CHANGE UP
-  ERTAPENEM: SIGNIFICANT CHANGE UP
-  GENTAMICIN: SIGNIFICANT CHANGE UP
-  GENTAMICIN: SIGNIFICANT CHANGE UP
-  IMIPENEM: SIGNIFICANT CHANGE UP
-  IMIPENEM: SIGNIFICANT CHANGE UP
-  LEVOFLOXACIN: SIGNIFICANT CHANGE UP
-  LEVOFLOXACIN: SIGNIFICANT CHANGE UP
-  MEROPENEM: SIGNIFICANT CHANGE UP
-  MEROPENEM: SIGNIFICANT CHANGE UP
-  PIPERACILLIN/TAZOBACTAM: SIGNIFICANT CHANGE UP
-  PIPERACILLIN/TAZOBACTAM: SIGNIFICANT CHANGE UP
-  TOBRAMYCIN: SIGNIFICANT CHANGE UP
-  TOBRAMYCIN: SIGNIFICANT CHANGE UP
-  TRIMETHOPRIM/SULFAMETHOXAZOLE: SIGNIFICANT CHANGE UP
-  TRIMETHOPRIM/SULFAMETHOXAZOLE: SIGNIFICANT CHANGE UP
ALBUMIN SERPL ELPH-MCNC: 1.8 G/DL — LOW (ref 3.3–5)
ALP SERPL-CCNC: 63 U/L — SIGNIFICANT CHANGE UP (ref 40–120)
ALT FLD-CCNC: 34 U/L — SIGNIFICANT CHANGE UP (ref 12–78)
ANION GAP SERPL CALC-SCNC: 13 MMOL/L — SIGNIFICANT CHANGE UP (ref 5–17)
AST SERPL-CCNC: 32 U/L — SIGNIFICANT CHANGE UP (ref 15–37)
AUTO DIFF PNL BLD: NEGATIVE — SIGNIFICANT CHANGE UP
BASOPHILS # BLD AUTO: 0.03 K/UL — SIGNIFICANT CHANGE UP (ref 0–0.2)
BASOPHILS NFR BLD AUTO: 0.2 % — SIGNIFICANT CHANGE UP (ref 0–2)
BILIRUB SERPL-MCNC: 0.5 MG/DL — SIGNIFICANT CHANGE UP (ref 0.2–1.2)
BUN SERPL-MCNC: 114 MG/DL — HIGH (ref 7–23)
C-ANCA SER-ACNC: NEGATIVE — SIGNIFICANT CHANGE UP
CALCIUM SERPL-MCNC: 9.2 MG/DL — SIGNIFICANT CHANGE UP (ref 8.5–10.1)
CHLORIDE SERPL-SCNC: 115 MMOL/L — HIGH (ref 96–108)
CO2 SERPL-SCNC: 21 MMOL/L — LOW (ref 22–31)
CREAT SERPL-MCNC: 5.95 MG/DL — HIGH (ref 0.5–1.3)
CULTURE RESULTS: SIGNIFICANT CHANGE UP
EGFR: 9 ML/MIN/1.73M2 — LOW
EOSINOPHIL # BLD AUTO: 0 K/UL — SIGNIFICANT CHANGE UP (ref 0–0.5)
EOSINOPHIL NFR BLD AUTO: 0 % — SIGNIFICANT CHANGE UP (ref 0–6)
GBM IGG SER-ACNC: <0.2 — SIGNIFICANT CHANGE UP (ref 0–0.9)
GLUCOSE BLDC GLUCOMTR-MCNC: 179 MG/DL — HIGH (ref 70–99)
GLUCOSE BLDC GLUCOMTR-MCNC: 196 MG/DL — HIGH (ref 70–99)
GLUCOSE BLDC GLUCOMTR-MCNC: 210 MG/DL — HIGH (ref 70–99)
GLUCOSE BLDC GLUCOMTR-MCNC: 223 MG/DL — HIGH (ref 70–99)
GLUCOSE SERPL-MCNC: 180 MG/DL — HIGH (ref 70–99)
GRAM STN FLD: SIGNIFICANT CHANGE UP
HBV CORE AB SER-ACNC: SIGNIFICANT CHANGE UP
HBV SURFACE AB SER-ACNC: <3 MIU/ML — LOW
HBV SURFACE AB SER-ACNC: SIGNIFICANT CHANGE UP
HBV SURFACE AG SER-ACNC: SIGNIFICANT CHANGE UP
HCT VFR BLD CALC: 33.6 % — LOW (ref 39–50)
HCV AB S/CO SERPL IA: 0.1 S/CO — SIGNIFICANT CHANGE UP (ref 0–0.99)
HCV AB SERPL-IMP: SIGNIFICANT CHANGE UP
HGB BLD-MCNC: 10.4 G/DL — LOW (ref 13–17)
IMM GRANULOCYTES NFR BLD AUTO: 2 % — HIGH (ref 0–1.5)
LYMPHOCYTES # BLD AUTO: 0.53 K/UL — LOW (ref 1–3.3)
LYMPHOCYTES # BLD AUTO: 3.1 % — LOW (ref 13–44)
MAGNESIUM SERPL-MCNC: 3.3 MG/DL — HIGH (ref 1.6–2.6)
MCHC RBC-ENTMCNC: 30.2 PG — SIGNIFICANT CHANGE UP (ref 27–34)
MCHC RBC-ENTMCNC: 31 G/DL — LOW (ref 32–36)
MCV RBC AUTO: 97.7 FL — SIGNIFICANT CHANGE UP (ref 80–100)
METHOD TYPE: SIGNIFICANT CHANGE UP
METHOD TYPE: SIGNIFICANT CHANGE UP
MONOCYTES # BLD AUTO: 0.93 K/UL — HIGH (ref 0–0.9)
MONOCYTES NFR BLD AUTO: 5.5 % — SIGNIFICANT CHANGE UP (ref 2–14)
NEUTROPHILS # BLD AUTO: 15.12 K/UL — HIGH (ref 1.8–7.4)
NEUTROPHILS NFR BLD AUTO: 89.2 % — HIGH (ref 43–77)
NRBC # BLD: 0 /100 WBCS — SIGNIFICANT CHANGE UP (ref 0–0)
ORGANISM # SPEC MICROSCOPIC CNT: SIGNIFICANT CHANGE UP
P-ANCA SER-ACNC: NEGATIVE — SIGNIFICANT CHANGE UP
PHOSPHATE SERPL-MCNC: 4.5 MG/DL — SIGNIFICANT CHANGE UP (ref 2.5–4.5)
PLATELET # BLD AUTO: 263 K/UL — SIGNIFICANT CHANGE UP (ref 150–400)
POTASSIUM SERPL-MCNC: 4.6 MMOL/L — SIGNIFICANT CHANGE UP (ref 3.5–5.3)
POTASSIUM SERPL-SCNC: 4.6 MMOL/L — SIGNIFICANT CHANGE UP (ref 3.5–5.3)
PROT SERPL-MCNC: 6.5 GM/DL — SIGNIFICANT CHANGE UP (ref 6–8.3)
RBC # BLD: 3.44 M/UL — LOW (ref 4.2–5.8)
RBC # FLD: 14.6 % — HIGH (ref 10.3–14.5)
SODIUM SERPL-SCNC: 149 MMOL/L — HIGH (ref 135–145)
SPECIMEN SOURCE: SIGNIFICANT CHANGE UP
WBC # BLD: 16.95 K/UL — HIGH (ref 3.8–10.5)
WBC # FLD AUTO: 16.95 K/UL — HIGH (ref 3.8–10.5)

## 2022-09-14 PROCEDURE — 99291 CRITICAL CARE FIRST HOUR: CPT

## 2022-09-14 RX ORDER — INSULIN LISPRO 100/ML
VIAL (ML) SUBCUTANEOUS EVERY 6 HOURS
Refills: 0 | Status: DISCONTINUED | OUTPATIENT
Start: 2022-09-14 | End: 2022-10-04

## 2022-09-14 RX ADMIN — LEVETIRACETAM 420 MILLIGRAM(S): 250 TABLET, FILM COATED ORAL at 05:24

## 2022-09-14 RX ADMIN — Medication 2: at 11:55

## 2022-09-14 RX ADMIN — Medication 2: at 05:35

## 2022-09-14 RX ADMIN — HEPARIN SODIUM 5000 UNIT(S): 5000 INJECTION INTRAVENOUS; SUBCUTANEOUS at 05:23

## 2022-09-14 RX ADMIN — LEVETIRACETAM 420 MILLIGRAM(S): 250 TABLET, FILM COATED ORAL at 17:45

## 2022-09-14 RX ADMIN — Medication 4: at 17:45

## 2022-09-14 RX ADMIN — CHLORHEXIDINE GLUCONATE 15 MILLILITER(S): 213 SOLUTION TOPICAL at 17:46

## 2022-09-14 RX ADMIN — Medication 2: at 23:21

## 2022-09-14 RX ADMIN — CHLORHEXIDINE GLUCONATE 15 MILLILITER(S): 213 SOLUTION TOPICAL at 05:25

## 2022-09-14 RX ADMIN — Medication 81 MILLIGRAM(S): at 11:54

## 2022-09-14 RX ADMIN — PIPERACILLIN AND TAZOBACTAM 25 GRAM(S): 4; .5 INJECTION, POWDER, LYOPHILIZED, FOR SOLUTION INTRAVENOUS at 18:00

## 2022-09-14 RX ADMIN — PANTOPRAZOLE SODIUM 40 MILLIGRAM(S): 20 TABLET, DELAYED RELEASE ORAL at 11:54

## 2022-09-14 RX ADMIN — PIPERACILLIN AND TAZOBACTAM 25 GRAM(S): 4; .5 INJECTION, POWDER, LYOPHILIZED, FOR SOLUTION INTRAVENOUS at 05:23

## 2022-09-14 RX ADMIN — Medication 6 MILLIGRAM(S): at 21:08

## 2022-09-14 RX ADMIN — HEPARIN SODIUM 5000 UNIT(S): 5000 INJECTION INTRAVENOUS; SUBCUTANEOUS at 17:46

## 2022-09-14 RX ADMIN — CHLORHEXIDINE GLUCONATE 1 APPLICATION(S): 213 SOLUTION TOPICAL at 05:23

## 2022-09-14 RX ADMIN — POLYETHYLENE GLYCOL 3350 17 GRAM(S): 17 POWDER, FOR SOLUTION ORAL at 15:22

## 2022-09-14 NOTE — PROGRESS NOTE ADULT - SUBJECTIVE AND OBJECTIVE BOX
[facility] NEPHROLOGY SERVICES, [facility]  NEPHROLOGY AND HYPERTENSION  [address]  [address]  [place] [address]  [phone]    MD TESSY ESPINOSA MD ANDREY GONCHARUK, MD MADHU KORRAPATI, MD YELENA ROSENBERG, MD BINNY KOSHY, MD CHRISTOPHER CAPUTO, MD EDWARD BOVER, MD          Patient events noted  No distress vent dependent     MEDICATIONS  (STANDING):  aspirin  chewable 81 milliGRAM(s) Enteral Tube daily  chlorhexidine 0.12% Liquid 15 milliLiter(s) Oral Mucosa every 12 hours  chlorhexidine 2% Cloths 1 Application(s) Topical <User Schedule>  dexAMETHasone  Injectable 6 milliGRAM(s) IV Push daily  glucagon  Injectable 1 milliGRAM(s) IntraMuscular once  heparin   Injectable 5000 Unit(s) SubCutaneous every 12 hours  insulin lispro (ADMELOG) corrective regimen sliding scale   SubCutaneous every 6 hours  levETIRAcetam  IVPB 500 milliGRAM(s) IV Intermittent every 12 hours  pantoprazole   Suspension 40 milliGRAM(s) Enteral Tube daily  piperacillin/tazobactam IVPB.. 3.375 Gram(s) IV Intermittent every 12 hours  polyethylene glycol 3350 17 Gram(s) Oral daily    MEDICATIONS  (PRN):  sodium chloride 0.9% lock flush 10 milliLiter(s) IV Push every 1 hour PRN Pre/post blood products, medications, blood draw, and to maintain line patency  sodium chloride 0.9% lock flush 10 milliLiter(s) IV Push every 1 hour PRN Pre/post blood products, medications, blood draw, and to maintain line patency      09-13-22 @ 07:01 - 09-14-22 @ 07:00  --------------------------------------------------------  IN: 1687.6 mL / OUT: 550 mL / NET: 1137.6 mL    09-14-22 @ 07:01  -  09-14-22 @ 20:41  --------------------------------------------------------  IN: 875 mL / OUT: 660 mL / NET: 215 mL      PHYSICAL EXAM:      T(C): 36.8 (09-14-22 @ 20:30), Max: 37.3 (09-13-22 @ 23:00)  HR: 72 (09-14-22 @ 19:00) (66 - 90)  BP: 93/65 (09-14-22 @ 19:00) (82/61 - 114/85)  RR: 23 (09-14-22 @ 19:00) (18 - 33)  SpO2: 94% (09-14-22 @ 19:00) (92% - 100%)  Wt(kg): --  Lungs clear  Heart S1S2  Abd soft NT ND  Extremities:   1 edema                                    10.4   16.95 )-----------( 263      ( 14 Sep 2022 03:00 )             33.6     09-14    149<H>  |  115<H>  |  114<H>  ----------------------------<  180<H>  4.6   |  21<L>  |  5.95<H>    Ca    9.2      14 Sep 2022 03:00  Phos  4.5     09-14  Mg     3.3     09-14    TPro  6.5  /  Alb  1.8<L>  /  TBili  0.5  /  DBili  x   /  AST  32  /  ALT  34  /  AlkPhos  63  09-14      LIVER FUNCTIONS - ( 14 Sep 2022 03:00 )  Alb: 1.8 g/dL / Pro: 6.5 gm/dL / ALK PHOS: 63 U/L / ALT: 34 U/L / AST: 32 U/L / GGT: x           Creatinine Trend: 5.95<--, 7.25<--, 7.06<--, 6.98<--, 6.93<--, 6.69<--  Mode: AC/ CMV (Assist Control/ Continuous Mandatory Ventilation)  RR (machine): 14  TV (machine): 450  FiO2: 30  PEEP: 5  ITime: 1  MAP: 9  PIP: 16      VASILE CKD sucpected 3-4; pre renal/ risk for ischemic ATN; COVID related tubular injury   Less likely pulm renal disease, will exclude;   Risk for contrast related injury   Possible uremic encephalopathy    Plan  Second HD today  3 hours   Will follow MS Main Stoelo MD

## 2022-09-14 NOTE — PROGRESS NOTE ADULT - SUBJECTIVE AND OBJECTIVE BOX
INTERVAL HPI/OVERNIGHT EVENTS:      s/p HD overnight.     CENTRAL LINE: [ x ] YES [ ] NO  LOCATION:   R subclavian 9/11/22 RIJ HD cath 9/13/22   CASILLAS: [ x] YES [ ] NO      A-LINE:  [ ] YES [ x] NO  LOCATION:       GLOBAL ISSUE/BEST PRACTICE:  Analgesia: Fentanyl  Sedation:  HOB elevation: yes  Stress ulcer prophylaxis: pantoprazole   Suspension  VTE prophylaxis: HSQ  Oral Care: Chlorhexidine  Glycemic control:   Nutrition:Diet, NPO (09-11-22 @ 21:30) [Active]    REVIEW OF SYSTEMS: [x] Unable to obtain because: intubated and sedated    PHYSICAL EXAM:  Gen: intubated and sedated  HEENT: Intubated with ETT  CARD -s1s2, RRR, no M,G,R;   PULM - Coarse vented breath sounds, symmetric breath sounds;   ABD -  +BS, ND, NT, soft, no guarding, no rebound, no masses;   EXT - symmetric pulses, no edema;   NEURO - no focal neuro deficits       ICU Vital Signs Last 24 Hrs  T(C): 36.4 (14 Sep 2022 08:00), Max: 37.3 (13 Sep 2022 23:00)  T(F): 97.6 (14 Sep 2022 08:00), Max: 99.2 (13 Sep 2022 23:00)  HR: 90 (14 Sep 2022 08:00) (62 - 90)  BP: 88/72 (14 Sep 2022 08:00) (80/58 /- 132/92)  BP(mean): 79 (14 Sep 2022 08:00) (64 - 105)  ABP: --  ABP(mean): --  RR: 18 (14 Sep 2022 08:00) (17 - 36)  SpO2: 96% (14 Sep 2022 08:00) (92% - 100%)    O2 Parameters below as of 14 Sep 2022 08:00  Patient On (Oxygen Delivery Method): ventilator,CPAP    O2 Concentration (%): 30      I&O's Detail    13 Sep 2022 07:01  -  14 Sep 2022 07:00  --------------------------------------------------------  IN:    Enteral Tube Flush: 70 mL    Free Water: 750 mL    IV PiggyBack: 450 mL    Nepro with Carb Steady: 325 mL    Norepinephrine: 22.6 mL    Suplena with Carb Steady: 70 mL  Total IN: 1687.6 mL    OUT:    FentaNYL: 0 mL    Indwelling Catheter - Urethral (mL): 535 mL    Lactated Ringers: 0 mL    Other (mL): 15 mL  Total OUT: 550 mL    Total NET: 1137.6 mL      14 Sep 2022 07:01  -  14 Sep 2022 08:45  --------------------------------------------------------  IN:    Nepro with Carb Steady: 60 mL  Total IN: 60 mL    OUT:  Total OUT: 0 mL    Total NET: 60 mL        MEDICATIONS  NEURO  Meds: levETIRAcetam  IVPB 500 milliGRAM(s) IV Intermittent every 12 hours    RESPIRATORY  ABG - ( 12 Sep 2022 09:01 )  pH: 7.33  /  pCO2: 33    /  pO2: 94    / HCO3: 17    / Base Excess: -7.6  /  SaO2: 97.8    Lactate: x                Meds:   CARDIOVASCULAR  Meds: norepinephrine Infusion 1 MICROgram(s)/kG/Min (150 mL/Hr) IV Continuous <Continuous>    GI/NUTRITION  Meds: pantoprazole   Suspension 40 milliGRAM(s) Enteral Tube daily  polyethylene glycol 3350 17 Gram(s) Oral daily    GENITOURINARY  Meds: dextrose 5%. 1000 milliLiter(s) IV Continuous <Continuous>  dextrose 5%. 1000 milliLiter(s) IV Continuous <Continuous>  sodium chloride 0.9% lock flush 10 milliLiter(s) IV Push every 1 hour PRN Pre/post blood products, medications, blood draw, and to maintain line patency  sodium chloride 0.9% lock flush 10 milliLiter(s) IV Push every 1 hour PRN Pre/post blood products, medications, blood draw, and to maintain line patency    HEMATOLOGIC  Meds: aspirin  chewable 81 milliGRAM(s) Enteral Tube daily  heparin   Injectable 5000 Unit(s) SubCutaneous every 12 hours    [x] VTE Prophylaxis  INFECTIOUS DISEASES  Meds: piperacillin/tazobactam IVPB.. 3.375 Gram(s) IV Intermittent every 12 hours    ENDOCRINE  CAPILLARY BLOOD GLUCOSE      POCT Blood Glucose.: 223 mg/dL (14 Sep 2022 05:17)  POCT Blood Glucose.: 192 mg/dL (13 Sep 2022 23:46)  POCT Blood Glucose.: 158 mg/dL (13 Sep 2022 17:27)  POCT Blood Glucose.: 172 mg/dL (13 Sep 2022 11:39)    Meds: insulin lispro (ADMELOG) corrective regimen sliding scale   SubCutaneous every 6 hours    OTHER MEDICATIONS:  chlorhexidine 0.12% Liquid 15 milliLiter(s) Oral Mucosa every 12 hours  chlorhexidine 2% Cloths 1 Application(s) Topical <User Schedule>  :    LABS:                        10.4   16.95 )-----------( 263      ( 14 Sep 2022 03:00 )             33.6      09-14    149<H>  |  115<H>  |  114<H>  ----------------------------<  180<H>  4.6   |  21<L>  |  5.95<H>    Ca    9.2      14 Sep 2022 03:00  Phos  4.5     09-14  Mg     3.3     09-14    TPro  6.5  /  Alb  1.8<L>  /  TBili  0.5  /  DBili  x   /  AST  32  /  ALT  34  /  AlkPhos  63  09-14        Culture Results:   Moderate Klebsiella pneumoniae  Moderate Enterobacter cloacae complex (09-12 @ 09:19)  Culture Results:   No growth (09-11 @ 20:00)  Culture Results:   No growth to date. (09-11 @ 17:50)  Culture Results:   No growth to date. (09-11 @ 17:50)      Mode: CPAP with PS, FiO2: 30, PEEP: 5, PS: 5, ITime: 1, MAP: 7, PIP: 16      RADIOLOGY & ADDITIONAL STUDIES:     INTERVAL HPI/OVERNIGHT EVENTS:      s/p HD overnight. On PS 5/5 taking adequate volumes.  Opens eyes, not following commands yet.      CENTRAL LINE: [ x ] YES [ ] NO  LOCATION:   R subclavian 9/11/22 RIJ HD cath 9/13/22   CASILLAS: [ x] YES [ ] NO      A-LINE:  [ ] YES [ x] NO  LOCATION:       GLOBAL ISSUE/BEST PRACTICE:  Analgesia: Fentanyl  Sedation:  HOB elevation: yes  Stress ulcer prophylaxis: pantoprazole   Suspension  VTE prophylaxis: HSQ  Oral Care: Chlorhexidine  Glycemic control:   Nutrition:Diet, NPO (09-11-22 @ 21:30) [Active]    REVIEW OF SYSTEMS: [x] Unable to obtain because: intubated  PHYSICAL EXAM:  Gen: intubated on no sedation  HEENT: Intubated with ETT  CARD -s1s2, RRR, no M,G,R;   PULM - Coarse vented breath sounds, symmetric breath sounds;   ABD -  +BS, ND, NT, soft, no guarding, no rebound, no masses;   EXT - symmetric pulses, no edema;   NEURO - no focal neuro deficits       ICU Vital Signs Last 24 Hrs  T(C): 36.4 (14 Sep 2022 08:00), Max: 37.3 (13 Sep 2022 23:00)  T(F): 97.6 (14 Sep 2022 08:00), Max: 99.2 (13 Sep 2022 23:00)  HR: 90 (14 Sep 2022 08:00) (62 - 90)  BP: 88/72 (14 Sep 2022 08:00) (80/58 /- 132/92)  BP(mean): 79 (14 Sep 2022 08:00) (64 - 105)  ABP: --  ABP(mean): --  RR: 18 (14 Sep 2022 08:00) (17 - 36)  SpO2: 96% (14 Sep 2022 08:00) (92% - 100%)    O2 Parameters below as of 14 Sep 2022 08:00  Patient On (Oxygen Delivery Method): ventilator,CPAP    O2 Concentration (%): 30      I&O's Detail    13 Sep 2022 07:01  -  14 Sep 2022 07:00  --------------------------------------------------------  IN:    Enteral Tube Flush: 70 mL    Free Water: 750 mL    IV PiggyBack: 450 mL    Nepro with Carb Steady: 325 mL    Norepinephrine: 22.6 mL    Suplena with Carb Steady: 70 mL  Total IN: 1687.6 mL    OUT:    FentaNYL: 0 mL    Indwelling Catheter - Urethral (mL): 535 mL    Lactated Ringers: 0 mL    Other (mL): 15 mL  Total OUT: 550 mL    Total NET: 1137.6 mL      14 Sep 2022 07:01  -  14 Sep 2022 08:45  --------------------------------------------------------  IN:    Nepro with Carb Steady: 60 mL  Total IN: 60 mL    OUT:  Total OUT: 0 mL    Total NET: 60 mL        MEDICATIONS  NEURO  Meds: levETIRAcetam  IVPB 500 milliGRAM(s) IV Intermittent every 12 hours    RESPIRATORY  ABG - ( 12 Sep 2022 09:01 )  pH: 7.33  /  pCO2: 33    /  pO2: 94    / HCO3: 17    / Base Excess: -7.6  /  SaO2: 97.8    Lactate: x                Meds:   CARDIOVASCULAR  Meds: norepinephrine Infusion 1 MICROgram(s)/kG/Min (150 mL/Hr) IV Continuous <Continuous>    GI/NUTRITION  Meds: pantoprazole   Suspension 40 milliGRAM(s) Enteral Tube daily  polyethylene glycol 3350 17 Gram(s) Oral daily    GENITOURINARY  Meds: dextrose 5%. 1000 milliLiter(s) IV Continuous <Continuous>  dextrose 5%. 1000 milliLiter(s) IV Continuous <Continuous>  sodium chloride 0.9% lock flush 10 milliLiter(s) IV Push every 1 hour PRN Pre/post blood products, medications, blood draw, and to maintain line patency  sodium chloride 0.9% lock flush 10 milliLiter(s) IV Push every 1 hour PRN Pre/post blood products, medications, blood draw, and to maintain line patency    HEMATOLOGIC  Meds: aspirin  chewable 81 milliGRAM(s) Enteral Tube daily  heparin   Injectable 5000 Unit(s) SubCutaneous every 12 hours    [x] VTE Prophylaxis  INFECTIOUS DISEASES  Meds: piperacillin/tazobactam IVPB.. 3.375 Gram(s) IV Intermittent every 12 hours    ENDOCRINE  CAPILLARY BLOOD GLUCOSE      POCT Blood Glucose.: 223 mg/dL (14 Sep 2022 05:17)  POCT Blood Glucose.: 192 mg/dL (13 Sep 2022 23:46)  POCT Blood Glucose.: 158 mg/dL (13 Sep 2022 17:27)  POCT Blood Glucose.: 172 mg/dL (13 Sep 2022 11:39)    Meds: insulin lispro (ADMELOG) corrective regimen sliding scale   SubCutaneous every 6 hours    OTHER MEDICATIONS:  chlorhexidine 0.12% Liquid 15 milliLiter(s) Oral Mucosa every 12 hours  chlorhexidine 2% Cloths 1 Application(s) Topical <User Schedule>  :    LABS:                        10.4   16.95 )-----------( 263      ( 14 Sep 2022 03:00 )             33.6      09-14    149<H>  |  115<H>  |  114<H>  ----------------------------<  180<H>  4.6   |  21<L>  |  5.95<H>    Ca    9.2      14 Sep 2022 03:00  Phos  4.5     09-14  Mg     3.3     09-14    TPro  6.5  /  Alb  1.8<L>  /  TBili  0.5  /  DBili  x   /  AST  32  /  ALT  34  /  AlkPhos  63  09-14        Culture Results:   Moderate Klebsiella pneumoniae  Moderate Enterobacter cloacae complex (09-12 @ 09:19)  Culture Results:   No growth (09-11 @ 20:00)  Culture Results:   No growth to date. (09-11 @ 17:50)  Culture Results:   No growth to date. (09-11 @ 17:50)      Mode: CPAP with PS, FiO2: 30, PEEP: 5, PS: 5, ITime: 1, MAP: 7, PIP: 16      RADIOLOGY & ADDITIONAL STUDIES:

## 2022-09-14 NOTE — PROGRESS NOTE ADULT - ASSESSMENT
82yo M, PMH dementia, htn, TEVAR, PPM for syncopal bradycardia, seizures, CKD, admitted for airway protection requiring intubation, course c/b poor mental status and COVID.    Neuro: AMS - intubated, not requiring sedation, likely 2/2 uremic encephalopathy; CT head on admission with no acute findings.    - EEG pending; c/w keppra, keep off sedation for now at this time.   Resp:  COVID with superimposed PNA - continue with decadron for now.  On minimal oxygen requirements at this time.   CV: Shock state on norepinephrine - maintain MAP > 65  - PPM appears functional, no issues with Aortic stent  - c/w home ASA  GI - Protonix, start tube feeds in AM.   Renal: VASILE on CKD - may now require HD given patient remains non-responsive possibly 2/2 uremia.  - hyperk, improved s/p lokelma  - hyperna, started on free water boluses  - minimal UOP s/p fluid bolus  - nephrology following, receiving 2nd round of HD today.   ID elevated procal, c/w zosyn for PNA  - f/u cultures  ENDO: ISS  Heme: HSQ  Ethics: full code per son (9/12)  Remains critically ill, intubated, with acute renal failure and intubated for likely metabolic/uremic encephalopathy.

## 2022-09-15 LAB
ALBUMIN SERPL ELPH-MCNC: 1.8 G/DL — LOW (ref 3.3–5)
ALP SERPL-CCNC: 68 U/L — SIGNIFICANT CHANGE UP (ref 40–120)
ALT FLD-CCNC: 34 U/L — SIGNIFICANT CHANGE UP (ref 12–78)
ANION GAP SERPL CALC-SCNC: 9 MMOL/L — SIGNIFICANT CHANGE UP (ref 5–17)
AST SERPL-CCNC: 37 U/L — SIGNIFICANT CHANGE UP (ref 15–37)
BILIRUB SERPL-MCNC: 0.5 MG/DL — SIGNIFICANT CHANGE UP (ref 0.2–1.2)
BUN SERPL-MCNC: 86 MG/DL — HIGH (ref 7–23)
CALCIUM SERPL-MCNC: 8.6 MG/DL — SIGNIFICANT CHANGE UP (ref 8.5–10.1)
CHLORIDE SERPL-SCNC: 108 MMOL/L — SIGNIFICANT CHANGE UP (ref 96–108)
CO2 SERPL-SCNC: 25 MMOL/L — SIGNIFICANT CHANGE UP (ref 22–31)
CREAT SERPL-MCNC: 4.66 MG/DL — HIGH (ref 0.5–1.3)
EGFR: 12 ML/MIN/1.73M2 — LOW
GLUCOSE BLDC GLUCOMTR-MCNC: 173 MG/DL — HIGH (ref 70–99)
GLUCOSE BLDC GLUCOMTR-MCNC: 186 MG/DL — HIGH (ref 70–99)
GLUCOSE BLDC GLUCOMTR-MCNC: 267 MG/DL — HIGH (ref 70–99)
GLUCOSE SERPL-MCNC: 238 MG/DL — HIGH (ref 70–99)
HCT VFR BLD CALC: 29.4 % — LOW (ref 39–50)
HGB BLD-MCNC: 9.2 G/DL — LOW (ref 13–17)
MAGNESIUM SERPL-MCNC: 3 MG/DL — HIGH (ref 1.6–2.6)
MCHC RBC-ENTMCNC: 31 PG — SIGNIFICANT CHANGE UP (ref 27–34)
MCHC RBC-ENTMCNC: 31.3 G/DL — LOW (ref 32–36)
MCV RBC AUTO: 99 FL — SIGNIFICANT CHANGE UP (ref 80–100)
NRBC # BLD: 0 /100 WBCS — SIGNIFICANT CHANGE UP (ref 0–0)
PHOSPHATE SERPL-MCNC: 4.1 MG/DL — SIGNIFICANT CHANGE UP (ref 2.5–4.5)
PLATELET # BLD AUTO: 250 K/UL — SIGNIFICANT CHANGE UP (ref 150–400)
POTASSIUM SERPL-MCNC: 4.5 MMOL/L — SIGNIFICANT CHANGE UP (ref 3.5–5.3)
POTASSIUM SERPL-SCNC: 4.5 MMOL/L — SIGNIFICANT CHANGE UP (ref 3.5–5.3)
PROT SERPL-MCNC: 6 GM/DL — SIGNIFICANT CHANGE UP (ref 6–8.3)
RBC # BLD: 2.97 M/UL — LOW (ref 4.2–5.8)
RBC # FLD: 14.6 % — HIGH (ref 10.3–14.5)
SODIUM SERPL-SCNC: 142 MMOL/L — SIGNIFICANT CHANGE UP (ref 135–145)
WBC # BLD: 16.32 K/UL — HIGH (ref 3.8–10.5)
WBC # FLD AUTO: 16.32 K/UL — HIGH (ref 3.8–10.5)

## 2022-09-15 PROCEDURE — 99291 CRITICAL CARE FIRST HOUR: CPT

## 2022-09-15 RX ORDER — LEVETIRACETAM 250 MG/1
500 TABLET, FILM COATED ORAL
Refills: 0 | Status: ACTIVE | OUTPATIENT
Start: 2022-09-15 | End: 2023-08-14

## 2022-09-15 RX ORDER — DEXAMETHASONE 0.5 MG/5ML
6 ELIXIR ORAL AT BEDTIME
Refills: 0 | Status: DISCONTINUED | OUTPATIENT
Start: 2022-09-15 | End: 2022-09-18

## 2022-09-15 RX ADMIN — POLYETHYLENE GLYCOL 3350 17 GRAM(S): 17 POWDER, FOR SOLUTION ORAL at 12:01

## 2022-09-15 RX ADMIN — CHLORHEXIDINE GLUCONATE 15 MILLILITER(S): 213 SOLUTION TOPICAL at 05:16

## 2022-09-15 RX ADMIN — HEPARIN SODIUM 5000 UNIT(S): 5000 INJECTION INTRAVENOUS; SUBCUTANEOUS at 05:16

## 2022-09-15 RX ADMIN — PIPERACILLIN AND TAZOBACTAM 25 GRAM(S): 4; .5 INJECTION, POWDER, LYOPHILIZED, FOR SOLUTION INTRAVENOUS at 17:31

## 2022-09-15 RX ADMIN — PIPERACILLIN AND TAZOBACTAM 25 GRAM(S): 4; .5 INJECTION, POWDER, LYOPHILIZED, FOR SOLUTION INTRAVENOUS at 05:17

## 2022-09-15 RX ADMIN — Medication 2: at 12:00

## 2022-09-15 RX ADMIN — Medication 4: at 06:29

## 2022-09-15 RX ADMIN — Medication 81 MILLIGRAM(S): at 12:01

## 2022-09-15 RX ADMIN — HEPARIN SODIUM 5000 UNIT(S): 5000 INJECTION INTRAVENOUS; SUBCUTANEOUS at 17:31

## 2022-09-15 RX ADMIN — LEVETIRACETAM 500 MILLIGRAM(S): 250 TABLET, FILM COATED ORAL at 17:31

## 2022-09-15 RX ADMIN — CHLORHEXIDINE GLUCONATE 1 APPLICATION(S): 213 SOLUTION TOPICAL at 05:16

## 2022-09-15 RX ADMIN — Medication 2: at 17:30

## 2022-09-15 RX ADMIN — CHLORHEXIDINE GLUCONATE 15 MILLILITER(S): 213 SOLUTION TOPICAL at 17:31

## 2022-09-15 RX ADMIN — PANTOPRAZOLE SODIUM 40 MILLIGRAM(S): 20 TABLET, DELAYED RELEASE ORAL at 12:01

## 2022-09-15 RX ADMIN — Medication 6 MILLIGRAM(S): at 21:22

## 2022-09-15 RX ADMIN — LEVETIRACETAM 420 MILLIGRAM(S): 250 TABLET, FILM COATED ORAL at 05:17

## 2022-09-15 NOTE — PROGRESS NOTE ADULT - SUBJECTIVE AND OBJECTIVE BOX
[facility] NEPHROLOGY SERVICES, [facility]  NEPHROLOGY AND HYPERTENSION  [address]  [address]  [place] [address]  [phone]    MD TESSY ESPINOSA MD ANDREY GONCHARUK, MD MADHU KORRAPATI, MD YELENA ROSENBERG, MD BINNY KOSHY, MD CHRISTOPHER CAPUTO, MD EDWARD BOVER, MD          Patient events noted  Vent dependent     MEDICATIONS  (STANDING):  aspirin  chewable 81 milliGRAM(s) Enteral Tube daily  chlorhexidine 0.12% Liquid 15 milliLiter(s) Oral Mucosa every 12 hours  chlorhexidine 2% Cloths 1 Application(s) Topical <User Schedule>  dexAMETHasone     Tablet 6 milliGRAM(s) Oral at bedtime  glucagon  Injectable 1 milliGRAM(s) IntraMuscular once  heparin   Injectable 5000 Unit(s) SubCutaneous every 12 hours  insulin lispro (ADMELOG) corrective regimen sliding scale   SubCutaneous every 6 hours  levETIRAcetam  Solution 500 milliGRAM(s) Enteral Tube two times a day  pantoprazole   Suspension 40 milliGRAM(s) Enteral Tube daily  piperacillin/tazobactam IVPB.. 3.375 Gram(s) IV Intermittent every 12 hours  polyethylene glycol 3350 17 Gram(s) Oral daily    MEDICATIONS  (PRN):  sodium chloride 0.9% lock flush 10 milliLiter(s) IV Push every 1 hour PRN Pre/post blood products, medications, blood draw, and to maintain line patency  sodium chloride 0.9% lock flush 10 milliLiter(s) IV Push every 1 hour PRN Pre/post blood products, medications, blood draw, and to maintain line patency      09-14-22 @ 07:01  -  09-15-22 @ 07:00  --------------------------------------------------------  IN: 1795 mL / OUT: 760 mL / NET: 1035 mL    09-15-22 @ 07:01  -  09-15-22 @ 17:47  --------------------------------------------------------  IN: 380 mL / OUT: 1060 mL / NET: -680 mL      PHYSICAL EXAM:      T(C): 36.5 (09-15-22 @ 15:40), Max: 36.8 (09-14-22 @ 20:30)  HR: 89 (09-15-22 @ 17:25) (62 - 89)  BP: 98/72 (09-15-22 @ 16:00) (83/65 - 118/80)  RR: 28 (09-15-22 @ 16:00) (15 - 33)  SpO2: 96% (09-15-22 @ 17:25) (93% - 100%)  Wt(kg): --  Lungs clear  Heart S1S2  Abd soft NT ND  Extremities:   tr edema                                    9.2    16.32 )-----------( 250      ( 15 Sep 2022 04:00 )             29.4     09-15    142  |  108  |  86<H>  ----------------------------<  238<H>  4.5   |  25  |  4.66<H>    Ca    8.6      15 Sep 2022 04:00  Phos  4.1     09-15  Mg     3.0     09-15    TPro  6.0  /  Alb  1.8<L>  /  TBili  0.5  /  DBili  x   /  AST  37  /  ALT  34  /  AlkPhos  68  09-15      LIVER FUNCTIONS - ( 15 Sep 2022 04:00 )  Alb: 1.8 g/dL / Pro: 6.0 gm/dL / ALK PHOS: 68 U/L / ALT: 34 U/L / AST: 37 U/L / GGT: x           Creatinine Trend: 4.66<--, 5.95<--, 7.25<--, 7.06<--, 6.98<--, 6.93<--  Mode: CPAP with PS  FiO2: 30  PEEP: 5  PS: 5  ITime: 0.9  MAP: 7  PIP: 10      VASILE CKD sucpected 3-4; pre renal/ risk for ischemic ATN; COVID related tubular injury   Less likely pulm renal disease, will exclude;   Risk for contrast related injury   Possible uremic encephalopathy    Plan  Third HD today  3.5  hours   Will follow [facility] MD

## 2022-09-15 NOTE — PROGRESS NOTE ADULT - ASSESSMENT
80yo M, PMH dementia, htn, TEVAR, PPM for syncopal bradycardia, seizures, CKD, admitted for airway protection requiring intubation, course c/b poor mental status and COVID.    Neuro: AMS - intubated, not requiring sedation, likely 2/2 uremic encephalopathy; CT head on admission with no acute findings.    - EEG pending; c/w keppra, keep off sedation for now at this time.   Resp:  COVID with superimposed PNA - continue with decadron for now.  On minimal oxygen requirements at this time.   CV: Shock state on norepinephrine - maintain MAP > 65  - PPM appears functional, no issues with Aortic stent  - c/w home ASA  GI - Protonix, start tube feeds in AM.   Renal: VASILE on CKD - may now require HD given patient remains non-responsive possibly 2/2 uremia.  - hyperk, improved s/p lokelma  - hyperna, started on free water boluses  - minimal UOP s/p fluid bolus  - nephrology following, receiving 3rd session of HD today.   ID elevated procal, c/w zosyn for PNA  - f/u cultures  ENDO: ISS  Heme: HSQ  Ethics: full code per son (9/12)  Remains critically ill, intubated, with acute renal failure and intubated for likely metabolic/uremic encephalopathy.

## 2022-09-15 NOTE — PROGRESS NOTE ADULT - SUBJECTIVE AND OBJECTIVE BOX
INTERVAL HPI/OVERNIGHT EVENTS:    No acute overnight events, awaiting for HD today.    On PS 5/5 taking adequate volumes.  Opens eyes, responding to pain, not following commands yet.      CENTRAL LINE: [ x ] YES [ ] NO  LOCATION:   R subclavian 9/11/22 RIJ HD cath 9/13/22   CASILLAS: [ x] YES [ ] NO      A-LINE:  [ ] YES [ x] NO  LOCATION:       GLOBAL ISSUE/BEST PRACTICE:  Analgesia: Fentanyl  Sedation:  HOB elevation: yes  Stress ulcer prophylaxis: pantoprazole   Suspension  VTE prophylaxis: HSQ  Oral Care: Chlorhexidine  Glycemic control:   Nutrition:Diet, NPO (09-11-22 @ 21:30) [Active] Tube feeds    REVIEW OF SYSTEMS: [x] Unable to obtain because: intubated    PHYSICAL EXAM:  Gen: intubated on no sedation  HEENT: Intubated with ETT  CARD -s1s2, RRR, no M,G,R;   PULM - Coarse vented breath sounds, symmetric breath sounds;   ABD -  +BS, ND, NT, soft, no guarding, no rebound, no masses;   EXT - symmetric pulses, no edema;   NEURO - no focal neuro deficits     ICU Vital Signs Last 24 Hrs  T(C): 36.3 (15 Sep 2022 20:00), Max: 36.6 (15 Sep 2022 00:18)  T(F): 97.4 (15 Sep 2022 20:00), Max: 97.9 (15 Sep 2022 00:18)  HR: 86 (15 Sep 2022 21:00) (62 - 92)  BP: 108/68 (15 Sep 2022 21:00) (83/65 - 118/80)  BP(mean): 82 (15 Sep 2022 21:00) (72 - 92)  ABP: --  ABP(mean): --  RR: 24 (15 Sep 2022 21:00) (15 - 34)  SpO2: 94% (15 Sep 2022 21:00) (89% - 100%)    O2 Parameters below as of 15 Sep 2022 18:00  Patient On (Oxygen Delivery Method): Pt swiched back to a/c mode by RRT    O2 Concentration (%): 40      I&O's Detail    14 Sep 2022 07:01  -  15 Sep 2022 07:00  --------------------------------------------------------  IN:    Enteral Tube Flush: 750 mL    Free Water: 250 mL    Nepro with Carb Steady: 795 mL  Total IN: 1795 mL    OUT:    Indwelling Catheter - Urethral (mL): 260 mL    Other (mL): 500 mL  Total OUT: 760 mL    Total NET: 1035 mL      15 Sep 2022 07:01  -  15 Sep 2022 21:41  --------------------------------------------------------  IN:    Enteral Tube Flush: 120 mL    IV PiggyBack: 100 mL    Nepro with Carb Steady: 520 mL  Total IN: 740 mL    OUT:    Free Water: 0 mL    Indwelling Catheter - Urethral (mL): 95 mL    Other (mL): 1000 mL  Total OUT: 1095 mL    Total NET: -355 mL        MEDICATIONS  NEURO  Meds: levETIRAcetam  Solution 500 milliGRAM(s) Enteral Tube two times a day    RESPIRATORY    Meds:   CARDIOVASCULAR  Meds:   GI/NUTRITION  Meds: pantoprazole   Suspension 40 milliGRAM(s) Enteral Tube daily  polyethylene glycol 3350 17 Gram(s) Oral daily    GENITOURINARY  Meds: sodium chloride 0.9% lock flush 10 milliLiter(s) IV Push every 1 hour PRN Pre/post blood products, medications, blood draw, and to maintain line patency  sodium chloride 0.9% lock flush 10 milliLiter(s) IV Push every 1 hour PRN Pre/post blood products, medications, blood draw, and to maintain line patency    HEMATOLOGIC  Meds: aspirin  chewable 81 milliGRAM(s) Enteral Tube daily  heparin   Injectable 5000 Unit(s) SubCutaneous every 12 hours    [x] VTE Prophylaxis  INFECTIOUS DISEASES  Meds: piperacillin/tazobactam IVPB.. 3.375 Gram(s) IV Intermittent every 12 hours    ENDOCRINE  CAPILLARY BLOOD GLUCOSE      POCT Blood Glucose.: 186 mg/dL (15 Sep 2022 17:26)  POCT Blood Glucose.: 173 mg/dL (15 Sep 2022 11:47)  POCT Blood Glucose.: 267 mg/dL (15 Sep 2022 08:18)  POCT Blood Glucose.: 179 mg/dL (14 Sep 2022 23:13)    Meds: insulin lispro (ADMELOG) corrective regimen sliding scale   SubCutaneous every 6 hours    OTHER MEDICATIONS:  chlorhexidine 0.12% Liquid 15 milliLiter(s) Oral Mucosa every 12 hours  chlorhexidine 2% Cloths 1 Application(s) Topical <User Schedule>  :    LABS:                        9.2    16.32 )-----------( 250      ( 15 Sep 2022 04:00 )             29.4      09-15    142  |  108  |  86<H>  ----------------------------<  238<H>  4.5   |  25  |  4.66<H>    Ca    8.6      15 Sep 2022 04:00  Phos  4.1     09-15  Mg     3.0     09-15    TPro  6.0  /  Alb  1.8<L>  /  TBili  0.5  /  DBili  x   /  AST  37  /  ALT  34  /  AlkPhos  68  09-15            Mode: AC/ CMV (Assist Control/ Continuous Mandatory Ventilation), RR (machine): 14, TV (machine): 450, FiO2: 40, PEEP: 5, ITime: 0.9, MAP: 7, PIP: 18      RADIOLOGY & ADDITIONAL STUDIES:

## 2022-09-16 LAB
ALBUMIN SERPL ELPH-MCNC: 1.9 G/DL — LOW (ref 3.3–5)
ALP SERPL-CCNC: 70 U/L — SIGNIFICANT CHANGE UP (ref 40–120)
ALT FLD-CCNC: 38 U/L — SIGNIFICANT CHANGE UP (ref 12–78)
ANION GAP SERPL CALC-SCNC: 9 MMOL/L — SIGNIFICANT CHANGE UP (ref 5–17)
AST SERPL-CCNC: 41 U/L — HIGH (ref 15–37)
BILIRUB SERPL-MCNC: 0.6 MG/DL — SIGNIFICANT CHANGE UP (ref 0.2–1.2)
BUN SERPL-MCNC: 51 MG/DL — HIGH (ref 7–23)
CALCIUM SERPL-MCNC: 8.5 MG/DL — SIGNIFICANT CHANGE UP (ref 8.5–10.1)
CHLORIDE SERPL-SCNC: 105 MMOL/L — SIGNIFICANT CHANGE UP (ref 96–108)
CO2 SERPL-SCNC: 28 MMOL/L — SIGNIFICANT CHANGE UP (ref 22–31)
CREAT SERPL-MCNC: 3.3 MG/DL — HIGH (ref 0.5–1.3)
EGFR: 18 ML/MIN/1.73M2 — LOW
GLUCOSE BLDC GLUCOMTR-MCNC: 145 MG/DL — HIGH (ref 70–99)
GLUCOSE BLDC GLUCOMTR-MCNC: 158 MG/DL — HIGH (ref 70–99)
GLUCOSE BLDC GLUCOMTR-MCNC: 168 MG/DL — HIGH (ref 70–99)
GLUCOSE BLDC GLUCOMTR-MCNC: 250 MG/DL — HIGH (ref 70–99)
GLUCOSE SERPL-MCNC: 221 MG/DL — HIGH (ref 70–99)
HCT VFR BLD CALC: 29.2 % — LOW (ref 39–50)
HGB BLD-MCNC: 9.2 G/DL — LOW (ref 13–17)
MAGNESIUM SERPL-MCNC: 2.7 MG/DL — HIGH (ref 1.6–2.6)
MCHC RBC-ENTMCNC: 31.1 PG — SIGNIFICANT CHANGE UP (ref 27–34)
MCHC RBC-ENTMCNC: 31.5 G/DL — LOW (ref 32–36)
MCV RBC AUTO: 98.6 FL — SIGNIFICANT CHANGE UP (ref 80–100)
NRBC # BLD: 0 /100 WBCS — SIGNIFICANT CHANGE UP (ref 0–0)
PHOSPHATE SERPL-MCNC: 3.6 MG/DL — SIGNIFICANT CHANGE UP (ref 2.5–4.5)
PLATELET # BLD AUTO: 254 K/UL — SIGNIFICANT CHANGE UP (ref 150–400)
POTASSIUM SERPL-MCNC: 4.1 MMOL/L — SIGNIFICANT CHANGE UP (ref 3.5–5.3)
POTASSIUM SERPL-SCNC: 4.1 MMOL/L — SIGNIFICANT CHANGE UP (ref 3.5–5.3)
PROT SERPL-MCNC: 6.2 GM/DL — SIGNIFICANT CHANGE UP (ref 6–8.3)
RBC # BLD: 2.96 M/UL — LOW (ref 4.2–5.8)
RBC # FLD: 14.1 % — SIGNIFICANT CHANGE UP (ref 10.3–14.5)
SODIUM SERPL-SCNC: 142 MMOL/L — SIGNIFICANT CHANGE UP (ref 135–145)
WBC # BLD: 15.41 K/UL — HIGH (ref 3.8–10.5)
WBC # FLD AUTO: 15.41 K/UL — HIGH (ref 3.8–10.5)

## 2022-09-16 PROCEDURE — 95816 EEG AWAKE AND DROWSY: CPT | Mod: 26

## 2022-09-16 PROCEDURE — 99291 CRITICAL CARE FIRST HOUR: CPT

## 2022-09-16 RX ADMIN — Medication 4: at 05:16

## 2022-09-16 RX ADMIN — PIPERACILLIN AND TAZOBACTAM 25 GRAM(S): 4; .5 INJECTION, POWDER, LYOPHILIZED, FOR SOLUTION INTRAVENOUS at 05:14

## 2022-09-16 RX ADMIN — LEVETIRACETAM 500 MILLIGRAM(S): 250 TABLET, FILM COATED ORAL at 05:14

## 2022-09-16 RX ADMIN — LEVETIRACETAM 500 MILLIGRAM(S): 250 TABLET, FILM COATED ORAL at 17:12

## 2022-09-16 RX ADMIN — CHLORHEXIDINE GLUCONATE 1 APPLICATION(S): 213 SOLUTION TOPICAL at 05:41

## 2022-09-16 RX ADMIN — CHLORHEXIDINE GLUCONATE 15 MILLILITER(S): 213 SOLUTION TOPICAL at 17:12

## 2022-09-16 RX ADMIN — Medication 2: at 00:27

## 2022-09-16 RX ADMIN — Medication 81 MILLIGRAM(S): at 11:39

## 2022-09-16 RX ADMIN — CHLORHEXIDINE GLUCONATE 15 MILLILITER(S): 213 SOLUTION TOPICAL at 05:14

## 2022-09-16 RX ADMIN — PANTOPRAZOLE SODIUM 40 MILLIGRAM(S): 20 TABLET, DELAYED RELEASE ORAL at 11:39

## 2022-09-16 RX ADMIN — HEPARIN SODIUM 5000 UNIT(S): 5000 INJECTION INTRAVENOUS; SUBCUTANEOUS at 05:15

## 2022-09-16 RX ADMIN — PIPERACILLIN AND TAZOBACTAM 25 GRAM(S): 4; .5 INJECTION, POWDER, LYOPHILIZED, FOR SOLUTION INTRAVENOUS at 17:12

## 2022-09-16 RX ADMIN — Medication 6 MILLIGRAM(S): at 21:30

## 2022-09-16 RX ADMIN — Medication 2: at 11:40

## 2022-09-16 RX ADMIN — HEPARIN SODIUM 5000 UNIT(S): 5000 INJECTION INTRAVENOUS; SUBCUTANEOUS at 17:11

## 2022-09-16 NOTE — PROGRESS NOTE ADULT - SUBJECTIVE AND OBJECTIVE BOX
[facility] NEPHROLOGY SERVICES, [facility]  NEPHROLOGY AND HYPERTENSION  [address]  [address]  [place] [address]  [phone]    MD TESSY ESPINOSA MD ANDREY GONCHARUK, MD MADHU KORRAPATI, MD YELENA ROSENBERG, MD BINNY KOSHY, MD CHRISTOPHER CAPUTO, MD MAGALIE MAYFIELD MD          Patient events noted  More alert;   Vent dependent     MEDICATIONS  (STANDING):  aspirin  chewable 81 milliGRAM(s) Enteral Tube daily  chlorhexidine 0.12% Liquid 15 milliLiter(s) Oral Mucosa every 12 hours  chlorhexidine 2% Cloths 1 Application(s) Topical <User Schedule>  dexAMETHasone     Tablet 6 milliGRAM(s) Oral at bedtime  glucagon  Injectable 1 milliGRAM(s) IntraMuscular once  heparin   Injectable 5000 Unit(s) SubCutaneous every 12 hours  insulin lispro (ADMELOG) corrective regimen sliding scale   SubCutaneous every 6 hours  levETIRAcetam  Solution 500 milliGRAM(s) Enteral Tube two times a day  pantoprazole   Suspension 40 milliGRAM(s) Enteral Tube daily  piperacillin/tazobactam IVPB.. 3.375 Gram(s) IV Intermittent every 12 hours  polyethylene glycol 3350 17 Gram(s) Oral daily    MEDICATIONS  (PRN):  sodium chloride 0.9% lock flush 10 milliLiter(s) IV Push every 1 hour PRN Pre/post blood products, medications, blood draw, and to maintain line patency  sodium chloride 0.9% lock flush 10 milliLiter(s) IV Push every 1 hour PRN Pre/post blood products, medications, blood draw, and to maintain line patency      09-15-22 @ 07:01 - 09-16-22 @ 07:00  --------------------------------------------------------  IN: 1310 mL / OUT: 1195 mL / NET: 115 mL    09-16-22 @ 07:01  - 09-16-22 @ 14:38  --------------------------------------------------------  IN: 320 mL / OUT: 105 mL / NET: 215 mL      PHYSICAL EXAM:      T(C): 36.6 (09-16-22 @ 12:00), Max: 36.8 (09-16-22 @ 04:00)  HR: 67 (09-16-22 @ 14:00) (63 - 92)  BP: 124/77 (09-16-22 @ 14:00) (86/67 - 134/82)  RR: 23 (09-16-22 @ 14:00) (16 - 34)  SpO2: 100% (09-16-22 @ 14:00) (89% - 100%)  Wt(kg): --  Lungs clear  Heart S1S2  Abd soft NT ND  Extremities:   tr edema                                    9.2    15.41 )-----------( 254      ( 16 Sep 2022 02:00 )             29.2     09-16    142  |  105  |  51<H>  ----------------------------<  221<H>  4.1   |  28  |  3.30<H>    Ca    8.5      16 Sep 2022 02:00  Phos  3.6     09-16  Mg     2.7     09-16    TPro  6.2  /  Alb  1.9<L>  /  TBili  0.6  /  DBili  x   /  AST  41<H>  /  ALT  38  /  AlkPhos  70  09-16      LIVER FUNCTIONS - ( 16 Sep 2022 02:00 )  Alb: 1.9 g/dL / Pro: 6.2 gm/dL / ALK PHOS: 70 U/L / ALT: 38 U/L / AST: 41 U/L / GGT: x           Creatinine Trend: 3.30<--, 4.66<--, 5.95<--, 7.25<--, 7.06<--, 6.98<--  Mode: CPAP with PS  FiO2: 40  PEEP: 5  PS: 5  ITime: 0.9  MAP: 6  PIP: 11        VASILE CKD sucpected 3-4; pre renal/ risk for ischemic ATN; COVID related tubular injury   Less likely pulm renal disease, will exclude;   Risk for contrast related injury   Possible uremic encephalopathy, necessitating HD initiation    Plan  Follow off HD today  Trend indices, UO   Will follow         Main Sotelo MD

## 2022-09-16 NOTE — PROGRESS NOTE ADULT - ASSESSMENT
80yo M, PMH dementia, htn, TEVAR, PPM for syncopal bradycardia, seizures, CKD, admitted for airway protection requiring intubation, course c/b poor mental status and COVID.    Neuro: AMS - intubated, not requiring sedation, likely 2/2 uremic encephalopathy; CT head on admission with no acute findings.    - EEG pending; c/w keppra, keep off sedation for now at this time.   - Continues to have poor mental status preventing weaning  Resp:  COVID with superimposed PNA - continue with decadron for now.  On minimal oxygen requirements at this time. Tolerating PS 5/5.    CV: Shock state on norepinephrine - maintain MAP > 65  - PPM appears functional, no issues with Aortic stent  - c/w home ASA  GI - Protonix, start tube feeds in AM.   Renal: VASILE on CKD - may now require HD given patient remains non-responsive possibly 2/2 uremia.  - minimal UOP s/p fluid bolus  - nephrology following, to receive HD in am tomorrow ID elevated procal, c/w zosyn for PNA  - f/u cultures  ENDO: ISS  Heme: HSQ  Ethics: full code per son (9/12)  Remains critically ill, intubated, with acute renal failure and intubated for likely metabolic/uremic encephalopathy.

## 2022-09-16 NOTE — PROGRESS NOTE ADULT - SUBJECTIVE AND OBJECTIVE BOX
INTERVAL HPI/OVERNIGHT EVENTS:      No acute overnight events, more responsive today.  Updated wife and niece over the phone.     CENTRAL LINE: [ x ] YES [ ] NO  LOCATION:   R subclavian 9/11/22 RIJ HD cath 9/13/22   CASILLAS: [ x] YES [ ] NO      A-LINE:  [ ] YES [ x] NO  LOCATION:       GLOBAL ISSUE/BEST PRACTICE:  Analgesia: Fentanyl  Sedation:  HOB elevation: yes  Stress ulcer prophylaxis: pantoprazole   Suspension  VTE prophylaxis: HSQ  Oral Care: Chlorhexidine  Glycemic control:   Nutrition:Diet, NPO (09-11-22 @ 21:30) [Active] Tube feeds    REVIEW OF SYSTEMS: [x] Unable to obtain because: intubated    PHYSICAL EXAM:  Gen: intubated on no sedation  HEENT: Intubated with ETT  CARD -s1s2, RRR, no M,G,R;   PULM - Coarse vented breath sounds, symmetric breath sounds;   ABD -  +BS, ND, NT, soft, no guarding, no rebound, no masses;   EXT - symmetric pulses, no edema;   NEURO - no focal neuro deficits     ICU Vital Signs Last 24 Hrs  T(C): 36.6 (16 Sep 2022 12:00), Max: 36.8 (16 Sep 2022 04:00)  T(F): 97.8 (16 Sep 2022 12:00), Max: 98.2 (16 Sep 2022 04:00)  HR: 65 (16 Sep 2022 16:48) (63 - 92)  BP: 120/79 (16 Sep 2022 16:00) (86/67 - 134/82)  BP(mean): 93 (16 Sep 2022 16:00) (74 - 99)  ABP: --  ABP(mean): --  RR: 24 (16 Sep 2022 16:00) (16 - 34)  SpO2: 100% (16 Sep 2022 16:48) (89% - 100%)    O2 Parameters below as of 16 Sep 2022 16:00  Patient On (Oxygen Delivery Method): ventilator    O2 Concentration (%): 40      I&O's Detail    15 Sep 2022 07:01  -  16 Sep 2022 07:00  --------------------------------------------------------  IN:    Enteral Tube Flush: 190 mL    IV PiggyBack: 200 mL    Nepro with Carb Steady: 920 mL  Total IN: 1310 mL    OUT:    Free Water: 0 mL    Indwelling Catheter - Urethral (mL): 195 mL    Other (mL): 1000 mL  Total OUT: 1195 mL    Total NET: 115 mL      16 Sep 2022 07:01  -  16 Sep 2022 16:52  --------------------------------------------------------  IN:    Enteral Tube Flush: 40 mL    Nepro with Carb Steady: 360 mL  Total IN: 400 mL    OUT:    Indwelling Catheter - Urethral (mL): 185 mL  Total OUT: 185 mL    Total NET: 215 mL        MEDICATIONS  NEURO  Meds: levETIRAcetam  Solution 500 milliGRAM(s) Enteral Tube two times a day    RESPIRATORY    Meds:   CARDIOVASCULAR  Meds:   GI/NUTRITION  Meds: pantoprazole   Suspension 40 milliGRAM(s) Enteral Tube daily  polyethylene glycol 3350 17 Gram(s) Oral daily    GENITOURINARY  Meds: sodium chloride 0.9% lock flush 10 milliLiter(s) IV Push every 1 hour PRN Pre/post blood products, medications, blood draw, and to maintain line patency  sodium chloride 0.9% lock flush 10 milliLiter(s) IV Push every 1 hour PRN Pre/post blood products, medications, blood draw, and to maintain line patency    HEMATOLOGIC  Meds: aspirin  chewable 81 milliGRAM(s) Enteral Tube daily  heparin   Injectable 5000 Unit(s) SubCutaneous every 12 hours    [x] VTE Prophylaxis  INFECTIOUS DISEASES  Meds: piperacillin/tazobactam IVPB.. 3.375 Gram(s) IV Intermittent every 12 hours    ENDOCRINE  CAPILLARY BLOOD GLUCOSE      POCT Blood Glucose.: 168 mg/dL (16 Sep 2022 11:39)  POCT Blood Glucose.: 250 mg/dL (16 Sep 2022 05:12)  POCT Blood Glucose.: 158 mg/dL (16 Sep 2022 00:00)  POCT Blood Glucose.: 186 mg/dL (15 Sep 2022 17:26)    Meds: insulin lispro (ADMELOG) corrective regimen sliding scale   SubCutaneous every 6 hours    OTHER MEDICATIONS:  chlorhexidine 0.12% Liquid 15 milliLiter(s) Oral Mucosa every 12 hours  chlorhexidine 2% Cloths 1 Application(s) Topical <User Schedule>  :    LABS:                        9.2    15.41 )-----------( 254      ( 16 Sep 2022 02:00 )             29.2      09-16    142  |  105  |  51<H>  ----------------------------<  221<H>  4.1   |  28  |  3.30<H>    Ca    8.5      16 Sep 2022 02:00  Phos  3.6     09-16  Mg     2.7     09-16    TPro  6.2  /  Alb  1.9<L>  /  TBili  0.6  /  DBili  x   /  AST  41<H>  /  ALT  38  /  AlkPhos  70  09-16            Mode: CPAP with PS, FiO2: 40, PEEP: 5, PS: 5, ITime: 0.9, MAP: 7, PIP: 10      RADIOLOGY & ADDITIONAL STUDIES:

## 2022-09-17 LAB
ALBUMIN SERPL ELPH-MCNC: 1.9 G/DL — LOW (ref 3.3–5)
ALP SERPL-CCNC: 60 U/L — SIGNIFICANT CHANGE UP (ref 40–120)
ALT FLD-CCNC: 51 U/L — SIGNIFICANT CHANGE UP (ref 12–78)
ANION GAP SERPL CALC-SCNC: 10 MMOL/L — SIGNIFICANT CHANGE UP (ref 5–17)
AST SERPL-CCNC: 43 U/L — HIGH (ref 15–37)
BILIRUB SERPL-MCNC: 0.4 MG/DL — SIGNIFICANT CHANGE UP (ref 0.2–1.2)
BUN SERPL-MCNC: 74 MG/DL — HIGH (ref 7–23)
CALCIUM SERPL-MCNC: 9 MG/DL — SIGNIFICANT CHANGE UP (ref 8.5–10.1)
CHLORIDE SERPL-SCNC: 106 MMOL/L — SIGNIFICANT CHANGE UP (ref 96–108)
CO2 SERPL-SCNC: 28 MMOL/L — SIGNIFICANT CHANGE UP (ref 22–31)
CREAT SERPL-MCNC: 4.12 MG/DL — HIGH (ref 0.5–1.3)
CULTURE RESULTS: SIGNIFICANT CHANGE UP
CULTURE RESULTS: SIGNIFICANT CHANGE UP
EGFR: 14 ML/MIN/1.73M2 — LOW
GLUCOSE BLDC GLUCOMTR-MCNC: 138 MG/DL — HIGH (ref 70–99)
GLUCOSE BLDC GLUCOMTR-MCNC: 149 MG/DL — HIGH (ref 70–99)
GLUCOSE BLDC GLUCOMTR-MCNC: 189 MG/DL — HIGH (ref 70–99)
GLUCOSE BLDC GLUCOMTR-MCNC: 197 MG/DL — HIGH (ref 70–99)
GLUCOSE BLDC GLUCOMTR-MCNC: 98 MG/DL — SIGNIFICANT CHANGE UP (ref 70–99)
GLUCOSE SERPL-MCNC: 154 MG/DL — HIGH (ref 70–99)
HCT VFR BLD CALC: 31 % — LOW (ref 39–50)
HGB BLD-MCNC: 9.6 G/DL — LOW (ref 13–17)
INR BLD: 1.1 RATIO — SIGNIFICANT CHANGE UP (ref 0.88–1.16)
MAGNESIUM SERPL-MCNC: 3 MG/DL — HIGH (ref 1.6–2.6)
MCHC RBC-ENTMCNC: 30.5 PG — SIGNIFICANT CHANGE UP (ref 27–34)
MCHC RBC-ENTMCNC: 31 G/DL — LOW (ref 32–36)
MCV RBC AUTO: 98.4 FL — SIGNIFICANT CHANGE UP (ref 80–100)
NRBC # BLD: 0 /100 WBCS — SIGNIFICANT CHANGE UP (ref 0–0)
PHOSPHATE SERPL-MCNC: 4.3 MG/DL — SIGNIFICANT CHANGE UP (ref 2.5–4.5)
PLATELET # BLD AUTO: 304 K/UL — SIGNIFICANT CHANGE UP (ref 150–400)
POTASSIUM SERPL-MCNC: 3.9 MMOL/L — SIGNIFICANT CHANGE UP (ref 3.5–5.3)
POTASSIUM SERPL-SCNC: 3.9 MMOL/L — SIGNIFICANT CHANGE UP (ref 3.5–5.3)
PROT SERPL-MCNC: 6.6 GM/DL — SIGNIFICANT CHANGE UP (ref 6–8.3)
PROTHROM AB SERPL-ACNC: 13.2 SEC — SIGNIFICANT CHANGE UP (ref 10.5–13.4)
RBC # BLD: 3.15 M/UL — LOW (ref 4.2–5.8)
RBC # FLD: 14 % — SIGNIFICANT CHANGE UP (ref 10.3–14.5)
SODIUM SERPL-SCNC: 144 MMOL/L — SIGNIFICANT CHANGE UP (ref 135–145)
SPECIMEN SOURCE: SIGNIFICANT CHANGE UP
SPECIMEN SOURCE: SIGNIFICANT CHANGE UP
WBC # BLD: 14.87 K/UL — HIGH (ref 3.8–10.5)
WBC # FLD AUTO: 14.87 K/UL — HIGH (ref 3.8–10.5)

## 2022-09-17 PROCEDURE — 99291 CRITICAL CARE FIRST HOUR: CPT

## 2022-09-17 RX ORDER — LEVETIRACETAM 250 MG/1
500 TABLET, FILM COATED ORAL EVERY 12 HOURS
Refills: 0 | Status: DISCONTINUED | OUTPATIENT
Start: 2022-09-17 | End: 2022-09-20

## 2022-09-17 RX ADMIN — HEPARIN SODIUM 5000 UNIT(S): 5000 INJECTION INTRAVENOUS; SUBCUTANEOUS at 05:24

## 2022-09-17 RX ADMIN — CHLORHEXIDINE GLUCONATE 1 APPLICATION(S): 213 SOLUTION TOPICAL at 04:23

## 2022-09-17 RX ADMIN — Medication 2: at 11:27

## 2022-09-17 RX ADMIN — PIPERACILLIN AND TAZOBACTAM 25 GRAM(S): 4; .5 INJECTION, POWDER, LYOPHILIZED, FOR SOLUTION INTRAVENOUS at 05:24

## 2022-09-17 RX ADMIN — CHLORHEXIDINE GLUCONATE 15 MILLILITER(S): 213 SOLUTION TOPICAL at 04:23

## 2022-09-17 RX ADMIN — LEVETIRACETAM 420 MILLIGRAM(S): 250 TABLET, FILM COATED ORAL at 17:40

## 2022-09-17 RX ADMIN — LEVETIRACETAM 500 MILLIGRAM(S): 250 TABLET, FILM COATED ORAL at 05:24

## 2022-09-17 RX ADMIN — PANTOPRAZOLE SODIUM 40 MILLIGRAM(S): 20 TABLET, DELAYED RELEASE ORAL at 11:26

## 2022-09-17 RX ADMIN — Medication 2: at 00:45

## 2022-09-17 RX ADMIN — Medication 81 MILLIGRAM(S): at 11:25

## 2022-09-17 NOTE — EEG REPORT - NS EEG TEXT BOX
[facility]   COMPREHENSIVE EPILEPSY CENTER   REPORT OF ROUTINE EEG     [facility]: [address], [place] [address], Ph#: [phone]  [facility]: [id number] [place] [address], Ph#: [phone]  Office: [address] [place] [address], [facility] [address], [place] [address] Ph#: [phone]    Patient Name: SARAH JIMÉNEZ  Age and : 81y ([birth year])  MRN #: [id number]  Location: [facility]  Referring Physician: Miguel Angel Kahn    Study Date: 22    _____________________________________________________________  TECHNICAL INFORMATION    Placement and Labeling of Electrodes:  The EEG was performed utilizing 20 channels referential EEG connections (coronal over temporal over parasagittal montage) using all standard 10-20 electrode placements with EKG.  Recording was at a sampling rate of 256 samples per second per channel. Arvin and seizure detection algorithms were utilized.    _____________________________________________________________  HISTORY    Patient is a 81y old  Male who presents with a chief complaint of Encephalopathy, acute respiratory failure (17 Sep 2022 10:28)    PERTINENT MEDICATION:  MEDICATIONS  (STANDING):  aspirin  chewable 81 milliGRAM(s) Enteral Tube daily  chlorhexidine 0.12% Liquid 15 milliLiter(s) Oral Mucosa every 12 hours  chlorhexidine 2% Cloths 1 Application(s) Topical <User Schedule>  dexAMETHasone     Tablet 6 milliGRAM(s) Oral at bedtime  glucagon  Injectable 1 milliGRAM(s) IntraMuscular once  heparin   Injectable 5000 Unit(s) SubCutaneous every 12 hours  insulin lispro (ADMELOG) corrective regimen sliding scale   SubCutaneous every 6 hours  levETIRAcetam  Solution 500 milliGRAM(s) Enteral Tube two times a day  pantoprazole   Suspension 40 milliGRAM(s) Enteral Tube daily  piperacillin/tazobactam IVPB.. 3.375 Gram(s) IV Intermittent every 12 hours  polyethylene glycol 3350 17 Gram(s) Oral daily    _____________________________________________________________  STUDY INTERPRETATION    Findings: The background was continuous, spontaneously variable and reactive.  Background predominantly consisted of theta and delta activities. No posterior dominant rhythm seen.    Background Slowing:  Diffuse theta and polymorphic delta slowing.    Focal Slowing:   None were present.    Sleep Background:  Drowsiness and stage II sleep transients were not recorded.    Other Non-Epileptiform Findings:  None were present.    Interictal Epileptiform Activity:   None were present.    Events:  Clinical events: None recorded.  Seizures: None recorded.    Activation Procedures:   Hyperventilation was not performed.    Photic stimulation was not performed.     Artifacts:  Intermittent myogenic and movement artifacts were noted.    ECG:  The heart rate on single channel ECG was predominantly between 60-70 BPM.    _____________________________________________________________  EEG SUMMARY/CLASSIFICATION     Abnormal EEG in the awake, drowsy states.  - Moderate generalized slowing.    _____________________________________________________________  EEG IMPRESSION/CLINICAL CORRELATE    Abnormal EEG study.  Moderate nonspecific diffuse or multifocal cerebral dysfunction.   No epileptiform pattern or seizure seen.        Shauna Ingram MD  Epilepsy Attending, [facility]

## 2022-09-17 NOTE — PROGRESS NOTE ADULT - SUBJECTIVE AND OBJECTIVE BOX
INTERVAL HPI/OVERNIGHT EVENTS:    Pending HD this AM.    Weaning well, will attempt to extubate after HD.    CENTRAL LINE: [ x ] YES [ ] NO  LOCATION:   R subclavian 9/11/22 RIJ HD cath 9/13/22   CASILLAS: [ x] YES [ ] NO      A-LINE:  [ ] YES [ x] NO  LOCATION:       GLOBAL ISSUE/BEST PRACTICE:  Analgesia: Fentanyl  Sedation:  HOB elevation: yes  Stress ulcer prophylaxis: pantoprazole   Suspension  VTE prophylaxis: HSQ  Oral Care: Chlorhexidine  Glycemic control:   Nutrition:Diet, NPO (09-11-22 @ 21:30) [Active] Tube feeds    REVIEW OF SYSTEMS: [x] Unable to obtain because: intubated    PHYSICAL EXAM:  Gen: intubated on no sedation  HEENT: Intubated with ETT  CARD -s1s2, RRR, no M,G,R;   PULM - Coarse vented breath sounds, symmetric breath sounds;   ABD -  +BS, ND, NT, soft, no guarding, no rebound, no masses;   EXT - symmetric pulses, no edema;   NEURO - no focal neuro deficits     ICU Vital Signs Last 24 Hrs  T(C): 36.6 (17 Sep 2022 11:45), Max: 37 (17 Sep 2022 03:42)  T(F): 97.9 (17 Sep 2022 11:45), Max: 98.6 (17 Sep 2022 03:42)  HR: 71 (17 Sep 2022 13:00) (64 - 81)  BP: 102/77 (17 Sep 2022 12:00) (86/69 - 126/82)  BP(mean): 86 (17 Sep 2022 12:00) (76 - 95)  ABP: --  ABP(mean): --  RR: 20 (17 Sep 2022 13:00) (14 - 27)  SpO2: 100% (17 Sep 2022 13:00) (97% - 100%)    O2 Parameters below as of 17 Sep 2022 08:00  Patient On (Oxygen Delivery Method): ventilator,cpap 5/5 40%    O2 Concentration (%): 40      I&O's Detail    16 Sep 2022 07:01  -  17 Sep 2022 07:00  --------------------------------------------------------  IN:    Enteral Tube Flush: 80 mL    IV PiggyBack: 100 mL    Nepro with Carb Steady: 960 mL  Total IN: 1140 mL    OUT:    Indwelling Catheter - Urethral (mL): 610 mL  Total OUT: 610 mL    Total NET: 530 mL      17 Sep 2022 07:01  -  17 Sep 2022 13:24  --------------------------------------------------------  IN:    Nepro with Carb Steady: 160 mL  Total IN: 160 mL    OUT:    Indwelling Catheter - Urethral (mL): 150 mL  Total OUT: 150 mL    Total NET: 10 mL        MEDICATIONS  NEURO  Meds: levETIRAcetam  Solution 500 milliGRAM(s) Enteral Tube two times a day    RESPIRATORY    Meds:   CARDIOVASCULAR  Meds:   GI/NUTRITION  Meds: pantoprazole   Suspension 40 milliGRAM(s) Enteral Tube daily  polyethylene glycol 3350 17 Gram(s) Oral daily    GENITOURINARY  Meds: sodium chloride 0.9% lock flush 10 milliLiter(s) IV Push every 1 hour PRN Pre/post blood products, medications, blood draw, and to maintain line patency  sodium chloride 0.9% lock flush 10 milliLiter(s) IV Push every 1 hour PRN Pre/post blood products, medications, blood draw, and to maintain line patency    HEMATOLOGIC  Meds: aspirin  chewable 81 milliGRAM(s) Enteral Tube daily  heparin   Injectable 5000 Unit(s) SubCutaneous every 12 hours    [x] VTE Prophylaxis  INFECTIOUS DISEASES  Meds: piperacillin/tazobactam IVPB.. 3.375 Gram(s) IV Intermittent every 12 hours    ENDOCRINE  CAPILLARY BLOOD GLUCOSE      POCT Blood Glucose.: 197 mg/dL (17 Sep 2022 11:23)  POCT Blood Glucose.: 138 mg/dL (17 Sep 2022 05:26)  POCT Blood Glucose.: 189 mg/dL (17 Sep 2022 00:08)  POCT Blood Glucose.: 145 mg/dL (16 Sep 2022 18:26)    Meds: insulin lispro (ADMELOG) corrective regimen sliding scale   SubCutaneous every 6 hours    OTHER MEDICATIONS:  chlorhexidine 0.12% Liquid 15 milliLiter(s) Oral Mucosa every 12 hours  chlorhexidine 2% Cloths 1 Application(s) Topical <User Schedule>  :    LABS:                        9.6    14.87 )-----------( 304      ( 17 Sep 2022 02:50 )             31.0      09-17    144  |  106  |  74<H>  ----------------------------<  154<H>  3.9   |  28  |  4.12<H>    Ca    9.0      17 Sep 2022 02:50  Phos  4.3     09-17  Mg     3.0     09-17    TPro  6.6  /  Alb  1.9<L>  /  TBili  0.4  /  DBili  x   /  AST  43<H>  /  ALT  51  /  AlkPhos  60  09-17    PT/INR - ( 17 Sep 2022 12:15 )   PT: 13.2 sec;   INR: 1.10 ratio                 Mode: PS (Pressure Support)/ Spontaneous, FiO2: 40, PEEP: 5, PS: 5, ITime: 1, MAP: 7, PIP: 10      RADIOLOGY & ADDITIONAL STUDIES:

## 2022-09-17 NOTE — PROGRESS NOTE ADULT - ASSESSMENT
80yo M, PMH dementia, htn, TEVAR, PPM for syncopal bradycardia, seizures, CKD, admitted for airway protection requiring intubation, course c/b poor mental status and COVID.    Neuro: AMS - intubated, not requiring sedation, likely 2/2 uremic encephalopathy; CT head on admission with no acute findings.    - EEG reviewed with no seizure; c/w keppra, keep off sedation for now at this time.   - Continues to have poor mental status, however family reports patient does not always respond at baseline.  Resp:  COVID with superimposed PNA - continue with decadron for now.  On minimal oxygen requirements at this time. Tolerating PS 5/5.  Will attempt to extubate after HD.   CV: Shock state on norepinephrine - maintain MAP > 65  - PPM appears functional, no issues with Aortic stent  - c/w home ASA  GI - Protonix, start tube feeds in AM.   Renal: VASILE on CKD - may now require HD given patient remains non-responsive possibly 2/2 uremia.  - minimal UOP s/p fluid bolus  - nephrology following, to receive HD in am tomorrow ID elevated procal, c/w zosyn for PNA  - f/u cultures  ENDO: ISS  Heme: HSQ  Ethics: full code per son (9/12)  Remains critically ill, intubated, with acute renal failure and intubated for likely metabolic/uremic encephalopathy.    Will remove SC TLC after HD.

## 2022-09-17 NOTE — PROGRESS NOTE ADULT - SUBJECTIVE AND OBJECTIVE BOX
NEPHROLOGY PROGRESS NOTE    CHIEF COMPLAINT:  VASILE    HPI:  Remains intubated with low FiO2.  Making some urine.  No pressors at present.      EXAM:  T(F): 98.5 (09-17-22 @ 07:37)  HR: 72 (09-17-22 @ 08:30)  BP: 109/79 (09-17-22 @ 08:00)  RR: 20 (09-17-22 @ 08:00)  SpO2: 98% (09-17-22 @ 08:30)      Normal respiratory effort, lungs clear bilaterally  Heart RRR with no murmur, no peripheral edema         LABS                             9.6    14.87 )-----------( 304      ( 17 Sep 2022 02:50 )             31.0          09-17    144  |  106  |  74<H>  ----------------------------<  154<H>  3.9   |  28  |  4.12<H>    Ca    9.0      17 Sep 2022 02:50  Phos  4.3     09-17  Mg     3.0     09-17    TPro  6.6  /  Alb  1.9<L>  /  TBili  0.4  /  DBili  x   /  AST  43<H>  /  ALT  51  /  AlkPhos  60  09-17    Impression  VASILE CKD sucpected 3-4; pre renal/ risk for ischemic ATN; COVID related tubular injury   Less likely pulm renal disease, ANCA/GBM were negative  Risk for contrast related injury   Possible uremic encephalopathy, necessitating HD initiation    Plan  Resume HD today then may consider line holiday if needed  Follow urine output for evidence of recovery

## 2022-09-18 LAB
ALBUMIN SERPL ELPH-MCNC: 2 G/DL — LOW (ref 3.3–5)
ALP SERPL-CCNC: 66 U/L — SIGNIFICANT CHANGE UP (ref 40–120)
ALT FLD-CCNC: 82 U/L — HIGH (ref 12–78)
ANION GAP SERPL CALC-SCNC: 8 MMOL/L — SIGNIFICANT CHANGE UP (ref 5–17)
AST SERPL-CCNC: 62 U/L — HIGH (ref 15–37)
BASOPHILS # BLD AUTO: 0 K/UL — SIGNIFICANT CHANGE UP (ref 0–0.2)
BASOPHILS NFR BLD AUTO: 0 % — SIGNIFICANT CHANGE UP (ref 0–2)
BILIRUB SERPL-MCNC: 0.6 MG/DL — SIGNIFICANT CHANGE UP (ref 0.2–1.2)
BUN SERPL-MCNC: 46 MG/DL — HIGH (ref 7–23)
CALCIUM SERPL-MCNC: 9.2 MG/DL — SIGNIFICANT CHANGE UP (ref 8.5–10.1)
CHLORIDE SERPL-SCNC: 107 MMOL/L — SIGNIFICANT CHANGE UP (ref 96–108)
CO2 SERPL-SCNC: 27 MMOL/L — SIGNIFICANT CHANGE UP (ref 22–31)
CREAT SERPL-MCNC: 3.32 MG/DL — HIGH (ref 0.5–1.3)
EGFR: 18 ML/MIN/1.73M2 — LOW
EOSINOPHIL # BLD AUTO: 0.34 K/UL — SIGNIFICANT CHANGE UP (ref 0–0.5)
EOSINOPHIL NFR BLD AUTO: 2 % — SIGNIFICANT CHANGE UP (ref 0–6)
GLUCOSE BLDC GLUCOMTR-MCNC: 105 MG/DL — HIGH (ref 70–99)
GLUCOSE BLDC GLUCOMTR-MCNC: 166 MG/DL — HIGH (ref 70–99)
GLUCOSE BLDC GLUCOMTR-MCNC: 171 MG/DL — HIGH (ref 70–99)
GLUCOSE BLDC GLUCOMTR-MCNC: 98 MG/DL — SIGNIFICANT CHANGE UP (ref 70–99)
GLUCOSE SERPL-MCNC: 133 MG/DL — HIGH (ref 70–99)
HCT VFR BLD CALC: 32.9 % — LOW (ref 39–50)
HGB BLD-MCNC: 10.3 G/DL — LOW (ref 13–17)
LYMPHOCYTES # BLD AUTO: 1.88 K/UL — SIGNIFICANT CHANGE UP (ref 1–3.3)
LYMPHOCYTES # BLD AUTO: 11 % — LOW (ref 13–44)
MAGNESIUM SERPL-MCNC: 2.6 MG/DL — SIGNIFICANT CHANGE UP (ref 1.6–2.6)
MANUAL SMEAR VERIFICATION: SIGNIFICANT CHANGE UP
MCHC RBC-ENTMCNC: 30.4 PG — SIGNIFICANT CHANGE UP (ref 27–34)
MCHC RBC-ENTMCNC: 31.3 G/DL — LOW (ref 32–36)
MCV RBC AUTO: 97.1 FL — SIGNIFICANT CHANGE UP (ref 80–100)
METAMYELOCYTES # FLD: 1 % — HIGH (ref 0–0)
MONOCYTES # BLD AUTO: 1.71 K/UL — HIGH (ref 0–0.9)
MONOCYTES NFR BLD AUTO: 10 % — SIGNIFICANT CHANGE UP (ref 2–14)
NEUTROPHILS # BLD AUTO: 13.02 K/UL — HIGH (ref 1.8–7.4)
NEUTROPHILS NFR BLD AUTO: 74 % — SIGNIFICANT CHANGE UP (ref 43–77)
NEUTS BAND # BLD: 2 % — SIGNIFICANT CHANGE UP (ref 0–8)
NRBC # BLD: 0 /100 — SIGNIFICANT CHANGE UP (ref 0–0)
NRBC # BLD: SIGNIFICANT CHANGE UP /100 WBCS (ref 0–0)
PHOSPHATE SERPL-MCNC: 3.7 MG/DL — SIGNIFICANT CHANGE UP (ref 2.5–4.5)
PLAT MORPH BLD: NORMAL — SIGNIFICANT CHANGE UP
PLATELET # BLD AUTO: 383 K/UL — SIGNIFICANT CHANGE UP (ref 150–400)
POTASSIUM SERPL-MCNC: 3.9 MMOL/L — SIGNIFICANT CHANGE UP (ref 3.5–5.3)
POTASSIUM SERPL-SCNC: 3.9 MMOL/L — SIGNIFICANT CHANGE UP (ref 3.5–5.3)
PROT SERPL-MCNC: 7.2 GM/DL — SIGNIFICANT CHANGE UP (ref 6–8.3)
RBC # BLD: 3.39 M/UL — LOW (ref 4.2–5.8)
RBC # FLD: 13.8 % — SIGNIFICANT CHANGE UP (ref 10.3–14.5)
RBC BLD AUTO: NORMAL — SIGNIFICANT CHANGE UP
SODIUM SERPL-SCNC: 142 MMOL/L — SIGNIFICANT CHANGE UP (ref 135–145)
WBC # BLD: 17.13 K/UL — HIGH (ref 3.8–10.5)
WBC # FLD AUTO: 17.13 K/UL — HIGH (ref 3.8–10.5)

## 2022-09-18 PROCEDURE — 71045 X-RAY EXAM CHEST 1 VIEW: CPT | Mod: 26

## 2022-09-18 PROCEDURE — 70450 CT HEAD/BRAIN W/O DYE: CPT | Mod: 26

## 2022-09-18 PROCEDURE — 99291 CRITICAL CARE FIRST HOUR: CPT

## 2022-09-18 RX ORDER — DEXAMETHASONE 0.5 MG/5ML
6 ELIXIR ORAL DAILY
Refills: 0 | Status: COMPLETED | OUTPATIENT
Start: 2022-09-18 | End: 2022-09-19

## 2022-09-18 RX ORDER — PANTOPRAZOLE SODIUM 20 MG/1
40 TABLET, DELAYED RELEASE ORAL DAILY
Refills: 0 | Status: DISCONTINUED | OUTPATIENT
Start: 2022-09-18 | End: 2022-09-20

## 2022-09-18 RX ADMIN — PANTOPRAZOLE SODIUM 40 MILLIGRAM(S): 20 TABLET, DELAYED RELEASE ORAL at 11:53

## 2022-09-18 RX ADMIN — Medication 81 MILLIGRAM(S): at 12:13

## 2022-09-18 RX ADMIN — Medication 2: at 11:53

## 2022-09-18 RX ADMIN — LEVETIRACETAM 420 MILLIGRAM(S): 250 TABLET, FILM COATED ORAL at 06:05

## 2022-09-18 RX ADMIN — CHLORHEXIDINE GLUCONATE 1 APPLICATION(S): 213 SOLUTION TOPICAL at 05:01

## 2022-09-18 RX ADMIN — LEVETIRACETAM 420 MILLIGRAM(S): 250 TABLET, FILM COATED ORAL at 17:23

## 2022-09-18 RX ADMIN — HEPARIN SODIUM 5000 UNIT(S): 5000 INJECTION INTRAVENOUS; SUBCUTANEOUS at 17:24

## 2022-09-18 RX ADMIN — PIPERACILLIN AND TAZOBACTAM 25 GRAM(S): 4; .5 INJECTION, POWDER, LYOPHILIZED, FOR SOLUTION INTRAVENOUS at 17:24

## 2022-09-18 RX ADMIN — Medication 2: at 17:24

## 2022-09-18 RX ADMIN — Medication 6 MILLIGRAM(S): at 06:20

## 2022-09-18 RX ADMIN — HEPARIN SODIUM 5000 UNIT(S): 5000 INJECTION INTRAVENOUS; SUBCUTANEOUS at 06:05

## 2022-09-18 RX ADMIN — PIPERACILLIN AND TAZOBACTAM 25 GRAM(S): 4; .5 INJECTION, POWDER, LYOPHILIZED, FOR SOLUTION INTRAVENOUS at 06:05

## 2022-09-18 NOTE — PROGRESS NOTE ADULT - ASSESSMENT
82yo M, PMH dementia, htn, TEVAR, PPM for syncopal bradycardia, seizures, CKD, admitted for airway protection requiring intubation, course c/b poor mental status and COVID.    Neuro: AMS - intubated, not requiring sedation, likely 2/2 uremic encephalopathy; CT head on admission with no acute findings.    - EEG reviewed with no seizure; c/w keppra, keep off sedation for now at this time.   - Continues to have poor mental status, however family reports patient does not always respond at baseline.  - Given persistent poor mental status, will obtain CT head, for encephalopathy, possibly related to COVID.  Resp:  COVID with superimposed PNA - continue with decadron for now.  On minimal oxygen requirements at this time. Tolerating PS 5/5.    - Extubated on 9/17 with no acute issues, on nasal cannula.   CV: Shock state on norepinephrine - maintain MAP > 65  - PPM appears functional, no issues with Aortic stent  - c/w home ASA  GI - Protonix, start tube feeds in AM.   Renal: VASILE on CKD - may now require HD given patient remains non-responsive possibly 2/2 uremia.  - minimal UOP s/p fluid bolus  -s/p hd on 9/17, no acute need for hd today.  Noted to have urine outptu 420 in 24 hrs0  ENDO: ISS  Heme: HSQ  Ethics: full code per son (9/12)  Extubated yesterday will continue to observe in ICU given overall poor mental status.

## 2022-09-18 NOTE — PROGRESS NOTE ADULT - CRITICAL CARE ATTENDING COMMENT
s/p extubation yesterday, completed HD on 9/17, will reassess need on 9/19; currently has persistently poor mental status, will obtain CT head to re-eval.  May require MR.  Possibly related to covid encephalopathy.
80yo M, PMH dementia, htn, TEVAR, PPM for syncopal bradycardia, seizures, CKD, admitted for airway protection requiring intubation, course c/b poor mental status and covid.    Neuro: AMS - intubated, not requiring sedation, likely 2/2 uremic encephalopathy; CT head on admission with no acute findings.    - EEG pending; c/w keppra, keep off sedation for now at this time.   Resp:  COVID with superimposed PNA - continue with decadron for now.  On minimal oxygen requirements at this time.   CV: Shock state on norepinephrine - maintain MAP > 65  - PPM appears functional, no issues with Aortic stent  - c/w home ASA  GI - Protonix, start tube feeds in AM.   Renal: VASILE on CKD  - hyperk, improved s/p lokelma  - hyperna, consider free water flushes  - minimal UOP s/p fluid bolus  - nephrology following, consider HD  ID elevated procal, c/w zosyn for PNA  - f/u cultures  ENDO: ISS  Heme: HSQ  Ethics: full code per son (9/12)  Remains critically ill, intubated, with acute renal failure and intubated for likely metabolic/uremic encephalopathy.

## 2022-09-18 NOTE — PROGRESS NOTE ADULT - SUBJECTIVE AND OBJECTIVE BOX
NEPHROLOGY PROGRESS NOTE    CHIEF COMPLAINT:  VASILE    HPI:  He had dialysis yesterday with 1.5 liters off.  Remains vented, hemodynamically stable,  mL.    EXAM:  T(F): 98.6 (09-18-22 @ 04:00)  HR: 68 (09-18-22 @ 07:00)  BP: 119/79 (09-18-22 @ 07:00)  RR: 27 (09-18-22 @ 07:00)  SpO2: 98% (09-18-22 @ 04:00)    Intubated, vented  Normal respiratory effort, lungs clear bilaterally  Heart RRR with no murmur, no peripheral edema         LABS                             10.3   17.13 )-----------( 383      ( 18 Sep 2022 03:20 )             32.9          09-18    142  |  107  |  46<H>  ----------------------------<  133<H>  3.9   |  27  |  3.32<H>    Ca    9.2      18 Sep 2022 03:20  Phos  3.7     09-18  Mg     2.6     09-18    TPro  7.2  /  Alb  2.0<L>  /  TBili  0.6  /  DBili  x   /  AST  62<H>  /  ALT  82<H>  /  AlkPhos  66  09-18           Impression  VASILE CKD sucpected 3-4; pre renal/ risk for ischemic ATN; COVID related tubular injury   Less likely pulm renal disease, ANCA/GBM were negative  Risk for contrast related injury   Possible uremic encephalopathy, necessitating HD initiation, which is now resolved s/p adequate dialysis  Trending leukocytosis w/o fever    Plan  Resume HD Monday or Tuesday  No objection to dialysis line holiday if concern for infection  Follow urine output for evidence of recovery

## 2022-09-18 NOTE — PROGRESS NOTE ADULT - SUBJECTIVE AND OBJECTIVE BOX
INTERVAL HPI/OVERNIGHT EVENTS:    Extubated yesterday, no acute overnight events.    Opens eyes to voice; overall minimally responsive.      Urine output of 420 in 24 hours.     R subclavian catheter removed.    CENTRAL LINE: [x ] YES [ ] NO  LOCATION:   [facility] HD catheter 9/13/22    CASILLAS: [ x] YES [ ] NO        A-LINE:  [ ] YES [ x] NO  LOCATION:       GLOBAL ISSUE/BEST PRACTICE:  Analgesia:   Sedation:  HOB elevation: yes  Stress ulcer prophylaxis: pantoprazole  Injectable    VTE prophylaxis: aspirin  chewable 81 milliGRAM(s) Enteral Tube daily  heparin   Injectable 5000 Unit(s) SubCutaneous every 12 hours    Oral Care: Chlorhexidine  Glycemic control:   Nutrition:Diet, NPO with Tube Feed:   Tube Feeding Modality: Nasogastric  Glucerna 1.5 Mayco  Total Volume for 24 Hours (mL): 1200  Continuous  Starting Tube Feed Rate mL per Hour: 10  Increase Tube Feed Rate by (mL): 10     Every 6 hours  Until Goal Tube Feed Rate (mL per Hour): 50  Tube Feed Duration (in Hours): 24  Tube Feed Start Time: 12:09 (09-18-22 @ 12:10) [Active]          REVIEW OF SYSTEMS: [x] Unable to obtain because: extubated minimally responsive  PHYSICAL EXAM:    GENERAL: NAD, opens eyes to voice.  HEAD:  Atraumatic, Normocephalic  ENMT: moist mucous membranes, No lesions  NECK: Supple, No JVD, Normal thyroid  CHEST/LUNG: Clear to auscultation bilaterally; No rales, rhonchi, wheezing, or rubs  HEART: Regular rate and rhythm; No murmurs, rubs, or gallops  ABDOMEN: Soft, Nontender, Nondistended; Bowel sounds present  EXTREMITIES:  2+ Peripheral Pulses, No clubbing, cyanosis, or edema  NERVOUS SYSTEM:  Opens eyes to voice, not participating in exam, not moving extremities.    ICU Vital Signs Last 24 Hrs  T(C): 36.1 (18 Sep 2022 08:00), Max: 37 (18 Sep 2022 04:00)  T(F): 97 (18 Sep 2022 08:00), Max: 98.6 (18 Sep 2022 04:00)  HR: 70 (18 Sep 2022 12:00) (68 - 87)  BP: 95/73 (18 Sep 2022 12:00) (93/65 - 140/98)  BP(mean): 82 (18 Sep 2022 12:00) (74 - 110)  ABP: --  ABP(mean): --  RR: 19 (18 Sep 2022 12:00) (15 - 29)  SpO2: 100% (18 Sep 2022 12:00) (93% - 100%)    O2 Parameters below as of 17 Sep 2022 16:53    O2 Flow (L/min): 4        I&O's Detail    17 Sep 2022 07:01  -  18 Sep 2022 07:00  --------------------------------------------------------  IN:    IV PiggyBack: 500 mL    Nepro with Carb Steady: 400 mL  Total IN: 900 mL    OUT:    Indwelling Catheter - Urethral (mL): 420 mL    Other (mL): 1500 mL  Total OUT: 1920 mL    Total NET: -1020 mL      18 Sep 2022 07:01  -  18 Sep 2022 12:39  --------------------------------------------------------  IN:  Total IN: 0 mL    OUT:    Indwelling Catheter - Urethral (mL): 40 mL  Total OUT: 40 mL    Total NET: -40 mL        MEDICATIONS  NEURO  Meds: levETIRAcetam  IVPB 500 milliGRAM(s) IV Intermittent every 12 hours    RESPIRATORY    Meds:   CARDIOVASCULAR  Meds:   GI/NUTRITION  Meds: pantoprazole  Injectable 40 milliGRAM(s) IV Push daily    GENITOURINARY  Meds: sodium chloride 0.9% lock flush 10 milliLiter(s) IV Push every 1 hour PRN Pre/post blood products, medications, blood draw, and to maintain line patency    HEMATOLOGIC  Meds: aspirin  chewable 81 milliGRAM(s) Enteral Tube daily  heparin   Injectable 5000 Unit(s) SubCutaneous every 12 hours    [x] VTE Prophylaxis  INFECTIOUS DISEASES  Meds: piperacillin/tazobactam IVPB.. 3.375 Gram(s) IV Intermittent every 12 hours    ENDOCRINE  CAPILLARY BLOOD GLUCOSE      POCT Blood Glucose.: 171 mg/dL (18 Sep 2022 11:49)  POCT Blood Glucose.: 98 mg/dL (18 Sep 2022 05:02)  POCT Blood Glucose.: 98 mg/dL (17 Sep 2022 23:50)  POCT Blood Glucose.: 149 mg/dL (17 Sep 2022 17:10)    Meds: insulin lispro (ADMELOG) corrective regimen sliding scale   SubCutaneous every 6 hours    OTHER MEDICATIONS:  chlorhexidine 2% Cloths 1 Application(s) Topical <User Schedule>  :    LABS:                        10.3   17.13 )-----------( 383      ( 18 Sep 2022 03:20 )             32.9      09-18    142  |  107  |  46<H>  ----------------------------<  133<H>  3.9   |  27  |  3.32<H>    Ca    9.2      18 Sep 2022 03:20  Phos  3.7     09-18  Mg     2.6     09-18    TPro  7.2  /  Alb  2.0<L>  /  TBili  0.6  /  DBili  x   /  AST  62<H>  /  ALT  82<H>  /  AlkPhos  66  09-18    PT/INR - ( 17 Sep 2022 12:15 )   PT: 13.2 sec;   INR: 1.10 ratio      RADIOLOGY & ADDITIONAL STUDIES:

## 2022-09-19 LAB
ALBUMIN SERPL ELPH-MCNC: 2 G/DL — LOW (ref 3.3–5)
ALP SERPL-CCNC: 73 U/L — SIGNIFICANT CHANGE UP (ref 40–120)
ALT FLD-CCNC: 80 U/L — HIGH (ref 12–78)
ANION GAP SERPL CALC-SCNC: 11 MMOL/L — SIGNIFICANT CHANGE UP (ref 5–17)
AST SERPL-CCNC: 52 U/L — HIGH (ref 15–37)
BASOPHILS # BLD AUTO: 0.05 K/UL — SIGNIFICANT CHANGE UP (ref 0–0.2)
BASOPHILS NFR BLD AUTO: 0.3 % — SIGNIFICANT CHANGE UP (ref 0–2)
BILIRUB SERPL-MCNC: 0.5 MG/DL — SIGNIFICANT CHANGE UP (ref 0.2–1.2)
BUN SERPL-MCNC: 64 MG/DL — HIGH (ref 7–23)
CALCIUM SERPL-MCNC: 9.5 MG/DL — SIGNIFICANT CHANGE UP (ref 8.5–10.1)
CHLORIDE SERPL-SCNC: 107 MMOL/L — SIGNIFICANT CHANGE UP (ref 96–108)
CO2 SERPL-SCNC: 25 MMOL/L — SIGNIFICANT CHANGE UP (ref 22–31)
CREAT SERPL-MCNC: 4.56 MG/DL — HIGH (ref 0.5–1.3)
EGFR: 12 ML/MIN/1.73M2 — LOW
EOSINOPHIL # BLD AUTO: 0.23 K/UL — SIGNIFICANT CHANGE UP (ref 0–0.5)
EOSINOPHIL NFR BLD AUTO: 1.4 % — SIGNIFICANT CHANGE UP (ref 0–6)
GLUCOSE BLDC GLUCOMTR-MCNC: 159 MG/DL — HIGH (ref 70–99)
GLUCOSE BLDC GLUCOMTR-MCNC: 221 MG/DL — HIGH (ref 70–99)
GLUCOSE BLDC GLUCOMTR-MCNC: 81 MG/DL — SIGNIFICANT CHANGE UP (ref 70–99)
GLUCOSE SERPL-MCNC: 101 MG/DL — HIGH (ref 70–99)
HCT VFR BLD CALC: 31.2 % — LOW (ref 39–50)
HGB BLD-MCNC: 9.8 G/DL — LOW (ref 13–17)
IMM GRANULOCYTES NFR BLD AUTO: 5.4 % — HIGH (ref 0–0.9)
LYMPHOCYTES # BLD AUTO: 1.44 K/UL — SIGNIFICANT CHANGE UP (ref 1–3.3)
LYMPHOCYTES # BLD AUTO: 8.9 % — LOW (ref 13–44)
MAGNESIUM SERPL-MCNC: 2.8 MG/DL — HIGH (ref 1.6–2.6)
MCHC RBC-ENTMCNC: 30.4 PG — SIGNIFICANT CHANGE UP (ref 27–34)
MCHC RBC-ENTMCNC: 31.4 G/DL — LOW (ref 32–36)
MCV RBC AUTO: 96.9 FL — SIGNIFICANT CHANGE UP (ref 80–100)
MONOCYTES # BLD AUTO: 1.2 K/UL — HIGH (ref 0–0.9)
MONOCYTES NFR BLD AUTO: 7.4 % — SIGNIFICANT CHANGE UP (ref 2–14)
NEUTROPHILS # BLD AUTO: 12.39 K/UL — HIGH (ref 1.8–7.4)
NEUTROPHILS NFR BLD AUTO: 76.6 % — SIGNIFICANT CHANGE UP (ref 43–77)
NRBC # BLD: 0 /100 WBCS — SIGNIFICANT CHANGE UP (ref 0–0)
PHOSPHATE SERPL-MCNC: 5.6 MG/DL — HIGH (ref 2.5–4.5)
PLATELET # BLD AUTO: 401 K/UL — HIGH (ref 150–400)
POTASSIUM SERPL-MCNC: 4.2 MMOL/L — SIGNIFICANT CHANGE UP (ref 3.5–5.3)
POTASSIUM SERPL-SCNC: 4.2 MMOL/L — SIGNIFICANT CHANGE UP (ref 3.5–5.3)
PROT SERPL-MCNC: 6.9 GM/DL — SIGNIFICANT CHANGE UP (ref 6–8.3)
RBC # BLD: 3.22 M/UL — LOW (ref 4.2–5.8)
RBC # FLD: 13.6 % — SIGNIFICANT CHANGE UP (ref 10.3–14.5)
SODIUM SERPL-SCNC: 143 MMOL/L — SIGNIFICANT CHANGE UP (ref 135–145)
WBC # BLD: 16.18 K/UL — HIGH (ref 3.8–10.5)
WBC # FLD AUTO: 16.18 K/UL — HIGH (ref 3.8–10.5)

## 2022-09-19 PROCEDURE — 99291 CRITICAL CARE FIRST HOUR: CPT

## 2022-09-19 RX ORDER — FUROSEMIDE 40 MG
60 TABLET ORAL ONCE
Refills: 0 | Status: COMPLETED | OUTPATIENT
Start: 2022-09-19 | End: 2022-09-19

## 2022-09-19 RX ORDER — DEXAMETHASONE 0.5 MG/5ML
6 ELIXIR ORAL ONCE
Refills: 0 | Status: COMPLETED | OUTPATIENT
Start: 2022-09-20 | End: 2022-09-20

## 2022-09-19 RX ADMIN — Medication 2: at 17:44

## 2022-09-19 RX ADMIN — HEPARIN SODIUM 5000 UNIT(S): 5000 INJECTION INTRAVENOUS; SUBCUTANEOUS at 05:05

## 2022-09-19 RX ADMIN — CHLORHEXIDINE GLUCONATE 1 APPLICATION(S): 213 SOLUTION TOPICAL at 05:05

## 2022-09-19 RX ADMIN — Medication 4: at 11:49

## 2022-09-19 RX ADMIN — Medication 6 MILLIGRAM(S): at 05:04

## 2022-09-19 RX ADMIN — HEPARIN SODIUM 5000 UNIT(S): 5000 INJECTION INTRAVENOUS; SUBCUTANEOUS at 17:13

## 2022-09-19 RX ADMIN — LEVETIRACETAM 420 MILLIGRAM(S): 250 TABLET, FILM COATED ORAL at 17:14

## 2022-09-19 RX ADMIN — Medication 81 MILLIGRAM(S): at 11:49

## 2022-09-19 RX ADMIN — Medication 60 MILLIGRAM(S): at 09:34

## 2022-09-19 RX ADMIN — PANTOPRAZOLE SODIUM 40 MILLIGRAM(S): 20 TABLET, DELAYED RELEASE ORAL at 11:49

## 2022-09-19 RX ADMIN — LEVETIRACETAM 420 MILLIGRAM(S): 250 TABLET, FILM COATED ORAL at 05:05

## 2022-09-19 NOTE — PROGRESS NOTE ADULT - ATTENDING COMMENTS
Rudy: I have seen and examined the patient face to face, have reviewed and addended the HPI, PE and a/p as necessary.
Agree with above  COVID with AMS  Now extubated, but persistently altered mental status  Appears to be protecting airway  Patient has been dialyzed for possible uremic encephalopathy, but even though BUN has improved, there is [id number] improvement in mental status. Will diurese patient with Lasix today and monitor BUN and urine output.  Continue to monitor patient closely in ICU for possible respiratory decompensation.    Frequent bedside visits with changes in plan.  I have personally provided 45 minutes of critical care time.

## 2022-09-19 NOTE — PROGRESS NOTE ADULT - SUBJECTIVE AND OBJECTIVE BOX
INTERVAL HPI/OVERNIGHT EVENTS: No acute events.     SUBJECTIVE: Patient seen and examined at bedside. Patient continues to be altered.       VITAL SIGNS:  ICU Vital Signs Last 24 Hrs  T(C): 37 (19 Sep 2022 05:00), Max: 37 (19 Sep 2022 05:00)  T(F): 98.6 (19 Sep 2022 05:00), Max: 98.6 (19 Sep 2022 05:00)  HR: 71 (19 Sep 2022 07:00) (63 - 94)  BP: 128/78 (19 Sep 2022 07:00) (89/75 - 137/99)  BP(mean): 94 (19 Sep 2022 07:00) (74 - 110)  ABP: --  ABP(mean): --  RR: 18 (19 Sep 2022 07:00) (13 - 27)  SpO2: 100% (19 Sep 2022 07:00) (90% - 100%)    O2 Parameters below as of 18 Sep 2022 10:20  Patient On (Oxygen Delivery Method): nasal cannula w/ humidification,2lpm            Plateau pressure:   P/F ratio:     09-18 @ 07:01  -  09-19 @ 07:00  --------------------------------------------------------  IN: 570 mL / OUT: 271 mL / NET: 299 mL      CAPILLARY BLOOD GLUCOSE      POCT Blood Glucose.: 81 mg/dL (19 Sep 2022 05:08)    ECG:    PHYSICAL EXAM:    General:   HEENT:   Neck:   Respiratory:   Cardiovascular:   Abdomen:   Extremities:  Neurological:    MEDICATIONS:  MEDICATIONS  (STANDING):  aspirin  chewable 81 milliGRAM(s) Enteral Tube daily  chlorhexidine 2% Cloths 1 Application(s) Topical <User Schedule>  glucagon  Injectable 1 milliGRAM(s) IntraMuscular once  heparin   Injectable 5000 Unit(s) SubCutaneous every 12 hours  insulin lispro (ADMELOG) corrective regimen sliding scale   SubCutaneous every 6 hours  levETIRAcetam  IVPB 500 milliGRAM(s) IV Intermittent every 12 hours  pantoprazole  Injectable 40 milliGRAM(s) IV Push daily    MEDICATIONS  (PRN):  sodium chloride 0.9% lock flush 10 milliLiter(s) IV Push every 1 hour PRN Pre/post blood products, medications, blood draw, and to maintain line patency      ALLERGIES:  Allergies    No Known Allergies    Intolerances        LABS:                        9.8    16.18 )-----------( 401      ( 19 Sep 2022 02:15 )             31.2     09-19    143  |  107  |  64<H>  ----------------------------<  101<H>  4.2   |  25  |  4.56<H>    Ca    9.5      19 Sep 2022 02:15  Phos  5.6     09-19  Mg     2.8     09-19    TPro  6.9  /  Alb  2.0<L>  /  TBili  0.5  /  DBili  x   /  AST  52<H>  /  ALT  80<H>  /  AlkPhos  73  09-19    PT/INR - ( 17 Sep 2022 12:15 )   PT: 13.2 sec;   INR: 1.10 ratio               RADIOLOGY & ADDITIONAL TESTS: Reviewed.   INTERVAL HPI/OVERNIGHT EVENTS: No acute events.     SUBJECTIVE: Patient seen and examined at bedside. Patient continues to be altered.       VITAL SIGNS:  ICU Vital Signs Last 24 Hrs  T(C): 37 (19 Sep 2022 05:00), Max: 37 (19 Sep 2022 05:00)  T(F): 98.6 (19 Sep 2022 05:00), Max: 98.6 (19 Sep 2022 05:00)  HR: 71 (19 Sep 2022 07:00) (63 - 94)  BP: 128/78 (19 Sep 2022 07:00) (89/75 - 137/99)  BP(mean): 94 (19 Sep 2022 07:00) (74 - 110)  ABP: --  ABP(mean): --  RR: 18 (19 Sep 2022 07:00) (13 - 27)  SpO2: 100% (19 Sep 2022 07:00) (90% - 100%)    O2 Parameters below as of 18 Sep 2022 10:20  Patient On (Oxygen Delivery Method): nasal cannula w/ humidification,2lpm            Plateau pressure:   P/F ratio:     09-18 @ 07:01  -  09-19 @ 07:00  --------------------------------------------------------  IN: 570 mL / OUT: 271 mL / NET: 299 mL      CAPILLARY BLOOD GLUCOSE      POCT Blood Glucose.: 81 mg/dL (19 Sep 2022 05:08)    ECG:    PHYSICAL EXAM:    General: inimal response.   Head: Normocephalic and atraumatic.  Eyes: PERRLA with EOMI.  Neck: Supple. Trachea midline.   Cardiac: Normal S1 and S2 w/ RRR. No murmurs appreciated.   Pulmonary: Coarse breath sounds, on NC.   Abdominal: Soft, non-tender. (+) bowel sounds appreciated in all 4 quadrants. No hepatosplenomegaly.   Neurologic: altered?   Skin: Color appropriate for race. Intact, warm, and well-perfused.    MEDICATIONS:  MEDICATIONS  (STANDING):  aspirin  chewable 81 milliGRAM(s) Enteral Tube daily  chlorhexidine 2% Cloths 1 Application(s) Topical <User Schedule>  glucagon  Injectable 1 milliGRAM(s) IntraMuscular once  heparin   Injectable 5000 Unit(s) SubCutaneous every 12 hours  insulin lispro (ADMELOG) corrective regimen sliding scale   SubCutaneous every 6 hours  levETIRAcetam  IVPB 500 milliGRAM(s) IV Intermittent every 12 hours  pantoprazole  Injectable 40 milliGRAM(s) IV Push daily    MEDICATIONS  (PRN):  sodium chloride 0.9% lock flush 10 milliLiter(s) IV Push every 1 hour PRN Pre/post blood products, medications, blood draw, and to maintain line patency      ALLERGIES:  Allergies    No Known Allergies    Intolerances        LABS:                        9.8    16.18 )-----------( 401      ( 19 Sep 2022 02:15 )             31.2     09-19    143  |  107  |  64<H>  ----------------------------<  101<H>  4.2   |  25  |  4.56<H>    Ca    9.5      19 Sep 2022 02:15  Phos  5.6     09-19  Mg     2.8     09-19    TPro  6.9  /  Alb  2.0<L>  /  TBili  0.5  /  DBili  x   /  AST  52<H>  /  ALT  80<H>  /  AlkPhos  73  09-19    PT/INR - ( 17 Sep 2022 12:15 )   PT: 13.2 sec;   INR: 1.10 ratio               RADIOLOGY & ADDITIONAL TESTS: Reviewed. INTERVAL HPI/OVERNIGHT EVENTS: No acute events.   SUBJECTIVE: Patient seen and examined at bedside. Patient continues to be altered.     VITAL SIGNS:  T(C): 37 (19 Sep 2022 05:00), Max: 37 (19 Sep 2022 05:00)  HR: 71 (19 Sep 2022 07:00) (63 - 94)  BP: 128/78 (19 Sep 2022 07:00) (89/75 - 137/99)  BP(mean): 94 (19 Sep 2022 07:00) (74 - 110)  RR: 18 (19 Sep 2022 07:00) (13 - 27)  SpO2: 100% (19 Sep 2022 07:00) (90% - 100%)    O2 Parameters below as of 18 Sep 2022 10:20  Patient On (Oxygen Delivery Method): nasal cannula w/ humidification,2lpm  IN: 570 mL / OUT: 271 mL / NET: 299 mL    POCT Blood Glucose.: 81 mg/dL (19 Sep 2022 05:08)      PHYSICAL EXAM:    General: minimal response.   Head: Normocephalic and atraumatic.  Eyes: PERRLA with EOMI.  Neck: Supple. Trachea midline.   Cardiac: Normal S1 and S2 w/ RRR. No murmurs appreciated.   Pulmonary: Coarse breath sounds, on NC.   Abdominal: Soft, non-tender. (+) bowel sounds appreciated in all 4 quadrants. No hepatosplenomegaly.   Neurologic: altered?   Skin: Color appropriate for race. Intact, warm, and well-perfused.    MEDICATIONS:  MEDICATIONS  (STANDING):  aspirin  chewable 81 milliGRAM(s) Enteral Tube daily  chlorhexidine 2% Cloths 1 Application(s) Topical <User Schedule>  glucagon  Injectable 1 milliGRAM(s) IntraMuscular once  heparin   Injectable 5000 Unit(s) SubCutaneous every 12 hours  insulin lispro (ADMELOG) corrective regimen sliding scale   SubCutaneous every 6 hours  levETIRAcetam  IVPB 500 milliGRAM(s) IV Intermittent every 12 hours  pantoprazole  Injectable 40 milliGRAM(s) IV Push daily    MEDICATIONS  (PRN):  sodium chloride 0.9% lock flush 10 milliLiter(s) IV Push every 1 hour PRN Pre/post blood products, medications, blood draw, and to maintain line patency      ALLERGIES:  Allergies    No Known Allergies    Intolerances        LABS:                        9.8    16.18 )-----------( 401      ( 19 Sep 2022 02:15 )             31.2     09-19    143  |  107  |  64<H>  ----------------------------<  101<H>  4.2   |  25  |  4.56<H>    Ca    9.5      19 Sep 2022 02:15  Phos  5.6     09-19  Mg     2.8     09-19    TPro  6.9  /  Alb  2.0<L>  /  TBili  0.5  /  DBili  x   /  AST  52<H>  /  ALT  80<H>  /  AlkPhos  73  09-19    PT/INR - ( 17 Sep 2022 12:15 )   PT: 13.2 sec;   INR: 1.10 ratio               RADIOLOGY & ADDITIONAL TESTS: Reviewed.

## 2022-09-19 NOTE — PROGRESS NOTE ADULT - SUBJECTIVE AND OBJECTIVE BOX
[facility] NEPHROLOGY SERVICES, [facility]  NEPHROLOGY AND HYPERTENSION  [address]  [address]  [place] [address]  [phone]    MD TESSY ESPINOSA, MD POLI BENJAMIN, MD MAKENZIE RACHEL, MD JESSICA CLANCY, MD MAGALIE MAYFIELD MD          Patient events noted  Extubated; no distress  Opens eyes to     MEDICATIONS  (STANDING):  aspirin  chewable 81 milliGRAM(s) Enteral Tube daily  chlorhexidine 2% Cloths 1 Application(s) Topical <User Schedule>  glucagon  Injectable 1 milliGRAM(s) IntraMuscular once  heparin   Injectable 5000 Unit(s) SubCutaneous every 12 hours  insulin lispro (ADMELOG) corrective regimen sliding scale   SubCutaneous every 6 hours  levETIRAcetam  IVPB 500 milliGRAM(s) IV Intermittent every 12 hours  pantoprazole  Injectable 40 milliGRAM(s) IV Push daily    MEDICATIONS  (PRN):  sodium chloride 0.9% lock flush 10 milliLiter(s) IV Push every 1 hour PRN Pre/post blood products, medications, blood draw, and to maintain line patency      09-18-22 @ 07:01  -  09-19-22 @ 07:00  --------------------------------------------------------  IN: 570 mL / OUT: 271 mL / NET: 299 mL    09-19-22 @ 07:01  -  09-19-22 @ 10:56  --------------------------------------------------------  IN: 80 mL / OUT: 65 mL / NET: 15 mL      PHYSICAL EXAM:      T(C): 36.2 (09-19-22 @ 08:00), Max: 37 (09-19-22 @ 05:00)  HR: 77 (09-19-22 @ 09:34) (63 - 94)  BP: 124/79 (09-19-22 @ 09:34) (89/75 - 137/99)  RR: 16 (09-19-22 @ 09:34) (13 - 27)  SpO2: 100% (09-19-22 @ 09:00) (90% - 100%)  Wt(kg): --  Lungs clear  Heart S1S2  Abd soft NT ND  Extremities:   tr edema                                    9.8    16.18 )-----------( 401      ( 19 Sep 2022 02:15 )             31.2     09-19    143  |  107  |  64<H>  ----------------------------<  101<H>  4.2   |  25  |  4.56<H>    Ca    9.5      19 Sep 2022 02:15  Phos  5.6     09-19  Mg     2.8     09-19    TPro  6.9  /  Alb  2.0<L>  /  TBili  0.5  /  DBili  x   /  AST  52<H>  /  ALT  80<H>  /  AlkPhos  73  09-19      LIVER FUNCTIONS - ( 19 Sep 2022 02:15 )  Alb: 2.0 g/dL / Pro: 6.9 gm/dL / ALK PHOS: 73 U/L / ALT: 80 U/L / AST: 52 U/L / GGT: x           Creatinine Trend: 4.56<--, 3.32<--, 4.12<--, 3.30<--, 4.66<--, 5.95<--           Impression  VASILE CKD sucpected 3-4; pre renal/ risk for ischemic ATN; COVID related tubular injury   Less likely pulm renal disease, ANCA/GBM were negative  Risk for contrast related injury   Possible uremic encephalopathy, necessitating HD initiation.  Mental status not at baseline post HD support.   Trending leukocytosis w/o fever    Plan  [facility] catheter holiday; d/c today, replace Wed pending course.  Follow urine output for evidence of recovery  IV lasix challenge        Main Sotelo MD

## 2022-09-19 NOTE — PROGRESS NOTE ADULT - ASSESSMENT
82yo M, PMH dementia, htn, TEVAR, PPM for syncopal bradycardia, seizures, CKD, admitted for airway protection requiring intubation, course c/b poor mental status and COVID.    Neuro: AMS - intubated, not requiring sedation, likely 2/2 uremic encephalopathy; CT head on admission with no acute findings.    - EEG reviewed with no seizure; c/w keppra, keep off sedation for now at this time.   - Continues to have poor mental status, however family reports patient does not always respond at baseline.  - Given persistent poor mental status, will obtain CT head, for encephalopathy, possibly related to COVID. CT head negative.   Resp:  COVID with superimposed PNA - continue with decadron for now.  On minimal oxygen requirements at this time. Tolerating PS 5/5.    - Extubated on 9/17 with no acute issues, on nasal cannula.   CV: Shock state on norepinephrine - maintain MAP > 65  - PPM appears functional, no issues with Aortic stent  - c/w home ASA  GI - Protonix, start tube feeds in AM.   Renal: VASILE on CKD - may now require HD given patient remains non-responsive possibly 2/2 uremia.  - minimal UOP s/p fluid bolus  -s/p hd on 9/17, no acute need for hd today.  Noted to have urine outptu 270 in 24 hrs0  ENDO: ISS  Heme: HSQ  Ethics: full code per son (9/12)  Extubated 9/17 will continue to observe in ICU given overall poor mental status. 80yo M, PMH dementia, htn, TEVAR, PPM for syncopal bradycardia, seizures, CKD, admitted for airway protection requiring intubation, course c/b poor mental status and COVID.    Neuro: AMS - intubated, not requiring sedation, likely 2/2 uremic encephalopathy, however BUN 64 today; CT head on admission with no acute findings.    - EEG reviewed with no seizure; c/w keppra, keep off sedation for now at this time.   - Continues to have poor mental status, however family reports patient does not always respond at baseline.  - Given persistent poor mental status, will obtain CT head, for encephalopathy, possibly related to COVID. CT head negative.   Resp:  COVID with superimposed PNA - continue with decadron for now.  On minimal oxygen requirements at this time. Tolerating PS 5/5.    - Extubated on 9/17 with no acute issues, on nasal cannula.   CV: Shock state on norepinephrine - maintain MAP > 65  - PPM appears functional, no issues with Aortic stent  - c/w home ASA  GI - Protonix, tube feeds   Renal: VASILE on CKD - may now require HD given patient remains non-responsive possibly 2/2 uremia.  - minimal UOP s/p fluid bolus  -s/p hd on 9/17, no acute need for hd today.  Noted to have urine output 125 O/N. Will give laxis 60mg IV.   ENDO: ISS  Heme: HSQ  Ethics: full code per son (9/12)  Extubated 9/17 will continue to observe in ICU given overall poor mental status. 82yo M, PMH dementia, htn, TEVAR, PPM for syncopal bradycardia, seizures, CKD, admitted for airway protection requiring intubation, course c/b poor mental status and COVID.    Neuro: AMS - intubated, not requiring sedation, likely 2/2 uremic encephalopathy, however BUN 64 today; CT head on admission with no acute findings.    - EEG reviewed with no seizure; c/w keppra, keep off sedation for now at this time.   - Continues to have poor mental status, however family reports patient does not always respond at baseline.  - Given persistent poor mental status, obtained CT head, for encephalopathy, possibly related to COVID. CT head negative.   Resp:  COVID with superimposed PNA - continue with decadron for now.  On minimal oxygen requirements at this time. Tolerating PS 5/5.    - Extubated on 9/17 with no acute issues, on nasal cannula.   CV: Shock state on norepinephrine - maintain MAP > 65  - PPM appears functional, no issues with Aortic stent  - c/w home ASA  GI - Protonix, tube feeds   Renal: VASILE on CKD - may now require HD given patient remains non-responsive possibly 2/2 uremia.  - minimal UOP s/p fluid bolus  -s/p hd on 9/17, no acute need for hd today.  Noted to have urine output 125 O/N. Will give laxis 60mg IV.   ENDO: ISS  Heme: HSQ  Ethics: full code per son (9/12)  Extubated 9/17 will continue to observe in ICU given overall poor mental status.

## 2022-09-20 LAB
ALBUMIN SERPL ELPH-MCNC: 2 G/DL — LOW (ref 3.3–5)
ALP SERPL-CCNC: 70 U/L — SIGNIFICANT CHANGE UP (ref 40–120)
ALT FLD-CCNC: 79 U/L — HIGH (ref 12–78)
ANION GAP SERPL CALC-SCNC: 11 MMOL/L — SIGNIFICANT CHANGE UP (ref 5–17)
AST SERPL-CCNC: 40 U/L — HIGH (ref 15–37)
BILIRUB SERPL-MCNC: 0.2 MG/DL — SIGNIFICANT CHANGE UP (ref 0.2–1.2)
BUN SERPL-MCNC: 91 MG/DL — HIGH (ref 7–23)
CALCIUM SERPL-MCNC: 9.1 MG/DL — SIGNIFICANT CHANGE UP (ref 8.5–10.1)
CHLORIDE SERPL-SCNC: 109 MMOL/L — HIGH (ref 96–108)
CO2 SERPL-SCNC: 24 MMOL/L — SIGNIFICANT CHANGE UP (ref 22–31)
CREAT SERPL-MCNC: 5.22 MG/DL — HIGH (ref 0.5–1.3)
EGFR: 11 ML/MIN/1.73M2 — LOW
GLUCOSE BLDC GLUCOMTR-MCNC: 114 MG/DL — HIGH (ref 70–99)
GLUCOSE BLDC GLUCOMTR-MCNC: 139 MG/DL — HIGH (ref 70–99)
GLUCOSE BLDC GLUCOMTR-MCNC: 141 MG/DL — HIGH (ref 70–99)
GLUCOSE BLDC GLUCOMTR-MCNC: 143 MG/DL — HIGH (ref 70–99)
GLUCOSE BLDC GLUCOMTR-MCNC: 266 MG/DL — HIGH (ref 70–99)
GLUCOSE SERPL-MCNC: 165 MG/DL — HIGH (ref 70–99)
HCT VFR BLD CALC: 31.5 % — LOW (ref 39–50)
HGB BLD-MCNC: 9.7 G/DL — LOW (ref 13–17)
MAGNESIUM SERPL-MCNC: 3 MG/DL — HIGH (ref 1.6–2.6)
MCHC RBC-ENTMCNC: 30.1 PG — SIGNIFICANT CHANGE UP (ref 27–34)
MCHC RBC-ENTMCNC: 30.8 G/DL — LOW (ref 32–36)
MCV RBC AUTO: 97.8 FL — SIGNIFICANT CHANGE UP (ref 80–100)
NRBC # BLD: 0 /100 WBCS — SIGNIFICANT CHANGE UP (ref 0–0)
PHOSPHATE SERPL-MCNC: 6.2 MG/DL — HIGH (ref 2.5–4.5)
PLATELET # BLD AUTO: 429 K/UL — HIGH (ref 150–400)
POTASSIUM SERPL-MCNC: 4.1 MMOL/L — SIGNIFICANT CHANGE UP (ref 3.5–5.3)
POTASSIUM SERPL-SCNC: 4.1 MMOL/L — SIGNIFICANT CHANGE UP (ref 3.5–5.3)
PROT SERPL-MCNC: 6.8 GM/DL — SIGNIFICANT CHANGE UP (ref 6–8.3)
RBC # BLD: 3.22 M/UL — LOW (ref 4.2–5.8)
RBC # FLD: 13.5 % — SIGNIFICANT CHANGE UP (ref 10.3–14.5)
SODIUM SERPL-SCNC: 144 MMOL/L — SIGNIFICANT CHANGE UP (ref 135–145)
WBC # BLD: 15.71 K/UL — HIGH (ref 3.8–10.5)
WBC # FLD AUTO: 15.71 K/UL — HIGH (ref 3.8–10.5)

## 2022-09-20 PROCEDURE — 99233 SBSQ HOSP IP/OBS HIGH 50: CPT

## 2022-09-20 PROCEDURE — 99232 SBSQ HOSP IP/OBS MODERATE 35: CPT

## 2022-09-20 RX ORDER — FUROSEMIDE 40 MG
60 TABLET ORAL ONCE
Refills: 0 | Status: COMPLETED | OUTPATIENT
Start: 2022-09-20 | End: 2022-09-20

## 2022-09-20 RX ORDER — PANTOPRAZOLE SODIUM 20 MG/1
40 TABLET, DELAYED RELEASE ORAL DAILY
Refills: 0 | Status: DISCONTINUED | OUTPATIENT
Start: 2022-09-20 | End: 2022-10-04

## 2022-09-20 RX ORDER — LEVETIRACETAM 250 MG/1
500 TABLET, FILM COATED ORAL DAILY
Refills: 0 | Status: DISCONTINUED | OUTPATIENT
Start: 2022-09-21 | End: 2022-10-04

## 2022-09-20 RX ADMIN — Medication 81 MILLIGRAM(S): at 12:34

## 2022-09-20 RX ADMIN — Medication 6 MILLIGRAM(S): at 05:44

## 2022-09-20 RX ADMIN — Medication 60 MILLIGRAM(S): at 15:57

## 2022-09-20 RX ADMIN — LEVETIRACETAM 420 MILLIGRAM(S): 250 TABLET, FILM COATED ORAL at 05:45

## 2022-09-20 RX ADMIN — HEPARIN SODIUM 5000 UNIT(S): 5000 INJECTION INTRAVENOUS; SUBCUTANEOUS at 18:44

## 2022-09-20 RX ADMIN — PANTOPRAZOLE SODIUM 40 MILLIGRAM(S): 20 TABLET, DELAYED RELEASE ORAL at 12:34

## 2022-09-20 RX ADMIN — Medication 6: at 12:58

## 2022-09-20 RX ADMIN — HEPARIN SODIUM 5000 UNIT(S): 5000 INJECTION INTRAVENOUS; SUBCUTANEOUS at 05:44

## 2022-09-20 RX ADMIN — CHLORHEXIDINE GLUCONATE 1 APPLICATION(S): 213 SOLUTION TOPICAL at 05:05

## 2022-09-20 NOTE — CHART NOTE - NSCHARTNOTEFT_GEN_A_CORE
Assessment:  Pt seen in CCU for nutrition dx of inadequate energy intake.  Pt is on COVID-19 isolation, enteral feeding of Glucerna 1.2 infusing @ 50 ml/hr, ~ 100 ml residual volume reported by RN.  Pt adm c COVID-19, adm for airway protection requiring intubation, s/p extubation 09/17, AMS, VASILE on CKD, s/p multiple rounds of HD, HD is on hold at present.  Patient is for transfer to medicine service.     PMH include bradycardia, renal insufficiency, anxiety, syncope, descending aortic aneurysm, dementia, HTN, CKD,     Factors impacting intake: [ ] none [ ] nausea  [ ] vomiting [ ] diarrhea [ ] constipation  [ ]chewing problems [ ] swallowing issues  [ x] other:  c NGT feeding due to poor mental status, swallow evaluation ordered.      Diet Prescription: Diet, NPO with Tube Feed:   Tube Feeding Modality: Nasogastric  Glucerna 1.2 Mayco  Total Volume for 24 Hours (mL): 1200  Continuous  Starting Tube Feed Rate {mL per Hour}: 10  Increase Tube Feed Rate by (mL): 10     Every 6 hours  Until Goal Tube Feed Rate (mL per Hour): 50  Tube Feed Duration (in Hours): 24  Tube Feed Start Time: 12:09 (09-18-22 @ 13:26)    Intake: Glucerna 1.2 @ 50 mL/hr x 24 hrs=1200 ml,1440 mayco, 72 gm pro, 96% RDIs, 972 mL free water     Current Weight: 09/20, 63.6 kg, 09/11, 67.4 kg, c wt. loss of 3.8 kg  % Weight Change: 5.6%  09/19, 1+ generalized edema noted  I/O: 1340/950(+390)    Pertinent Medications: MEDICATIONS  (STANDING):  aspirin  chewable 81 milliGRAM(s) Enteral Tube daily  chlorhexidine 2% Cloths 1 Application(s) Topical <User Schedule>  furosemide   Injectable 60 milliGRAM(s) IV Push once  glucagon  Injectable 1 milliGRAM(s) IntraMuscular once  heparin   Injectable 5000 Unit(s) SubCutaneous every 12 hours  insulin lispro (ADMELOG) corrective regimen sliding scale   SubCutaneous every 6 hours  metolazone 10 milliGRAM(s) Oral once  pantoprazole   Suspension 40 milliGRAM(s) Enteral Tube daily    MEDICATIONS  (PRN):    Pertinent Labs: 09-20 Na144 mmol/L Glu 165 mg/dL<H> K+ 4.1 mmol/L Cr  5.22 mg/dL<H> BUN 91 mg/dL<H> 09-20 Phos 6.2 mg/dL<H> 09-20 Alb 2.0 g/dL<L>09-20 ALT 79 U/L<H> AST 40 U/L<H> Alkaline Phosphatase 70 U/L  09-13-22 @ 03:45 a1c 6.7<H-dx range DM>   CAPILLARY BLOOD GLUCOSE      POCT Blood Glucose.: 266 mg/dL (20 Sep 2022 12:32)  POCT Blood Glucose.: 143 mg/dL (20 Sep 2022 05:42)  POCT Blood Glucose.: 139 mg/dL (20 Sep 2022 00:21)  POCT Blood Glucose.: 159 mg/dL (19 Sep 2022 17:31)    Skin: WDL except ecchymotic, IAD    Estimated Needs:   [ ] no change since previous assessment(09/13), except for protein for HD being held at present  [x ] recalculated:   protein needs: 58-73 gram protein(.8-1.0 gram protein/kg IBW=72.6 kg)    Previous Nutrition Diagnosis: (09/13)  Inadequate Energy Intake  Etiology: acute illness  Signs/Symptoms: decreased oral intake PTA, initiation of enteral feeding which is not @ goal rate  Goal/Expected Outcome: pt to meet >75% protein-energy needs via enteral feeding; met   Nutrition Diagnosis is [ ] ongoing  [x ] resolved [ ] not applicable     New Nutrition Diagnosis: [x ] not applicable     Interventions:   Recommend  [ ] Change Diet To:  [ ] Nutrition Supplement  [x ] Nutrition Support: if pt does not pass swallow evaluation, recommend change enteral feeding to Nepro(renal formula which is CHO steady) @ 35 ml/hu=720 ml, 1512 calories, 68 grams protein, 610 ml free water, 89% RDIs   [x] Endocrine consult     Monitoring and Evaluation:   [ ] PO intake [ x ] Tolerance to diet prescription [ x ] weights [ x ] labs; Glucose, renal indices [ x ] follow up per protocol  [ ] other:

## 2022-09-20 NOTE — PROGRESS NOTE ADULT - ASSESSMENT
80yo M, PMH dementia, htn, TEVAR, PPM for syncopal bradycardia, seizures, CKD, admitted for airway protection requiring intubation, course c/b poor mental status and COVID.    - Remains altered despite multiple rounds of HD that have brought down BUN, making uremic encephalopathy less likely  - EEG negative for subclinical status. Titrate down Keppra to home dose  - Continue diuresis with Lasix. HD catheter out now for line holiday, nephro consult appreciated   - COVID related encephalopathy? Unclear what would be useful in diagnosis. Patient cannot get MRI d/t pacemaker  - If persistent     Neuro: AMS - intubated, not requiring sedation, likely 2/2 uremic encephalopathy, however BUN 64 today; CT head on admission with no acute findings.    - EEG reviewed with no seizure; c/w keppra, keep off sedation for now at this time.   - Continues to have poor mental status, however family reports patient does not always respond at baseline.  - Given persistent poor mental status, obtained CT head, for encephalopathy, possibly related to COVID. CT head negative.   Resp:  COVID with superimposed PNA - continue with decadron for now.  On minimal oxygen requirements at this time. Tolerating PS 5/5.    - Extubated on 9/17 with no acute issues, on nasal cannula.   CV: Shock state on norepinephrine - maintain MAP > 65  - PPM appears functional, no issues with Aortic stent  - c/w home ASA  GI - Protonix, tube feeds   Renal: VASILE on CKD - may now require HD given patient remains non-responsive possibly 2/2 uremia.  - minimal UOP s/p fluid bolus  -s/p hd on 9/17, no acute need for hd today.  Noted to have urine output 125 O/N. Will give laxis 60mg IV.   ENDO: ISS  Heme: HSQ  Ethics: full code per son (9/12)  Extubated 9/17 will continue to observe in ICU given overall poor mental status. 80yo M, PMH dementia, htn, TEVAR, PPM for syncopal bradycardia, seizures, CKD, admitted for airway protection requiring intubation, course c/b poor mental status and COVID.    - Remains altered despite multiple rounds of HD that have brought down BUN, making uremic encephalopathy less likely  - EEG negative for subclinical status. Titrate down Keppra to home dose  - Continue diuresis with Lasix. HD catheter out now for line holiday, nephro consult appreciated   - COVID related encephalopathy? Unclear what would be useful in diagnosis. Patient cannot get MRI d/t pacemaker. CT head negative for acute pathology  - If persistently AMS, will need to broach topic of PEG for patient at some point.  - Remains extubated for several days and still protecting airway  - DVT ppx with Hep SC    Transfer to general medicine

## 2022-09-20 NOTE — PROGRESS NOTE ADULT - SUBJECTIVE AND OBJECTIVE BOX
[facility], [facility]  NEPHROLOGY AND HYPERTENSION  [address]  [address]  [place] [address]  [phone]    MD TESSY ESPINOSA, MD POLI BENJAMIN, MD MAKENZIE RACHEL, MD EJSSICA CLANCY, MD MAGALIE MAYFIELD MD          Patient events noted  non verbal     MEDICATIONS  (STANDING):  aspirin  chewable 81 milliGRAM(s) Enteral Tube daily  chlorhexidine 2% Cloths 1 Application(s) Topical <User Schedule>  glucagon  Injectable 1 milliGRAM(s) IntraMuscular once  heparin   Injectable 5000 Unit(s) SubCutaneous every 12 hours  insulin lispro (ADMELOG) corrective regimen sliding scale   SubCutaneous every 6 hours  pantoprazole   Suspension 40 milliGRAM(s) Enteral Tube daily    MEDICATIONS  (PRN):      09-19-22 @ 07:01  -  09-20-22 @ 07:00  --------------------------------------------------------  IN: 1340 mL / OUT: 950 mL / NET: 390 mL    09-20-22 @ 07:01  -  09-20-22 @ 22:38  --------------------------------------------------------  IN: 820 mL / OUT: 1550 mL / NET: -730 mL      PHYSICAL EXAM:      T(C): 36.8 (09-20-22 @ 19:44), Max: 36.8 (09-20-22 @ 05:00)  HR: 97 (09-20-22 @ 21:00) (70 - 98)  BP: 133/87 (09-20-22 @ 21:00) (105/84 - 158/103)  RR: 20 (09-20-22 @ 21:00) (14 - 29)  SpO2: 100% (09-20-22 @ 21:00) (97% - 100%)  Wt(kg): --  Lungs clear  Heart S1S2  Abd soft NT ND  Extremities:   tr edema                                    9.7    15.71 )-----------( 429      ( 20 Sep 2022 03:55 )             31.5     09-20    144  |  109<H>  |  91<H>  ----------------------------<  165<H>  4.1   |  24  |  5.22<H>    Ca    9.1      20 Sep 2022 03:55  Phos  6.2     09-20  Mg     3.0     09-20    TPro  6.8  /  Alb  2.0<L>  /  TBili  0.2  /  DBili  x   /  AST  40<H>  /  ALT  79<H>  /  AlkPhos  70  09-20      LIVER FUNCTIONS - ( 20 Sep 2022 03:55 )  Alb: 2.0 g/dL / Pro: 6.8 gm/dL / ALK PHOS: 70 U/L / ALT: 79 U/L / AST: 40 U/L / GGT: x           Creatinine Trend: 5.22<--, 4.56<--, 3.32<--, 4.12<--, 3.30<--, 4.66<--        Impression  VASILE CKD sucpected 3-4; pre renal/ risk for ischemic ATN; COVID related tubular injury   Less likely pulm renal disease, ANCA/GBM were negative  Risk for contrast related injury   Possible uremic encephalopathy, necessitating HD initiation.  Mental status not at baseline post HD support.   Trending leukocytosis w/o fever    Plan  Harman catheter removed   Follow urine output for evidence of recovery      Main Sotelo MD

## 2022-09-20 NOTE — CHART NOTE - NSCHARTNOTEFT_GEN_A_CORE
82yo M, PMH dementia, htn, TEVAR, PPM for syncopal bradycardia, ? sizures?and CKD (baseline Cr appears to be ~1.8?), on Eliquis (unclear why), BIBEMS to ED after being found unresponsive at home. On EMS arrival pt obtunded and not breathing. Pt intubated in the field. On arrival in ED, pt found to be Covid+. CTH with no acute pathology. CT chest with diffuse b/l GGO. ICU consulted and admitted for further management.   He has been weak and not eating since discharge from [facility] for unclear reasons.  He was normal with moving all his extremities and talking and moving with no issues just weak and had no apatite Pt intubated in the field. On arrival in ED, pt found to be Covid+.   unresponsive except to deep stimuli, unclear cause,   Uremia vs  hypernatremia vs septic vs non convulsive seizure. Patient started on Keppra , EEG done no active seizures. Treated for Hypernatremia and has been started on HD 9/13.  Possible uremic encephalopathy, necessitating HD initiation. HD cath removed 9/19  for line free days.  Mental status not at baseline post HD support.  Nephrology Dr. Sotelo. Patient extubated on9/17 remains on nasal canula O2. Patient remains  COVID + with superimposed PNA - continue with s/p decadron. No remdesiver 2/2 renal function.   On minimal oxygen requirements at this time.  Given persistent poor mental status - CT head on admission with no acute findings obtained repeat CT head, for encephalopathy, possibly related to COVID.  CT -  No acute intracranial hemorrhage or acute territorial infarct. Unable to have MRI as patient with PPM.  Patient remains with NGT feeding as mental status remains poor. Speech and swallow evaluation ordered.  Patient seen and examined by ICU attending and stable for transfer to medicine service.     Report to Dr. Espinosa and placed on Dr. Gale service    JOSE F Olguin  critical care

## 2022-09-20 NOTE — PROGRESS NOTE ADULT - SUBJECTIVE AND OBJECTIVE BOX
INTERVAL HPI/OVERNIGHT EVENTS: No acute events.   SUBJECTIVE: Patient seen and examined at bedside. Patient continues to be altered.     ## O/E:  T(F): 97.6 (09-20-22 @ 08:00), Max: 98.3 (09-20-22 @ 05:00)  HR: 81 (09-20-22 @ 12:00) (67 - 91)  BP: 105/84 (09-20-22 @ 12:00) (105/84 - 162/94)  ABP: --  ABP(mean): --  RR: 18 (09-20-22 @ 12:00) (12 - 29)  SpO2: 100% (09-20-22 @ 12:00) (88% - 100%)    09-19 @ 07:01  -  09-20 @ 07:00  --------------------------------------------------------  IN: 1340 mL / OUT: 950 mL / NET: 390 mL    PHYSICAL EXAM:  General: minimal response.   Head: Normocephalic and atraumatic.  Eyes: PERRLA with EOMI.  Neck: Supple. Trachea midline.   Cardiac: Normal S1 and S2 w/ RRR. No murmurs appreciated.   Pulmonary: Coarse breath sounds, on NC.   Abdominal: Soft, non-tender. (+) bowel sounds appreciated in all 4 quadrants. No hepatosplenomegaly.   Neurologic: minimally responsive +gag   Skin: Color appropriate for race. Intact, warm, and well-perfused.    ## Medications:  furosemide   Injectable 60 milliGRAM(s) IV Push once  metolazone 10 milliGRAM(s) Oral once  aspirin  chewable 81 milliGRAM(s) Enteral Tube daily  heparin   Injectable 5000 Unit(s) SubCutaneous every 12 hours    pantoprazole   Suspension 40 milliGRAM(s) Enteral Tube daily      glucagon  Injectable 1 milliGRAM(s) IntraMuscular once  insulin lispro (ADMELOG) corrective regimen sliding scale   SubCutaneous every 6 hours    ## Labs:  ** CBC: **                        9.7    15.71 )-----------( 429      ( 20 Sep 2022 03:55 )             31.5     ** Chem:  **  09-20    144  |  109<H>  |  91<H>  ----------------------------<  165<H>  4.1   |  24  |  5.22<H>    Ca    9.1      20 Sep 2022 03:55  Phos  6.2     09-20  Mg     3.0     09-20    TPro  6.8  /  Alb  2.0<L>  /  TBili  0.2  /  DBili  x   /  AST  40<H>  /  ALT  79<H>  /  AlkPhos  70  09-20    ** Coags: **      CAPILLARY BLOOD GLUCOSE      POCT Blood Glucose.: 266 mg/dL (20 Sep 2022 12:32)  POCT Blood Glucose.: 143 mg/dL (20 Sep 2022 05:42)  POCT Blood Glucose.: 139 mg/dL (20 Sep 2022 00:21)  POCT Blood Glucose.: 159 mg/dL (19 Sep 2022 17:31)

## 2022-09-21 LAB
ALBUMIN SERPL ELPH-MCNC: 2.4 G/DL — LOW (ref 3.3–5)
ALP SERPL-CCNC: 85 U/L — SIGNIFICANT CHANGE UP (ref 40–120)
ALT FLD-CCNC: 77 U/L — SIGNIFICANT CHANGE UP (ref 12–78)
ANION GAP SERPL CALC-SCNC: 13 MMOL/L — SIGNIFICANT CHANGE UP (ref 5–17)
AST SERPL-CCNC: 33 U/L — SIGNIFICANT CHANGE UP (ref 15–37)
BASOPHILS # BLD AUTO: 0.07 K/UL — SIGNIFICANT CHANGE UP (ref 0–0.2)
BASOPHILS NFR BLD AUTO: 0.2 % — SIGNIFICANT CHANGE UP (ref 0–2)
BILIRUB SERPL-MCNC: 0.3 MG/DL — SIGNIFICANT CHANGE UP (ref 0.2–1.2)
BUN SERPL-MCNC: 98 MG/DL — HIGH (ref 7–23)
CALCIUM SERPL-MCNC: 10 MG/DL — SIGNIFICANT CHANGE UP (ref 8.5–10.1)
CHLORIDE SERPL-SCNC: 110 MMOL/L — HIGH (ref 96–108)
CK SERPL-CCNC: 83 U/L — SIGNIFICANT CHANGE UP (ref 26–308)
CO2 SERPL-SCNC: 24 MMOL/L — SIGNIFICANT CHANGE UP (ref 22–31)
CREAT SERPL-MCNC: 5.37 MG/DL — HIGH (ref 0.5–1.3)
EGFR: 10 ML/MIN/1.73M2 — LOW
EOSINOPHIL # BLD AUTO: 0.16 K/UL — SIGNIFICANT CHANGE UP (ref 0–0.5)
EOSINOPHIL NFR BLD AUTO: 0.5 % — SIGNIFICANT CHANGE UP (ref 0–6)
GLUCOSE BLDC GLUCOMTR-MCNC: 158 MG/DL — HIGH (ref 70–99)
GLUCOSE BLDC GLUCOMTR-MCNC: 164 MG/DL — HIGH (ref 70–99)
GLUCOSE BLDC GLUCOMTR-MCNC: 201 MG/DL — HIGH (ref 70–99)
GLUCOSE SERPL-MCNC: 173 MG/DL — HIGH (ref 70–99)
HCT VFR BLD CALC: 36.8 % — LOW (ref 39–50)
HGB BLD-MCNC: 11.6 G/DL — LOW (ref 13–17)
IMM GRANULOCYTES NFR BLD AUTO: 1.3 % — HIGH (ref 0–0.9)
LYMPHOCYTES # BLD AUTO: 1.49 K/UL — SIGNIFICANT CHANGE UP (ref 1–3.3)
LYMPHOCYTES # BLD AUTO: 4.9 % — LOW (ref 13–44)
MAGNESIUM SERPL-MCNC: 2.8 MG/DL — HIGH (ref 1.6–2.6)
MCHC RBC-ENTMCNC: 31.1 PG — SIGNIFICANT CHANGE UP (ref 27–34)
MCHC RBC-ENTMCNC: 31.5 G/DL — LOW (ref 32–36)
MCV RBC AUTO: 98.7 FL — SIGNIFICANT CHANGE UP (ref 80–100)
MONOCYTES # BLD AUTO: 1.84 K/UL — HIGH (ref 0–0.9)
MONOCYTES NFR BLD AUTO: 6 % — SIGNIFICANT CHANGE UP (ref 2–14)
NEUTROPHILS # BLD AUTO: 26.69 K/UL — HIGH (ref 1.8–7.4)
NEUTROPHILS NFR BLD AUTO: 87.1 % — HIGH (ref 43–77)
NRBC # BLD: 0 /100 WBCS — SIGNIFICANT CHANGE UP (ref 0–0)
PHOSPHATE SERPL-MCNC: 6.4 MG/DL — HIGH (ref 2.5–4.5)
PLATELET # BLD AUTO: 474 K/UL — HIGH (ref 150–400)
POTASSIUM SERPL-MCNC: 4.5 MMOL/L — SIGNIFICANT CHANGE UP (ref 3.5–5.3)
POTASSIUM SERPL-SCNC: 4.5 MMOL/L — SIGNIFICANT CHANGE UP (ref 3.5–5.3)
PROT SERPL-MCNC: 7.9 GM/DL — SIGNIFICANT CHANGE UP (ref 6–8.3)
RBC # BLD: 3.73 M/UL — LOW (ref 4.2–5.8)
RBC # FLD: 13.9 % — SIGNIFICANT CHANGE UP (ref 10.3–14.5)
SODIUM SERPL-SCNC: 147 MMOL/L — HIGH (ref 135–145)
WBC # BLD: 30.46 K/UL — HIGH (ref 3.8–10.5)
WBC # FLD AUTO: 30.46 K/UL — HIGH (ref 3.8–10.5)

## 2022-09-21 PROCEDURE — 99233 SBSQ HOSP IP/OBS HIGH 50: CPT

## 2022-09-21 PROCEDURE — 99223 1ST HOSP IP/OBS HIGH 75: CPT | Mod: FS

## 2022-09-21 PROCEDURE — 99223 1ST HOSP IP/OBS HIGH 75: CPT

## 2022-09-21 RX ORDER — SODIUM CHLORIDE 9 MG/ML
1000 INJECTION, SOLUTION INTRAVENOUS
Refills: 0 | Status: DISCONTINUED | OUTPATIENT
Start: 2022-09-21 | End: 2022-09-22

## 2022-09-21 RX ADMIN — HEPARIN SODIUM 5000 UNIT(S): 5000 INJECTION INTRAVENOUS; SUBCUTANEOUS at 05:52

## 2022-09-21 RX ADMIN — Medication 81 MILLIGRAM(S): at 13:07

## 2022-09-21 RX ADMIN — Medication 4: at 19:26

## 2022-09-21 RX ADMIN — Medication 2: at 06:23

## 2022-09-21 RX ADMIN — LEVETIRACETAM 500 MILLIGRAM(S): 250 TABLET, FILM COATED ORAL at 13:08

## 2022-09-21 RX ADMIN — HEPARIN SODIUM 5000 UNIT(S): 5000 INJECTION INTRAVENOUS; SUBCUTANEOUS at 19:26

## 2022-09-21 RX ADMIN — PANTOPRAZOLE SODIUM 40 MILLIGRAM(S): 20 TABLET, DELAYED RELEASE ORAL at 13:08

## 2022-09-21 RX ADMIN — CHLORHEXIDINE GLUCONATE 1 APPLICATION(S): 213 SOLUTION TOPICAL at 05:52

## 2022-09-21 RX ADMIN — Medication 2: at 12:30

## 2022-09-21 NOTE — SWALLOW BEDSIDE ASSESSMENT ADULT - ADDITIONAL RECOMMENDATIONS
METICULOUS ORAL HYGIENE due to npo status; CONSIDER long term means nutrition/hydration if lethargy persists

## 2022-09-21 NOTE — PROGRESS NOTE ADULT - SUBJECTIVE AND OBJECTIVE BOX
Patient is a 81y old  Male who presents with a chief complaint of Encephalopathy, acute respiratory failure (21 Sep 2022 14:40)      INTERVAL HPI/OVERNIGHT EVENTS:  remains non-verbal       MEDICATIONS  (STANDING):  aspirin  chewable 81 milliGRAM(s) Enteral Tube daily  chlorhexidine 2% Cloths 1 Application(s) Topical <User Schedule>  glucagon  Injectable 1 milliGRAM(s) IntraMuscular once  heparin   Injectable 5000 Unit(s) SubCutaneous every 12 hours  insulin lispro (ADMELOG) corrective regimen sliding scale   SubCutaneous every 6 hours  levETIRAcetam  Solution 500 milliGRAM(s) Oral daily  pantoprazole   Suspension 40 milliGRAM(s) Enteral Tube daily    MEDICATIONS  (PRN):      Allergies    No Known Allergies    Intolerances        Vital Signs Last 24 Hrs  T(C): 36.9 (21 Sep 2022 12:00), Max: 37.7 (20 Sep 2022 22:38)  T(F): 98.5 (21 Sep 2022 12:00), Max: 99.9 (20 Sep 2022 22:38)  HR: 97 (21 Sep 2022 12:00) (84 - 104)  BP: 151/73 (21 Sep 2022 12:00) (133/87 - 158/100)  BP(mean): 102 (20 Sep 2022 21:00) (102 - 118)  RR: 20 (21 Sep 2022 12:00) (19 - 29)  SpO2: 98% (21 Sep 2022 12:00) (94% - 100%)    Parameters below as of 21 Sep 2022 12:00  Patient On (Oxygen Delivery Method): nasal cannula        PHYSICAL EXAM:  GENERAL: NAD  HEAD:  Atraumatic, Normocephalic  EYES: EOMI, PERRLA, conjunctiva and sclera clear  ENMT: No tonsillar erythema, exudates, or enlargement;   NECK: Supple, Normal thyroid  NERVOUS SYSTEM: rigid, no obvious focal deficits   CHEST/LUNG: CTABL; No rales, rhonchi, wheezing, or rubs  HEART: Regular rate and rhythm; No murmurs, rubs, or gallops  ABDOMEN: Soft, Nontender, Nondistended; Bowel sounds present  EXTREMITIES:  2+ Peripheral Pulses, No clubbing, cyanosis, or edema  LYMPH: No lymphadenopathy noted  SKIN: No rashes or lesions    LABS:                        11.6   30.46 )-----------( 474      ( 21 Sep 2022 08:19 )             36.8     09-21    147<H>  |  110<H>  |  98<H>  ----------------------------<  173<H>  4.5   |  24  |  5.37<H>    Ca    10.0      21 Sep 2022 08:19  Phos  6.4     09-21  Mg     2.8     09-21    TPro  7.9  /  Alb  2.4<L>  /  TBili  0.3  /  DBili  x   /  AST  33  /  ALT  77  /  AlkPhos  85  09-21        CAPILLARY BLOOD GLUCOSE      POCT Blood Glucose.: 164 mg/dL (21 Sep 2022 12:00)  POCT Blood Glucose.: 158 mg/dL (21 Sep 2022 06:15)  POCT Blood Glucose.: 141 mg/dL (20 Sep 2022 23:27)  POCT Blood Glucose.: 114 mg/dL (20 Sep 2022 18:42)      RADIOLOGY & ADDITIONAL TESTS:    Imaging Personally Reviewed:  [ ] YES  [ ] NO    Consultant(s) Notes Reviewed:  [ ] YES  [ ] NO    Care Discussed with Consultants/Other Providers [ ] YES  [ ] NO

## 2022-09-21 NOTE — SWALLOW BEDSIDE ASSESSMENT ADULT - SLP GENERAL OBSERVATIONS
lethargic and minimally interactive; briefly opened eyes but poor oral movements for bolus manipulation; did not respond to temperature and tactile stim modalities lethargic and minimally interactive; briefly opened eyes but poor oral movements for bolus manipulation; did not respond to temperature and tactile stim modalities; poor awareness of eating/swallowing context

## 2022-09-21 NOTE — SWALLOW BEDSIDE ASSESSMENT ADULT - SLP PERTINENT HISTORY OF CURRENT PROBLEM
being found unresponsive, not breathing.  Intubated in the field.  In ER, found to be COVID positive and chest CT showed bilateral groundglass opacities. Pt found unresponsive, not breathing, intubated in the field; In ER, found to be COVID positive and chest CT showed bilateral groundglass opacities; obtunded

## 2022-09-21 NOTE — SWALLOW BEDSIDE ASSESSMENT ADULT - ORAL PHASE
Decreased anterior-posterior movement of the bolus/Stasis in lateral sulci Decreased anterior-posterior movement of the bolus/Delayed oral transit time/Stasis in anterior sulcus/Stasis in lateral sulci

## 2022-09-21 NOTE — SWALLOW BEDSIDE ASSESSMENT ADULT - PHARYNGEAL PHASE
Decreased laryngeal elevation Delayed pharyngeal swallow/Decreased laryngeal elevation/Multiple swallows

## 2022-09-21 NOTE — PROGRESS NOTE ADULT - ASSESSMENT
80yo M, PMH dementia, htn, TEVAR, PPM for syncopal bradycardia, ? seizures?and CKD (baseline Cr appears to be ~1.8?), on Eliquis (unclear why), BIBEMS to ED after being found unresponsive at home. On EMS arrival pt obtunded and not breathing. Pt intubated in the field. On arrival in ED, pt found to be Covid+. CTH with no acute pathology. CT chest with diffuse b/l GGO. ICU consulted and admitted for further management.   He has been weak and not eating since discharge from [facility] for unclear reasons.  He was normal with moving all his extremities and talking and moving with no issues just weak and had no appetite Pt intubated in the field. On arrival in ED, pt found to be Covid+.   unresponsive except to deep stimuli, unclear cause,   Uremia vs  hypernatremia vs septic vs non convulsive seizure. Patient started on Keppra , EEG done no active seizures. Treated for Hypernatremia and has been started on HD 9/13.  Possible uremic encephalopathy, necessitating HD initiation. HD cath removed 9/19  for line free days.  Mental status not at baseline post HD support.  Nephrology Dr. Sotelo. Patient extubated on9/17 remains on nasal canula O2. Patient remains  COVID + with superimposed PNA - continue with s/p decadron. No remdesiver 2/2 renal function.   On minimal oxygen requirements at this time.  Given persistent poor mental status - CT head on admission with no acute findings obtained repeat CT head, for encephalopathy, possibly related to COVID.  CT -  No acute intracranial hemorrhage or acute territorial infarct. Unable to have MRI as patient with PPM.  Patient remains with NGT feeding as mental status remains poor. Speech and swallow evaluation ordered.  Patient seen and examined by ICU attending and stable for transfer to medicine service.     acute encephalopathy of unknown etiology  - still requiring NG tube   - on the ddx is infection vs cva ( cant get MRI), ve seizure   - eeg shows no seizure  - CT head x2 shows no cva   - will likely need LP to rule infectious etiology. will include nmda receptor ab, [place] IgM   - discussed with neuro and will repeat eeg,     leukocytosis   - wbc trending up with predominate neutrophil. Just finished course of dexamethasone which could explain, but due to the persistent encephalopathy will get LP and get ID consult for possible empiric abx     covid   - VSS  - s.p remd     ckg stage 5   - s/p dialysis with imrpoved cr and bun but is now trending up

## 2022-09-21 NOTE — CONSULT NOTE ADULT - REASON FOR ADMISSION
Encephalopathy, acute respiratory failure

## 2022-09-21 NOTE — CONSULT NOTE ADULT - SUBJECTIVE AND OBJECTIVE BOX
Patient is a 81y old  Male who presents with a chief complaint of Encephalopathy, acute respiratory failure (20 Sep 2022 22:38)      CC: AMS    HPI:  82 yo man admitted on 9/11/22 after being found unresponsive, not breathing.  Intubated in the field.  In ER, found to be COVID positive and chest CT showed bilateral groundglass opacities.  Patient was hypotensive in ICU requiring pressors.  Patient was also in acute renal failure requiring HD.   Mental status improved on 9/16 and he was extubated, however, he has remained very lethargic and poorly responsive.  EEG on 9/17 showed diffuse slowing, no seizures or epileptiform abnormalities.  Wife denies history of seizures in the past.      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, right BG, left thalamus, diffuse white matter ischemic changes    Can not have brain MRI due to cardiac pacemaker    WBC = 30.46  BUN/Cr = 98/5.37    PAST MEDICAL & SURGICAL HISTORY:  Hypertension, unspecified type  Descending aortic aneurysm  Syncope, unspecified syncope type  Anxiety  Renal insufficiency  Bradycardia  H/O cardiac pacemaker  nvite    Social Hx:  Nonsmoker, no drug or alcohol use    MEDICATIONS  (STANDING):  aspirin  chewable 81 milliGRAM(s) Enteral Tube daily  chlorhexidine 2% Cloths 1 Application(s) Topical <User Schedule>  glucagon  Injectable 1 milliGRAM(s) IntraMuscular once  heparin   Injectable 5000 Unit(s) SubCutaneous every 12 hours  insulin lispro (ADMELOG) corrective regimen sliding scale   SubCutaneous every 6 hours  levETIRAcetam  Solution 500 milliGRAM(s) Oral daily  pantoprazole   Suspension 40 milliGRAM(s) Enteral Tube daily     Allergies  No Known Allergies    ROS: Pertinent positives in HPI, all other ROS were reviewed and are negative.      Vital Signs Last 24 Hrs  T(C): 36.9 (21 Sep 2022 12:00), Max: 37.7 (20 Sep 2022 22:38)  T(F): 98.5 (21 Sep 2022 12:00), Max: 99.9 (20 Sep 2022 22:38)  HR: 97 (21 Sep 2022 12:00) (78 - 104)  BP: 151/73 (21 Sep 2022 12:00) (133/87 - 158/103)  BP(mean): 102 (20 Sep 2022 21:00) (102 - 119)  RR: 20 (21 Sep 2022 12:00) (17 - 29)  SpO2: 98% (21 Sep 2022 12:00) (94% - 100%)    Parameters below as of 21 Sep 2022 12:00  Patient On (Oxygen Delivery Method): nasal cannula    Neurological exam:  Neck: +rigidity in setting of diffuse rigidity in limbs  HF: Lethargic with eyes closed, opens eyes to loud auditory stimulation, nonverbal, does not follow any commands  CN: GEORGINA, gaze midline, no nystagmus,no NLFD, tongue midline  Motor/Sens: diffuse muscle rigidity, severe generalized weaknesss, moves extremities symmetrically and withdraws symmetrically from noxious stimuli  Reflexes: Symmetric and normal . BJ 2+, BR 2+, KJ 2+, AJ 2+, downgoing toes b/l  Coord:  unable to assess    Labs:   09-21    147<H>  |  110<H>  |  98<H>  ----------------------------<  173<H>  4.5   |  24  |  5.37<H>    Ca    10.0      21 Sep 2022 08:19  Phos  6.4     09-21  Mg     2.8     09-21    TPro  7.9  /  Alb  2.4<L>  /  TBili  0.3  /  DBili  x   /  AST  33  /  ALT  77  /  AlkPhos  85  09-21                              11.6   30.46 )-----------( 474      ( 21 Sep 2022 08:19 )             36.8       A/P:   82 yo man with persistent encephalopathy, which is likely multifactorial (related to initial hypoxia, initial hypotension with cerebral hypoperfusion, uremic encephalopathy).  Diffuse rigidity on exam, uncertain etiology.    Recommend:  1. Repeat infectious work up, including blood cultures.  If no source found, recommend LP (cell count, protein, glucose, culture, GS, CSF PCR, HSV I/II PCR) - need assistance from IR due to diffuse rigidity  2. ID consult, empirical antibiotic coverage  3. Diffuse rigidity: check CPK, avoid antipsychotic agents  4. routine EEG    Leonard Tyler MD  Neurology Attending Physician

## 2022-09-21 NOTE — CONSULT NOTE ADULT - ASSESSMENT
80yo M, PMH dementia, htn, TEVAR, PPM for syncopal bradycardia, ? sizures?and CKD (baseline Cr appears to be ~1.8?), on Eliquis (unclear why), BIBEMS to ED after being found unresponsive at home. On EMS arrival pt obtunded and not breathing. Pt intubated in the field. On arrival in ED, pt found to be Covid+. CTH with no acute pathology. CT chest with diffuse b/l GGO. Treated for Hypernatremia and has been started on HD 9/13.  Possible uremic encephalopathy, necessitating HD initiation. HD cath removed 9/19  for line free days.  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 Could he have suffered another CVA?   His wbc has increased drastically, blood cultures sent   at this juncture patient needs a lumbar puncture   can also rule out syphilis   needs MRI if his PPM is MRI compatible  Patient has a [employer] CLS Edora pacemaker( asked wife to bring the card in but PPM was placed in [facility] facility by Dr. Davsi Saldana)    Plan:  completed course of Zosyn(has poor BBB penetration)   follow up on blood cultures   perform LP   cell count, glucose, protein, routine gram stain and culture, CSF PCR panel, fungal Culture, AFB culture (send 5 mL), CSF crypt antigen   Syphilis screen  HIV  obtain PPM card vs calling EP to  confirm MRI compatibility   obtain MRI if able  EEG   vitamin B12, folate, TSH and free T4    encephalopathy panel   trend wbc and creatinine  consider placement of fifi for hemodialysis per renal   if clinically worsens, start vancomycin and meropenem     Discussed with Dr. Sayra Hernandez, DO  Infectious Disease Attending  Reachable via Microsoft Teams or ID office: [phone]  After 5pm/weekends please call [phone] for all inquiries and new consults

## 2022-09-21 NOTE — PROGRESS NOTE ADULT - SUBJECTIVE AND OBJECTIVE BOX
[facility] NEPHROLOGY SERVICES, [facility]  NEPHROLOGY AND HYPERTENSION  [address]  [address]  [place] [address]  [phone]    MD TESSY ESPINOSA, MD POLI BENJAMIN, MD MAKENZIE RACHEL, MD JESSICA CLANCY, MD MAGALIE MAYFIELD MD          Patient events noted  No distress    MEDICATIONS  (STANDING):  aspirin  chewable 81 milliGRAM(s) Enteral Tube daily  chlorhexidine 2% Cloths 1 Application(s) Topical <User Schedule>  glucagon  Injectable 1 milliGRAM(s) IntraMuscular once  heparin   Injectable 5000 Unit(s) SubCutaneous every 12 hours  insulin lispro (ADMELOG) corrective regimen sliding scale   SubCutaneous every 6 hours  levETIRAcetam  Solution 500 milliGRAM(s) Oral daily  pantoprazole   Suspension 40 milliGRAM(s) Enteral Tube daily    MEDICATIONS  (PRN):      09-20-22 @ 07:01  -  09-21-22 @ 07:00  --------------------------------------------------------  IN: 1320 mL / OUT: 2550 mL / NET: -1230 mL      PHYSICAL EXAM:      T(C): 37.3 (09-21-22 @ 17:56), Max: 37.7 (09-20-22 @ 22:38)  HR: 89 (09-21-22 @ 17:56) (89 - 104)  BP: 119/64 (09-21-22 @ 17:56) (119/64 - 151/73)  RR: 17 (09-21-22 @ 17:56) (17 - 21)  SpO2: 95% (09-21-22 @ 17:56) (94% - 98%)  Wt(kg): --  Lungs clear  Heart S1S2  Abd soft NT ND  Extremities:   tr edema                                    11.6   30.46 )-----------( 474      ( 21 Sep 2022 08:19 )             36.8     09-21    147<H>  |  110<H>  |  98<H>  ----------------------------<  173<H>  4.5   |  24  |  5.37<H>    Ca    10.0  corrected to 11.2   21 Sep 2022 08:19  Phos  6.4     09-21  Mg     2.8     09-21    TPro  7.9  /  Alb  2.4<L>  /  TBili  0.3  /  DBili  x   /  AST  33  /  ALT  77  /  AlkPhos  85  09-21      LIVER FUNCTIONS - ( 21 Sep 2022 08:19 )  Alb: 2.4 g/dL / Pro: 7.9 gm/dL / ALK PHOS: 85 U/L / ALT: 77 U/L / AST: 33 U/L / GGT: x           Creatinine Trend: 5.37<--, 5.22<--, 4.56<--, 3.32<--, 4.12<--, 3.30<--      Impression  VASILE CKD sucpected 3-4; pre renal/ risk for ischemic ATN; COVID related tubular injury   Less likely pulm renal disease, ANCA/GBM were negative  Risk for contrast related injury   Possible uremic encephalopathy, necessitating HD initiation.  Mental status not at baseline post HD support.   Trending leukocytosis w/o fever  Renal indices stable the last 24 hrs;     Plan    Follow urine output for evidence of recovery  Holding HD      Main Sotelo MD [facility] NEPHROLOGY SERVICES, [facility]  NEPHROLOGY AND HYPERTENSION  [address]  [address]  [place] [address]  [phone]    MD TESSY ESPINOSA, MD POLI BENJAMIN, MD MAKENZIE RACHEL, MD JESSICA CLANCY, MD MAGALIE MAYFIELD MD          Patient events noted  No distress  elevated WBC trend    MEDICATIONS  (STANDING):  aspirin  chewable 81 milliGRAM(s) Enteral Tube daily  chlorhexidine 2% Cloths 1 Application(s) Topical <User Schedule>  glucagon  Injectable 1 milliGRAM(s) IntraMuscular once  heparin   Injectable 5000 Unit(s) SubCutaneous every 12 hours  insulin lispro (ADMELOG) corrective regimen sliding scale   SubCutaneous every 6 hours  levETIRAcetam  Solution 500 milliGRAM(s) Oral daily  pantoprazole   Suspension 40 milliGRAM(s) Enteral Tube daily    MEDICATIONS  (PRN):      09-20-22 @ 07:01  -  09-21-22 @ 07:00  --------------------------------------------------------  IN: 1320 mL / OUT: 2550 mL / NET: -1230 mL      PHYSICAL EXAM:      T(C): 37.3 (09-21-22 @ 17:56), Max: 37.7 (09-20-22 @ 22:38)  HR: 89 (09-21-22 @ 17:56) (89 - 104)  BP: 119/64 (09-21-22 @ 17:56) (119/64 - 151/73)  RR: 17 (09-21-22 @ 17:56) (17 - 21)  SpO2: 95% (09-21-22 @ 17:56) (94% - 98%)  Wt(kg): --  Lungs clear  Heart S1S2  Abd soft NT ND  Extremities:   tr edema                                    11.6   30.46 )-----------( 474      ( 21 Sep 2022 08:19 )             36.8     09-21    147<H>  |  110<H>  |  98<H>  ----------------------------<  173<H>  4.5   |  24  |  5.37<H>    Ca    10.0  corrected to 11.2   21 Sep 2022 08:19  Phos  6.4     09-21  Mg     2.8     09-21    TPro  7.9  /  Alb  2.4<L>  /  TBili  0.3  /  DBili  x   /  AST  33  /  ALT  77  /  AlkPhos  85  09-21      LIVER FUNCTIONS - ( 21 Sep 2022 08:19 )  Alb: 2.4 g/dL / Pro: 7.9 gm/dL / ALK PHOS: 85 U/L / ALT: 77 U/L / AST: 33 U/L / GGT: x           Creatinine Trend: 5.37<--, 5.22<--, 4.56<--, 3.32<--, 4.12<--, 3.30<--      Impression  VASILE CKD sucpected 3-4; pre renal/ risk for ischemic ATN; COVID related tubular injury   Less likely pulm renal disease, ANCA/GBM were negative  Risk for contrast related injury   Possible uremic encephalopathy, necessitating HD initiation.  Mental status not at baseline post HD support.   Trending leukocytosis w/o fever  Renal indices stable the last 24 hrs;     Plan    Follow urine output for evidence of recovery  Pancultured  IVF maintenance  Holding HD      Main Sotelo MD

## 2022-09-21 NOTE — SWALLOW BEDSIDE ASSESSMENT ADULT - ORAL PREPARATORY PHASE
Reduced oral grading/Anterior loss of bolus/Lateral loss of bolus Reduced oral grading/Bolus falls into anterior sulcus/Bolus falls into right lateral sulci

## 2022-09-21 NOTE — SWALLOW BEDSIDE ASSESSMENT ADULT - SWALLOW EVAL: DIAGNOSIS
Oropharyngeal dysphagia in setting of encephalopathy, acute respiratory failure; swallow mechanism is non-functional at this time, poorly responsive to multimodal stimulation and airway protection for swallow is not maintained

## 2022-09-21 NOTE — SWALLOW BEDSIDE ASSESSMENT ADULT - COMMENTS
9/18 CXR clear lungs ; CT head No acute intracranial hemorrhage or acute territorial infarct  9/21 MD note acute encephalopathy of unknown etiology  Patient remains with NGT feeding as mental status remains poor. Speech and swallow evaluation ordered.  9/21 Neuro note persistent encephalopathy, which is likely multifactorial (related to initial hypoxia, initial hypotension with cerebral hypoperfusion, uremic encephalopathy).  Mental status improved on 9/16 and he was extubated, however, he has remained very lethargic and poorly responsive.  E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, right BG, left thalamus, diffuse white matter ischemic changes didn't propel bolus and needed to be manually removed by SLP; protective cough x 1 following removal and spoon water bolus to clear per H&P, He has been weak and not eating since discharge from [place] for unclear reasons.  He was normal with moving all his extremities and talking and moving with no issues just weak and had no appetite  PMH Dementia, htn, PPM for syncopal bradycardia, ? seizures? CKD  9/18 CXR clear lungs ; CT head No acute intracranial hemorrhage or acute territorial infarct  9/21 MD note acute encephalopathy of unknown etiology;  Patient remains with NGT feeding as mental status remains poor. Speech and swallow evaluation ordered  9/21 Neuro note persistent encephalopathy which is likely multifactorial (related to initial hypoxia, initial hypotension with cerebral hypoperfusion, uremic encephalopathy).  Mental status improved on 9/16 and he was extubated, however, he has remained very lethargic and poorly responsive; 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, right BG, left thalamus, diffuse white matter ischemic changes

## 2022-09-21 NOTE — CONSULT NOTE ADULT - SUBJECTIVE AND OBJECTIVE BOX
[facility]  INFECTIOUS DISEASES   [address] [place] [address]  Tel: [phone]     Fax: [phone]  ======================================================  Aston Randall,, MD Jaun Hernandez, DO Sherin Junior, BRANDI   ======================================================    SARAH JIMÉNEZ  MRN-[id number]        Patient is a 81y old  Male who presents with a chief complaint of Encephalopathy, acute respiratory failure (21 Sep 2022 21:26)      HPI:  80yo M, PMH dementia, htn, TEVAR, PPM for syncopal bradycardia, ? sizures?and CKD (baseline Cr appears to be ~1.8?), on Eliquis (unclear why), BIBEMS to ED after being found unresponsive at home. On EMS arrival pt obtunded and not breathing. Pt intubated in the field. On arrival in ED, pt found to be Covid+. CTH with no acute pathology. CT chest with diffuse b/l GGO. ICU consulted for further management.      He has been weak and not eating since discharge from [place] for unclear reasons.  He was normal with moving all his extremities and talking and moving with no issues just weak and had no apatite  (11 Sep 2022 21:45)    Transfer Note: 80yo M, PMH dementia, htn, TEVAR, PPM for syncopal bradycardia, ? sizures?and CKD (baseline Cr appears to be ~1.8?), on Eliquis (unclear why), BIBEMS to ED after being found unresponsive at home. On EMS arrival pt obtunded and not breathing. Pt intubated in the field. On arrival in ED, pt found to be Covid+. CTH with no acute pathology. CT chest with diffuse b/l GGO. ICU consulted and admitted for further management.   He has been weak and not eating since discharge from [facility] for unclear reasons.  He was normal with moving all his extremities and talking and moving with no issues just weak and had no apatite Pt intubated in the field. On arrival in ED, pt found to be Covid+.   unresponsive except to deep stimuli, unclear cause,   Uremia vs  hypernatremia vs septic vs non convulsive seizure. Patient started on Keppra , EEG done no active seizures. Treated for Hypernatremia and has been started on HD 9/13.  Possible uremic encephalopathy, necessitating HD initiation. HD cath removed 9/19  for line free days.  Mental status not at baseline post HD support.  Nephrology Dr. Sotelo. Patient extubated on9/17 remains on nasal canula O2. Patient remains  COVID + with superimposed PNA - continue with s/p decadron. No remdesiver 2/2 renal function.   On minimal oxygen requirements at this time.  Given persistent poor mental status - CT head on admission with no acute findings obtained repeat CT head, for encephalopathy, possibly related to COVID.  CT -  No acute intracranial hemorrhage or acute territorial infarct. Unable to have MRI as patient with PPM.  Patient remains with NGT feeding as mental status remains poor.    ID consulted for workup and antibiotic management     PAST MEDICAL & SURGICAL HISTORY:  Hypertension, unspecified type      Descending aortic aneurysm      Syncope, unspecified syncope type      Anxiety      Renal insufficiency      Bradycardia      H/O cardiac pacemaker  medtronic          Allergies  No Known Allergies        ANTIMICROBIALS:      MEDICATIONS  (STANDING):  cefTRIAXone   IVPB   100 mL/Hr IV Intermittent (09-11-22 @ 18:06)    piperacillin/tazobactam IVPB.   200 mL/Hr IV Intermittent (09-12-22 @ 01:52)    piperacillin/tazobactam IVPB..   25 mL/Hr IV Intermittent (09-18-22 @ 17:24)   25 mL/Hr IV Intermittent (09-18-22 @ 06:05)   25 mL/Hr IV Intermittent (09-17-22 @ 17:40)   25 mL/Hr IV Intermittent (09-17-22 @ 05:24)   25 mL/Hr IV Intermittent (09-16-22 @ 17:12)   25 mL/Hr IV Intermittent (09-16-22 @ 05:14)   25 mL/Hr IV Intermittent (09-15-22 @ 17:31)   25 mL/Hr IV Intermittent (09-15-22 @ 05:17)   25 mL/Hr IV Intermittent (09-14-22 @ 18:00)   25 mL/Hr IV Intermittent (09-14-22 @ 05:23)   25 mL/Hr IV Intermittent (09-13-22 @ 17:33)   25 mL/Hr IV Intermittent (09-13-22 @ 06:35)   25 mL/Hr IV Intermittent (09-12-22 @ 17:54)   25 mL/Hr IV Intermittent (09-12-22 @ 06:06)    vancomycin  IVPB.   250 mL/Hr IV Intermittent (09-11-22 @ 19:05)        OTHER MEDS: MEDICATIONS  (STANDING):  aspirin  chewable 81 daily  glucagon  Injectable 1 once  heparin   Injectable 5000 every 12 hours  insulin lispro (ADMELOG) corrective regimen sliding scale  every 6 hours  levETIRAcetam  Solution 500 daily  pantoprazole   Suspension 40 daily      SOCIAL HISTORY:     unknown  FAMILY HISTORY:  unknown    REVIEW OF SYSTEMS  [ X ] ROS unobtainable because:  poor mental status   [  ] All other systems negative except as noted below:	    Constitutional:  [ ] fever [ ] chills  [ ] weight loss  [ ] weakness  Skin:  [ ] rash [ ] phlebitis	  Eyes: [ ] icterus [ ] pain  [ ] discharge	  ENMT: [ ] sore throat  [ ] thrush [ ] ulcers [ ] exudates  Respiratory: [ ] dyspnea [ ] hemoptysis [ ] cough [ ] sputum	  Cardiovascular:  [ ] chest pain [ ] palpitations [ ] edema	  Gastrointestinal:  [ ] nausea [ ] vomiting [ ] diarrhea [ ] constipation [ ] pain	  Genitourinary:  [ ] dysuria [ ] frequency [ ] hematuria [ ] discharge [ ] flank pain  [ ] incontinence  Musculoskeletal:  [ ] myalgias [ ] arthralgias [ ] arthritis  [ ] back pain  Neurological:  [ ] headache [ ] seizures  [ ] confusion/altered mental status  Psychiatric:  [ ] anxiety [ ] depression	  Hematology/Lymphatics:  [ ] lymphadenopathy  Endocrine:  [ ] adrenal [ ] thyroid  Allergic/Immunologic:	 [ ] transplant [ ] seasonal    Vital Signs Last 24 Hrs  T(F): 99.2 (09-21-22 @ 17:56), Max: 99.9 (09-20-22 @ 22:38)    Vital Signs Last 24 Hrs  HR: 89 (09-21-22 @ 17:56) (89 - 98)  BP: 119/64 (09-21-22 @ 17:56) (119/64 - 151/73)  RR: 17 (09-21-22 @ 17:56)  SpO2: 95% (09-21-22 @ 17:56) (95% - 98%)  Wt(kg): --    PHYSICAL EXAM:  Constitutional: ill appearing male hunched over in bed  no acute distress, on nasal cannula   HEAD/EYES: anicteric, no conjunctival injection  ENT:  neck is slightly stiff but not rigid, +NGT   Cardiovascular:   normal S1, S2, no murmur, + edema, +PPM left chest wall   Respiratory:  clear BS bilaterally, no wheezes, no rales  GI:  soft, non-tender, normal bowel sounds  :  +gardiner  Musculoskeletal:  no synovitis  Neurologic: awake but not alert, not responding to questions or commands   Skin:  no rash, no erythema, no phlebitis  Heme/Onc: no lymphadenopathy   Psychiatric:  awake, alert, appropriate mood      WBC Count: 30.46 K/uL (09-21 @ 08:19)  WBC Count: 15.71 K/uL (09-20 @ 03:55)  WBC Count: 16.18 K/uL (09-19 @ 02:15)  WBC Count: 17.13 K/uL (09-18 @ 03:20)  WBC Count: 14.87 K/uL (09-17 @ 02:50)  WBC Count: 15.41 K/uL (09-16 @ 02:00)  WBC Count: 16.32 K/uL (09-15 @ 04:00)      Auto Neutrophil %: 87.1 % *H* (09-21-22 @ 08:19)  Auto Neutrophil #: 26.69 K/uL *H* (09-21-22 @ 08:19)  Auto Neutrophil %: 76.6 % (09-19-22 @ 02:15)  Auto Neutrophil #: 12.39 K/uL *H* (09-19-22 @ 02:15)  Auto Neutrophil %: 74.0 % (09-18-22 @ 03:20)  Auto Neutrophil #: 13.02 K/uL *H* (09-18-22 @ 03:20)                            11.6   30.46 )-----------( 474      ( 21 Sep 2022 08:19 )             36.8       09-21    147<H>  |  110<H>  |  98<H>  ----------------------------<  173<H>  4.5   |  24  |  5.37<H>    Ca    10.0      21 Sep 2022 08:19  Phos  6.4     09-21  Mg     2.8     09-21    TPro  7.9  /  Alb  2.4<L>  /  TBili  0.3  /  DBili  x   /  AST  33  /  ALT  77  /  AlkPhos  85  09-21      Creatinine Trend: 5.37<--, 5.22<--, 4.56<--, 3.32<--, 4.12<--, 3.30<--      MICROBIOLOGY:  .Sputum Sputum trap  09-12-22   Moderate Klebsiella pneumoniae  Moderate Enterobacter cloacae complex  Normal Respiratory Victoria present  --  Klebsiella pneumoniae  Enterobacter cloacae complex      Clean Catch Clean Catch (Midstream)  09-11-22   No growth  --  --      .Blood Blood-Peripheral  09-11-22   No Growth Final  --  --        RADIOLOGY:  < from: CT Head No Cont (09.18.22 @ 12:58) >  There is moderate diffuse parenchymal volume loss. There are areas of low   attenuation in the periventricular white matter likely related to severe   chronic microvascular ischemic changes.    Small chronic infarct left cerebellum, left thalamus, basal ganglia.    There is no acute intracranial hemorrhage, parenchymal mass, mass effect   or midline shift. There is no acute territorial infarct.There is no   hydrocephalus.    The cranium is intact. The visualized paranasal sinuses are well-aerated.   Partially visualized NG tube noted    IMPRESSION:  No acute intracranial hemorrhage or acute territorial infarct.  If   symptoms persist, follow-up MRI exam recommended.

## 2022-09-22 LAB
ALBUMIN SERPL ELPH-MCNC: 2.3 G/DL — LOW (ref 3.3–5)
ALP SERPL-CCNC: 82 U/L — SIGNIFICANT CHANGE UP (ref 40–120)
ALT FLD-CCNC: 73 U/L — SIGNIFICANT CHANGE UP (ref 12–78)
ANION GAP SERPL CALC-SCNC: 10 MMOL/L — SIGNIFICANT CHANGE UP (ref 5–17)
APPEARANCE CSF: CLEAR — SIGNIFICANT CHANGE UP
AST SERPL-CCNC: 35 U/L — SIGNIFICANT CHANGE UP (ref 15–37)
BASOPHILS # BLD AUTO: 0.03 K/UL — SIGNIFICANT CHANGE UP (ref 0–0.2)
BASOPHILS NFR BLD AUTO: 0.1 % — SIGNIFICANT CHANGE UP (ref 0–2)
BILIRUB SERPL-MCNC: 0.5 MG/DL — SIGNIFICANT CHANGE UP (ref 0.2–1.2)
BUN SERPL-MCNC: 109 MG/DL — HIGH (ref 7–23)
CALCIUM SERPL-MCNC: 10.2 MG/DL — HIGH (ref 8.5–10.1)
CHLORIDE SERPL-SCNC: 112 MMOL/L — HIGH (ref 96–108)
CO2 SERPL-SCNC: 28 MMOL/L — SIGNIFICANT CHANGE UP (ref 22–31)
COLOR CSF: SIGNIFICANT CHANGE UP
CREAT SERPL-MCNC: 5.05 MG/DL — HIGH (ref 0.5–1.3)
EGFR: 11 ML/MIN/1.73M2 — LOW
EOSINOPHIL # BLD AUTO: 0.2 K/UL — SIGNIFICANT CHANGE UP (ref 0–0.5)
EOSINOPHIL NFR BLD AUTO: 1 % — SIGNIFICANT CHANGE UP (ref 0–6)
FOLATE SERPL-MCNC: 6.6 NG/ML — SIGNIFICANT CHANGE UP
GLUCOSE BLDC GLUCOMTR-MCNC: 144 MG/DL — HIGH (ref 70–99)
GLUCOSE BLDC GLUCOMTR-MCNC: 183 MG/DL — HIGH (ref 70–99)
GLUCOSE BLDC GLUCOMTR-MCNC: 200 MG/DL — HIGH (ref 70–99)
GLUCOSE BLDC GLUCOMTR-MCNC: 208 MG/DL — HIGH (ref 70–99)
GLUCOSE CSF-MCNC: 104 MG/DL — HIGH (ref 40–70)
GLUCOSE SERPL-MCNC: 164 MG/DL — HIGH (ref 70–99)
GRAM STN FLD: SIGNIFICANT CHANGE UP
HCT VFR BLD CALC: 34.7 % — LOW (ref 39–50)
HGB BLD-MCNC: 10.8 G/DL — LOW (ref 13–17)
HIV 1+2 AB+HIV1 P24 AG SERPL QL IA: SIGNIFICANT CHANGE UP
IMM GRANULOCYTES NFR BLD AUTO: 1.3 % — HIGH (ref 0–0.9)
LYMPHOCYTES # BLD AUTO: 1.23 K/UL — SIGNIFICANT CHANGE UP (ref 1–3.3)
LYMPHOCYTES # BLD AUTO: 6.1 % — LOW (ref 13–44)
LYMPHOCYTES # CSF: 71 % — SIGNIFICANT CHANGE UP (ref 40–80)
MCHC RBC-ENTMCNC: 30.9 PG — SIGNIFICANT CHANGE UP (ref 27–34)
MCHC RBC-ENTMCNC: 31.1 G/DL — LOW (ref 32–36)
MCV RBC AUTO: 99.1 FL — SIGNIFICANT CHANGE UP (ref 80–100)
MONOCYTES # BLD AUTO: 1.08 K/UL — HIGH (ref 0–0.9)
MONOCYTES NFR BLD AUTO: 5.3 % — SIGNIFICANT CHANGE UP (ref 2–14)
MONOS+MACROS NFR CSF: 10 % — LOW (ref 15–45)
NEUTROPHILS # BLD AUTO: 17.42 K/UL — HIGH (ref 1.8–7.4)
NEUTROPHILS # CSF: 19 % — HIGH (ref 0–6)
NEUTROPHILS NFR BLD AUTO: 86.2 % — HIGH (ref 43–77)
NIGHT BLUE STAIN TISS: SIGNIFICANT CHANGE UP
NRBC # BLD: 0 /100 WBCS — SIGNIFICANT CHANGE UP (ref 0–0)
NRBC NFR CSF: 0 /UL — SIGNIFICANT CHANGE UP (ref 0–5)
PLATELET # BLD AUTO: 475 K/UL — HIGH (ref 150–400)
POTASSIUM SERPL-MCNC: 4.5 MMOL/L — SIGNIFICANT CHANGE UP (ref 3.5–5.3)
POTASSIUM SERPL-SCNC: 4.5 MMOL/L — SIGNIFICANT CHANGE UP (ref 3.5–5.3)
PROT CSF-MCNC: 111 MG/DL — HIGH (ref 15–45)
PROT SERPL-MCNC: 7.6 GM/DL — SIGNIFICANT CHANGE UP (ref 6–8.3)
RBC # BLD: 3.5 M/UL — LOW (ref 4.2–5.8)
RBC # CSF: 0 /UL — SIGNIFICANT CHANGE UP (ref 0–0)
RBC # FLD: 14.1 % — SIGNIFICANT CHANGE UP (ref 10.3–14.5)
SODIUM SERPL-SCNC: 150 MMOL/L — HIGH (ref 135–145)
SPECIMEN SOURCE: SIGNIFICANT CHANGE UP
SPECIMEN SOURCE: SIGNIFICANT CHANGE UP
T PALLIDUM AB TITR SER: NEGATIVE — SIGNIFICANT CHANGE UP
TUBE TYPE: SIGNIFICANT CHANGE UP
VIT B12 SERPL-MCNC: >2000 PG/ML — HIGH (ref 232–1245)
WBC # BLD: 20.22 K/UL — HIGH (ref 3.8–10.5)
WBC # FLD AUTO: 20.22 K/UL — HIGH (ref 3.8–10.5)

## 2022-09-22 PROCEDURE — 62328 DX LMBR SPI PNXR W/FLUOR/CT: CPT | Mod: 26

## 2022-09-22 PROCEDURE — 95816 EEG AWAKE AND DROWSY: CPT | Mod: 26

## 2022-09-22 PROCEDURE — 99233 SBSQ HOSP IP/OBS HIGH 50: CPT

## 2022-09-22 PROCEDURE — 70450 CT HEAD/BRAIN W/O DYE: CPT | Mod: 26

## 2022-09-22 PROCEDURE — 71045 X-RAY EXAM CHEST 1 VIEW: CPT | Mod: 26

## 2022-09-22 RX ORDER — SODIUM CHLORIDE 9 MG/ML
1000 INJECTION, SOLUTION INTRAVENOUS
Refills: 0 | Status: COMPLETED | OUTPATIENT
Start: 2022-09-22 | End: 2022-09-23

## 2022-09-22 RX ADMIN — HEPARIN SODIUM 5000 UNIT(S): 5000 INJECTION INTRAVENOUS; SUBCUTANEOUS at 18:52

## 2022-09-22 RX ADMIN — Medication 4: at 12:34

## 2022-09-22 RX ADMIN — Medication 81 MILLIGRAM(S): at 12:34

## 2022-09-22 RX ADMIN — PANTOPRAZOLE SODIUM 40 MILLIGRAM(S): 20 TABLET, DELAYED RELEASE ORAL at 12:35

## 2022-09-22 RX ADMIN — SODIUM CHLORIDE 75 MILLILITER(S): 9 INJECTION, SOLUTION INTRAVENOUS at 09:54

## 2022-09-22 RX ADMIN — Medication 2: at 05:30

## 2022-09-22 RX ADMIN — Medication 2: at 01:04

## 2022-09-22 RX ADMIN — CHLORHEXIDINE GLUCONATE 1 APPLICATION(S): 213 SOLUTION TOPICAL at 05:27

## 2022-09-22 RX ADMIN — SODIUM CHLORIDE 100 MILLILITER(S): 9 INJECTION, SOLUTION INTRAVENOUS at 00:56

## 2022-09-22 RX ADMIN — HEPARIN SODIUM 5000 UNIT(S): 5000 INJECTION INTRAVENOUS; SUBCUTANEOUS at 05:26

## 2022-09-22 NOTE — PROGRESS NOTE ADULT - SUBJECTIVE AND OBJECTIVE BOX
[facility] NEPHROLOGY SERVICES, [facility]  NEPHROLOGY AND HYPERTENSION  [address]  [address]  [place] [address]  [phone]    MD TESSY ESPINOSA, MD POLI BENJAMIN, MD MAKENZIE RACHEL, MD JESSICA CLANCY, MD MAGALIE MAYFIELD MD          Patient events noted  non verbal comfortable       MEDICATIONS  (STANDING):  aspirin  chewable 81 milliGRAM(s) Enteral Tube daily  chlorhexidine 2% Cloths 1 Application(s) Topical <User Schedule>  dextrose 5%. 1000 milliLiter(s) (75 mL/Hr) IV Continuous <Continuous>  glucagon  Injectable 1 milliGRAM(s) IntraMuscular once  heparin   Injectable 5000 Unit(s) SubCutaneous every 12 hours  insulin lispro (ADMELOG) corrective regimen sliding scale   SubCutaneous every 6 hours  levETIRAcetam  Solution 500 milliGRAM(s) Oral daily  pantoprazole   Suspension 40 milliGRAM(s) Enteral Tube daily    MEDICATIONS  (PRN):      09-21-22 @ 07:01  -  09-22-22 @ 07:00  --------------------------------------------------------  IN: 1500 mL / OUT: 200 mL / NET: 1300 mL      PHYSICAL EXAM:      T(C): 36.9 (09-22-22 @ 11:38), Max: 37.5 (09-22-22 @ 05:30)  HR: 96 (09-22-22 @ 11:38) (89 - 101)  BP: 137/88 (09-22-22 @ 11:38) (117/79 - 137/88)  RR: 19 (09-22-22 @ 11:38) (17 - 19)  SpO2: 94% (09-22-22 @ 11:38) (94% - 96%)  Wt(kg): --  Lungs clear  Heart S1S2  Abd soft NT ND  Extremities:   tr edema                                    10.8   20.22 )-----------( 475      ( 22 Sep 2022 07:33 )             34.7     09-22    150<H>  |  112<H>  |  109<H>  ----------------------------<  164<H>  4.5   |  28  |  5.05<H>    Ca    10.2<H>      22 Sep 2022 07:33  Phos  6.4     09-21  Mg     2.8     09-21    TPro  7.6  /  Alb  2.3<L>  /  TBili  0.5  /  DBili  x   /  AST  35  /  ALT  73  /  AlkPhos  82  09-22      LIVER FUNCTIONS - ( 22 Sep 2022 07:33 )  Alb: 2.3 g/dL / Pro: 7.6 gm/dL / ALK PHOS: 82 U/L / ALT: 73 U/L / AST: 35 U/L / GGT: x           Creatinine Trend: 5.05<--, 5.37<--, 5.22<--, 4.56<--, 3.32<--, 4.12<--      Impression  VASILE CKD sucpected 3-4; pre renal/ risk for ischemic ATN; COVID related tubular injury   Less likely pulm renal disease, ANCA/GBM were negative  Risk for contrast related injury   Possible uremic encephalopathy, necessitating HD initiation.  Mental status not at baseline post HD support.   Trending leukocytosis w/o fever  Renal indices slowly improving     Plan    Follow urine output for evidence of recovery  Pancultured  IVF maintenance  TF to Nepro  Holding HD    Main Sotelo MD

## 2022-09-22 NOTE — CHART NOTE - NSCHARTNOTEFT_GEN_A_CORE
Medicine PA Note    Notified by Rn that patient's CXR was performed for NGT placement. Upon review of CXR , NGT tube need to be adjusted further. Arrived at bedside of patient, NGT partial out and a lot of dry blood on tube. Replaced with new NGT . NGT inserted . Pt tolerated procedure well. Placement vertified via auscultation. CXR ordered to conform placement.    Called by RN for NGT insertion. Patient is a 81y old  Male who presents with a chief complaint of Encephalopathy, acute respiratory failure (22 Sep 2022 18:15)   Order noted on chart.    T(C): 36.3 (09-22-22 @ 19:00), Max: 37.5 (09-22-22 @ 05:30)  T(F): 97.3 (09-22-22 @ 19:00), Max: 99.5 (09-22-22 @ 05:30)  HR: 83 (09-22-22 @ 19:00) (83 - 101)  BP: 139/95 (09-22-22 @ 19:00) (115/79 - 139/95)  RR: 18 (09-22-22 @ 19:00) (17 - 19)  SpO2: 100% (09-22-22 @ 19:00) (94% - 100%)    [facility]

## 2022-09-22 NOTE — PROGRESS NOTE ADULT - ASSESSMENT
82yo M, PMH dementia, htn, TEVAR, PPM for syncopal bradycardia, ? seizures?and CKD (baseline Cr appears to be ~1.8?), on Eliquis (unclear why), BIBEMS to ED after being found unresponsive at home. On EMS arrival pt obtunded and not breathing. Pt intubated in the field. On arrival in ED, pt found to be Covid+. CTH with no acute pathology. CT chest with diffuse b/l GGO. ICU consulted and admitted for further management.   He has been weak and not eating since discharge from [facility] for unclear reasons.  He was normal with moving all his extremities and talking and moving with no issues just weak and had no appetite Pt intubated in the field. On arrival in ED, pt found to be Covid+.   unresponsive except to deep stimuli, unclear cause,   Uremia vs  hypernatremia vs septic vs non convulsive seizure. Patient started on Keppra , EEG done no active seizures. Treated for Hypernatremia and has been started on HD 9/13.  Possible uremic encephalopathy, necessitating HD initiation. HD cath removed 9/19  for line free days.  Mental status not at baseline post HD support.  Nephrology Dr. Sotelo. Patient extubated on9/17 remains on nasal canula O2. Patient remains  COVID + with superimposed PNA - continue with s/p decadron. No remdesiver 2/2 renal function.   On minimal oxygen requirements at this time.  Given persistent poor mental status - CT head on admission with no acute findings obtained repeat CT head, for encephalopathy, possibly related to COVID.  CT -  No acute intracranial hemorrhage or acute territorial infarct. Unable to have MRI as patient with PPM.  Patient remains with NGT feeding as mental status remains poor. Speech and swallow evaluation ordered.  Patient seen and examined by ICU attending and stable for transfer to medicine service.     acute encephalopathy of unknown etiology  - still requiring NG tube   - on the ddx is infection vs cva ( cant get MRI), vs  seizure   - eeg shows no seizure  - CT head x2 shows no cva   - will likely need LP to rule infectious etiology. will include nmda receptor ab, [place] IgM   - discussed with neuro and will repeat eeg,   9/22 eeg shows no seizure activity. will consider MRI if I can get transfer to [facility] due to pacemaker as brain stem cva still possible       leukocytosis   - wbc trending up with predominate neutrophil. Just finished course of dexamethasone which could explain, but due to the persistent encephalopathy will get LP and get ID consult for possible empiric abx   9/22 trending down with out abx. f/u LP results and consider empiric abx pending cell count     covid   - VSS  - s.p remd     hypernatremia   - start d5     ckg stage 5   - s/p dialysis with improved cr and bun but is now trending up

## 2022-09-22 NOTE — PROGRESS NOTE ADULT - ASSESSMENT
82yo M, PMH dementia, htn, TEVAR, PPM for syncopal bradycardia, ? sizures?and CKD (baseline Cr appears to be ~1.8?), on Eliquis (unclear why), BIBEMS to ED after being found unresponsive at home. On EMS arrival pt obtunded and not breathing. Pt intubated in the field. On arrival in ED, pt found to be Covid+. CTH with no acute pathology. CT chest with diffuse b/l GGO. Treated for Hypernatremia and has been started on HD 9/13.  Possible uremic encephalopathy, necessitating HD initiation. HD cath removed 9/19  for line free days.  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 Could he have suffered another CVA?   His wbc has increased drastically, blood cultures sent   at this juncture patient needs a lumbar puncture   can also rule out syphilis   needs MRI if his PPM is MRI compatible  Patient has a [employer] CLS Edora pacemaker( asked wife to bring the card in but PPM was placed in Health system by Dr. Davis Saldana)    4/22: found out from his EP that MRI is compatible just needs to be set to MRI mode, LP today, may need to go to [facility] for MRI brain, will follow all LP results, wbc better today     Plan:  completed course of Zosyn(has poor BBB penetration)   follow up on blood cultures   perform LP   cell count, glucose, protein, routine gram stain and culture, CSF PCR panel, fungal Culture, AFB culture (send 5 mL), CSF crypt antigen   Syphilis screen  HIV  obtain MRI   EEG   vitamin B12, folate, TSH and free T4   Encephalitis panel   trend wbc and creatinine  consider placement of fifi for hemodialysis per renal if not getting better   if clinically worsens, start vancomycin and meropenem     Discussed with Dr. Sayra Hernandez, DO  Infectious Disease Attending  Reachable via Microsoft Teams or ID office: [phone]  After 5pm/weekends please call [phone] for all inquiries and new consults

## 2022-09-22 NOTE — EEG REPORT - NS EEG TEXT BOX
[facility]   COMPREHENSIVE EPILEPSY CENTER   REPORT OF ROUTINE VIDEO EEG     [facility]: [address] , [address], [place] [address], Ph#: [phone]  [facility]: [id number] [place] [address], Ph#: [phone]  [facility]: [address] [place] [address], Ph#: [phone]    Patient Name: SARAH JIMÉNEZ  Age and : 81y (1015-42)  MRN #: [id number]  Location: [facility] [address]E [address]  Referring Physician: Caleb Colbert    Study Date: 22    _____________________________________________________________  TECHNICAL INFORMATION    Placement and Labeling of Electrodes:  The EEG was performed utilizing 20 channels referential EEG connections (coronal over temporal over parasagittal montage) using all standard 10-20 electrode placements with EKG.  Recording was at a sampling rate of 256 samples per second per channel.  Time synchronized digital video recording was done simultaneously with EEG recording.  A low light infrared camera was used for low light recording.  Arvin and seizure detection algorithms were utilized.    _____________________________________________________________  HISTORY    Patient is a 81y old  Male who presents with a chief complaint of Encephalopathy, acute respiratory failure (22 Sep 2022 16:50)      PERTINENT MEDICATION:  MEDICATIONS  (STANDING):  aspirin  chewable 81 milliGRAM(s) Enteral Tube daily  chlorhexidine 2% Cloths 1 Application(s) Topical <User Schedule>  dextrose 5%. 1000 milliLiter(s) (75 mL/Hr) IV Continuous <Continuous>  glucagon  Injectable 1 milliGRAM(s) IntraMuscular once  heparin   Injectable 5000 Unit(s) SubCutaneous every 12 hours  insulin lispro (ADMELOG) corrective regimen sliding scale   SubCutaneous every 6 hours  levETIRAcetam  Solution 500 milliGRAM(s) Oral daily  pantoprazole   Suspension 40 milliGRAM(s) Enteral Tube daily    _____________________________________________________________  STUDY INTERPRETATION    Findings: The background was continuous and reactive. During wakefulness, there is no discernable posterior dominant rhythm.     Background Slowing:  Diffuse theta and polymorphic delta slowing.      Focal Slowing:   None were present.    Sleep Background:  Drowsiness was characterized by fragmentation, attenuation, and slowing of the background activity.    Stage II sleep transients were not recorded.    Other Non-Epileptiform Findings:  None were present.    Interictal Epileptiform Activity:   Frequent generalized periodic discharges with triphasic morphology     Events:  No event or seizure recorded.    Activation Procedures:   Hyperventilation was not performed.    Photic stimulation was performed and did not elicit any abnormality.     Artifacts:  Intermittent myogenic and movement artifacts were noted.    ECG:  The heart rate on single channel ECG was predominantly between  BPM.    _____________________________________________________________  EEG SUMMARY/CLASSIFICATION    Normal / Abnormal EEG in the awake, drowsy and asleep states.  Abnormal EEG in an altered / a sedated patient.    1. Generalized periodic discharges with triphasic morphology   2. Moderate generalized slowing.    _____________________________________________________________  EEG IMPRESSION/CLINICAL CORRELATE    Abnormal EEG study.    1. Generalized periodic discharges with triphasic morphology indicative of toxic-metabolic encephalopathy less likely epileptogenic.   2. Moderate nonspecific diffuse or multifocal cerebral dysfunction.   3. No seizure seen.    _____________________________________________________________    Silvestre Mason MD   Epilepsy Fellow, [facility] [place]	    This is a preliminary read       [facility]   COMPREHENSIVE EPILEPSY CENTER   REPORT OF ROUTINE VIDEO EEG     [facility]: [address] , [address], [place] [address], Ph#: [phone]  [facility]: [phone] [place] [address], Ph#: [phone]  [facility]: [address] [place] [address], Ph#: [phone]    Patient Name: SARAH JIMÉNEZ  Age and : 81y (1017-42)  MRN #: [id number]  Location: [facility] [address]  Referring Physician: Caleb Colbert    Study Date: 22    _____________________________________________________________  TECHNICAL INFORMATION    Placement and Labeling of Electrodes:  The EEG was performed utilizing 20 channels referential EEG connections (coronal over temporal over parasagittal montage) using all standard 10-20 electrode placements with EKG.  Recording was at a sampling rate of 256 samples per second per channel.  Time synchronized digital video recording was done simultaneously with EEG recording.  A low light infrared camera was used for low light recording.  Arvin and seizure detection algorithms were utilized.    _____________________________________________________________  HISTORY    Patient is a 81y old  Male who presents with a chief complaint of Encephalopathy, acute respiratory failure (22 Sep 2022 16:50)      PERTINENT MEDICATION:  MEDICATIONS  (STANDING):  aspirin  chewable 81 milliGRAM(s) Enteral Tube daily  chlorhexidine 2% Cloths 1 Application(s) Topical <User Schedule>  dextrose 5%. 1000 milliLiter(s) (75 mL/Hr) IV Continuous <Continuous>  glucagon  Injectable 1 milliGRAM(s) IntraMuscular once  heparin   Injectable 5000 Unit(s) SubCutaneous every 12 hours  insulin lispro (ADMELOG) corrective regimen sliding scale   SubCutaneous every 6 hours  levETIRAcetam  Solution 500 milliGRAM(s) Oral daily  pantoprazole   Suspension 40 milliGRAM(s) Enteral Tube daily    _____________________________________________________________  STUDY INTERPRETATION    Findings: The background was continuous and reactive. During wakefulness, there is no discernable posterior dominant rhythm.     Background Slowing:  Diffuse theta and polymorphic delta slowing.      Focal Slowing:   None were present.    Sleep Background:  Drowsiness was characterized by fragmentation, attenuation, and slowing of the background activity.    Stage II sleep transients were not recorded.    Other Non-Epileptiform Findings:  None were present.    Interictal Epileptiform Activity:   Frequent generalized periodic discharges with triphasic morphology     Events:  No event or seizure recorded.    Activation Procedures:   Hyperventilation was not performed.    Photic stimulation was performed and did not elicit any abnormality.     Artifacts:  Intermittent myogenic and movement artifacts were noted.    ECG:  The heart rate on single channel ECG was predominantly between  BPM.    _____________________________________________________________  EEG SUMMARY/CLASSIFICATION    Normal / Abnormal EEG in the awake, drowsy and asleep states.  Abnormal EEG in an altered / a sedated patient.    1. Generalized periodic discharges with triphasic morphology.   2. Background slowing, generalized, moderate.     _____________________________________________________________  EEG IMPRESSION/CLINICAL CORRELATE    Abnormal EEG study.    1. Generalized periodic discharges with triphasic morphology indicative of toxic-metabolic encephalopathy less likely epileptogenic.   2. Moderate nonspecific diffuse or multifocal cerebral dysfunction.   3. No seizure seen.    _____________________________________________________________    Silvestre Mason MD   Epilepsy Fellow, [facility]	    This is a preliminary read       [facility]   COMPREHENSIVE EPILEPSY CENTER   REPORT OF ROUTINE VIDEO EEG     [facility]: [address] , [address], [place] [address], Ph#: [phone]  [facility]: [phone] [place] [address], Ph#: [phone]  [facility]: [address] [place] [address], Ph#: [phone]    Patient Name: SARAH JIMÉNEZ  Age and : 81y (1015-42)  MRN #: [id number]  Location: [facility] [address]  Referring Physician: Caleb Colbert    Study Date: 22    _____________________________________________________________  TECHNICAL INFORMATION    Placement and Labeling of Electrodes:  The EEG was performed utilizing 20 channels referential EEG connections (coronal over temporal over parasagittal montage) using all standard 10-20 electrode placements with EKG.  Recording was at a sampling rate of 256 samples per second per channel.  Time synchronized digital video recording was done simultaneously with EEG recording.  A low light infrared camera was used for low light recording.  Arvin and seizure detection algorithms were utilized.    _____________________________________________________________  HISTORY    Patient is a 81y old  Male who presents with a chief complaint of Encephalopathy, acute respiratory failure (22 Sep 2022 16:50)      PERTINENT MEDICATION:  MEDICATIONS  (STANDING):  aspirin  chewable 81 milliGRAM(s) Enteral Tube daily  chlorhexidine 2% Cloths 1 Application(s) Topical <User Schedule>  dextrose 5%. 1000 milliLiter(s) (75 mL/Hr) IV Continuous <Continuous>  glucagon  Injectable 1 milliGRAM(s) IntraMuscular once  heparin   Injectable 5000 Unit(s) SubCutaneous every 12 hours  insulin lispro (ADMELOG) corrective regimen sliding scale   SubCutaneous every 6 hours  levETIRAcetam  Solution 500 milliGRAM(s) Oral daily  pantoprazole   Suspension 40 milliGRAM(s) Enteral Tube daily    _____________________________________________________________  STUDY INTERPRETATION    Findings: The background was continuous and reactive. During wakefulness, there is no discernable posterior dominant rhythm.     Background Slowing:  Diffuse theta and polymorphic delta slowing.      Focal Slowing:   None were present.    Sleep Background:  Drowsiness was characterized by fragmentation, attenuation, and slowing of the background activity.    Stage II sleep transients were not recorded.    Other Non-Epileptiform Findings:  None were present.    Interictal Epileptiform Activity:   Frequent generalized periodic discharges with triphasic morphology     Events:  No event or seizure recorded.    Activation Procedures:   Hyperventilation was not performed.    Photic stimulation was performed and did not elicit any abnormality.     Artifacts:  Intermittent myogenic and movement artifacts were noted.    ECG:  The heart rate on single channel ECG was predominantly between  BPM.    _____________________________________________________________  EEG SUMMARY/CLASSIFICATION    Normal / Abnormal EEG in the awake, drowsy and asleep states.  Abnormal EEG in an altered / a sedated patient.    1. Generalized periodic discharges with triphasic morphology.   2. Background slowing, generalized, moderate.     _____________________________________________________________  EEG IMPRESSION/CLINICAL CORRELATE    Abnormal EEG study.    1. Generalized periodic discharges with triphasic morphology indicative of toxic-metabolic encephalopathy less likely epileptogenic.   2. Moderate nonspecific diffuse or multifocal cerebral dysfunction.   3. No seizure seen.    _____________________________________________________________    Silvestre Mason MD   Epilepsy Fellow, [facility]	    Orlando Whitfield MD PhD  Director, Epilepsy Division, [facility] EEG Reading Room Ph#: [phone]  Epilepsy Answering Service after 5PM and before 8:30AM: Ph#: [phone]

## 2022-09-22 NOTE — PROGRESS NOTE ADULT - SUBJECTIVE AND OBJECTIVE BOX
Patient is a 81y old  Male who presents with a chief complaint of Encephalopathy, acute respiratory failure (21 Sep 2022 14:40)      INTERVAL HPI/OVERNIGHT EVENTS:  remains non-verbal       MEDICATIONS  (STANDING):  aspirin  chewable 81 milliGRAM(s) Enteral Tube daily  chlorhexidine 2% Cloths 1 Application(s) Topical <User Schedule>  dextrose 5%. 1000 milliLiter(s) (75 mL/Hr) IV Continuous <Continuous>  glucagon  Injectable 1 milliGRAM(s) IntraMuscular once  heparin   Injectable 5000 Unit(s) SubCutaneous every 12 hours  insulin lispro (ADMELOG) corrective regimen sliding scale   SubCutaneous every 6 hours  levETIRAcetam  Solution 500 milliGRAM(s) Oral daily  pantoprazole   Suspension 40 milliGRAM(s) Enteral Tube daily    MEDICATIONS  (PRN):      Allergies    No Known Allergies    Intolerances        Vital Signs Last 24 Hrs  T(C): 36.4 (22 Sep 2022 17:00), Max: 37.5 (22 Sep 2022 05:30)  T(F): 97.6 (22 Sep 2022 17:00), Max: 99.5 (22 Sep 2022 05:30)  HR: 95 (22 Sep 2022 17:00) (92 - 101)  BP: 115/79 (22 Sep 2022 17:00) (115/79 - 137/88)  BP(mean): --  RR: 18 (22 Sep 2022 17:00) (18 - 19)  SpO2: 100% (22 Sep 2022 17:00) (94% - 100%)    Parameters below as of 22 Sep 2022 05:30  Patient On (Oxygen Delivery Method): room air          PHYSICAL EXAM:  GENERAL: NAD  HEAD:  Atraumatic, Normocephalic  EYES: EOMI, PERRLA, conjunctiva and sclera clear  ENMT: No tonsillar erythema, exudates, or enlargement;   NECK: Supple, Normal thyroid  NERVOUS SYSTEM: rigid, no obvious focal deficits   CHEST/LUNG: CTABL; No rales, rhonchi, wheezing, or rubs  HEART: Regular rate and rhythm; No murmurs, rubs, or gallops  ABDOMEN: Soft, Nontender, Nondistended; Bowel sounds present  EXTREMITIES:  2+ Peripheral Pulses, No clubbing, cyanosis, or edema  LYMPH: No lymphadenopathy noted  SKIN: No rashes or lesions    LABS:                                   10.8   20.22 )-----------( 475      ( 22 Sep 2022 07:33 )             34.7     09-22    150<H>  |  112<H>  |  109<H>  ----------------------------<  164<H>  4.5   |  28  |  5.05<H>    Ca    10.2<H>      22 Sep 2022 07:33  Phos  6.4     09-21  Mg     2.8     09-21    TPro  7.6  /  Alb  2.3<L>  /  TBili  0.5  /  DBili  x   /  AST  35  /  ALT  73  /  AlkPhos  82  09-22            CAPILLARY BLOOD GLUCOSE      RADIOLOGY & ADDITIONAL TESTS:    Imaging Personally Reviewed:  [ ] YES  [ ] NO    Consultant(s) Notes Reviewed:  [x ] YES  [ ] NO    Care Discussed with Consultants/Other Providers [ ] YES  [ ] NO

## 2022-09-22 NOTE — PROGRESS NOTE ADULT - SUBJECTIVE AND OBJECTIVE BOX
[facility]  INFECTIOUS DISEASES   [address] [place] [address]  Tel: [phone]     Fax: [phone]  ======================================================  Aston Randall,, MD Jaun Hernandez, DO Sherin Junior, BRANDI   ======================================================    SARAH JIMÉNEZ  MRN-[id number]      Follow Up:  AMS     Interval History: still has poor mental status, LP today, CT head done again, questionable CVA, found out his PPM is MRI compatible     ROS:    X] Unobtainable because: poor mental status   [ ] All other systems negative except as noted    Constitutional: no fever, no chills  Head: no trauma  Eyes: no vision changes, no eye pain  ENT:  no sore throat, no rhinorrhea  Cardiovascular:  no chest pain, no palpitation  Respiratory:  no SOB, no cough  GI:  no abd pain, no vomiting, no diarrhea  urinary: no dysuria, no hematuria, no flank pain  musculoskeletal:  no joint pain, no joint swelling  skin:  no rash  neurology:  no headache, no seizure, no change in mental status  psych: no anxiety, no depression         Allergies  No Known Allergies        ANTIMICROBIALS:      OTHER MEDS:  aspirin  chewable 81 milliGRAM(s) Enteral Tube daily  chlorhexidine 2% Cloths 1 Application(s) Topical <User Schedule>  dextrose 5%. 1000 milliLiter(s) IV Continuous <Continuous>  glucagon  Injectable 1 milliGRAM(s) IntraMuscular once  heparin   Injectable 5000 Unit(s) SubCutaneous every 12 hours  insulin lispro (ADMELOG) corrective regimen sliding scale   SubCutaneous every 6 hours  levETIRAcetam  Solution 500 milliGRAM(s) Oral daily  pantoprazole   Suspension 40 milliGRAM(s) Enteral Tube daily      Physical Exam:  Vital Signs Last 24 Hrs  T(C): 36.9 (22 Sep 2022 11:38), Max: 37.5 (22 Sep 2022 05:30)  T(F): 98.4 (22 Sep 2022 11:38), Max: 99.5 (22 Sep 2022 05:30)  HR: 96 (22 Sep 2022 11:38) (89 - 101)  BP: 137/88 (22 Sep 2022 11:38) (117/79 - 137/88)  BP(mean): --  RR: 19 (22 Sep 2022 11:38) (17 - 19)  SpO2: 94% (22 Sep 2022 11:38) (94% - 96%)    Parameters below as of 22 Sep 2022 05:30  Patient On (Oxygen Delivery Method): room air        Constitutional: ill appearing male hunched over in bed  no acute distress, on nasal cannula   HEAD/EYES: anicteric, no conjunctival injection  ENT:  neck is slightly stiff but not rigid, +NGT   Cardiovascular:   normal S1, S2, no murmur, + edema, +PPM left chest wall   Respiratory:  clear BS bilaterally, no wheezes, no rales  GI:  soft, non-tender, normal bowel sounds  :  +gardiner  Musculoskeletal:  no synovitis  Neurologic: awake but not alert, not responding to questions or commands   Skin:  no rash, no erythema, no phlebitis  Heme/Onc: no lymphadenopathy   Psychiatric:  awake not alert lethargic     WBC Count: 20.22 K/uL (09-22 @ 07:33)  WBC Count: 30.46 K/uL (09-21 @ 08:19)  WBC Count: 15.71 K/uL (09-20 @ 03:55)  WBC Count: 16.18 K/uL (09-19 @ 02:15)  WBC Count: 17.13 K/uL (09-18 @ 03:20)  WBC Count: 14.87 K/uL (09-17 @ 02:50)  WBC Count: 15.41 K/uL (09-16 @ 02:00)                            10.8   20.22 )-----------( 475      ( 22 Sep 2022 07:33 )             34.7       09-22    150<H>  |  112<H>  |  109<H>  ----------------------------<  164<H>  4.5   |  28  |  5.05<H>    Ca    10.2<H>      22 Sep 2022 07:33  Phos  6.4     09-21  Mg     2.8     09-21    TPro  7.6  /  Alb  2.3<L>  /  TBili  0.5  /  DBili  x   /  AST  35  /  ALT  73  /  AlkPhos  82  09-22          Creatinine Trend: 5.05<--, 5.37<--, 5.22<--, 4.56<--, 3.32<--, 4.12<--      MICROBIOLOGY:  v  .Sputum Sputum trap  09-12-22   Moderate Klebsiella pneumoniae  Moderate Enterobacter cloacae complex  Normal Respiratory Victoria present  --  Klebsiella pneumoniae  Enterobacter cloacae complex      Clean Catch Clean Catch (Midstream)  09-11-22   No growth  --  --      .Blood Blood-Peripheral  09-11-22   No Growth Final  --  --    RADIOLOGY:

## 2022-09-23 LAB
ALBUMIN SERPL ELPH-MCNC: 2.1 G/DL — LOW (ref 3.3–5)
ALP SERPL-CCNC: 86 U/L — SIGNIFICANT CHANGE UP (ref 40–120)
ALT FLD-CCNC: 58 U/L — SIGNIFICANT CHANGE UP (ref 12–78)
ANION GAP SERPL CALC-SCNC: 12 MMOL/L — SIGNIFICANT CHANGE UP (ref 5–17)
AST SERPL-CCNC: 32 U/L — SIGNIFICANT CHANGE UP (ref 15–37)
BILIRUB SERPL-MCNC: 0.6 MG/DL — SIGNIFICANT CHANGE UP (ref 0.2–1.2)
BUN SERPL-MCNC: 103 MG/DL — HIGH (ref 7–23)
CA-I BLD-SCNC: 4.8 MG/DL — SIGNIFICANT CHANGE UP (ref 4.5–5.6)
CALCIUM SERPL-MCNC: 9.6 MG/DL — SIGNIFICANT CHANGE UP (ref 8.5–10.1)
CHLORIDE SERPL-SCNC: 107 MMOL/L — SIGNIFICANT CHANGE UP (ref 96–108)
CK SERPL-CCNC: 50 U/L — SIGNIFICANT CHANGE UP (ref 26–308)
CO2 SERPL-SCNC: 24 MMOL/L — SIGNIFICANT CHANGE UP (ref 22–31)
CORTIS AM PEAK SERPL-MCNC: 21.4 UG/DL — HIGH (ref 6–18.4)
CREAT SERPL-MCNC: 4.42 MG/DL — HIGH (ref 0.5–1.3)
CRYPTOC AG CSF-ACNC: NEGATIVE — SIGNIFICANT CHANGE UP
CSF PCR RESULT: SIGNIFICANT CHANGE UP
EGFR: 13 ML/MIN/1.73M2 — LOW
ERYTHROCYTE [SEDIMENTATION RATE] IN BLOOD: 104 MM/HR — HIGH (ref 0–20)
GLUCOSE BLDC GLUCOMTR-MCNC: 149 MG/DL — HIGH (ref 70–99)
GLUCOSE BLDC GLUCOMTR-MCNC: 179 MG/DL — HIGH (ref 70–99)
GLUCOSE BLDC GLUCOMTR-MCNC: 182 MG/DL — HIGH (ref 70–99)
GLUCOSE BLDC GLUCOMTR-MCNC: 231 MG/DL — HIGH (ref 70–99)
GLUCOSE SERPL-MCNC: 154 MG/DL — HIGH (ref 70–99)
HCT VFR BLD CALC: 37.5 % — LOW (ref 39–50)
HGB BLD-MCNC: 11.3 G/DL — LOW (ref 13–17)
LABORATORY COMMENT REPORT: SIGNIFICANT CHANGE UP
MCHC RBC-ENTMCNC: 30.1 G/DL — LOW (ref 32–36)
MCHC RBC-ENTMCNC: 30.5 PG — SIGNIFICANT CHANGE UP (ref 27–34)
MCV RBC AUTO: 101.4 FL — HIGH (ref 80–100)
NRBC # BLD: 0 /100 WBCS — SIGNIFICANT CHANGE UP (ref 0–0)
PLATELET # BLD AUTO: 412 K/UL — HIGH (ref 150–400)
POTASSIUM SERPL-MCNC: 4.9 MMOL/L — SIGNIFICANT CHANGE UP (ref 3.5–5.3)
POTASSIUM SERPL-SCNC: 4.9 MMOL/L — SIGNIFICANT CHANGE UP (ref 3.5–5.3)
PROT SERPL-MCNC: 7.8 GM/DL — SIGNIFICANT CHANGE UP (ref 6–8.3)
RBC # BLD: 3.7 M/UL — LOW (ref 4.2–5.8)
RBC # FLD: 14.2 % — SIGNIFICANT CHANGE UP (ref 10.3–14.5)
SODIUM SERPL-SCNC: 143 MMOL/L — SIGNIFICANT CHANGE UP (ref 135–145)
SOURCE HSV 1/2: SIGNIFICANT CHANGE UP
WBC # BLD: 21.95 K/UL — HIGH (ref 3.8–10.5)
WBC # FLD AUTO: 21.95 K/UL — HIGH (ref 3.8–10.5)

## 2022-09-23 PROCEDURE — 99232 SBSQ HOSP IP/OBS MODERATE 35: CPT

## 2022-09-23 PROCEDURE — 93971 EXTREMITY STUDY: CPT | Mod: 26,LT

## 2022-09-23 PROCEDURE — 99233 SBSQ HOSP IP/OBS HIGH 50: CPT

## 2022-09-23 RX ADMIN — CHLORHEXIDINE GLUCONATE 1 APPLICATION(S): 213 SOLUTION TOPICAL at 06:19

## 2022-09-23 RX ADMIN — SODIUM CHLORIDE 75 MILLILITER(S): 9 INJECTION, SOLUTION INTRAVENOUS at 22:50

## 2022-09-23 RX ADMIN — HEPARIN SODIUM 5000 UNIT(S): 5000 INJECTION INTRAVENOUS; SUBCUTANEOUS at 17:24

## 2022-09-23 RX ADMIN — Medication 4: at 12:05

## 2022-09-23 RX ADMIN — Medication 2: at 07:07

## 2022-09-23 RX ADMIN — HEPARIN SODIUM 5000 UNIT(S): 5000 INJECTION INTRAVENOUS; SUBCUTANEOUS at 06:19

## 2022-09-23 RX ADMIN — LEVETIRACETAM 500 MILLIGRAM(S): 250 TABLET, FILM COATED ORAL at 12:05

## 2022-09-23 RX ADMIN — Medication 2: at 17:24

## 2022-09-23 RX ADMIN — Medication 81 MILLIGRAM(S): at 12:05

## 2022-09-23 RX ADMIN — PANTOPRAZOLE SODIUM 40 MILLIGRAM(S): 20 TABLET, DELAYED RELEASE ORAL at 12:06

## 2022-09-23 RX ADMIN — SODIUM CHLORIDE 75 MILLILITER(S): 9 INJECTION, SOLUTION INTRAVENOUS at 06:21

## 2022-09-23 NOTE — PROGRESS NOTE ADULT - ASSESSMENT
82yo M, PMH dementia, htn, TEVAR, PPM for syncopal bradycardia, ? seizures?and CKD (baseline Cr appears to be ~1.8?), on Eliquis (unclear why), BIBEMS to ED after being found unresponsive at home. On EMS arrival pt obtunded and not breathing. Pt intubated in the field. On arrival in ED, pt found to be Covid+. CTH with no acute pathology. CT chest with diffuse b/l GGO. ICU consulted and admitted for further management.   He has been weak and not eating since discharge from [facility] for unclear reasons.  He was normal with moving all his extremities and talking and moving with no issues just weak and had no appetite Pt intubated in the field. On arrival in ED, pt found to be Covid+.   unresponsive except to deep stimuli, unclear cause,   Uremia vs  hypernatremia vs septic vs non convulsive seizure. Patient started on Keppra , EEG done no active seizures. Treated for Hypernatremia and has been started on HD 9/13.  Possible uremic encephalopathy, necessitating HD initiation. HD cath removed 9/19  for line free days.  Mental status not at baseline post HD support.  Nephrology Dr. Sotelo. Patient extubated on9/17 remains on nasal canula O2. Patient remains  COVID + with superimposed PNA - continue with s/p decadron. No remdesiver 2/2 renal function.   On minimal oxygen requirements at this time.  Given persistent poor mental status - CT head on admission with no acute findings obtained repeat CT head, for encephalopathy, possibly related to COVID.  CT -  No acute intracranial hemorrhage or acute territorial infarct. Unable to have MRI as patient with PPM.  Patient remains with NGT feeding as mental status remains poor. Speech and swallow evaluation ordered.  Patient seen and examined by ICU attending and stable for transfer to medicine service.     acute encephalopathy of unknown etiology  - still requiring NG tube   - on the ddx is infection vs cva ( cant get MRI), vs  seizure   - eeg x2  shows no seizure  - CT head x2 shows no cva   - s/p  LP to rule infectious etiology. will include nmda receptor ab, [place] IgM   -   9/22 eeg shows no seizure activity. will consider MRI if I can get transfer [facility] due to pacemaker as brain stem cva still possible   9/23 normal cell count on LP but has elevated protein and abd glucose with is nonspecific. covid? encepahlitis. will check esr/crp and if negative can make the diagnosis of cva more likely. will try to get MRI of the brain at [facility] as pacemaker seems to be compatible       leukocytosis   - wbc trending up with predominate neutrophil. Just finished course of dexamethasone which could explain, but due to the persistent encephalopathy will get LP and get ID consult for possible empiric abx   -     covid   - VSS  - s.p remd     hypernatremia   - start d5     ckg stage 5   - s/p dialysis with improved cr and bun but is now trending up

## 2022-09-23 NOTE — PROGRESS NOTE ADULT - ASSESSMENT
80yo M, PMH dementia, htn, TEVAR, PPM for syncopal bradycardia, ? sizures?and CKD (baseline Cr appears to be ~1.8?), on Eliquis (unclear why), BIBEMS to ED after being found unresponsive at home. On EMS arrival pt obtunded and not breathing. Pt intubated in the field. On arrival in ED, pt found to be Covid+. CTH with no acute pathology. CT chest with diffuse b/l GGO. Treated for Hypernatremia and has been started on HD 9/13.  Possible uremic encephalopathy, necessitating HD initiation. HD cath removed 9/19  for line free days.  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 Could he have suffered another CVA?   His wbc has increased drastically, blood cultures sent   at this juncture patient needs a lumbar puncture   can also rule out syphilis   needs MRI if his PPM is MRI compatible  Patient has a [employer] CLS Edora pacemaker( asked wife to bring the card in but PPM was placed in [facility] by Dr. Davis Saldana)    4/22: found out from his EP that cardiac device is MRI compatible just needs to be set to MRI mode, LP today, may need to go to [facility] for MRI brain, will follow all LP results, wbc better today   4/23: no fever, on NC, WBC elevated 21.95, Cr a little better 4.42, BCs with no growth to date, CSF PCR negative, no HSV 1/2, off abx, pending MRI    Plan:  monitor off abx  follow up on blood cultures NGTD  s/p LP 9/22  follow CSF studies   cell count - 0  glucose 104  protein 111  routine gram stain and culture - no growth to date  CSF PCR panel - negative  fungal Culture - testing   AFB culture - testing, unable to perform smear  CSF crypt antigen - negative  Syphilis screen pending  WNV CSF Igg Igm pending   HIV negative   obtain MRI   EEG - performed, pending reading   vitamin B12 >2000, folate 6.6,   TSH and free T4 ordered   Encephalitis panel pending collection   trend wbc and creatinine  consider placement of fifi for hemodialysis per renal if not getting better   if clinically worsens, start vancomycin and meropenem    discussed with Dr. Hernandez 80yo M, PMH dementia, htn, TEVAR, PPM for syncopal bradycardia, ? sizures?and CKD (baseline Cr appears to be ~1.8?), on Eliquis (unclear why), BIBEMS to ED after being found unresponsive at home. On EMS arrival pt obtunded and not breathing. Pt intubated in the field. On arrival in ED, pt found to be Covid+. CTH with no acute pathology. CT chest with diffuse b/l GGO. Treated for Hypernatremia and has been started on HD 9/13.  Possible uremic encephalopathy, necessitating HD initiation. HD cath removed 9/19  for line free days.  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 Could he have suffered another CVA?   His wbc has increased drastically, blood cultures sent   at this juncture patient needs a lumbar puncture   can also rule out syphilis   needs MRI if his PPM is MRI compatible  Patient has a ev[employer] CLS Edora pacemaker( asked wife to bring the card in but PPM was placed in [facility] by Dr. Davis Saldana)    9/22: found out from his EP that cardiac device is MRI compatible just needs to be set to MRI mode, LP today, may need to go to [facility] for MRI brain, will follow all LP results, wbc better today   9/23: no fever, on NC, WBC elevated 21.95, Cr a little better 4.42, BCs with no growth to date, CSF PCR negative, no HSV 1/2, off abx, pending MRI    Plan:  monitor off abx  follow up on blood cultures NGTD  s/p LP 9/22  follow CSF studies   cell count - 0  glucose 104  protein 111  routine gram stain and culture - no growth to date  CSF PCR panel - negative  fungal Culture - testing   AFB culture - testing, unable to perform smear  CSF crypt antigen - negative  Syphilis screen pending  WNV CSF Igg Igm pending   HIV negative   obtain MRI   EEG - performed, pending reading   vitamin B12 >2000, folate 6.6,   TSH and free T4 ordered   Encephalitis panel pending collection   trend wbc and creatinine  consider placement of fifi for hemodialysis per renal if not getting better   if clinically worsens, start vancomycin and meropenem    discussed with Dr. Hernandez

## 2022-09-23 NOTE — PROGRESS NOTE ADULT - SUBJECTIVE AND OBJECTIVE BOX
SARAH JIMÉNEZ  MRN-[id number]    Follow Up:  COVID 19, AMS, s/p LP    Interval History: the pt was seen and examined earlier, no distress, opens his eyes occasionally, does not answer questions and does not follow commands. No fevers, on NC, WBC elevated.     PAST MEDICAL & SURGICAL HISTORY:  Hypertension, unspecified type      Descending aortic aneurysm      Syncope, unspecified syncope type      Anxiety      Renal insufficiency      Bradycardia      H/O cardiac pacemaker  medtronic          ROS:    [x ] Unobtainable because: AMS  [ ] All other systems negative    Constitutional: no fever, no chills  Head: no trauma  Eyes: no vision changes, no eye pain  ENT:  no sore throat, no rhinorrhea  Cardiovascular:  no chest pain, no palpitation  Respiratory:  no SOB, no cough  GI:  no abd pain, no vomiting, no diarrhea  urinary: no dysuria, no hematuria, no flank pain  musculoskeletal:  no joint pain, no joint swelling  skin:  no rash  neurology:  no headache, no seizure, no change in mental status  psych: no anxiety, no depression         Allergies  No Known Allergies        ANTIMICROBIALS:      OTHER MEDS:  aspirin  chewable 81 milliGRAM(s) Enteral Tube daily  chlorhexidine 2% Cloths 1 Application(s) Topical <User Schedule>  dextrose 5%. 1000 milliLiter(s) IV Continuous <Continuous>  glucagon  Injectable 1 milliGRAM(s) IntraMuscular once  heparin   Injectable 5000 Unit(s) SubCutaneous every 12 hours  insulin lispro (ADMELOG) corrective regimen sliding scale   SubCutaneous every 6 hours  levETIRAcetam  Solution 500 milliGRAM(s) Oral daily  pantoprazole   Suspension 40 milliGRAM(s) Enteral Tube daily      Vital Signs Last 24 Hrs  T(C): 36.9 (23 Sep 2022 11:39), Max: 36.9 (23 Sep 2022 11:39)  T(F): 98.5 (23 Sep 2022 11:39), Max: 98.5 (23 Sep 2022 11:39)  HR: 98 (23 Sep 2022 11:39) (83 - 99)  BP: 132/89 (23 Sep 2022 11:39) (125/86 - 139/95)  BP(mean): --  RR: 19 (23 Sep 2022 11:39) (16 - 19)  SpO2: 96% (23 Sep 2022 11:39) (96% - 100%)    Parameters below as of 23 Sep 2022 09:00  Patient On (Oxygen Delivery Method): nasal cannula  O2 Flow (L/min): 2      Physical Exam:  Constitutional: ill appearing , no acute distress, on nasal cannula   HEAD/EYES: anicteric, no conjunctival injection  ENT:  neck is slightly stiff but not rigid, +NGT   Cardiovascular:   normal S1, S2, no murmur, + edema, +PPM left chest wall   Respiratory:  clear BS bilaterally, no wheezes, no rales  GI:  soft, non-tender, normal bowel sounds  :  +gardiner  Musculoskeletal:  no synovitis  Neurologic: awake but not alert, not responding to questions or commands   Skin:  no rash, no erythema, no phlebitis  Heme/Onc: no lymphadenopathy   Psychiatric:  awake not alert lethargic     WBC Count: 21.95 K/uL (09-23 @ 06:00)  WBC Count: 20.22 K/uL (09-22 @ 07:33)  WBC Count: 30.46 K/uL (09-21 @ 08:19)  WBC Count: 15.71 K/uL (09-20 @ 03:55)  WBC Count: 16.18 K/uL (09-19 @ 02:15)  WBC Count: 17.13 K/uL (09-18 @ 03:20)  WBC Count: 14.87 K/uL (09-17 @ 02:50)                            11.3   21.95 )-----------( 412      ( 23 Sep 2022 06:00 )             37.5       09-23    143  |  107  |  103<H>  ----------------------------<  154<H>  4.9   |  24  |  4.42<H>    Ca    9.6      23 Sep 2022 06:00    TPro  7.8  /  Alb  2.1<L>  /  TBili  0.6  /  DBili  x   /  AST  32  /  ALT  58  /  AlkPhos  86  09-23          Creatinine Trend: 4.42<--, 5.05<--, 5.37<--, 5.22<--, 4.56<--, 3.32<--      MICROBIOLOGY:  v  .CSF CSF  09-22-22   No growth  --    No polymorphonuclear cells seen  No organisms seen  by cytocentrifuge      .Blood Blood-Peripheral  09-21-22   No growth to date.  --  --      .Blood Blood-Peripheral  09-21-22   No growth to date.  --  --      .Sputum Sputum trap  09-12-22   Moderate Klebsiella pneumoniae  Moderate Enterobacter cloacae complex  Normal Respiratory Victoria present  --  Klebsiella pneumoniae  Enterobacter cloacae complex      Clean Catch Clean Catch (Midstream)  09-11-22   No growth  --  --      .Blood Blood-Peripheral  09-11-22   No Growth Final  --  --          HSV 1/2 PCR: Gerald (09-22 @ 15:14)      RADIOLOGY:

## 2022-09-23 NOTE — PROGRESS NOTE ADULT - SUBJECTIVE AND OBJECTIVE BOX
HPI:  Patient remains obtunded, unresponsive    EEG demonstrates generalized triphasic waves, suggesting metabolic encephalopathy    LP shows elevated protein, normal WBC, and PCR negative    Na level 150      Vital Signs Last 24 Hrs  T(C): 36.5 (23 Sep 2022 17:27), Max: 36.9 (23 Sep 2022 11:39)  T(F): 97.7 (23 Sep 2022 17:27), Max: 98.5 (23 Sep 2022 11:39)  HR: 98 (23 Sep 2022 17:27) (83 - 99)  BP: 137/92 (23 Sep 2022 17:27) (125/86 - 139/95)  BP(mean): --  RR: 17 (23 Sep 2022 17:27) (16 - 19)  SpO2: 94% (23 Sep 2022 17:27) (94% - 100%)    Parameters below as of 23 Sep 2022 09:00  Patient On (Oxygen Delivery Method): nasal cannula  O2 Flow (L/min): 2      MEDICATIONS  (STANDING):  aspirin  chewable 81 milliGRAM(s) Enteral Tube daily  chlorhexidine 2% Cloths 1 Application(s) Topical <User Schedule>  dextrose 5%. 1000 milliLiter(s) (75 mL/Hr) IV Continuous <Continuous>  glucagon  Injectable 1 milliGRAM(s) IntraMuscular once  heparin   Injectable 5000 Unit(s) SubCutaneous every 12 hours  insulin lispro (ADMELOG) corrective regimen sliding scale   SubCutaneous every 6 hours  levETIRAcetam  Solution 500 milliGRAM(s) Oral daily  pantoprazole   Suspension 40 milliGRAM(s) Enteral Tube daily    MEDICATIONS  (PRN):      ROS: pertinent positives in HPI, all other ROS were reviewed and are negative       Neurological exam:  Neck: +rigidity in setting of diffuse rigidity in limbs  HF: Lethargic with eyes closed, opens eyes to loud auditory stimulation, nonverbal, does not follow any commands  CN: GEORGINA, gaze midline, no nystagmus,no NLFD, tongue midline  Motor/Sens: diffuse muscle rigidity, severe generalized weaknesss, moves extremities symmetrically and withdraws symmetrically from noxious stimuli  Reflexes: unable to assess due to rigidity/resistance  Coord:  unable to assess    NIHSS: 24    1A: Level of consciousness       0= Alert; keenly responsive       +1= Arouses to minor stimulation       +2= Requires repeated stimulation to arouse       +2= Movements to pain       +3= Postures or unresponsive    1B: Ask month and age       0= Both questions right       +1= 1 question right       +2= 0 questions right       +1= Dysarthric/intubated/trauma/language barrier       +2= Aphasic    1C: "Blink eyes" and "Squeeze Hands"       0= Performs both       +1= Performs 1 task       +2= Performs 0 tasks    2: Horizontal EOMs       0= Normal       +1= Partial gaze palsy: can be overcome       +1= Partial gaze palsy: corrects w/ oculocephalic reflex        +2= Forzed gaze palsy: cannot be overcome    3: Visual fields       0= No visual loss       +1= Partial hemianopia       +2= Complete hemianopia       +3= Patient is b/l blind       +3= B/l hemianopia    4: Facial palsy (use grimace if obtunded)       0= Normal symmetry       +1= Minor paralysis (flat NLF, smile asymmetry)       +2= Partial paralysis ( lower face)       +3= Unilateral complete paralysis (upper/lower face)       +3= B/l complete paralysis (upper/lower face)    5A: Left arm motor drift (count out loud and use fingers to show count)       0= No drift x 10 seconds       +1= Drift but doesn't hit bed       +2= Drift, hits bed       +2= Some effort against gravity       +3= No effort against gravity       +4= No movement       0= Amputation/joint fusion    5B: Right arm motor drift       0= No drift x 10 seconds       +1= Drift but doesn't hit bed       +2= Drift, hits bed       +2= Some effort against gravity       +3= No effort against gravity       +4= No movement       0= Amputation/joint fusion    6A: Left leg motor drift       0= No drift x 10 seconds       +1= Drift but doesn't hit bed       +2= Drift, hits bed       +2= Some effort against gravity       +3= No effort against gravity       +4= No movement       0= Amputation/joint fusion    6B: Right leg motor drift       0= No drift x 10 seconds       +1= Drift but doesn't hit bed       +2= Drift, hits bed       +2= Some effort against gravity       +3= No effort against gravity       +4= No movement       0= Amputation/joint fusion    7: Limb ataxia (FNF/heel-shin)       0= No ataxia       +1= Ataxia in 1 limb       +2= Ataxia in 2 limbs       0= Does not understand       0= Paralyzed       0= Amputation/joint fusion    8: Sensation       0= Normal, no sensory loss       +1= mild-moderate loss: less sharp/more dull       +1= mild-moderate loss: can sense being touched       +2= Complete loss: cannot sense being touched at all       +2= No response and quadriplegic       +2= Coma/unresponsive    9: Language/aphasia- describe the scene (on nelda); name the items; read the sentences (on nelda)       0= Normal, no aphasia       +1= mild-moderate aphaisa: some obvious changes without significant limitation       +2= Severe aphasia: fragmentary expression, inference needed, cannot identify materials       +3= Mute/global aphasia: no usable speech/auditory comprehension       +3= coma/unresponsive    10: Dysarthria- read the words       0= Normal       +1= mild-moderate dysarthria: slurring but can be understood       +2= Severe dysarthria: unintelligible slurring or out of proportion to dysphasia       +2= Mute/anarthric        0= Intubated/unable to test    11: Extinction/inattention       0= No abnormality       +1= visual/tactile/auditory/spatial/personal inattention       +1= Extinction to b/l simultaneous stimulation       +2= Profound koko-inattention (e.g. does not recognize own hand)       +2= extinction to > 1 modality          LABS:                         11.3   21.95 )-----------( 412      ( 23 Sep 2022 06:00 )             37.5     09-23    143  |  107  |  103<H>  ----------------------------<  154<H>  4.9   |  24  |  4.42<H>    Ca    9.6      23 Sep 2022 06:00    TPro  7.8  /  Alb  2.1<L>  /  TBili  0.6  /  DBili  x   /  AST  32  /  ALT  58  /  AlkPhos  86  09-23    LIVER FUNCTIONS - ( 23 Sep 2022 06:00 )  Alb: 2.1 g/dL / Pro: 7.8 gm/dL / ALK PHOS: 86 U/L / ALT: 58 U/L / AST: 32 U/L / GGT: x             A/P:  82 yo man with persistent encephalopathy.  EEG suggestive of a metabolic encephalopathy (uremia, hypernatremia).   There may be a component of COVID-related encephalopathy (which is worse in elderly with cerebrovascular disease and chronic renal failure).    Diffuse rigidity on exam, uncertain etiology.   Elevated CSF protein, nonspecific.    Recommend:  1. MRI brain if PPM is compatible  2. Follow up autoimmune encephalopathy panel  3. Diffuse rigidity: check CPK, avoid antipsychotic agents      Leonard Tyler MD  Neurology Attending Physician

## 2022-09-23 NOTE — PROGRESS NOTE ADULT - SUBJECTIVE AND OBJECTIVE BOX
[facility] NEPHROLOGY SERVICES, [facility]  NEPHROLOGY AND HYPERTENSION  [address]  [address]  [place] [address]  [phone]    MD TESSY ESPINOSA, MD POLI BENJAMIN MD YELENA ROSENBERG, MD JESSICA CLANCY, MD MAGALIE MAYFIELD MD          Patient events noted  no distress    MEDICATIONS  (STANDING):  aspirin  chewable 81 milliGRAM(s) Enteral Tube daily  chlorhexidine 2% Cloths 1 Application(s) Topical <User Schedule>  dextrose 5%. 1000 milliLiter(s) (75 mL/Hr) IV Continuous <Continuous>  glucagon  Injectable 1 milliGRAM(s) IntraMuscular once  heparin   Injectable 5000 Unit(s) SubCutaneous every 12 hours  insulin lispro (ADMELOG) corrective regimen sliding scale   SubCutaneous every 6 hours  levETIRAcetam  Solution 500 milliGRAM(s) Oral daily  pantoprazole   Suspension 40 milliGRAM(s) Enteral Tube daily    MEDICATIONS  (PRN):      PHYSICAL EXAM:      T(C): 36.5 (09-23-22 @ 17:27), Max: 36.9 (09-23-22 @ 11:39)  HR: 98 (09-23-22 @ 17:27) (94 - 99)  BP: 137/92 (09-23-22 @ 17:27) (132/89 - 137/92)  RR: 17 (09-23-22 @ 17:27) (16 - 19)  SpO2: 94% (09-23-22 @ 17:27) (94% - 98%)  Wt(kg): --  Lungs clear  Heart S1S2  Abd soft NT ND  Extremities:   tr edema                                    11.3   21.95 )-----------( 412      ( 23 Sep 2022 06:00 )             37.5     09-23    143  |  107  |  103<H>  ----------------------------<  154<H>  4.9   |  24  |  4.42<H>    Ca    9.6      23 Sep 2022 06:00    TPro  7.8  /  Alb  2.1<L>  /  TBili  0.6  /  DBili  x   /  AST  32  /  ALT  58  /  AlkPhos  86  09-23      LIVER FUNCTIONS - ( 23 Sep 2022 06:00 )  Alb: 2.1 g/dL / Pro: 7.8 gm/dL / ALK PHOS: 86 U/L / ALT: 58 U/L / AST: 32 U/L / GGT: x           Creatinine Trend: 4.42<--, 5.05<--, 5.37<--, 5.22<--, 4.56<--, 3.32<--      Impression  VASILE CKD sucpected 3-4; pre renal/ risk for ischemic ATN; COVID related tubular injury   Less likely pulm renal disease, ANCA/GBM were negative  Risk for contrast related injury   Mental status not at baseline post HD support; encephalitis work up.  Trending leukocytosis w/o fever  Renal indices slowly improving   Post LP     Plan    Follow urine output for evidence of recovery  Pancultured  IVF maintenance  TF to Nepro  Holding HD      Main Sotelo MD

## 2022-09-23 NOTE — PROGRESS NOTE ADULT - SUBJECTIVE AND OBJECTIVE BOX
Patient is a 81y old  Male who presents with a chief complaint of Encephalopathy, acute respiratory failure (21 Sep 2022 14:40)      INTERVAL HPI/OVERNIGHT EVENTS:  remains non-verbal       MEDICATIONS  (STANDING):  aspirin  chewable 81 milliGRAM(s) Enteral Tube daily  chlorhexidine 2% Cloths 1 Application(s) Topical <User Schedule>  dextrose 5%. 1000 milliLiter(s) (75 mL/Hr) IV Continuous <Continuous>  glucagon  Injectable 1 milliGRAM(s) IntraMuscular once  heparin   Injectable 5000 Unit(s) SubCutaneous every 12 hours  insulin lispro (ADMELOG) corrective regimen sliding scale   SubCutaneous every 6 hours  levETIRAcetam  Solution 500 milliGRAM(s) Oral daily  pantoprazole   Suspension 40 milliGRAM(s) Enteral Tube daily    MEDICATIONS  (PRN):      Allergies    No Known Allergies    Intolerances        Vital Signs Last 24 Hrs  T(C): 36.9 (23 Sep 2022 11:39), Max: 36.9 (23 Sep 2022 11:39)  T(F): 98.5 (23 Sep 2022 11:39), Max: 98.5 (23 Sep 2022 11:39)  HR: 98 (23 Sep 2022 11:39) (83 - 99)  BP: 132/89 (23 Sep 2022 11:39) (115/79 - 139/95)  BP(mean): --  RR: 19 (23 Sep 2022 11:39) (16 - 19)  SpO2: 96% (23 Sep 2022 11:39) (96% - 100%)    Parameters below as of 23 Sep 2022 09:00  Patient On (Oxygen Delivery Method): nasal cannula  O2 Flow (L/min): 2          PHYSICAL EXAM:  GENERAL: NAD  HEAD:  Atraumatic, Normocephalic  EYES: EOMI, PERRLA, conjunctiva and sclera clear  ENMT: No tonsillar erythema, exudates, or enlargement;   NECK: Supple, Normal thyroid  NERVOUS SYSTEM: rigid, no obvious focal deficits   CHEST/LUNG: CTABL; No rales, rhonchi, wheezing, or rubs  HEART: Regular rate and rhythm; No murmurs, rubs, or gallops  ABDOMEN: Soft, Nontender, Nondistended; Bowel sounds present  EXTREMITIES:  2+ Peripheral Pulses, No clubbing, cyanosis, or edema  LYMPH: No lymphadenopathy noted  SKIN: No rashes or lesions    LABS:                                              11.3   21.95 )-----------( 412      ( 23 Sep 2022 06:00 )             37.5     09-23    143  |  107  |  103<H>  ----------------------------<  154<H>  4.9   |  24  |  4.42<H>    Ca    9.6      23 Sep 2022 06:00    TPro  7.8  /  Alb  2.1<L>  /  TBili  0.6  /  DBili  x   /  AST  32  /  ALT  58  /  AlkPhos  86  09-23                  CAPILLARY BLOOD GLUCOSE      RADIOLOGY & ADDITIONAL TESTS:    Imaging Personally Reviewed:  [ ] YES  [ ] NO    Consultant(s) Notes Reviewed:  [x ] YES  [ ] NO    Care Discussed with Consultants/Other Providers [ ] YES  [ ] NO

## 2022-09-24 LAB
ALBUMIN SERPL ELPH-MCNC: 2.2 G/DL — LOW (ref 3.3–5)
ALP SERPL-CCNC: 83 U/L — SIGNIFICANT CHANGE UP (ref 40–120)
ALT FLD-CCNC: 48 U/L — SIGNIFICANT CHANGE UP (ref 12–78)
ANION GAP SERPL CALC-SCNC: 11 MMOL/L — SIGNIFICANT CHANGE UP (ref 5–17)
AST SERPL-CCNC: 22 U/L — SIGNIFICANT CHANGE UP (ref 15–37)
BASOPHILS # BLD AUTO: 0.04 K/UL — SIGNIFICANT CHANGE UP (ref 0–0.2)
BASOPHILS NFR BLD AUTO: 0.2 % — SIGNIFICANT CHANGE UP (ref 0–2)
BILIRUB SERPL-MCNC: 0.5 MG/DL — SIGNIFICANT CHANGE UP (ref 0.2–1.2)
BUN SERPL-MCNC: 89 MG/DL — HIGH (ref 7–23)
CALCIUM SERPL-MCNC: 10 MG/DL — SIGNIFICANT CHANGE UP (ref 8.5–10.1)
CHLORIDE SERPL-SCNC: 105 MMOL/L — SIGNIFICANT CHANGE UP (ref 96–108)
CO2 SERPL-SCNC: 26 MMOL/L — SIGNIFICANT CHANGE UP (ref 22–31)
CREAT SERPL-MCNC: 3.49 MG/DL — HIGH (ref 0.5–1.3)
CRP SERPL-MCNC: 228 MG/L — HIGH
EGFR: 17 ML/MIN/1.73M2 — LOW
EOSINOPHIL # BLD AUTO: 0.28 K/UL — SIGNIFICANT CHANGE UP (ref 0–0.5)
EOSINOPHIL NFR BLD AUTO: 1.6 % — SIGNIFICANT CHANGE UP (ref 0–6)
GLUCOSE BLDC GLUCOMTR-MCNC: 130 MG/DL — HIGH (ref 70–99)
GLUCOSE BLDC GLUCOMTR-MCNC: 179 MG/DL — HIGH (ref 70–99)
GLUCOSE BLDC GLUCOMTR-MCNC: 183 MG/DL — HIGH (ref 70–99)
GLUCOSE BLDC GLUCOMTR-MCNC: 201 MG/DL — HIGH (ref 70–99)
GLUCOSE BLDC GLUCOMTR-MCNC: 207 MG/DL — HIGH (ref 70–99)
GLUCOSE SERPL-MCNC: 187 MG/DL — HIGH (ref 70–99)
HCT VFR BLD CALC: 33.2 % — LOW (ref 39–50)
HGB BLD-MCNC: 10.6 G/DL — LOW (ref 13–17)
IMM GRANULOCYTES NFR BLD AUTO: 0.9 % — SIGNIFICANT CHANGE UP (ref 0–0.9)
LYMPHOCYTES # BLD AUTO: 1.35 K/UL — SIGNIFICANT CHANGE UP (ref 1–3.3)
LYMPHOCYTES # BLD AUTO: 7.7 % — LOW (ref 13–44)
MAGNESIUM SERPL-MCNC: 2.3 MG/DL — SIGNIFICANT CHANGE UP (ref 1.6–2.6)
MCHC RBC-ENTMCNC: 31.1 PG — SIGNIFICANT CHANGE UP (ref 27–34)
MCHC RBC-ENTMCNC: 31.9 G/DL — LOW (ref 32–36)
MCV RBC AUTO: 97.4 FL — SIGNIFICANT CHANGE UP (ref 80–100)
MONOCYTES # BLD AUTO: 1.2 K/UL — HIGH (ref 0–0.9)
MONOCYTES NFR BLD AUTO: 6.8 % — SIGNIFICANT CHANGE UP (ref 2–14)
NEUTROPHILS # BLD AUTO: 14.58 K/UL — HIGH (ref 1.8–7.4)
NEUTROPHILS NFR BLD AUTO: 82.8 % — HIGH (ref 43–77)
NMDAR IGG TITR CSF IF: SIGNIFICANT CHANGE UP
NRBC # BLD: 0 /100 WBCS — SIGNIFICANT CHANGE UP (ref 0–0)
PHOSPHATE SERPL-MCNC: 3.9 MG/DL — SIGNIFICANT CHANGE UP (ref 2.5–4.5)
PLATELET # BLD AUTO: 452 K/UL — HIGH (ref 150–400)
POTASSIUM SERPL-MCNC: 4.1 MMOL/L — SIGNIFICANT CHANGE UP (ref 3.5–5.3)
POTASSIUM SERPL-SCNC: 4.1 MMOL/L — SIGNIFICANT CHANGE UP (ref 3.5–5.3)
PROT SERPL-MCNC: 7.8 GM/DL — SIGNIFICANT CHANGE UP (ref 6–8.3)
RBC # BLD: 3.41 M/UL — LOW (ref 4.2–5.8)
RBC # FLD: 14 % — SIGNIFICANT CHANGE UP (ref 10.3–14.5)
SODIUM SERPL-SCNC: 142 MMOL/L — SIGNIFICANT CHANGE UP (ref 135–145)
T4 AB SER-ACNC: 4.8 UG/DL — SIGNIFICANT CHANGE UP (ref 4.6–12)
TSH SERPL-MCNC: 1.37 UU/ML — SIGNIFICANT CHANGE UP (ref 0.36–3.74)
WBC # BLD: 17.61 K/UL — HIGH (ref 3.8–10.5)
WBC # FLD AUTO: 17.61 K/UL — HIGH (ref 3.8–10.5)

## 2022-09-24 PROCEDURE — 99233 SBSQ HOSP IP/OBS HIGH 50: CPT

## 2022-09-24 RX ORDER — ACETAMINOPHEN 500 MG
650 TABLET ORAL EVERY 6 HOURS
Refills: 0 | Status: DISCONTINUED | OUTPATIENT
Start: 2022-09-24 | End: 2022-10-04

## 2022-09-24 RX ADMIN — Medication 650 MILLIGRAM(S): at 11:27

## 2022-09-24 RX ADMIN — Medication 2: at 05:28

## 2022-09-24 RX ADMIN — HEPARIN SODIUM 5000 UNIT(S): 5000 INJECTION INTRAVENOUS; SUBCUTANEOUS at 05:28

## 2022-09-24 RX ADMIN — HEPARIN SODIUM 5000 UNIT(S): 5000 INJECTION INTRAVENOUS; SUBCUTANEOUS at 17:28

## 2022-09-24 RX ADMIN — PANTOPRAZOLE SODIUM 40 MILLIGRAM(S): 20 TABLET, DELAYED RELEASE ORAL at 11:27

## 2022-09-24 RX ADMIN — Medication 2: at 11:28

## 2022-09-24 RX ADMIN — Medication 81 MILLIGRAM(S): at 11:36

## 2022-09-24 RX ADMIN — Medication 4: at 17:28

## 2022-09-24 RX ADMIN — Medication 650 MILLIGRAM(S): at 12:20

## 2022-09-24 RX ADMIN — LEVETIRACETAM 500 MILLIGRAM(S): 250 TABLET, FILM COATED ORAL at 11:27

## 2022-09-24 NOTE — PROGRESS NOTE ADULT - ASSESSMENT
80yo M, PMH dementia, htn, TEVAR, PPM for syncopal bradycardia, ? seizures?and CKD (baseline Cr appears to be ~1.8?), on Eliquis (unclear why), BIBEMS to ED after being found unresponsive at home. On EMS arrival pt obtunded and not breathing. Pt intubated in the field. On arrival in ED, pt found to be Covid+. CTH with no acute pathology. CT chest with diffuse b/l GGO. ICU consulted and admitted for further management.   He has been weak and not eating since discharge from [facility] for unclear reasons.  He was normal with moving all his extremities and talking and moving with no issues just weak and had no appetite Pt intubated in the field. On arrival in ED, pt found to be Covid+.   unresponsive except to deep stimuli, unclear cause,   Uremia vs  hypernatremia vs septic vs non convulsive seizure. Patient started on Keppra , EEG done no active seizures. Treated for Hypernatremia and has been started on HD 9/13.  Possible uremic encephalopathy, necessitating HD initiation. HD cath removed 9/19  for line free days.  Mental status not at baseline post HD support.  Nephrology Dr. Sotelo. Patient extubated on9/17 remains on nasal canula O2. Patient remains  COVID + with superimposed PNA - continue with s/p decadron. No remdesiver 2/2 renal function.   On minimal oxygen requirements at this time.  Given persistent poor mental status - CT head on admission with no acute findings obtained repeat CT head, for encephalopathy, possibly related to COVID.  CT -  No acute intracranial hemorrhage or acute territorial infarct. Unable to have MRI as patient with PPM.  Patient remains with NGT feeding as mental status remains poor. Speech and swallow evaluation ordered.  Patient seen and examined by ICU attending and stable for transfer to medicine service.     acute encephalopathy of unknown etiology  - still requiring NG tube   - on the ddx is infection vs cva ( cant get MRI), vs vs vasculitis vs anoxic brain injury ( pt intubated in the field)   - eeg x2  shows no seizure  - CT head x2 shows no cva   - s/p  LP to rule infectious etiology. will include nmda receptor ab, [place] IgM   -   9/22 eeg shows no seizure activity. will consider MRI if I can get transfer to [facility] due to pacemaker as brain stem cva still possible   9/23 normal cell count on LP but has elevated protein and abd glucose with is nonspecific. covid? encepahlitis. will check esr/crp and if negative can make the diagnosis of cva more likely. will try to get MRI of the brain at [facility] as pacemaker seems to be compatible   9/24  no change in mental status. doubt bacterial meningitis here. ESR and crp very elevated making cva less likely but still needs to me ruled out. WIll need to send to [facility] to get mri and MRA  to eval for any signs of vasculitis.. covid encephalopathy?        leukocytosis   - wbc trending up with predominate neutrophil. Just finished course of dexamethasone which could explain, but due to the persistent encephalopathy will get LP and get ID consult for possible empiric abx   -     covid   - VSS  - s.p remd     hypernatremia   - resolved with d5     ckg stage 5   - s/p dialysis with improved cr and bun but is now trending up

## 2022-09-24 NOTE — PROGRESS NOTE ADULT - SUBJECTIVE AND OBJECTIVE BOX
[facility] NEPHROLOGY SERVICES, [facility]  NEPHROLOGY AND HYPERTENSION  [address]  [address]  [place] [address]  [phone]    MD TESSY ESPINOSA, MD POLI BENJAMIN MD YELENA ROSENBERG, MD JESSICA CLANCY, MD MAGALIE MAYFIELD MD          Patient events noted    MEDICATIONS  (STANDING):  aspirin  chewable 81 milliGRAM(s) Enteral Tube daily  chlorhexidine 2% Cloths 1 Application(s) Topical <User Schedule>  glucagon  Injectable 1 milliGRAM(s) IntraMuscular once  heparin   Injectable 5000 Unit(s) SubCutaneous every 12 hours  insulin lispro (ADMELOG) corrective regimen sliding scale   SubCutaneous every 6 hours  levETIRAcetam  Solution 500 milliGRAM(s) Oral daily  pantoprazole   Suspension 40 milliGRAM(s) Enteral Tube daily    MEDICATIONS  (PRN):  acetaminophen     Tablet .. 650 milliGRAM(s) Oral every 6 hours PRN Temp greater or equal to 38C (100.4F), Mild Pain (1 - 3)      09-23-22 @ 07:01  -  09-24-22 @ 07:00  --------------------------------------------------------  IN: 0 mL / OUT: 1200 mL / NET: -1200 mL      PHYSICAL EXAM:      T(C): 36.8 (09-24-22 @ 18:05), Max: 38.2 (09-24-22 @ 11:25)  HR: 98 (09-24-22 @ 18:05) (98 - 107)  BP: 133/90 (09-24-22 @ 18:05) (129/91 - 150/98)  RR: 18 (09-24-22 @ 11:25) (17 - 18)  SpO2: 91% (09-24-22 @ 11:25) (91% - 94%)  Wt(kg): --  Lungs clear  Heart S1S2  Abd soft NT ND  Extremities:   tr edema                                    10.6   17.61 )-----------( 452      ( 24 Sep 2022 07:55 )             33.2     09-24    142  |  105  |  89<H>  ----------------------------<  187<H>  4.1   |  26  |  3.49<H>    Ca    10.0      24 Sep 2022 07:55  Phos  3.9     09-24  Mg     2.3     09-24    TPro  7.8  /  Alb  2.2<L>  /  TBili  0.5  /  DBili  x   /  AST  22  /  ALT  48  /  AlkPhos  83  09-24      LIVER FUNCTIONS - ( 24 Sep 2022 07:55 )  Alb: 2.2 g/dL / Pro: 7.8 gm/dL / ALK PHOS: 83 U/L / ALT: 48 U/L / AST: 22 U/L / GGT: x           Creatinine Trend: 3.49<--, 4.42<--, 5.05<--, 5.37<--, 5.22<--, 4.56<--      Impression  VASILE CKD sucpected 3-4; pre renal/ risk for ischemic ATN; COVID related tubular injury   Less likely pulm renal disease, ANCA/GBM were negative  Risk for contrast related injury   Mental status not at baseline post HD support; encephalitis work up.  Trending leukocytosis w/o fever  Renal indices slowly improving   Post LP     Plan    Follow urine output for evidence of recovery  DINESH  Pancultured  IVF maintenance  TF to [facility]ro  Holding HD      Main Sotelo MD

## 2022-09-24 NOTE — PROGRESS NOTE ADULT - SUBJECTIVE AND OBJECTIVE BOX
Patient is a 81y old  Male who presents with a chief complaint of Encephalopathy, acute respiratory failure (21 Sep 2022 14:40)      INTERVAL HPI/OVERNIGHT EVENTS:  no events       MEDICATIONS  (STANDING):  aspirin  chewable 81 milliGRAM(s) Enteral Tube daily  chlorhexidine 2% Cloths 1 Application(s) Topical <User Schedule>  glucagon  Injectable 1 milliGRAM(s) IntraMuscular once  heparin   Injectable 5000 Unit(s) SubCutaneous every 12 hours  insulin lispro (ADMELOG) corrective regimen sliding scale   SubCutaneous every 6 hours  levETIRAcetam  Solution 500 milliGRAM(s) Oral daily  pantoprazole   Suspension 40 milliGRAM(s) Enteral Tube daily    MEDICATIONS  (PRN):  acetaminophen     Tablet .. 650 milliGRAM(s) Oral every 6 hours PRN Temp greater or equal to 38C (100.4F), Mild Pain (1 - 3)    Allergies    No Known Allergies    Intolerances        Vital Signs Last 24 Hrs  T(C): 36.9 (23 Sep 2022 11:39), Max: 36.9 (23 Sep 2022 11:39)  T(F): 98.5 (23 Sep 2022 11:39), Max: 98.5 (23 Sep 2022 11:39)  HR: 98 (23 Sep 2022 11:39) (83 - 99)  BP: 132/89 (23 Sep 2022 11:39) (115/79 - 139/95)  BP(mean): --  RR: 19 (23 Sep 2022 11:39) (16 - 19)  SpO2: 96% (23 Sep 2022 11:39) (96% - 100%)    Parameters below as of 23 Sep 2022 09:00  Patient On (Oxygen Delivery Method): nasal cannula  O2 Flow (L/min): 2          PHYSICAL EXAM:  GENERAL: NAD  HEAD:  Atraumatic, Normocephalic  EYES: EOMI, PERRLA, conjunctiva and sclera clear  ENMT: No tonsillar erythema, exudates, or enlargement;   NECK: Supple, Normal thyroid  NERVOUS SYSTEM: rigid, no obvious focal deficits   CHEST/LUNG: CTABL; No rales, rhonchi, wheezing, or rubs  HEART: Regular rate and rhythm; No murmurs, rubs, or gallops  ABDOMEN: Soft, Nontender, Nondistended; Bowel sounds present  EXTREMITIES:  2+ Peripheral Pulses, No clubbing, cyanosis, or edema  LYMPH: No lymphadenopathy noted  SKIN: No rashes or lesions    LABS:                                              10.6   17.61 )-----------( 452      ( 24 Sep 2022 07:55 )             33.2     09-24    142  |  105  |  89<H>  ----------------------------<  187<H>  4.1   |  26  |  3.49<H>    Ca    10.0      24 Sep 2022 07:55  Phos  3.9     09-24  Mg     2.3     09-24    TPro  7.8  /  Alb  2.2<L>  /  TBili  0.5  /  DBili  x   /  AST  22  /  ALT  48  /  AlkPhos  83  09-24                    CAPILLARY BLOOD GLUCOSE      RADIOLOGY & ADDITIONAL TESTS:    Imaging Personally Reviewed:  [ ] YES  [ ] NO    Consultant(s) Notes Reviewed:  [x ] YES  [ ] NO    Care Discussed with Consultants/Other Providers [ ] YES  [ ] NO

## 2022-09-25 LAB
ANION GAP SERPL CALC-SCNC: 10 MMOL/L — SIGNIFICANT CHANGE UP (ref 5–17)
APPEARANCE UR: ABNORMAL
BACTERIA # UR AUTO: ABNORMAL
BILIRUB UR-MCNC: NEGATIVE — SIGNIFICANT CHANGE UP
BUN SERPL-MCNC: 97 MG/DL — HIGH (ref 7–23)
CALCIUM SERPL-MCNC: 10.5 MG/DL — HIGH (ref 8.5–10.1)
CHLORIDE SERPL-SCNC: 105 MMOL/L — SIGNIFICANT CHANGE UP (ref 96–108)
CO2 SERPL-SCNC: 27 MMOL/L — SIGNIFICANT CHANGE UP (ref 22–31)
COLOR SPEC: YELLOW — SIGNIFICANT CHANGE UP
CORTICOSTEROID BINDING GLOBULIN RESULT: 1.7 MG/DL — SIGNIFICANT CHANGE UP
CORTIS F/TOTAL MFR SERPL: 11 % — SIGNIFICANT CHANGE UP
CORTIS SERPL-MCNC: 10 UG/DL — SIGNIFICANT CHANGE UP
CORTISOL, FREE RESULT: 1.1 UG/DL — SIGNIFICANT CHANGE UP
CREAT SERPL-MCNC: 3.69 MG/DL — HIGH (ref 0.5–1.3)
CULTURE RESULTS: NO GROWTH — SIGNIFICANT CHANGE UP
DIFF PNL FLD: ABNORMAL
EGFR: 16 ML/MIN/1.73M2 — LOW
EPI CELLS # UR: SIGNIFICANT CHANGE UP
GLUCOSE BLDC GLUCOMTR-MCNC: 107 MG/DL — HIGH (ref 70–99)
GLUCOSE BLDC GLUCOMTR-MCNC: 166 MG/DL — HIGH (ref 70–99)
GLUCOSE BLDC GLUCOMTR-MCNC: 168 MG/DL — HIGH (ref 70–99)
GLUCOSE BLDC GLUCOMTR-MCNC: 299 MG/DL — HIGH (ref 70–99)
GLUCOSE SERPL-MCNC: 185 MG/DL — HIGH (ref 70–99)
GLUCOSE UR QL: NEGATIVE MG/DL — SIGNIFICANT CHANGE UP
GRAM STN FLD: SIGNIFICANT CHANGE UP
HCT VFR BLD CALC: 34 % — LOW (ref 39–50)
HGB BLD-MCNC: 10.8 G/DL — LOW (ref 13–17)
KETONES UR-MCNC: NEGATIVE — SIGNIFICANT CHANGE UP
LEUKOCYTE ESTERASE UR-ACNC: ABNORMAL
MCHC RBC-ENTMCNC: 30.6 PG — SIGNIFICANT CHANGE UP (ref 27–34)
MCHC RBC-ENTMCNC: 31.8 G/DL — LOW (ref 32–36)
MCV RBC AUTO: 96.3 FL — SIGNIFICANT CHANGE UP (ref 80–100)
N-METHYL-DA RECEPTOR AB IGG BY CBA-IFA, SERUM W/RFLX TITER: SIGNIFICANT CHANGE UP
NITRITE UR-MCNC: NEGATIVE — SIGNIFICANT CHANGE UP
NRBC # BLD: 0 /100 WBCS — SIGNIFICANT CHANGE UP (ref 0–0)
PH UR: 6 — SIGNIFICANT CHANGE UP (ref 5–8)
PLATELET # BLD AUTO: 435 K/UL — HIGH (ref 150–400)
POTASSIUM SERPL-MCNC: 4.4 MMOL/L — SIGNIFICANT CHANGE UP (ref 3.5–5.3)
POTASSIUM SERPL-SCNC: 4.4 MMOL/L — SIGNIFICANT CHANGE UP (ref 3.5–5.3)
PROT UR-MCNC: 100 MG/DL
RBC # BLD: 3.53 M/UL — LOW (ref 4.2–5.8)
RBC # FLD: 14 % — SIGNIFICANT CHANGE UP (ref 10.3–14.5)
RBC CASTS # UR COMP ASSIST: SIGNIFICANT CHANGE UP /HPF (ref 0–4)
SODIUM SERPL-SCNC: 142 MMOL/L — SIGNIFICANT CHANGE UP (ref 135–145)
SP GR SPEC: 1.01 — SIGNIFICANT CHANGE UP (ref 1.01–1.02)
SPECIMEN SOURCE: SIGNIFICANT CHANGE UP
T3 SERPL-MCNC: 65 NG/DL — LOW (ref 80–200)
T4 AB SER-ACNC: 5.3 UG/DL — SIGNIFICANT CHANGE UP (ref 4.6–12)
TSH SERPL-MCNC: 1.17 UIU/ML — SIGNIFICANT CHANGE UP (ref 0.36–3.74)
UROBILINOGEN FLD QL: NEGATIVE MG/DL — SIGNIFICANT CHANGE UP
WBC # BLD: 20.7 K/UL — HIGH (ref 3.8–10.5)
WBC # FLD AUTO: 20.7 K/UL — HIGH (ref 3.8–10.5)
WBC UR QL: ABNORMAL

## 2022-09-25 PROCEDURE — 71045 X-RAY EXAM CHEST 1 VIEW: CPT | Mod: 26

## 2022-09-25 PROCEDURE — 99233 SBSQ HOSP IP/OBS HIGH 50: CPT

## 2022-09-25 RX ORDER — VANCOMYCIN HCL 1 G
500 VIAL (EA) INTRAVENOUS EVERY 24 HOURS
Refills: 0 | Status: DISCONTINUED | OUTPATIENT
Start: 2022-09-26 | End: 2022-09-26

## 2022-09-25 RX ORDER — ACETAMINOPHEN 500 MG
1000 TABLET ORAL ONCE
Refills: 0 | Status: COMPLETED | OUTPATIENT
Start: 2022-09-25 | End: 2022-09-25

## 2022-09-25 RX ORDER — MEROPENEM 1 G/30ML
500 INJECTION INTRAVENOUS EVERY 12 HOURS
Refills: 0 | Status: DISCONTINUED | OUTPATIENT
Start: 2022-09-25 | End: 2022-09-28

## 2022-09-25 RX ORDER — MIDODRINE HYDROCHLORIDE 2.5 MG/1
10 TABLET ORAL ONCE
Refills: 0 | Status: COMPLETED | OUTPATIENT
Start: 2022-09-25 | End: 2022-09-25

## 2022-09-25 RX ORDER — SODIUM CHLORIDE 9 MG/ML
1000 INJECTION, SOLUTION INTRAVENOUS
Refills: 0 | Status: COMPLETED | OUTPATIENT
Start: 2022-09-25 | End: 2022-09-26

## 2022-09-25 RX ORDER — MEROPENEM 1 G/30ML
500 INJECTION INTRAVENOUS EVERY 24 HOURS
Refills: 0 | Status: DISCONTINUED | OUTPATIENT
Start: 2022-09-25 | End: 2022-09-25

## 2022-09-25 RX ORDER — VANCOMYCIN HCL 1 G
1000 VIAL (EA) INTRAVENOUS ONCE
Refills: 0 | Status: COMPLETED | OUTPATIENT
Start: 2022-09-25 | End: 2022-09-25

## 2022-09-25 RX ORDER — CHLORHEXIDINE GLUCONATE 213 G/1000ML
1 SOLUTION TOPICAL
Refills: 0 | Status: DISCONTINUED | OUTPATIENT
Start: 2022-09-25 | End: 2022-10-04

## 2022-09-25 RX ORDER — SODIUM CHLORIDE 9 MG/ML
1900 INJECTION INTRAMUSCULAR; INTRAVENOUS; SUBCUTANEOUS ONCE
Refills: 0 | Status: COMPLETED | OUTPATIENT
Start: 2022-09-25 | End: 2022-09-25

## 2022-09-25 RX ORDER — LACTULOSE 10 G/15ML
20 SOLUTION ORAL ONCE
Refills: 0 | Status: COMPLETED | OUTPATIENT
Start: 2022-09-25 | End: 2022-09-25

## 2022-09-25 RX ORDER — MEROPENEM 1 G/30ML
500 INJECTION INTRAVENOUS ONCE
Refills: 0 | Status: COMPLETED | OUTPATIENT
Start: 2022-09-25 | End: 2022-09-25

## 2022-09-25 RX ADMIN — Medication 250 MILLIGRAM(S): at 10:41

## 2022-09-25 RX ADMIN — Medication 2: at 17:43

## 2022-09-25 RX ADMIN — Medication 2: at 06:09

## 2022-09-25 RX ADMIN — Medication 6: at 11:55

## 2022-09-25 RX ADMIN — HEPARIN SODIUM 5000 UNIT(S): 5000 INJECTION INTRAVENOUS; SUBCUTANEOUS at 06:10

## 2022-09-25 RX ADMIN — MEROPENEM 100 MILLIGRAM(S): 1 INJECTION INTRAVENOUS at 11:51

## 2022-09-25 RX ADMIN — HEPARIN SODIUM 5000 UNIT(S): 5000 INJECTION INTRAVENOUS; SUBCUTANEOUS at 17:42

## 2022-09-25 RX ADMIN — CHLORHEXIDINE GLUCONATE 1 APPLICATION(S): 213 SOLUTION TOPICAL at 06:16

## 2022-09-25 RX ADMIN — SODIUM CHLORIDE 100 MILLILITER(S): 9 INJECTION, SOLUTION INTRAVENOUS at 20:12

## 2022-09-25 RX ADMIN — MIDODRINE HYDROCHLORIDE 10 MILLIGRAM(S): 2.5 TABLET ORAL at 11:53

## 2022-09-25 RX ADMIN — Medication 81 MILLIGRAM(S): at 11:53

## 2022-09-25 RX ADMIN — LACTULOSE 20 GRAM(S): 10 SOLUTION ORAL at 11:53

## 2022-09-25 RX ADMIN — Medication 400 MILLIGRAM(S): at 08:52

## 2022-09-25 RX ADMIN — SODIUM CHLORIDE 633.33 MILLILITER(S): 9 INJECTION INTRAMUSCULAR; INTRAVENOUS; SUBCUTANEOUS at 11:51

## 2022-09-25 RX ADMIN — MEROPENEM 100 MILLIGRAM(S): 1 INJECTION INTRAVENOUS at 17:42

## 2022-09-25 RX ADMIN — PANTOPRAZOLE SODIUM 40 MILLIGRAM(S): 20 TABLET, DELAYED RELEASE ORAL at 11:54

## 2022-09-25 RX ADMIN — LEVETIRACETAM 500 MILLIGRAM(S): 250 TABLET, FILM COATED ORAL at 11:52

## 2022-09-25 RX ADMIN — CHLORHEXIDINE GLUCONATE 1 APPLICATION(S): 213 SOLUTION TOPICAL at 11:54

## 2022-09-25 RX ADMIN — Medication 1000 MILLIGRAM(S): at 10:19

## 2022-09-25 NOTE — CHART NOTE - NSCHARTNOTEFT_GEN_A_CORE
Hospitalist Medicine NP    CC: Called by RN to evaluate pt for temperature of 102.5.     82yo M, PMH dementia, htn, TAVAR, PPM for syncopal bradycardia, ? seizures? and CKD (baseline Cr appears to be ~1.8?), on Eliquis (unclear why), BIBEMS to ED after being found unresponsive at home. On EMS arrival pt obtunded and not breathing. Pt intubated in the field. On arrival in ED, pt found to be Covid+. CTH with no acute pathology. CT chest with diffuse b/l GGO. ICU consulted for further management.    He has been weak and not eating since discharge from [place] for unclear reasons.  He was normal with moving all his extremities and talking and moving with no issues just weak and had no appetite  (11 Sep 2022 21:45) Admitted for Encephalopathy, acute respiratory failure.       REVIEW OF SYSTEMS: unable to obtain due to patient's baseline mental status      VITALS:  T(C): 39.2 (09-25-22 @ 08:28), Max: 39.2 (09-25-22 @ 08:28)  HR: 119 (09-25-22 @ 08:28) (98 - 119)  BP: 100/68 (09-25-22 @ 08:28) (100/68 - 133/90)  RR: 18 (09-25-22 @ 08:28) (16 - 18)  SpO2: 94% (09-25-22 @ 08:28) (91% - 95%)      PHYSICAL EXAM:   Constitutional: ill appearing , no acute distress, on nasal cannula   HEAD/EYES: anicteric, no conjunctival injection  ENT:  neck is slightly stiff but not rigid, +NGT   Cardiovascular:   normal S1, S2, no murmur, + edema, +PPM left chest wall   Respiratory:  scattered rhonchi throughout, no wheezes, no rales, no accessory muscle use  GI:  soft, non-tender, normal bowel sounds  :  +gardiner  Musculoskeletal:  no synovitis  Neurologic: awake but not alert, not responding to questions or commands   Skin:  no rash, no erythema, no phlebitis  Heme/Onc: no lymphadenopathy   Psychiatric:  awake not alert lethargic       LABS:  CAPILLARY BLOOD GLUCOSE      POCT Blood Glucose.: 168 mg/dL (25 Sep 2022 05:54)  POCT Blood Glucose.: 130 mg/dL (24 Sep 2022 23:49)  POCT Blood Glucose.: 207 mg/dL (24 Sep 2022 21:06)  POCT Blood Glucose.: 201 mg/dL (24 Sep 2022 17:17)  POCT Blood Glucose.: 179 mg/dL (24 Sep 2022 11:04)                         10.8   20.70 )-----------( 435      ( 25 Sep 2022 06:55 )             34.0     09-25    142  |  105  |  97<H>  ----------------------------<  185<H>  4.4   |  27  |  3.69<H>    Ca    10.5<H>      25 Sep 2022 06:55  Phos  3.9     09-24  Mg     2.3     09-24    TPro  7.8  /  Alb  2.2<L>  /  TBili  0.5  /  DBili  x   /  AST  22  /  ALT  48  /  AlkPhos  83  09-24    CARDIAC MARKERS ( 23 Sep 2022 14:45 )  x     / x     / 50 U/L / x     / x            LIVER FUNCTIONS - ( 24 Sep 2022 07:55 )  Alb: 2.2 g/dL / Pro: 7.8 gm/dL / ALK PHOS: 83 U/L / ALT: 48 U/L / AST: 22 U/L / GGT: x                                     ASSESSMENT:  80 yo M, PMH dementia, htn, TEVAR, PPM for syncopal bradycardia, ? seizures? and CKD (baseline Cr appears to be ~1.8?), on Eliquis (unclear why), BIBEMS to ED after being found unresponsive at home. On EMS arrival pt obtunded and not breathing. Pt intubated in the field. On arrival in ED, pt found to be Covid+. CTH with no acute pathology. CT chest with diffuse b/l GGO. ICU consulted and admitted for further management.   He has been weak and not eating since discharge from [facility] for unclear reasons.  He was normal with moving all his extremities and talking and moving with no issues just weak and had no appetite Pt intubated in the field. On arrival in ED, pt found to be Covid+.   unresponsive except to deep stimuli, unclear cause,   Uremia vs  hypernatremia vs septic vs non convulsive seizure. Patient started on Keppra , EEG done no active seizures. Treated for Hypernatremia and has been started on HD 9/13.  Possible uremic encephalopathy, necessitating HD initiation. HD cath removed 9/19  for line free days.  Mental status not at baseline post HD support.  Nephrology Dr. Sotelo. Patient extubated on 9/17 remains on nasal canula O2. Patient remains  COVID + with superimposed PNA - continue with s/p decadron. No remdesivir 2/2 renal function.  On minimal oxygen requirements at this time.  Given persistent poor mental status - CT head on admission with no acute findings obtained repeat CT head, for encephalopathy, possibly related to COVID.  CT  head with no acute intracranial hemorrhage or acute territorial infarct. Unable to have MRI as patient with PPM.  Patient remains with NGT feeding as mental status remains poor. Speech and swallow evaluation ordered.  Downgraded to medicine service 9/20/22, Now with fever and concern for aspiration pneumonia.       Plan:     fever in the setting of persistent upward trending leukocytosis-  last dose of steroids over 72 hours ago. Blood cultures NGTD (last drawn 9/23)   Repeat Blood cultures x 2, urine culture, legionella urine, sputum culture  ID recommendations reviewed from 9/23-  to start vancomycin and meropenem in the event of clinical decompensation   Vancomycin 1 GM IV x 1 now, start meropenem 500mg Q8hrs x 7 days.   Concern for asp. pna with NGT in place- Urgent Portable CXR ordered  Oxygen demand unclear- documented 2L NC yesterday now on 4L nasal cannula  vitals q4hrs x 24 hours  antipyretics as indicated for fever  ID following    CKD stage 5 - s/p dialysis with improved cr and bun but is now trending up   Renal following       - D/w Dr. Colbert aware and agree with the plan  - Will continue to follow up    Time spent on encounter 40 minutes. Hospitalist Medicine NP    CC: Called by RN to evaluate pt for temperature of 102.5.     80yo M, PMH dementia, htn, TAVAR, PPM for syncopal bradycardia, ? seizures? and CKD (baseline Cr appears to be ~1.8?), on Eliquis (unclear why), BIBEMS to ED after being found unresponsive at home. On EMS arrival pt obtunded and not breathing. Pt intubated in the field. On arrival in ED, pt found to be Covid+. CTH with no acute pathology. CT chest with diffuse b/l GGO. ICU consulted for further management.    He has been weak and not eating since discharge from [place] for unclear reasons.  He was normal with moving all his extremities and talking and moving with no issues just weak and had no appetite  (11 Sep 2022 21:45) Admitted for Encephalopathy, acute respiratory failure.       REVIEW OF SYSTEMS: unable to obtain due to patient's baseline mental status      VITALS:  T(C): 39.2 (09-25-22 @ 08:28), Max: 39.2 (09-25-22 @ 08:28)  HR: 119 (09-25-22 @ 08:28) (98 - 119)  BP: 100/68 (09-25-22 @ 08:28) (100/68 - 133/90)  RR: 18 (09-25-22 @ 08:28) (16 - 18)  SpO2: 94% (09-25-22 @ 08:28) (91% - 95%)      PHYSICAL EXAM:   Constitutional: ill appearing , no acute distress, on nasal cannula   HEAD/EYES: anicteric, no conjunctival injection  ENT:  neck is slightly stiff but not rigid, +NGT   Cardiovascular:   normal S1, S2, no murmur, + edema, +PPM left chest wall   Respiratory:  scattered rhonchi throughout, no wheezes, no rales, no accessory muscle use  GI:  soft, non-tender, normal bowel sounds  :  +gardiner  Musculoskeletal:  no synovitis  Neurologic: awake but not alert, not responding to questions or commands   Skin:  no rash, no erythema, no phlebitis  Heme/Onc: no lymphadenopathy   Psychiatric:  awake not alert lethargic       LABS:  CAPILLARY BLOOD GLUCOSE      POCT Blood Glucose.: 168 mg/dL (25 Sep 2022 05:54)  POCT Blood Glucose.: 130 mg/dL (24 Sep 2022 23:49)  POCT Blood Glucose.: 207 mg/dL (24 Sep 2022 21:06)  POCT Blood Glucose.: 201 mg/dL (24 Sep 2022 17:17)  POCT Blood Glucose.: 179 mg/dL (24 Sep 2022 11:04)                         10.8   20.70 )-----------( 435      ( 25 Sep 2022 06:55 )             34.0     09-25    142  |  105  |  97<H>  ----------------------------<  185<H>  4.4   |  27  |  3.69<H>    Ca    10.5<H>      25 Sep 2022 06:55  Phos  3.9     09-24  Mg     2.3     09-24    TPro  7.8  /  Alb  2.2<L>  /  TBili  0.5  /  DBili  x   /  AST  22  /  ALT  48  /  AlkPhos  83  09-24    CARDIAC MARKERS ( 23 Sep 2022 14:45 )  x     / x     / 50 U/L / x     / x            LIVER FUNCTIONS - ( 24 Sep 2022 07:55 )  Alb: 2.2 g/dL / Pro: 7.8 gm/dL / ALK PHOS: 83 U/L / ALT: 48 U/L / AST: 22 U/L / GGT: x                                     ASSESSMENT:  82 yo M, PMH dementia, htn, TEVAR, PPM for syncopal bradycardia, ? seizures? and CKD (baseline Cr appears to be ~1.8?), on Eliquis (unclear why), BIBEMS to ED after being found unresponsive at home. On EMS arrival pt obtunded and not breathing. Pt intubated in the field. On arrival in ED, pt found to be Covid+. CTH with no acute pathology. CT chest with diffuse b/l GGO. ICU consulted and admitted for further management.   He has been weak and not eating since discharge from [facility] for unclear reasons.  He was normal with moving all his extremities and talking and moving with no issues just weak and had no appetite Pt intubated in the field. On arrival in ED, pt found to be Covid+.   unresponsive except to deep stimuli, unclear cause,   Uremia vs  hypernatremia vs septic vs non convulsive seizure. Patient started on Keppra , EEG done no active seizures. Treated for Hypernatremia and has been started on HD 9/13.  Possible uremic encephalopathy, necessitating HD initiation. HD cath removed 9/19  for line free days.  Mental status not at baseline post HD support.  Nephrology Dr. Sotelo. Patient extubated on 9/17 remains on nasal canula O2. Patient remains  COVID + with superimposed PNA - continue with s/p decadron. No remdesivir 2/2 renal function.  On minimal oxygen requirements at this time.  Given persistent poor mental status - CT head on admission with no acute findings obtained repeat CT head, for encephalopathy, possibly related to COVID.  CT  head with no acute intracranial hemorrhage or acute territorial infarct. Unable to have MRI as patient with PPM.  Patient remains with NGT feeding as mental status remains poor. Speech and swallow evaluation ordered.  Downgraded to medicine service 9/20/22, Now with fever and concern for aspiration pneumonia.       Plan:     fever in the setting of persistent upward trending leukocytosis-  last dose of steroids over 72 hours ago. Blood cultures NGTD (last drawn 9/23)   Repeat Blood cultures x 2, urine culture, legionella urine, sputum culture  ID recommendations reviewed from 9/23-  to start vancomycin and meropenem in the event of clinical decompensation   Vancomycin 1 GM IV x 1 now, start meropenem 500mg Q8hrs x 7 days.   Concern for asp. pna with NGT in place- Urgent Portable CXR ordered  Oxygen demand unclear- documented 2L NC yesterday now on 4L nasal cannula  vitals q4hrs x 24 hours  antipyretics as indicated for fever  ID following    CKD stage 5 - s/p dialysis with improved cr and bun but is now trending up   Renal following       - D/w Dr. Colbert aware and agree with the plan  - Will continue to follow up    Time spent on encounter 40 minutes.          1036hrs- NP Addendum    Patient BP now 93/65, , temp 101.4 post interventions above. Concern for sepsis of unknown etiology.   Discussed with Renal MD Sotelo- Ok for fluid resuscitation from renal standpoint  Midodrine 10mg via NGT x 1 now  NS 1900ml bolus over 3 hrs ordered.    Monitor vital signs closely.     GOC: full code       - D/w Dr. Colbert aware and agree with the plan  - Will continue to follow up

## 2022-09-25 NOTE — PROGRESS NOTE ADULT - ASSESSMENT
80 yo man with persistent encephalopathy.  EEG suggestive of a metabolic encephalopathy (uremia, hypernatremia).   There may be a component of COVID-related encephalopathy (which is worse in elderly with cerebrovascular disease and chronic renal failure).    Diffuse rigidity on exam, uncertain etiology.   Elevated CSF protein, nonspecific.    Recommend:  1. MRI brain if PPM is compatible  2. Follow up autoimmune encephalopathy panel  3. Diffuse rigidity: check CPK, avoid antipsychotic agents  Covering for today Dr. Rogers covering going forward

## 2022-09-25 NOTE — PROGRESS NOTE ADULT - SUBJECTIVE AND OBJECTIVE BOX
Patient is a 81y old  Male who presents with a chief complaint of Encephalopathy, acute respiratory failure (21 Sep 2022 14:40)      INTERVAL HPI/OVERNIGHT EVENTS:  no overnight events. febrile today       MEDICATIONS  (STANDING):  aspirin  chewable 81 milliGRAM(s) Enteral Tube daily  chlorhexidine 2% Cloths 1 Application(s) Topical <User Schedule>  glucagon  Injectable 1 milliGRAM(s) IntraMuscular once  heparin   Injectable 5000 Unit(s) SubCutaneous every 12 hours  insulin lispro (ADMELOG) corrective regimen sliding scale   SubCutaneous every 6 hours  levETIRAcetam  Solution 500 milliGRAM(s) Oral daily  pantoprazole   Suspension 40 milliGRAM(s) Enteral Tube daily    MEDICATIONS  (PRN):  acetaminophen     Tablet .. 650 milliGRAM(s) Oral every 6 hours PRN Temp greater or equal to 38C (100.4F), Mild Pain (1 - 3)      Allergies    No Known Allergies    Intolerances        Vital Signs Last 24 Hrs  T(C): 36.3 (25 Sep 2022 17:00), Max: 39.2 (25 Sep 2022 08:28)  T(F): 97.3 (25 Sep 2022 17:00), Max: 102.5 (25 Sep 2022 08:28)  HR: 59 (25 Sep 2022 17:00) (59 - 119)  BP: 128/84 (25 Sep 2022 17:00) (93/65 - 133/90)  BP(mean): --  RR: 18 (25 Sep 2022 17:00) (16 - 18)  SpO2: 96% (25 Sep 2022 17:00) (94% - 99%)    Parameters below as of 25 Sep 2022 15:00  Patient On (Oxygen Delivery Method): nasal cannula  O2 Flow (L/min): 4      Parameters below as of 23 Sep 2022 09:00  Patient On (Oxygen Delivery Method): nasal cannula  O2 Flow (L/min): 2          PHYSICAL EXAM:  GENERAL: NAD  HEAD:  Atraumatic, Normocephalic  EYES: EOMI, PERRLA, conjunctiva and sclera clear  ENMT: No tonsillar erythema, exudates, or enlargement;   NECK: Supple, Normal thyroid  NERVOUS SYSTEM: aaox0, rigid, no obvious focal deficits, encephalopathic    CHEST/LUNG: CTABL; No rales, rhonchi, wheezing, or rubs  HEART: Regular rate and rhythm; No murmurs, rubs, or gallops  ABDOMEN: Soft, Nontender, Nondistended; Bowel sounds present  EXTREMITIES:  2+ Peripheral Pulses, No clubbing, cyanosis, or edema  LYMPH: No lymphadenopathy noted  SKIN: No rashes or lesions    LABS:                                                         10.8   20.70 )-----------( 435      ( 25 Sep 2022 06:55 )             34.0         142  |  105  |  97<H>  ----------------------------<  185<H>  4.4   |  27  |  3.69<H>    Ca    10.5<H>      25 Sep 2022 06:55  Phos  3.9       Mg     2.3         TPro  7.8  /  Alb  2.2<L>  /  TBili  0.5  /  DBili  x   /  AST  22  /  ALT  48  /  AlkPhos  83        Urinalysis Basic - ( 25 Sep 2022 11:00 )    Color: Yellow / Appearance: very cloudy / S.010 / pH: x  Gluc: x / Ketone: Negative  / Bili: Negative / Urobili: Negative mg/dL   Blood: x / Protein: 100 mg/dL / Nitrite: Negative   Leuk Esterase: Moderate / RBC: 0-2 /HPF / WBC 6-10   Sq Epi: x / Non Sq Epi: Occasional / Bacteria: Many                      CAPILLARY BLOOD GLUCOSE      RADIOLOGY & ADDITIONAL TESTS:    Imaging Personally Reviewed:  [ ] YES  [ ] NO    Consultant(s) Notes Reviewed:  [x ] YES  [ ] NO    Care Discussed with Consultants/Other Providers [ ] YES  [ ] NO

## 2022-09-25 NOTE — PROGRESS NOTE ADULT - SUBJECTIVE AND OBJECTIVE BOX
[facility] NEPHROLOGY SERVICES, [facility]  NEPHROLOGY AND HYPERTENSION  [address]  [address]  [place] [address]  [phone]    MD TESSY ESPINOSA, MD ADAN SEQUEIRA, MD POLI CRUZ, MD MAKENZIE RACHEL, MD JESSICA CLANCY, MD MAGALIE MAYFIELD MD          Patient events noted    MEDICATIONS  (STANDING):  aspirin  chewable 81 milliGRAM(s) Enteral Tube daily  chlorhexidine 2% Cloths 1 Application(s) Topical <User Schedule>  glucagon  Injectable 1 milliGRAM(s) IntraMuscular once  heparin   Injectable 5000 Unit(s) SubCutaneous every 12 hours  insulin lispro (ADMELOG) corrective regimen sliding scale   SubCutaneous every 6 hours  levETIRAcetam  Solution 500 milliGRAM(s) Oral daily  meropenem  IVPB 500 milliGRAM(s) IV Intermittent every 12 hours  pantoprazole   Suspension 40 milliGRAM(s) Enteral Tube daily  sodium chloride 0.45%. 1000 milliLiter(s) (100 mL/Hr) IV Continuous <Continuous>  vancomycin  IVPB 500 milliGRAM(s) IV Intermittent every 24 hours    MEDICATIONS  (PRN):  acetaminophen     Tablet .. 650 milliGRAM(s) Oral every 6 hours PRN Temp greater or equal to 38C (100.4F), Mild Pain (1 - 3)      09-24-22 @ 07:01  -  09-25-22 @ 07:00  --------------------------------------------------------  IN: 480 mL / OUT: 760 mL / NET: -280 mL    09-25-22 @ 07:01  -  09-26-22 @ 00:21  --------------------------------------------------------  IN: 0 mL / OUT: 200 mL / NET: -200 mL      PHYSICAL EXAM:      T(C): 36.3 (09-25-22 @ 23:45), Max: 39.2 (09-25-22 @ 08:28)  HR: 88 (09-25-22 @ 23:45) (59 - 119)  BP: 154/96 (09-25-22 @ 23:45) (93/65 - 154/96)  RR: 20 (09-25-22 @ 23:45) (16 - 20)  SpO2: 97% (09-25-22 @ 23:45) (94% - 99%)  Wt(kg): --  Lungs clear  Heart S1S2  Abd soft NT ND  Extremities:   tr edema                                    10.8   20.70 )-----------( 435      ( 25 Sep 2022 06:55 )             34.0     09-25    142  |  105  |  97<H>  ----------------------------<  185<H>  4.4   |  27  |  3.69<H>    Ca    10.5<H>      25 Sep 2022 06:55  Phos  3.9     09-24  Mg     2.3     09-24    TPro  7.8  /  Alb  2.2<L>  /  TBili  0.5  /  DBili  x   /  AST  22  /  ALT  48  /  AlkPhos  83  09-24      LIVER FUNCTIONS - ( 24 Sep 2022 07:55 )  Alb: 2.2 g/dL / Pro: 7.8 gm/dL / ALK PHOS: 83 U/L / ALT: 48 U/L / AST: 22 U/L / GGT: x           Creatinine Trend: 3.69<--, 3.49<--, 4.42<--, 5.05<--, 5.37<--, 5.22<--      Assessment   VASILE, CKD suspected  Off hemodialysis   Hypercalcemia trend with leukocytosis  Mental status change    Plan:  Resume IVF  Ionized Ca  Empiric IV abx  Will follow     Main Sotelo MD [facility] NEPHROLOGY SERVICES, [facility]  NEPHROLOGY AND HYPERTENSION  [address]  [address]  [place] [address]  [phone]    MD TESSY ESPINOSA, MD ADAN SEQUEIRA, MD POLI CRUZ, MD MAKENZIE RACHEL, MD JESSICA CLANCY, MD MAGALIE MAYFIELD MD          Patient events noted    MEDICATIONS  (STANDING):  aspirin  chewable 81 milliGRAM(s) Enteral Tube daily  chlorhexidine 2% Cloths 1 Application(s) Topical <User Schedule>  glucagon  Injectable 1 milliGRAM(s) IntraMuscular once  heparin   Injectable 5000 Unit(s) SubCutaneous every 12 hours  insulin lispro (ADMELOG) corrective regimen sliding scale   SubCutaneous every 6 hours  levETIRAcetam  Solution 500 milliGRAM(s) Oral daily  meropenem  IVPB 500 milliGRAM(s) IV Intermittent every 12 hours  pantoprazole   Suspension 40 milliGRAM(s) Enteral Tube daily  sodium chloride 0.45%. 1000 milliLiter(s) (100 mL/Hr) IV Continuous <Continuous>  vancomycin  IVPB 500 milliGRAM(s) IV Intermittent every 24 hours    MEDICATIONS  (PRN):  acetaminophen     Tablet .. 650 milliGRAM(s) Oral every 6 hours PRN Temp greater or equal to 38C (100.4F), Mild Pain (1 - 3)      09-24-22 @ 07:01  -  09-25-22 @ 07:00  --------------------------------------------------------  IN: 480 mL / OUT: 760 mL / NET: -280 mL    09-25-22 @ 07:01  -  09-26-22 @ 00:21  --------------------------------------------------------  IN: 0 mL / OUT: 200 mL / NET: -200 mL      PHYSICAL EXAM:      T(C): 36.3 (09-25-22 @ 23:45), Max: 39.2 (09-25-22 @ 08:28)  HR: 88 (09-25-22 @ 23:45) (59 - 119)  BP: 154/96 (09-25-22 @ 23:45) (93/65 - 154/96)  RR: 20 (09-25-22 @ 23:45) (16 - 20)  SpO2: 97% (09-25-22 @ 23:45) (94% - 99%)  Wt(kg): --  Lungs clear  Heart S1S2  Abd soft NT ND  Extremities:   tr edema                                    10.8   20.70 )-----------( 435      ( 25 Sep 2022 06:55 )             34.0     09-25    142  |  105  |  97<H>  ----------------------------<  185<H>  4.4   |  27  |  3.69<H>    Ca    10.5<H>      25 Sep 2022 06:55  Phos  3.9     09-24  Mg     2.3     09-24    TPro  7.8  /  Alb  2.2<L>  /  TBili  0.5  /  DBili  x   /  AST  22  /  ALT  48  /  AlkPhos  83  09-24      LIVER FUNCTIONS - ( 24 Sep 2022 07:55 )  Alb: 2.2 g/dL / Pro: 7.8 gm/dL / ALK PHOS: 83 U/L / ALT: 48 U/L / AST: 22 U/L / GGT: x           Creatinine Trend: 3.69<--, 3.49<--, 4.42<--, 5.05<--, 5.37<--, 5.22<--      Assessment   VASILE, CKD suspected  Off hemodialysis   Hypercalcemia trend with leukocytosis  Mental status change unclear etiology     Plan:  Resume IVF  Follow DINESH  Ionized Ca  Empiric IV abx  Will follow     Main Sotelo MD

## 2022-09-25 NOTE — PROGRESS NOTE ADULT - ASSESSMENT
80yo M, PMH dementia, htn, TEVAR, PPM for syncopal bradycardia, ? seizures?and CKD (baseline Cr appears to be ~1.8?), on Eliquis (unclear why), BIBEMS to ED after being found unresponsive at home. On EMS arrival pt obtunded and not breathing. Pt intubated in the field. On arrival in ED, pt found to be Covid+. CTH with no acute pathology. CT chest with diffuse b/l GGO. ICU consulted and admitted for further management.   He has been weak and not eating since discharge from [facility] for unclear reasons.  He was normal with moving all his extremities and talking and moving with no issues just weak and had no appetite Pt intubated in the field. On arrival in ED, pt found to be Covid+.   unresponsive except to deep stimuli, unclear cause,   Uremia vs  hypernatremia vs septic vs non convulsive seizure. Patient started on Keppra , EEG done no active seizures. Treated for Hypernatremia and has been started on HD 9/13.  Possible uremic encephalopathy, necessitating HD initiation. HD cath removed 9/19  for line free days.  Mental status not at baseline post HD support.  Nephrology Dr. Sotelo. Patient extubated on9/17 remains on nasal canula O2. Patient remains  COVID + with superimposed PNA - continue with s/p decadron. No remdesiver 2/2 renal function.   On minimal oxygen requirements at this time.  Given persistent poor mental status - CT head on admission with no acute findings obtained repeat CT head, for encephalopathy, possibly related to COVID.  CT -  No acute intracranial hemorrhage or acute territorial infarct. Unable to have MRI as patient with PPM.  Patient remains with NGT feeding as mental status remains poor. Speech and swallow evaluation ordered.  Patient seen and examined by ICU attending and stable for transfer to medicine service.     acute encephalopathy of unknown etiology  - still requiring NG tube   - on the ddx is infection vs cva ( cant get MRI), vs vs vasculitis vs anoxic brain injury ( pt intubated in the field)   - eeg x2  shows no seizure  - CT head x2 shows no cva   - s/p  LP to rule infectious etiology. will include nmda receptor ab, west nile IgM   -   9/22 eeg shows no seizure activity. will consider MRI if I can get transfer to [facility] due to pacemaker as brain stem cva still possible   9/23 normal cell count on LP but has elevated protein and abd glucose with is nonspecific. covid? encepahlitis. will check esr/crp and if negative can make the diagnosis of cva more likely. will try to get MRI of the brain at [facility] as pacemaker seems to be compatible   9/24  no change in mental status. doubt bacterial meningitis here. ESR and crp very elevated making cva less likely but still needs to me ruled out. WIll need to send to [facility] to get mri and MRA  to eval for any signs of vasculitis.. covid encephalopathy?    9/25 febrile today and slight drop in BP. started empiric abx for now. will try to descalate when possible.  CXR unemarkable by my read but still could be microaspiration.  will hold feed for now. cxs takes. Plan is if patient remains stable is to call the transfer center to have the patient shuttled  to [facility] for MRI due to pacemaker. Will need to be in  coordination of mri supervisor Moise Allen ( [facility] aware)  and EP doctor.  WIll try to get MRI and MRA. If possible, it would be best to have the read done whiles [facility] at [facility] because if he has any signs of vasculitis on imaging it may be best for him to be treated at a tertiary center.       leukocytosis   - as above       covid   - VSS  - s.p remd     hypernatremia   - resolved with d5     ckg stage 5   - cr stable     prognosis guarded

## 2022-09-25 NOTE — PROGRESS NOTE ADULT - SUBJECTIVE AND OBJECTIVE BOX
HPI:  Patient remains obtunded, unresponsive    EEG demonstrates generalized triphasic waves, suggesting metabolic encephalopathy    LP shows elevated protein, normal WBC, and PCR negative      MEDICATIONS:    acetaminophen     Tablet .. 650 milliGRAM(s) Oral every 6 hours PRN  aspirin  chewable 81 milliGRAM(s) Enteral Tube daily  chlorhexidine 2% Cloths 1 Application(s) Topical <User Schedule>  glucagon  Injectable 1 milliGRAM(s) IntraMuscular once  heparin   Injectable 5000 Unit(s) SubCutaneous every 12 hours  insulin lispro (ADMELOG) corrective regimen sliding scale   SubCutaneous every 6 hours  lactulose Syrup 20 Gram(s) Enteral Tube once  levETIRAcetam  Solution 500 milliGRAM(s) Oral daily  meropenem  IVPB 500 milliGRAM(s) IV Intermittent once  midodrine 10 milliGRAM(s) Oral once  pantoprazole   Suspension 40 milliGRAM(s) Enteral Tube daily  sodium chloride 0.9% Bolus 1900 milliLiter(s) IV Bolus once      LABS:                          10.8   20.70 )-----------( 435      ( 25 Sep 2022 06:55 )             34.0     09-25    142  |  105  |  97<H>  ----------------------------<  185<H>  4.4   |  27  |  3.69<H>    Ca    10.5<H>      25 Sep 2022 06:55  Phos  3.9     09-24  Mg     2.3     09-24    TPro  7.8  /  Alb  2.2<L>  /  TBili  0.5  /  DBili  x   /  AST  22  /  ALT  48  /  AlkPhos  83  09-24    CAPILLARY BLOOD GLUCOSE      POCT Blood Glucose.: 168 mg/dL (25 Sep 2022 05:54)  POCT Blood Glucose.: 130 mg/dL (24 Sep 2022 23:49)  POCT Blood Glucose.: 207 mg/dL (24 Sep 2022 21:06)  POCT Blood Glucose.: 201 mg/dL (24 Sep 2022 17:17)        I&O's Summary    24 Sep 2022 07:01  -  25 Sep 2022 07:00  --------------------------------------------------------  IN: 480 mL / OUT: 760 mL / NET: -280 mL      Vital Signs Last 24 Hrs  T(C): 38.6 (25 Sep 2022 10:19), Max: 39.2 (25 Sep 2022 08:28)  T(F): 101.4 (25 Sep 2022 10:19), Max: 102.5 (25 Sep 2022 08:28)  HR: 112 (25 Sep 2022 10:19) (98 - 119)  BP: 93/65 (25 Sep 2022 10:19) (93/65 - 133/90)  BP(mean): --  RR: 18 (25 Sep 2022 10:19) (16 - 18)  SpO2: 96% (25 Sep 2022 10:19) (91% - 96%)    Parameters below as of 25 Sep 2022 10:19  Patient On (Oxygen Delivery Method): nasal cannula  O2 Flow (L/min): 4        ROS: pertinent positives in HPI, all other ROS were reviewed and are negative       Neurological exam:  Neck: +rigidity in setting of diffuse rigidity in limbs  HF: Lethargic with eyes closed, opens eyes to loud auditory stimulation, nonverbal, does not follow any commands  CN: GEORGINA, gaze midline, no nystagmus,no NLFD, tongue midline  Motor/Sens: diffuse muscle rigidity, severe generalized weaknesss, moves extremities symmetrically and withdraws symmetrically from noxious stimuli  Reflexes: unable to assess due to rigidity/resistance  Coord:  unable to assess    NIHSS: 24    1A: Level of consciousness       0= Alert; keenly responsive       +1= Arouses to minor stimulation       +2= Requires repeated stimulation to arouse       +2= Movements to pain       +3= Postures or unresponsive    1B: Ask month and age       0= Both questions right       +1= 1 question right       +2= 0 questions right       +1= Dysarthric/intubated/trauma/language barrier       +2= Aphasic    1C: "Blink eyes" and "Squeeze Hands"       0= Performs both       +1= Performs 1 task       +2= Performs 0 tasks    2: Horizontal EOMs       0= Normal       +1= Partial gaze palsy: can be overcome       +1= Partial gaze palsy: corrects w/ oculocephalic reflex        +2= Forzed gaze palsy: cannot be overcome    3: Visual fields       0= No visual loss       +1= Partial hemianopia       +2= Complete hemianopia       +3= Patient is b/l blind       +3= B/l hemianopia    4: Facial palsy (use grimace if obtunded)       0= Normal symmetry       +1= Minor paralysis (flat NLF, smile asymmetry)       +2= Partial paralysis ( lower face)       +3= Unilateral complete paralysis (upper/lower face)       +3= B/l complete paralysis (upper/lower face)    5A: Left arm motor drift (count out loud and use fingers to show count)       0= No drift x 10 seconds       +1= Drift but doesn't hit bed       +2= Drift, hits bed       +2= Some effort against gravity       +3= No effort against gravity       +4= No movement       0= Amputation/joint fusion    5B: Right arm motor drift       0= No drift x 10 seconds       +1= Drift but doesn't hit bed       +2= Drift, hits bed       +2= Some effort against gravity       +3= No effort against gravity       +4= No movement       0= Amputation/joint fusion    6A: Left leg motor drift       0= No drift x 10 seconds       +1= Drift but doesn't hit bed       +2= Drift, hits bed       +2= Some effort against gravity       +3= No effort against gravity       +4= No movement       0= Amputation/joint fusion    6B: Right leg motor drift       0= No drift x 10 seconds       +1= Drift but doesn't hit bed       +2= Drift, hits bed       +2= Some effort against gravity       +3= No effort against gravity       +4= No movement       0= Amputation/joint fusion    7: Limb ataxia (FNF/heel-shin)       0= No ataxia       +1= Ataxia in 1 limb       +2= Ataxia in 2 limbs       0= Does not understand       0= Paralyzed       0= Amputation/joint fusion    8: Sensation       0= Normal, no sensory loss       +1= mild-moderate loss: less sharp/more dull       +1= mild-moderate loss: can sense being touched       +2= Complete loss: cannot sense being touched at all       +2= No response and quadriplegic       +2= Coma/unresponsive    9: Language/aphasia- describe the scene (on nelda); name the items; read the sentences (on nelda)       0= Normal, no aphasia       +1= mild-moderate aphaisa: some obvious changes without significant limitation       +2= Severe aphasia: fragmentary expression, inference needed, cannot identify materials       +3= Mute/global aphasia: no usable speech/auditory comprehension       +3= coma/unresponsive    10: Dysarthria- read the words       0= Normal       +1= mild-moderate dysarthria: slurring but can be understood       +2= Severe dysarthria: unintelligible slurring or out of proportion to dysphasia       +2= Mute/anarthric        0= Intubated/unable to test    11: Extinction/inattention       0= No abnormality       +1= visual/tactile/auditory/spatial/personal inattention       +1= Extinction to b/l simultaneous stimulation       +2= Profound koko-inattention (e.g. does not recognize own hand)       +2= extinction to > 1 modality          LABS:              A/P:

## 2022-09-26 DIAGNOSIS — G93.41 METABOLIC ENCEPHALOPATHY: ICD-10-CM

## 2022-09-26 DIAGNOSIS — U07.1 COVID-19: ICD-10-CM

## 2022-09-26 DIAGNOSIS — N18.5 CHRONIC KIDNEY DISEASE, STAGE 5: ICD-10-CM

## 2022-09-26 DIAGNOSIS — R13.10 DYSPHAGIA, UNSPECIFIED: ICD-10-CM

## 2022-09-26 DIAGNOSIS — E87.0 HYPEROSMOLALITY AND HYPERNATREMIA: ICD-10-CM

## 2022-09-26 LAB
ANA TITR SER: NEGATIVE — SIGNIFICANT CHANGE UP
ANION GAP SERPL CALC-SCNC: 7 MMOL/L — SIGNIFICANT CHANGE UP (ref 5–17)
ARBOVIRUS IGG PNL SER IF: SIGNIFICANT CHANGE UP
BUN SERPL-MCNC: 89 MG/DL — HIGH (ref 7–23)
CALCIUM SERPL-MCNC: 9.5 MG/DL — SIGNIFICANT CHANGE UP (ref 8.5–10.1)
CHLORIDE SERPL-SCNC: 111 MMOL/L — HIGH (ref 96–108)
CO2 SERPL-SCNC: 26 MMOL/L — SIGNIFICANT CHANGE UP (ref 22–31)
CREAT SERPL-MCNC: 3.23 MG/DL — HIGH (ref 0.5–1.3)
EGFR: 19 ML/MIN/1.73M2 — LOW
GLUCOSE BLDC GLUCOMTR-MCNC: 101 MG/DL — HIGH (ref 70–99)
GLUCOSE BLDC GLUCOMTR-MCNC: 155 MG/DL — HIGH (ref 70–99)
GLUCOSE BLDC GLUCOMTR-MCNC: 165 MG/DL — HIGH (ref 70–99)
GLUCOSE BLDC GLUCOMTR-MCNC: 80 MG/DL — SIGNIFICANT CHANGE UP (ref 70–99)
GLUCOSE BLDC GLUCOMTR-MCNC: 83 MG/DL — SIGNIFICANT CHANGE UP (ref 70–99)
GLUCOSE SERPL-MCNC: 101 MG/DL — HIGH (ref 70–99)
HCT VFR BLD CALC: 29 % — LOW (ref 39–50)
HGB BLD-MCNC: 8.8 G/DL — LOW (ref 13–17)
LDH CSF L TO P-CCNC: 35 U/L — SIGNIFICANT CHANGE UP
LDH FLD-CCNC: 35 U/L — SIGNIFICANT CHANGE UP
MCHC RBC-ENTMCNC: 30.3 G/DL — LOW (ref 32–36)
MCHC RBC-ENTMCNC: 30.6 PG — SIGNIFICANT CHANGE UP (ref 27–34)
MCV RBC AUTO: 100.7 FL — HIGH (ref 80–100)
NRBC # BLD: 0 /100 WBCS — SIGNIFICANT CHANGE UP (ref 0–0)
PLATELET # BLD AUTO: 317 K/UL — SIGNIFICANT CHANGE UP (ref 150–400)
POTASSIUM SERPL-MCNC: 4.4 MMOL/L — SIGNIFICANT CHANGE UP (ref 3.5–5.3)
POTASSIUM SERPL-SCNC: 4.4 MMOL/L — SIGNIFICANT CHANGE UP (ref 3.5–5.3)
RBC # BLD: 2.88 M/UL — LOW (ref 4.2–5.8)
RBC # FLD: 13.9 % — SIGNIFICANT CHANGE UP (ref 10.3–14.5)
SODIUM SERPL-SCNC: 144 MMOL/L — SIGNIFICANT CHANGE UP (ref 135–145)
VDRL CSF-TITR: SIGNIFICANT CHANGE UP
WBC # BLD: 15.86 K/UL — HIGH (ref 3.8–10.5)
WBC # FLD AUTO: 15.86 K/UL — HIGH (ref 3.8–10.5)
WNV IGG CSF IA-ACNC: NEGATIVE — SIGNIFICANT CHANGE UP
WNV IGG TITR FLD: POSITIVE
WNV IGM CSF IA-ACNC: NEGATIVE — SIGNIFICANT CHANGE UP
WNV IGM SPEC QL: NEGATIVE — SIGNIFICANT CHANGE UP

## 2022-09-26 PROCEDURE — 99232 SBSQ HOSP IP/OBS MODERATE 35: CPT | Mod: FS

## 2022-09-26 PROCEDURE — 99233 SBSQ HOSP IP/OBS HIGH 50: CPT

## 2022-09-26 RX ADMIN — Medication 2: at 23:48

## 2022-09-26 RX ADMIN — HEPARIN SODIUM 5000 UNIT(S): 5000 INJECTION INTRAVENOUS; SUBCUTANEOUS at 06:13

## 2022-09-26 RX ADMIN — Medication 81 MILLIGRAM(S): at 12:25

## 2022-09-26 RX ADMIN — PANTOPRAZOLE SODIUM 40 MILLIGRAM(S): 20 TABLET, DELAYED RELEASE ORAL at 12:26

## 2022-09-26 RX ADMIN — Medication 2: at 18:22

## 2022-09-26 RX ADMIN — MEROPENEM 100 MILLIGRAM(S): 1 INJECTION INTRAVENOUS at 18:13

## 2022-09-26 RX ADMIN — Medication 100 MILLIGRAM(S): at 00:27

## 2022-09-26 RX ADMIN — CHLORHEXIDINE GLUCONATE 1 APPLICATION(S): 213 SOLUTION TOPICAL at 06:13

## 2022-09-26 RX ADMIN — SODIUM CHLORIDE 100 MILLILITER(S): 9 INJECTION, SOLUTION INTRAVENOUS at 06:15

## 2022-09-26 RX ADMIN — LEVETIRACETAM 500 MILLIGRAM(S): 250 TABLET, FILM COATED ORAL at 12:28

## 2022-09-26 RX ADMIN — HEPARIN SODIUM 5000 UNIT(S): 5000 INJECTION INTRAVENOUS; SUBCUTANEOUS at 18:14

## 2022-09-26 RX ADMIN — MEROPENEM 100 MILLIGRAM(S): 1 INJECTION INTRAVENOUS at 06:14

## 2022-09-26 NOTE — PROGRESS NOTE ADULT - SUBJECTIVE AND OBJECTIVE BOX
SARAH JIMÉNEZ  MRN-[id number]    Follow Up:  Fever, r/o WNV, covid 19, leukocytosis     Interval History: the pt was seen and examined earlier today, no distress, opens his eyes, does not answer any of my questions, and does not follow commands. The pt had fevers yesterday, none today, WBC better 15.86.    PAST MEDICAL & SURGICAL HISTORY:  Hypertension, unspecified type      Descending aortic aneurysm      Syncope, unspecified syncope type      Anxiety      Renal insufficiency      Bradycardia      H/O cardiac pacemaker  medtronic          ROS:    [x ] Unobtainable because: poor mental status   [ ] All other systems negative    Constitutional: no fever, no chills  Head: no trauma  Eyes: no vision changes, no eye pain  ENT:  no sore throat, no rhinorrhea  Cardiovascular:  no chest pain, no palpitation  Respiratory:  no SOB, no cough  GI:  no abd pain, no vomiting, no diarrhea  urinary: no dysuria, no hematuria, no flank pain  musculoskeletal:  no joint pain, no joint swelling  skin:  no rash  neurology:  no headache, no seizure, no change in mental status  psych: no anxiety, no depression         Allergies  No Known Allergies        ANTIMICROBIALS:  meropenem  IVPB 500 every 12 hours      OTHER MEDS:  acetaminophen     Tablet .. 650 milliGRAM(s) Oral every 6 hours PRN  aspirin  chewable 81 milliGRAM(s) Enteral Tube daily  chlorhexidine 2% Cloths 1 Application(s) Topical <User Schedule>  glucagon  Injectable 1 milliGRAM(s) IntraMuscular once  heparin   Injectable 5000 Unit(s) SubCutaneous every 12 hours  insulin lispro (ADMELOG) corrective regimen sliding scale   SubCutaneous every 6 hours  levETIRAcetam  Solution 500 milliGRAM(s) Oral daily  pantoprazole   Suspension 40 milliGRAM(s) Enteral Tube daily      Vital Signs Last 24 Hrs  T(C): 37.2 (26 Sep 2022 12:00), Max: 37.2 (26 Sep 2022 12:00)  T(F): 98.9 (26 Sep 2022 12:00), Max: 98.9 (26 Sep 2022 12:00)  HR: 85 (26 Sep 2022 12:00) (59 - 88)  BP: 152/85 (26 Sep 2022 12:00) (128/84 - 154/96)  BP(mean): --  RR: 17 (26 Sep 2022 12:00) (17 - 20)  SpO2: 94% (26 Sep 2022 12:00) (94% - 97%)    Parameters below as of 26 Sep 2022 12:00  Patient On (Oxygen Delivery Method): room air        Physical Exam:  Constitutional: ill appearing , no acute distress, on nasal cannula   HEAD/EYES: anicteric, no conjunctival injection  ENT:  neck is not rigid, +NGT   Cardiovascular:   normal S1, S2, no murmur, mild edema, +PPM left chest wall   Respiratory:  diminished BS bilaterally, no wheezes, no rales  GI:  soft, non-tender, normal bowel sounds  :  condom catheter   Musculoskeletal:  no synovitis  Neurologic: awake but not alert, not responding to questions or commands   Skin:  no rash, no erythema, no phlebitis  Heme/Onc: no lymphadenopathy   Psychiatric:  awake, unable     WBC Count: 15.86 K/uL ( @ 08:35)  WBC Count: 20.70 K/uL ( @ 06:55)  WBC Count: 17.61 K/uL ( @ 07:55)  WBC Count: 21.95 K/uL ( @ 06:00)  WBC Count: 20.22 K/uL ( @ 07:33)  WBC Count: 30.46 K/uL ( @ 08:19)  WBC Count: 15.71 K/uL ( @ 03:55)                            8.8    15.86 )-----------( 317      ( 26 Sep 2022 08:35 )             29.0           144  |  111<H>  |  89<H>  ----------------------------<  101<H>  4.4   |  26  |  3.23<H>    Ca    9.5      26 Sep 2022 08:35        Urinalysis Basic - ( 25 Sep 2022 11:00 )    Color: Yellow / Appearance: very cloudy / S.010 / pH: x  Gluc: x / Ketone: Negative  / Bili: Negative / Urobili: Negative mg/dL   Blood: x / Protein: 100 mg/dL / Nitrite: Negative   Leuk Esterase: Moderate / RBC: 0-2 /HPF / WBC 6-10   Sq Epi: x / Non Sq Epi: Occasional / Bacteria: Many        Creatinine Trend: 3.23<--, 3.69<--, 3.49<--, 4.42<--, 5.05<--, 5.37<--      MICROBIOLOGY:  v  .Blood Blood-Peripheral  22   No growth to date.  --  --      .Blood Blood-Peripheral  22   No growth to date.  --  --      .CSF CSF  22   Culture is being performed.  --    No polymorphonuclear cells seen  No organisms seen  by cytocentrifuge      .Blood Blood-Peripheral  22   No growth to date.  --  --      .Blood Blood-Peripheral  22   No growth to date.  --  --      .Sputum Sputum trap  22   Moderate Klebsiella pneumoniae  Moderate Enterobacter cloacae complex  Normal Respiratory Victoria present  --  Klebsiella pneumoniae  Enterobacter cloacae complex      Clean Catch Clean Catch (Midstream)  22   No growth  --  --      .Blood Blood-Peripheral  22   No Growth Final  --  --    HSV 1/2 PCR: Gerald ( @ 15:14)    C-Reactive Protein, Serum: 228 ()    RADIOLOGY:

## 2022-09-26 NOTE — PROGRESS NOTE ADULT - PROBLEM SELECTOR PLAN 1
- still requiring NG tube   - on the ddx is infection vs cva ( cant get MRI), vs vs vasculitis vs anoxic brain injury ( pt intubated in the field)   - eeg x2  shows no seizure  - CT head x2 shows no cva   - s/p  LP to rule infectious etiology. will include nmda receptor ab, west nile IgM     9/22 eeg shows no seizure activity. will consider MRI if I can get transfer to [facility] due to pacemaker as brain stem cva still possible   9/23 normal cell count on LP but has elevated protein and abd glucose with is nonspecific. covid? encepahlitis. will check esr/crp and if negative can make the diagnosis of cva more likely. will try to get MRI of the brain at [facility] as pacemaker seems to be compatible   9/24  no change in mental status. doubt bacterial meningitis here. ESR and crp very elevated making cva less likely but still needs to me ruled out. WIll need to send to [facility] to get mri and MRA  to [facility] for any signs of vasculitis.. covid encephalopathy?    9/25 febrile today and slight drop in BP. started empiric abx for now. will try to descalate when possible.  CXR unemarkable by my read but still could be microaspiration.  will hold feed for now. cxs takes. Plan is if patient remains stable is to call the transfer center to have the patient shuttled  to [facility] for MRI due to pacemaker. Will need to be in  coordination of mri supervisor Moise Allen ( [facility] aware)  and EP doctor.  WIll try to get MRI and MRA. If possible, it would be best to have the read done whiles [facility] at [facility] because if he has any signs of vasculitis on imaging it may be best for him to be treated at a tertiary center.     9/26 - afebrile x 24 hours, blood and urine cultures are pending, check MRSA/MSSA PCR, Vanco/Zosyn given.  Will dose Vanco by level for now CrCl  - plan to d/c abx if cultures are negative   - high-suspicion for west nile virus vs. Vasculitis  -  west nile serology pending   - discussed case with Dr. Loyola at [facility] to coordinate MRI/MRA head

## 2022-09-26 NOTE — PHARMACOTHERAPY INTERVENTION NOTE - COMMENTS
Per policy, dexamethasone 6 mg IVP daily transitioned to oral formulation since the patient is tolerating feeding and/or other medications enterally. 
Recommended to discontinue standing vancomycin order and to dose by level for CrCl of 13.
Vancomycin trough ordered for 9/26 at 23:00, approx 23 hours from previous dose. 
Per policy, levetiracetam 500 mg IVPB twice daily transitioned to oral formulation since the patient is tolerating feeding and/or other medications enterally. 
Per policy, pantoprazole 40 mg IVP daily transitioned to oral formulation since the patient is tolerating feeding and/or other medications enterally. 
Patient currently septic on HD; however, HD schedule not clarified by the nephrologist. Spoke to Cecy; would like the patient to have a single dose for now given sepsis and ID will follow up tomorrow. Also, recommended to give future doses post HD if therapy to continue

## 2022-09-26 NOTE — PROGRESS NOTE ADULT - ASSESSMENT
82yo M, PMH dementia, htn, TEVAR, PPM for syncopal bradycardia, ? seizures?and CKD (baseline Cr appears to be ~1.8?), on Eliquis (unclear why), BIBEMS to ED after being found unresponsive at home. On EMS arrival pt obtunded and not breathing.

## 2022-09-26 NOTE — PROGRESS NOTE ADULT - SUBJECTIVE AND OBJECTIVE BOX
[facility] NEPHROLOGY SERVICES, [facility]  NEPHROLOGY AND HYPERTENSION  [address]  [address]  [place] [address]  [phone]    MD TESSY ESPINOSA, MD POLI BENJAMIN, MD MAKENZIE RACHEL, MD JESSICA CLANCY, MD MAGALIE MAYFIELD MD          Patient events noted  no distress     MEDICATIONS  (STANDING):  aspirin  chewable 81 milliGRAM(s) Enteral Tube daily  chlorhexidine 2% Cloths 1 Application(s) Topical <User Schedule>  glucagon  Injectable 1 milliGRAM(s) IntraMuscular once  heparin   Injectable 5000 Unit(s) SubCutaneous every 12 hours  insulin lispro (ADMELOG) corrective regimen sliding scale   SubCutaneous every 6 hours  levETIRAcetam  Solution 500 milliGRAM(s) Oral daily  meropenem  IVPB 500 milliGRAM(s) IV Intermittent every 12 hours  pantoprazole   Suspension 40 milliGRAM(s) Enteral Tube daily    MEDICATIONS  (PRN):  acetaminophen     Tablet .. 650 milliGRAM(s) Oral every 6 hours PRN Temp greater or equal to 38C (100.4F), Mild Pain (1 - 3)      09-25-22 @ 07:01  -  09-26-22 @ 07:00  --------------------------------------------------------  IN: 1300 mL / OUT: 200 mL / NET: 1100 mL      PHYSICAL EXAM:      T(C): 36.7 (09-26-22 @ 16:46), Max: 37.2 (09-26-22 @ 12:00)  HR: 98 (09-26-22 @ 16:46) (85 - 98)  BP: 149/94 (09-26-22 @ 16:46) (135/91 - 154/96)  RR: 18 (09-26-22 @ 16:46) (17 - 20)  SpO2: 96% (09-26-22 @ 16:46) (94% - 97%)  Wt(kg): --  Lungs clear  Heart S1S2  Abd soft NT ND  Extremities:   tr edema                                    8.8    15.86 )-----------( 317      ( 26 Sep 2022 08:35 )             29.0     09-26    144  |  111<H>  |  89<H>  ----------------------------<  101<H>  4.4   |  26  |  3.23<H>    Ca    9.5      26 Sep 2022 08:35          Creatinine Trend: 3.23<--, 3.69<--, 3.49<--, 4.42<--, 5.05<--, 5.37<--    Calcium, Ionized in AM (09.22.22 @ 07:33)    Calcium, Ionized: 4.8: Performed At: RN [facility]      Anti-Nuclear Antibody in AM (09.25.22 @ 06:55)    Anti Nuclear Factor Titer: Negative: Antinuclear AB (DINESH), IFA Method    West Nile Virus IgG/IgM Antibody, Serum (09.24.22 @ 06:30)    West Nile Virus IgG: Positive: Positive for West Nile Virus IgG Antibody. Serological  cross-reactivity among flaviviruses (e.g. Pippa  encephalitis and dengue viruses) is common. Cross-  reactivity has been noted in specimens containing  rheumatoid factor and antibody to Cytomegalovirus (CMV)  and Hira-Barr Virus (EBV).    West Nile Virus IgM: Negative: No detectable West Nile Virus IgM Antibody. If a recent          Assessment   VASILE, CKD suspected  Off hemodialysis   Hypercalcemia trend with leukocytosis  Mental status change unclear etiology     Plan:  Resumed IVF  Ionized Ca  Empiric IV abx  Will follow     Main Sotelo MD

## 2022-09-26 NOTE — PROGRESS NOTE ADULT - ASSESSMENT
80yo M, PMH dementia, htn, TEVAR, PPM for syncopal bradycardia, ? sizures?and CKD (baseline Cr appears to be ~1.8?), on Eliquis (unclear why), BIBEMS to ED after being found unresponsive at home. On EMS arrival pt obtunded and not breathing. Pt intubated in the field. On arrival in ED, pt found to be Covid+. CTH with no acute pathology. CT chest with diffuse b/l GGO. Treated for Hypernatremia and has been started on HD 9/13.  Possible uremic encephalopathy, necessitating HD initiation. HD cath removed 9/19  for line free days.  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 Could he have suffered another CVA?   His wbc has increased drastically, blood cultures sent   at this juncture patient needs a lumbar puncture   can also rule out syphilis   needs MRI if his PPM is MRI compatible  Patient has a giddy CLS Edora pacemaker( asked wife to bring the card in but PPM was placed in [facility] facility by Dr. Davis Saldana)    4/22: found out from his EP that cardiac device is MRI compatible just needs to be set to MRI mode, LP today, may need to go to [facility] for MRI brain, will follow all LP results, wbc better today   4/23: no fever, on NC, WBC elevated 21.95, Cr a little better 4.42, BCs with no growth to date, CSF PCR negative, no HSV 1/2, off abx, pending MRI  4/26: fevers yesterday, no fevers today, on NC, WBC better 15.86, Cr 3.23, CSF culture with no growth, additional testing is pending, MRI is pending. The pt was started on Vancomycin IV and Meropenem IV over the weekend due to high temperatures, cultures are collected, will continue with abx for now while awaiting for new culture data.      Plan:  continue IV Vancomycin for now  obtain vanco level prior 3rd dose  vancomycin levels monitoring is required  continue IV Meropenem for now   awaiting for BCs and UC  awaiting CSF studies   s/p LP 9/22  follow CSF studies   cell count - 0  glucose 104  protein 111  routine gram stain and culture - no growth to date  CSF PCR panel - negative  fungal Culture - testing   AFB culture - testing, unable to perform smear  CSF crypt antigen - negative  Syphilis screen pending  WNV CSF Igg Igm pending   HIV negative   obtain MRI - pending   EEG - abnormal   vitamin B12 >2000, folate 6.6,   TSH and free T4 reviewed   trend wbc and creatinine  consider placement of fifi for hemodialysis per renal if not getting better       discussed with Dr. Randall  discussed with Dr. Ochoa

## 2022-09-26 NOTE — PHARMACOTHERAPY INTERVENTION NOTE - NSPHARMCOMMASP
ASP - Clarify indication
ASP - Dose optimization/Non-Renal dose adjustment
ASP - Lab/ test recommended

## 2022-09-26 NOTE — PROGRESS NOTE ADULT - SUBJECTIVE AND OBJECTIVE BOX
Patient is a 81y old  Male who presents with a chief complaint of Encephalopathy, acute respiratory failure (25 Sep 2022 19:21)    HPI:  82yo M, PMH dementia, htn, TEVAR, PPM for syncopal bradycardia, ? sizures?and CKD (baseline Cr appears to be ~1.8?), on Eliquis (unclear why), BIBEMS to ED after being found unresponsive at home. On EMS arrival pt obtunded and not breathing. Pt intubated in the field. On arrival in ED, pt found to be Covid+. CTH with no acute pathology. CT chest with diffuse b/l GGO. ICU consulted for further management.      He has been weak and not eating since discharge from [place] for unclear reasons.  He was normal with moving all his extremities and talking and moving with no issues just weak and had no apatite  (11 Sep 2022 21:45)    SUBJECTIVE & OBJECTIVE: Pt seen and examined at bedside.   PHYSICAL EXAM:  T(C): 37.2 (22 @ 12:00), Max: 37.2 (22 @ 12:00)  HR: 85 (22 @ 12:00) (59 - 88)  BP: 152/85 (22 @ 12:00) (104/73 - 154/96)  RR: 17 (22 @ 12:00) (17 - 20)  SpO2: 94% (22 @ 12:00) (94% - 99%) Daily     Daily I&O's Detail    25 Sep 2022 07:01  -  26 Sep 2022 07:00  --------------------------------------------------------  IN:    IV PiggyBack: 250 mL    IV PiggyBack: 50 mL    sodium chloride 0.45%: 1000 mL  Total IN: 1300 mL    OUT:    Voided (mL): 200 mL  Total OUT: 200 mL    Total NET: 1100 mL        GENERAL: thin and chronically ill appearing  HEAD:  Atraumatic, Normocephalic  EYES: EOMI, PERRLA, conjunctiva and sclera clear  ENMT: Moist mucous membranes  NECK: Supple, No JVD  NERVOUS SYSTEM:  Alert, confused,  Motor Strength 3/5 B/L upper and lower extremities; DTRs 2+ intact and symmetric  CHEST/LUNG: Clear to auscultation bilaterally; No rales, rhonchi, wheezing, or rubs  HEART: Regular rate and rhythm; No murmurs, rubs, or gallops  ABDOMEN: Soft, Nontender, Nondistended; Bowel sounds present  EXTREMITIES:  2+ Peripheral Pulses, No clubbing, cyanosis, or edema  LABS:                        8.8    15.86 )-----------( 317      ( 26 Sep 2022 08:35 )             29.0   Urinalysis Basic - ( 25 Sep 2022 11:00 )    Color: Yellow / Appearance: very cloudy / S.010 / pH: x  Gluc: x / Ketone: Negative  / Bili: Negative / Urobili: Negative mg/dL   Blood: x / Protein: 100 mg/dL / Nitrite: Negative   Leuk Esterase: Moderate / RBC: 0-2 /HPF / WBC 6-10   Sq Epi: x / Non Sq Epi: Occasional / Bacteria: Many    CAPILLARY BLOOD GLUCOSE      POCT Blood Glucose.: 83 mg/dL (26 Sep 2022 11:55)  POCT Blood Glucose.: 80 mg/dL (26 Sep 2022 08:00)  POCT Blood Glucose.: 101 mg/dL (26 Sep 2022 06:24)  POCT Blood Glucose.: 107 mg/dL (25 Sep 2022 23:47)  POCT Blood Glucose.: 166 mg/dL (25 Sep 2022 17:41)    RECENT CULTURES:   RADIOLOGY & ADDITIONAL TESTS:   Patient is a 81y old  Male who presents with a chief complaint of Encephalopathy, acute respiratory failure (25 Sep 2022 19:21)    HPI:  80yo M, PMH dementia, htn, TEVAR, PPM for syncopal bradycardia, ? sizures?and CKD (baseline Cr appears to be ~1.8?), on Eliquis (unclear why), BIBEMS to ED after being found unresponsive at home. On EMS arrival pt obtunded and not breathing. Pt intubated in the field. On arrival in ED, pt found to be Covid+. CTH with no acute pathology. CT chest with diffuse b/l GGO. ICU consulted for further management.      He has been weak and not eating since discharge from [place] for unclear reasons.  He was normal with moving all his extremities and talking and moving with no issues just weak and had no apatite  (11 Sep 2022 21:45)    SUBJECTIVE & OBJECTIVE: Pt seen and examined at bedside. Febrile overnight   PHYSICAL EXAM:  T(C): 37.2 (22 @ 12:00), Max: 37.2 (22 @ 12:00)  HR: 85 (22 @ 12:00) (59 - 88)  BP: 152/85 (22 @ 12:00) (104/73 - 154/96)  RR: 17 (22 @ 12:00) (17 - 20)  SpO2: 94% (22 @ 12:00) (94% - 99%) Daily     Daily I&O's Detail    25 Sep 2022 07:01  -  26 Sep 2022 07:00  --------------------------------------------------------  IN:    IV PiggyBack: 250 mL    IV PiggyBack: 50 mL    sodium chloride 0.45%: 1000 mL  Total IN: 1300 mL    OUT:    Voided (mL): 200 mL  Total OUT: 200 mL    Total NET: 1100 mL        GENERAL: thin and chronically ill appearing  HEAD:  Atraumatic, Normocephalic  EYES: EOMI, PERRLA, conjunctiva and sclera clear  ENMT: Moist mucous membranes, NGT placed, draining  NECK: Supple, No JVD  NERVOUS SYSTEM: encephalopathic,  Motor Strength not assessed  CHEST/LUNG: Clear to auscultation bilaterally; No rales, rhonchi, wheezing, or rubs  HEART: Regular rate and rhythm; No murmurs, rubs, or gallops  ABDOMEN: Soft, Nontender, Nondistended; Bowel sounds present  EXTREMITIES:  2+ Peripheral Pulses, No clubbing, cyanosis, or edema  LABS:                        8.8    15.86 )-----------( 317      ( 26 Sep 2022 08:35 )             29.0   Urinalysis Basic - ( 25 Sep 2022 11:00 )    Color: Yellow / Appearance: very cloudy / S.010 / pH: x  Gluc: x / Ketone: Negative  / Bili: Negative / Urobili: Negative mg/dL   Blood: x / Protein: 100 mg/dL / Nitrite: Negative   Leuk Esterase: Moderate / RBC: 0-2 /HPF / WBC 6-10   Sq Epi: x / Non Sq Epi: Occasional / Bacteria: Many    CAPILLARY BLOOD GLUCOSE      POCT Blood Glucose.: 83 mg/dL (26 Sep 2022 11:55)  POCT Blood Glucose.: 80 mg/dL (26 Sep 2022 08:00)  POCT Blood Glucose.: 101 mg/dL (26 Sep 2022 06:24)  POCT Blood Glucose.: 107 mg/dL (25 Sep 2022 23:47)  POCT Blood Glucose.: 166 mg/dL (25 Sep 2022 17:41)    RECENT CULTURES:   RADIOLOGY & ADDITIONAL TESTS:

## 2022-09-27 ENCOUNTER — EMERGENCY (EMERGENCY)
Facility: HOSPITAL | Age: 82
LOS: 1 days | Discharge: ANOTHER HOSPITAL | End: 2022-09-27
Attending: STUDENT IN AN ORGANIZED HEALTH CARE EDUCATION/TRAINING PROGRAM | Admitting: STUDENT IN AN ORGANIZED HEALTH CARE EDUCATION/TRAINING PROGRAM

## 2022-09-27 VITALS
SYSTOLIC BLOOD PRESSURE: 130 MMHG | TEMPERATURE: 98 F | OXYGEN SATURATION: 100 % | RESPIRATION RATE: 18 BRPM | HEART RATE: 87 BPM | DIASTOLIC BLOOD PRESSURE: 86 MMHG

## 2022-09-27 VITALS
OXYGEN SATURATION: 99 % | RESPIRATION RATE: 18 BRPM | TEMPERATURE: 99 F | SYSTOLIC BLOOD PRESSURE: 133 MMHG | DIASTOLIC BLOOD PRESSURE: 91 MMHG | HEART RATE: 104 BPM

## 2022-09-27 DIAGNOSIS — Z95.0 PRESENCE OF CARDIAC PACEMAKER: Chronic | ICD-10-CM

## 2022-09-27 LAB
-  AMIKACIN: SIGNIFICANT CHANGE UP
-  AZTREONAM: SIGNIFICANT CHANGE UP
-  CEFEPIME: SIGNIFICANT CHANGE UP
-  CEFTAZIDIME: SIGNIFICANT CHANGE UP
-  CIPROFLOXACIN: SIGNIFICANT CHANGE UP
-  GENTAMICIN: SIGNIFICANT CHANGE UP
-  IMIPENEM: SIGNIFICANT CHANGE UP
-  LEVOFLOXACIN: SIGNIFICANT CHANGE UP
-  MEROPENEM: SIGNIFICANT CHANGE UP
-  PIPERACILLIN/TAZOBACTAM: SIGNIFICANT CHANGE UP
-  TOBRAMYCIN: SIGNIFICANT CHANGE UP
CULTURE RESULTS: SIGNIFICANT CHANGE UP
GLUCOSE BLDC GLUCOMTR-MCNC: 139 MG/DL — HIGH (ref 70–99)
GLUCOSE BLDC GLUCOMTR-MCNC: 163 MG/DL — HIGH (ref 70–99)
GLUCOSE BLDC GLUCOMTR-MCNC: 176 MG/DL — HIGH (ref 70–99)
LEGIONELLA AG UR QL: NEGATIVE — SIGNIFICANT CHANGE UP
METHOD TYPE: SIGNIFICANT CHANGE UP
ORGANISM # SPEC MICROSCOPIC CNT: SIGNIFICANT CHANGE UP
ORGANISM # SPEC MICROSCOPIC CNT: SIGNIFICANT CHANGE UP
SPECIMEN SOURCE: SIGNIFICANT CHANGE UP
VANCOMYCIN TROUGH SERPL-MCNC: 14.5 UG/ML — SIGNIFICANT CHANGE UP (ref 10–20)

## 2022-09-27 PROCEDURE — 71045 X-RAY EXAM CHEST 1 VIEW: CPT | Mod: 26

## 2022-09-27 PROCEDURE — 70544 MR ANGIOGRAPHY HEAD W/O DYE: CPT | Mod: 26,59

## 2022-09-27 PROCEDURE — 70553 MRI BRAIN STEM W/O & W/DYE: CPT | Mod: 26,MD

## 2022-09-27 PROCEDURE — 99285 EMERGENCY DEPT VISIT HI MDM: CPT | Mod: GC

## 2022-09-27 PROCEDURE — 93288 INTERROG EVL PM/LDLS PM IP: CPT | Mod: 26

## 2022-09-27 PROCEDURE — 99233 SBSQ HOSP IP/OBS HIGH 50: CPT

## 2022-09-27 PROCEDURE — 70549 MR ANGIOGRAPH NECK W/O&W/DYE: CPT | Mod: 26

## 2022-09-27 PROCEDURE — 99232 SBSQ HOSP IP/OBS MODERATE 35: CPT | Mod: FS

## 2022-09-27 RX ADMIN — PANTOPRAZOLE SODIUM 40 MILLIGRAM(S): 20 TABLET, DELAYED RELEASE ORAL at 11:47

## 2022-09-27 RX ADMIN — Medication 81 MILLIGRAM(S): at 11:47

## 2022-09-27 RX ADMIN — MEROPENEM 100 MILLIGRAM(S): 1 INJECTION INTRAVENOUS at 21:00

## 2022-09-27 RX ADMIN — Medication 2: at 05:41

## 2022-09-27 RX ADMIN — Medication 2: at 11:46

## 2022-09-27 RX ADMIN — LEVETIRACETAM 500 MILLIGRAM(S): 250 TABLET, FILM COATED ORAL at 11:46

## 2022-09-27 RX ADMIN — HEPARIN SODIUM 5000 UNIT(S): 5000 INJECTION INTRAVENOUS; SUBCUTANEOUS at 05:40

## 2022-09-27 RX ADMIN — CHLORHEXIDINE GLUCONATE 1 APPLICATION(S): 213 SOLUTION TOPICAL at 05:41

## 2022-09-27 RX ADMIN — MEROPENEM 100 MILLIGRAM(S): 1 INJECTION INTRAVENOUS at 05:40

## 2022-09-27 NOTE — ED PROVIDER NOTE - CLINICAL SUMMARY MEDICAL DECISION MAKING FREE TEXT BOX
Katie Nickerson MD, PGY-3: 80YO M hx PPM for symptomatic bradychardia, CKD, p/w transfer for MRI and EP/cards eval and clearance. vss, pe responsive to pain. consider encephalopathy s/t metabolic cause vs. vasculitis. low suspicion meningitis given recent negative LP. plan for MRI, transfer to pt's primary hospital.

## 2022-09-27 NOTE — ED ADULT NURSE NOTE - NSIMPLEMENTINTERV_GEN_ALL_ED
Implemented All Fall Risk Interventions:  [place] to call system. Call bell, personal items and telephone within reach. Instruct patient to call for assistance. Room bathroom lighting operational. Non-slip footwear when patient is off stretcher. Physically safe environment: no spills, clutter or unnecessary equipment. Stretcher in lowest position, wheels locked, appropriate side rails in place. Provide visual cue, wrist band, yellow gown, etc. Monitor gait and stability. Monitor for mental status changes and reorient to person, place, and time. Review medications for side effects contributing to fall risk. Reinforce activity limits and safety measures with patient and family.

## 2022-09-27 NOTE — ED ADULT NURSE NOTE - OBJECTIVE STATEMENT
Received patient to  for MRI. Transfer from [place] with RN at bedside stating due to patient's pacemaker, pt has to obtain MRI at [facility]. Offers no complaints at this time. RR even and unlabored, MD at bedside for evaluation.

## 2022-09-27 NOTE — CHART NOTE - NSCHARTNOTEFT_GEN_A_CORE
Medicine PA Note    Called by RN for NGT insertion. Patient is a 81y old  Male who presents with a chief complaint of Encephalopathy, acute respiratory failure (27 Sep 2022 13:53)   Order noted on chart.    T(C): 37.7 (09-27-22 @ 11:09), Max: 37.7 (09-27-22 @ 11:09)  T(F): 99.9 (09-27-22 @ 11:09), Max: 99.9 (09-27-22 @ 11:09)  HR: 103 (09-27-22 @ 11:09) (96 - 103)  BP: 137/88 (09-27-22 @ 11:09) (129/77 - 147/85)  RR: 20 (09-27-22 @ 11:09) (18 - 20)  SpO2: 94% (09-27-22 @ 11:09) (94% - 96%)    NGT inserted. Pt tolerated procedure well. Placement verified via auscultation. cxr ordered to confirm placement.    Estephania [facility]

## 2022-09-27 NOTE — ED PROVIDER NOTE - PHYSICAL EXAMINATION
Gen: WDWN, NAD  HEENT:  no nasal discharge, mucous membranes moist, pupils equal and reactive to light  CV: RRR,2+ radial pulses R radial  Resp: no accessory muscle use, no increased work of breathing  GI: Abdomen soft non-distended, NTTP  MSK: No open wounds, no bruising, no LE edema  Neuro: responsive to pain.   Psych: appropriate mood

## 2022-09-27 NOTE — PROCEDURE NOTE - ADDITIONAL PROCEDURE DETAILS
The PPM is MRI compatible with 3T   PPM implanted on 11/21/2018, pt is not pacer dependent  MRI order form completed and give to RN at pt's bedside who is companying patient ot MRI, discussed with Moise (MRI supervisor) who arranged [employer] to reprogram for MRI around 2pm The PPM is MRI compatible with 3T   PPM implanted on 11/21/2018, pt is not pacer dependent  MRI order form completed and give to RN at pt's bedside who is companying patient to MRI, discussed with Moise (MRI supervisor) who arranged [employer] to reprogram for MRI around 2pm

## 2022-09-27 NOTE — PROGRESS NOTE ADULT - SUBJECTIVE AND OBJECTIVE BOX
SARAH JIMÉNEZ  MRN-[id number]    Follow Up:  AMS, fever    Interval History: the pt was seen and examined earlier, no distress, awake, not alert, does not follow commands, does not answer any of my questions. The pt is afebrile since 9/25, on NC, no new lab work.     PAST MEDICAL & SURGICAL HISTORY:  Hypertension, unspecified type      Descending aortic aneurysm      Syncope, unspecified syncope type      Anxiety      Renal insufficiency      Bradycardia      H/O cardiac pacemaker  medtronic          ROS:    [x ] Unobtainable because: pt is not interactive   [ ] All other systems negative    Constitutional: no fever, no chills  Head: no trauma  Eyes: no vision changes, no eye pain  ENT:  no sore throat, no rhinorrhea  Cardiovascular:  no chest pain, no palpitation  Respiratory:  no SOB, no cough  GI:  no abd pain, no vomiting, no diarrhea  urinary: no dysuria, no hematuria, no flank pain  musculoskeletal:  no joint pain, no joint swelling  skin:  no rash  neurology:  no headache, no seizure, no change in mental status  psych: no anxiety, no depression         Allergies  No Known Allergies        ANTIMICROBIALS:  meropenem  IVPB 500 every 12 hours      OTHER MEDS:  acetaminophen     Tablet .. 650 milliGRAM(s) Oral every 6 hours PRN  aspirin  chewable 81 milliGRAM(s) Enteral Tube daily  chlorhexidine 2% Cloths 1 Application(s) Topical <User Schedule>  glucagon  Injectable 1 milliGRAM(s) IntraMuscular once  heparin   Injectable 5000 Unit(s) SubCutaneous every 12 hours  insulin lispro (ADMELOG) corrective regimen sliding scale   SubCutaneous every 6 hours  levETIRAcetam  Solution 500 milliGRAM(s) Oral daily  pantoprazole   Suspension 40 milliGRAM(s) Enteral Tube daily      Vital Signs Last 24 Hrs  T(C): 37.7 (27 Sep 2022 11:09), Max: 37.7 (27 Sep 2022 11:09)  T(F): 99.9 (27 Sep 2022 11:09), Max: 99.9 (27 Sep 2022 11:09)  HR: 103 (27 Sep 2022 11:09) (96 - 103)  BP: 137/88 (27 Sep 2022 11:09) (129/77 - 149/94)  BP(mean): --  RR: 20 (27 Sep 2022 11:09) (18 - 20)  SpO2: 94% (27 Sep 2022 11:09) (94% - 96%)        Physical Exam:  Constitutional: ill appearing , no acute distress, on nasal cannula   HEAD/EYES: anicteric, no conjunctival injection  ENT:  neck is not rigid, +NGT   Cardiovascular:   normal S1, S2, no murmur, mild edema, +PPM left chest wall   Respiratory:  diminished BS bilaterally, no wheezes, no rales  GI:  soft, non-tender, normal bowel sounds  :  condom catheter   Musculoskeletal:  no synovitis  Neurologic: awake but not alert, not responding to questions or commands   Skin:  no rash, no erythema, no phlebitis  Heme/Onc: no lymphadenopathy   Psychiatric:  awake, unable     WBC Count: 15.86 K/uL (09-26 @ 08:35)  WBC Count: 20.70 K/uL (09-25 @ 06:55)  WBC Count: 17.61 K/uL (09-24 @ 07:55)  WBC Count: 21.95 K/uL (09-23 @ 06:00)  WBC Count: 20.22 K/uL (09-22 @ 07:33)  WBC Count: 30.46 K/uL (09-21 @ 08:19)                            8.8    15.86 )-----------( 317      ( 26 Sep 2022 08:35 )             29.0       09-26    144  |  111<H>  |  89<H>  ----------------------------<  101<H>  4.4   |  26  |  3.23<H>    Ca    9.5      26 Sep 2022 08:35            Creatinine Trend: 3.23<--, 3.69<--, 3.49<--, 4.42<--, 5.05<--, 5.37<--      MICROBIOLOGY:  Vancomycin Level, Trough: 14.5 ug/mL (09-26-22 @ 23:55)  v  Catheterized Catheterized  09-25-22   >100,000 CFU/ml Pseudomonas aeruginosa  --  --      .Blood Blood-Peripheral  09-25-22   No growth to date.  --  --      .Blood Blood-Peripheral  09-25-22   No growth to date.  --  --      .CSF CSF  09-22-22   Culture is being performed.  --    No polymorphonuclear cells seen  No organisms seen  by cytocentrifuge      .Blood Blood-Peripheral  09-21-22   No Growth Final  --  --      .Blood Blood-Peripheral  09-21-22   No Growth Final  --  --      .Sputum Sputum trap  09-12-22   Moderate Klebsiella pneumoniae  Moderate Enterobacter cloacae complex  Normal Respiratory Victoria present  --  Klebsiella pneumoniae  Enterobacter cloacae complex      Clean Catch Clean Catch (Midstream)  09-11-22   No growth  --  --      .Blood Blood-Peripheral  09-11-22   No Growth Final  --  --          HSV 1/2 PCR: NotDeteben (09-22 @ 15:14)        C-Reactive Protein, Serum: 228 (09-23)      RADIOLOGY:

## 2022-09-27 NOTE — PROGRESS NOTE ADULT - SUBJECTIVE AND OBJECTIVE BOX
[facility] NEPHROLOGY SERVICES, [facility]  NEPHROLOGY AND HYPERTENSION  [address]  [address]  [place] [address]  [phone]    MD TESSY ESPINOSA, MD ADAN SEQUEIRA, MD POLI CRUZ, MD MAKENZIE RACHEL, MD JESSICA CLANCY, MD MAGALIE MAYFIELD MD          Patient events noted  poorly responsive     MEDICATIONS  (STANDING):  aspirin  chewable 81 milliGRAM(s) Enteral Tube daily  chlorhexidine 2% Cloths 1 Application(s) Topical <User Schedule>  glucagon  Injectable 1 milliGRAM(s) IntraMuscular once  heparin   Injectable 5000 Unit(s) SubCutaneous every 12 hours  insulin lispro (ADMELOG) corrective regimen sliding scale   SubCutaneous every 6 hours  levETIRAcetam  Solution 500 milliGRAM(s) Oral daily  meropenem  IVPB 500 milliGRAM(s) IV Intermittent every 12 hours  pantoprazole   Suspension 40 milliGRAM(s) Enteral Tube daily    MEDICATIONS  (PRN):  acetaminophen     Tablet .. 650 milliGRAM(s) Oral every 6 hours PRN Temp greater or equal to 38C (100.4F), Mild Pain (1 - 3)      09-26-22 @ 07:01  -  09-27-22 @ 07:00  --------------------------------------------------------  IN: 0 mL / OUT: 2400 mL / NET: -2400 mL    09-27-22 @ 07:01  -  09-27-22 @ 21:04  --------------------------------------------------------  IN: 0 mL / OUT: 800 mL / NET: -800 mL      PHYSICAL EXAM:      T(C): 37.7 (09-27-22 @ 11:09), Max: 37.7 (09-27-22 @ 11:09)  HR: 103 (09-27-22 @ 11:09) (96 - 103)  BP: 137/88 (09-27-22 @ 11:09) (129/77 - 147/85)  RR: 20 (09-27-22 @ 11:09) (18 - 20)  SpO2: 94% (09-27-22 @ 11:09) (94% - 96%)  Wt(kg): --  Lungs clear  Heart S1S2  Abd soft NT ND  Extremities:   tr edema                                    8.8    15.86 )-----------( 317      ( 26 Sep 2022 08:35 )             29.0     09-26    144  |  111<H>  |  89<H>  ----------------------------<  101<H>  4.4   |  26  |  3.23<H>    Ca    9.5      26 Sep 2022 08:35          Creatinine Trend: 3.23<--, 3.69<--, 3.49<--, 4.42<--, 5.05<--, 5.37<--        Assessment   VSAILE, CKD suspected  Off hemodialysis   Hypercalcemia trend with leukocytosis  Mental status change, noted CVA on MRI    Plan:  D/C IVF  Ionized Ca  Empiric IV abx  Will follow       Main Sotelo MD

## 2022-09-27 NOTE — PROGRESS NOTE ADULT - SUBJECTIVE AND OBJECTIVE BOX
Patient is a 81y old  Male who presents with a chief complaint of Encephalopathy, acute respiratory failure (28 Sep 2022 12:45)    HPI:  80yo M, PMH dementia, htn, TEVAR, PPM for syncopal bradycardia, ? sizures?and CKD (baseline Cr appears to be ~1.8?), on Eliquis (unclear why), BIBEMS to ED after being found unresponsive at home. On EMS arrival pt obtunded and not breathing. Pt intubated in the field. On arrival in ED, pt found to be Covid+. CTH with no acute pathology. CT chest with diffuse b/l GGO. ICU consulted for further management.      He has been weak and not eating since discharge from [place] for unclear reasons.  He was normal with moving all his extremities and talking and moving with no issues just weak and had no apatite  (11 Sep 2022 21:45)    SUBJECTIVE & OBJECTIVE: Pt seen and examined at bedside.   PHYSICAL EXAM:  T(C): 37.2 (09-28-22 @ 11:00), Max: 37.4 (09-27-22 @ 23:29)  HR: 108 (09-28-22 @ 11:00) (96 - 108)  BP: 144/84 (09-28-22 @ 11:00) (130/87 - 144/84)  RR: 20 (09-28-22 @ 11:00) (17 - 20)  SpO2: 94% (09-28-22 @ 11:00) (94% - 98%) Daily     Daily I&O's Detail    27 Sep 2022 07:01  -  28 Sep 2022 07:00  --------------------------------------------------------  IN:    Enteral Tube Flush: 500 mL    Nepro with Carb Steady: 260 mL  Total IN: 760 mL    OUT:    Voided (mL): 1650 mL  Total OUT: 1650 mL    Total NET: -890 mL        GENERAL: acutely ill appearing  HEAD:  Atraumatic, Normocephalic  EYES: EOMI, PERRLA, conjunctiva and sclera clear  ENMT: Moist mucous membranes  NECK: Supple, No JVD  NERVOUS SYSTEM: obtunded, not following commands   CHEST/LUNG: poor air entry to bases  HEART: Regular rate and rhythm; No murmurs, rubs, or gallops  ABDOMEN: Soft, Nontender, Nondistended; Bowel sounds present  EXTREMITIES:  2+ Peripheral Pulses, No clubbing, cyanosis, or edema  LABS:  CAPILLARY BLOOD GLUCOSE      POCT Blood Glucose.: 197 mg/dL (28 Sep 2022 13:25)  POCT Blood Glucose.: 117 mg/dL (28 Sep 2022 05:40)  POCT Blood Glucose.: 125 mg/dL (28 Sep 2022 00:36)  POCT Blood Glucose.: 139 mg/dL (27 Sep 2022 21:02)    RECENT CULTURES:   RADIOLOGY & ADDITIONAL TESTS:

## 2022-09-27 NOTE — CHART NOTE - NSCHARTNOTEFT_GEN_A_CORE
Pt presented to ED c AMS, generalized weakness & decreased PO intake PTA; found to be COVID+, encephalopathic, & in respiratory failure; pt intubated at admission  for airway protection & extubated 9/17. 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  Pt continues c AMS, non-verbal. Per swallow evaluation (9/21) SLP recommended continued NPO as pt not appropriate for PO diet at this time.  Pt transferred to [facility] today for special MRI as pt c PPM; high suspicion for West Nile virus; West Nile serology pending; or possibly vasculitis; pt to be transferred back to [facility] after testing.  PMHx includes dementia, PPM, VASILE on CKD.      Factors impacting intake: [ ] none [ ] nausea  [ ] vomiting [ ] diarrhea [ ] constipation  [X ]chewing problems [X ] swallowing issues  [X ] other: on tube feeding; inability to self-feed    Diet Prescription: Diet, NPO with Tube Feed:   Tube Feeding Modality: Nasogastric  Nepro with Carb Steady  Total Volume for 24 Hours (mL): 960  Continuous  Starting Tube Feed Rate {mL per Hour}: 30  Increase Tube Feed Rate by (mL): 10     Every 6 hours  Until Goal Tube Feed Rate (mL per Hour): 40  Tube Feed Duration (in Hours): 24  Tube Feed Start Time: 12:09 (09-21-22 @ 22:03)    Intake:  prior to transfer pt on tube feeding at goal rate (provides 960 ml, 1728 kcal, 78 g protein, 700 ml H2O)    Current Weight: Weight (kg): 62.4 (9/22); 67.4 (9/11)  % Weight Change:  7.4% wt loss x 11 days    No edema at present; at admission pt c 1+ generalized edema    Unable to conduct nutrition-focused physical exam due to isolation precautions being maintained for COVID    Pertinent Medications: MEDICATIONS  (STANDING):  aspirin  chewable 81 milliGRAM(s) Enteral Tube daily  chlorhexidine 2% Cloths 1 Application(s) Topical <User Schedule>  glucagon  Injectable 1 milliGRAM(s) IntraMuscular once  heparin   Injectable 5000 Unit(s) SubCutaneous every 12 hours  insulin lispro (ADMELOG) corrective regimen sliding scale   SubCutaneous every 6 hours  levETIRAcetam  Solution 500 milliGRAM(s) Oral daily  meropenem  IVPB 500 milliGRAM(s) IV Intermittent every 12 hours  pantoprazole   Suspension 40 milliGRAM(s) Enteral Tube daily    MEDICATIONS  (PRN):  acetaminophen     Tablet .. 650 milliGRAM(s) Oral every 6 hours PRN Temp greater or equal to 38C (100.4F), Mild Pain (1 - 3)    Pertinent Labs: 09-26 Na144 mmol/L Glu 101 mg/dL<H> K+ 4.4 mmol/L Cr  3.23 mg/dL<H> BUN 89 mg/dL<H> 09-24 Phos 3.9 mg/dL 09-24 Alb 2.2 g/dL<L>  09-13-22  A1C 6.7%; 09-26 POCT: 101, 80, 83. 155, 165    Skin:   WDL except ecchymotic IAD    Estimated Needs:   [ ] no change since previous assessment  [X ] recalculated: based on IBW = 68.4 kg daily needs are as follows:  8578-6676 kcal (25-30 kcal/kg); 68-82 g protein (1-1.2 g/kg); 4367-0967 ml H2O (20-25 ml/kg)    Previous Nutrition Diagnosis: (09/13)  Inadequate Energy Intake  Etiology: acute illness  Signs/Symptoms: decreased oral intake PTA, initiation of enteral feeding which is not @ goal rate    GOAL: pt to meet >75% protein-energy needs via enteral feeding; met     Nutrition Diagnosis is [ ] ongoing  [x ] resolved [ ] not applicable     New Nutrition Diagnosis: [x ] not applicable - NONE      Interventions:   continue current TF when pt back at [facility]  Recommend  [ ] Change Diet To:  [ ] Nutrition Supplement  [ ] Nutrition Support  [x ] Other:  add H2O flushes of 250 q 6 hrs    Monitoring and Evaluation:   [ ] PO intake [ x ] Tolerance to diet prescription [ x ] weights [ x ] labs[ x ] follow up per protocol  [ ] other:

## 2022-09-27 NOTE — ED PROVIDER NOTE - ATTENDING CONTRIBUTION TO CARE
81M h/o PPM for symptomatic bradychardia, CKD transferred from [place] for MRI for ongoing w/u of persistent encephalopathy, recently s/p covid dx. VS unable to obtain EP eval for PPM prior to MRI prompting transfer. Pt nonverbal, unable to provide additional history.  Gen: NAD  CV: RRR  Resp: CTAB, no accessory muscle use  GI: Abdomen soft non-distended, NTTP  MSK: No open wounds, no bruising, no LE edema  Neuro: responsive to pain.   Plan: 81M h/o PPM for symptomatic bradychardia, CKD transferred from [place] for MRI for ongoing w/u of persistent encephalopathy - will obtain MRI per plan provided by [place] inpatient team. EP at bedside interrogating PPM. Clinically stable to undergo MRI. Will follow-up read, if no indication for specialty care not available at , plan to transfer back for ongoing medical care.

## 2022-09-27 NOTE — PROCEDURE NOTE - INTERROGATION NOTE: MODEL
Kelly 8 ALEX serial # [id number] Edora 8 DR-T serial # [id number];  RA lead model Solia S 53 serial #[id number]; RV Solia S60 serial #[id number]

## 2022-09-27 NOTE — ED PROVIDER NOTE - PROGRESS NOTE DETAILS
Abhishek TAPIA (PGY-3)  MRI showing no signs of LVO or vasculitis; shows subacute/acute infarct to L corona radiata. Endorsed to Dr. Ochoa who accepted pt back to  ([address], same unit as prior). spoke to [facility] [facility] who will take pt back to  in about 1-1.5 hrs. VS RN made aware of update as well

## 2022-09-27 NOTE — ED PROVIDER NOTE - NSPREEXISTRELATION_ED_A_ED_FT
Was admitted to [facility], going back to [facility] after MRI was performed here

## 2022-09-27 NOTE — PROGRESS NOTE ADULT - ASSESSMENT
82yo M, PMH dementia, htn, TEVAR, PPM for syncopal bradycardia, ? seizures?and CKD (baseline Cr appears to be ~1.8?), on Eliquis (unclear why), BIBEMS to ED after being found unresponsive at home. On EMS arrival pt obtunded and not breathing.     Problem/Plan - 1:  ·  Problem: Metabolic encephalopathy.   ·  Plan: - still requiring NG tube   - on the ddx is infection vs cva ( cant get MRI), vs vs vasculitis vs anoxic brain injury ( pt intubated in the field)   - eeg x2  shows no seizure  - CT head x2 shows no cva   - s/p  LP to rule infectious etiology. will include nmda receptor ab, west nile IgM     9/22 eeg shows no seizure activity. will consider MRI if I can get transfer to [facility] due to pacemaker as brain stem cva still possible   9/23 normal cell count on LP but has elevated protein and abd glucose with is nonspecific. covid? encepahlitis. will check esr/crp and if negative can make the diagnosis of cva more likely. will try to get MRI of the brain at [facility] as pacemaker seems to be compatible   9/24  no change in mental status. doubt bacterial meningitis here. ESR and crp very elevated making cva less likely but still needs to me ruled out. WIll need to send to [facility] to get mri and MRA  to eval for any signs of vasculitis.. covid encephalopathy?    9/25 febrile today and slight drop in BP. started empiric abx for now. will try to descalate when possible.  CXR unemarkable by my read but still could be microaspiration.  will hold feed for now. cxs takes. Plan is if patient remains stable is to call the transfer center to have the patient shuttled  to [facility] for MRI due to pacemaker. Will need to be in  coordination of mri supervisor Moise Allen ( [facility] aware)  and EP doctor.  WIll try to get MRI and MRA. If possible, it would be best to have the read done whiles [facility] at [facility] because if he has any signs of vasculitis on imaging it may be best for him to be treated at a tertiary center.     9/26 - afebrile x 24 hours, blood and urine cultures are pending, check MRSA/MSSA PCR, Vanco/Zosyn given.  Will dose Vanco by level for now CrCl  - plan to d/c abx if cultures are negative   - high-suspicion for west nile virus vs. Vasculitis  -  west nile serology pending   - discussed case with Dr. Loyola at [facility] to coordinate MRI/MRA head.    9/27 - MRI/MRA done [facility] MRN#: [id number].   - acute/subacute lacunar infarct within the left corona radiata with associated cytotoxic edema. ? normal pressure hydrocephalus  - plan for family conversation regarding GOC    Problem/Plan - 2:  ·  Problem: 2019 novel coronavirus disease (COVID-19).   ·  Plan: - s/p remdesivir  - supportive care.    Problem/Plan - 3:  ·  Problem: Hypernatremia.   ·  Plan: - improved with d5   - continue to monitor bmp daily.    Problem/Plan - 4:  ·  Problem: Stage 5 chronic kidney disease not on chronic dialysis.   ·  Plan: - required several HD treatments  - now off HD  - gentle IVF  - 9/27 - off IVF

## 2022-09-27 NOTE — PROGRESS NOTE ADULT - ASSESSMENT
80yo M, PMH dementia, htn, TEVAR, PPM for syncopal bradycardia, ? sizures?and CKD (baseline Cr appears to be ~1.8?), on Eliquis (unclear why), BIBEMS to ED after being found unresponsive at home. On EMS arrival pt obtunded and not breathing. Pt intubated in the field. On arrival in ED, pt found to be Covid+. CTH with no acute pathology. CT chest with diffuse b/l GGO. Treated for Hypernatremia and has been started on HD 9/13.  Possible uremic encephalopathy, necessitating HD initiation. HD cath removed 9/19  for line free days.  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 Could he have suffered another CVA?   His wbc has increased drastically, blood cultures sent   at this juncture patient needs a lumbar puncture   can also rule out syphilis   needs MRI if his PPM is MRI compatible  Patient has a [employer] CLS Edora pacemaker( asked wife to bring the card in but PPM was placed in [facility] by Dr. Davis Saldana)    4/22: found out from his EP that cardiac device is MRI compatible just needs to be set to MRI mode, LP today, may need to go to [facility] for MRI brain, will follow all LP results, wbc better today   4/23: no fever, on NC, WBC elevated 21.95, Cr a little better 4.42, BCs with no growth to date, CSF PCR negative, no HSV 1/2, off abx, pending MRI  4/26: fevers yesterday, no fevers today, on NC, WBC better 15.86, Cr 3.23, CSF culture with no growth, additional testing is pending, MRI is pending. The pt was started on Vancomycin IV and Meropenem IV over the weekend due to high temperatures, cultures are collected, will continue with abx for now while awaiting for new culture data.    4/27: no fevers since 9/25, no new lab work, Vancomycin was stopped by medicine, Meropenem IV continued. Will continue abx for now empirically, MRI pending - awaiting for transfer to the tertiary facility for the imaging study.   WNV CSF Igg/Igm - negative, CSF culture with no growth, fungal and AFB cultures are in progress of being tested, BCs preliminary with no growth, UC grew pseudomonas, sensitivity is pending.     Plan:  off Vancomycin IV  continue IV Meropenem for now   follow BCs - NGTD  UC - grew pseudomonas, pending sensitivity  follow all CSF studies   s/p LP 9/22  cell count - 0  glucose 104  protein 111  routine gram stain and culture - no growth to date  CSF PCR panel - negative  fungal Culture - testing   AFB culture - testing, unable to perform smear  CSF crypt antigen - negative  Syphilis screen negative   WNV CSF Igg Igm negative   HIV negative   obtain MRI - pending transfer   EEG - abnormal   vitamin B12 >2000, folate 6.6,   TSH and free T4 reviewed   trend wbc and creatinine  follow renal function   follow WBC      discussed with Dr. Randall  discussed with Dr. Ochoa

## 2022-09-27 NOTE — ED PROVIDER NOTE - OBJECTIVE STATEMENT
82YO M hx PPM for symptomatic bradychardia, CKD, p/w transfer for MRI. pt with persistent encephalopathy, recently s/p covid dx. pt sent from OSH given inability to obtain EP c/s and device rep to interrogate/shut off device prior to MRI. pt nonverbal in ED, responsive to pain, diffusely rigid.

## 2022-09-28 LAB
CA-I BLD-SCNC: 5.1 MG/DL — SIGNIFICANT CHANGE UP (ref 4.5–5.6)
FLUAV AG NPH QL: SIGNIFICANT CHANGE UP
FLUBV AG NPH QL: SIGNIFICANT CHANGE UP
GLUCOSE BLDC GLUCOMTR-MCNC: 117 MG/DL — HIGH (ref 70–99)
GLUCOSE BLDC GLUCOMTR-MCNC: 125 MG/DL — HIGH (ref 70–99)
GLUCOSE BLDC GLUCOMTR-MCNC: 178 MG/DL — HIGH (ref 70–99)
GLUCOSE BLDC GLUCOMTR-MCNC: 185 MG/DL — HIGH (ref 70–99)
GLUCOSE BLDC GLUCOMTR-MCNC: 197 MG/DL — HIGH (ref 70–99)
JCPYV DNA # CSF NAA+PROBE: SIGNIFICANT CHANGE UP COPIES/ML
SARS-COV-2 RNA SPEC QL NAA+PROBE: SIGNIFICANT CHANGE UP

## 2022-09-28 PROCEDURE — 99232 SBSQ HOSP IP/OBS MODERATE 35: CPT

## 2022-09-28 RX ADMIN — Medication 81 MILLIGRAM(S): at 13:23

## 2022-09-28 RX ADMIN — MEROPENEM 100 MILLIGRAM(S): 1 INJECTION INTRAVENOUS at 05:31

## 2022-09-28 RX ADMIN — HEPARIN SODIUM 5000 UNIT(S): 5000 INJECTION INTRAVENOUS; SUBCUTANEOUS at 05:31

## 2022-09-28 RX ADMIN — Medication 2: at 13:43

## 2022-09-28 RX ADMIN — CHLORHEXIDINE GLUCONATE 1 APPLICATION(S): 213 SOLUTION TOPICAL at 06:18

## 2022-09-28 RX ADMIN — Medication 2: at 23:40

## 2022-09-28 RX ADMIN — PANTOPRAZOLE SODIUM 40 MILLIGRAM(S): 20 TABLET, DELAYED RELEASE ORAL at 13:23

## 2022-09-28 RX ADMIN — HEPARIN SODIUM 5000 UNIT(S): 5000 INJECTION INTRAVENOUS; SUBCUTANEOUS at 18:45

## 2022-09-28 RX ADMIN — LEVETIRACETAM 500 MILLIGRAM(S): 250 TABLET, FILM COATED ORAL at 13:22

## 2022-09-28 RX ADMIN — Medication 2: at 18:51

## 2022-09-28 NOTE — PROGRESS NOTE ADULT - ASSESSMENT
80yo M, PMH dementia, htn, TEVAR, PPM for syncopal bradycardia, ? sizures?and CKD (baseline Cr appears to be ~1.8?), on Eliquis (unclear why), BIBEMS to ED after being found unresponsive at home. On EMS arrival pt obtunded and not breathing. Pt intubated in the field. On arrival in ED, pt found to be Covid+. CTH with no acute pathology. CT chest with diffuse b/l GGO. Treated for Hypernatremia and has been started on HD 9/13.  Possible uremic encephalopathy, necessitating HD initiation. HD cath removed 9/19  for line free days.  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 Could he have suffered another CVA?   His wbc has increased drastically, blood cultures sent   at this juncture patient needs a lumbar puncture   can also rule out syphilis   needs MRI if his PPM is MRI compatible  Patient has a [employer] CLS Edora pacemaker( asked wife to bring the card in but PPM was placed in [facility] by Dr. Davis Saldana)    4/22: found out from his EP that cardiac device is MRI compatible just needs to be set to MRI mode, LP today, may need to go to [facility] for MRI brain, will follow all LP results, wbc better today   4/23: no fever, on NC, WBC elevated 21.95, Cr a little better 4.42, BCs with no growth to date, CSF PCR negative, no HSV 1/2, off abx, pending MRI  4/26: fevers yesterday, no fevers today, on NC, WBC better 15.86, Cr 3.23, CSF culture with no growth, additional testing is pending, MRI is pending. The pt was started on Vancomycin IV and Meropenem IV over the weekend due to high temperatures, cultures are collected, will continue with abx for now while awaiting for new culture data.    4/27: no fevers since 9/25, no new lab work, Vancomycin was stopped by medicine, Meropenem IV continued. Will continue abx for now empirically, MRI pending - awaiting for transfer to the tertiary facility for the imaging study.   WNV CSF Igg/Igm - negative, CSF culture with no growth, fungal and AFB cultures are in progress of being tested, BCs preliminary with no growth, UC grew pseudomonas, sensitivity is pending.   4/28: remains afebrile, no new lab work, MR brain performed - Acute/subacute lacunar infarction within the left corona radiata with associated cytotoxic edema. UC grew pseudomonas, received three days of Meropenem, sensitive, abx now stopped. BCs with no growth to date, CSF culture NGTD as well.   Please note, the pt has an alternate MR# in the system: [id number].     Plan:  MR brain reviewed - acute/subacute stroke   stop abx  follow all CSF studies   s/p LP 9/22  cell count - 0  glucose 104  protein 111  routine gram stain and culture - no growth to date  CSF PCR panel - negative  fungal Culture - testing   AFB culture - testing, unable to perform smear  CSF crypt antigen - negative  Syphilis screen negative   WNV CSF Igg Igm negative   HIV negative   EEG - abnormal   vitamin B12 >2000, folate 6.6,   TSH and free T4 reviewed   trend wbc and creatinine  follow renal function   follow WBC      discussed with Dr. Hernandez   discussed with Dr. Ochoa 82yo M, PMH dementia, htn, TEVAR, PPM for syncopal bradycardia, ? sizures?and CKD (baseline Cr appears to be ~1.8?), on Eliquis (unclear why), BIBEMS to ED after being found unresponsive at home. On EMS arrival pt obtunded and not breathing. Pt intubated in the field. On arrival in ED, pt found to be Covid+. CTH with no acute pathology. CT chest with diffuse b/l GGO. Treated for Hypernatremia and has been started on HD 9/13.  Possible uremic encephalopathy, necessitating HD initiation. HD cath removed 9/19  for line free days.  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 Could he have suffered another CVA?   His wbc has increased drastically, blood cultures sent   at this juncture patient needs a lumbar puncture   can also rule out syphilis   needs MRI if his PPM is MRI compatible  Patient has a [employer] CLS Edora pacemaker( asked wife to bring the card in but PPM was placed in [facility] by Dr. Davis Saldana)    4/22: found out from his EP that cardiac device is MRI compatible just needs to be set to MRI mode, LP today, may need to go to [facility] for MRI brain, will follow all LP results, wbc better today   4/23: no fever, on NC, WBC elevated 21.95, Cr a little better 4.42, BCs with no growth to date, CSF PCR negative, no HSV 1/2, off abx, pending MRI  4/26: fevers yesterday, no fevers today, on NC, WBC better 15.86, Cr 3.23, CSF culture with no growth, additional testing is pending, MRI is pending. The pt was started on Vancomycin IV and Meropenem IV over the weekend due to high temperatures, cultures are collected, will continue with abx for now while awaiting for new culture data.    4/27: no fevers since 9/25, no new lab work, Vancomycin was stopped by medicine, Meropenem IV continued. Will continue abx for now empirically, MRI pending - awaiting for transfer to the tertiary facility for the imaging study.   WNV CSF Igg/Igm - negative, CSF culture with no growth, fungal and AFB cultures are in progress of being tested, BCs preliminary with no growth, UC grew pseudomonas, sensitivity is pending.   4/28: remains afebrile, no new lab work, MR brain performed - Acute/subacute lacunar infarction within the left corona radiata with associated cytotoxic edema. UC grew pseudomonas, received three days of Meropenem, sensitive, abx now stopped. BCs with no growth to date, CSF culture NGTD as well.   Please note, the pt has an alternate MR# in the system: [id number].     Plan:  MR brain reviewed - acute/subacute stroke   stop abx  follow all CSF studies   s/p LP 9/22  cell count - 0  glucose 104  protein 111  routine gram stain and culture - no growth to date  CSF PCR panel - negative  fungal Culture - testing   AFB culture - testing, unable to perform smear  CSF crypt antigen - negative  Syphilis screen negative   WNV CSF Igg Igm negative   HIV negative   EEG - abnormal   vitamin B12 >2000, folate 6.6,   TSH and free T4 reviewed   trend wbc and creatinine  follow renal function   follow WBC  please keep HOB elevated, pt is at risk for aspiration       discussed with Dr. Hernandez   discussed with Dr. Ochoa 82yo M, PMH dementia, htn, TEVAR, PPM for syncopal bradycardia, ? sizures?and CKD (baseline Cr appears to be ~1.8?), on Eliquis (unclear why), BIBEMS to ED after being found unresponsive at home. On EMS arrival pt obtunded and not breathing. Pt intubated in the field. On arrival in ED, pt found to be Covid+. CTH with no acute pathology. CT chest with diffuse b/l GGO. Treated for Hypernatremia and has been started on HD 9/13.  Possible uremic encephalopathy, necessitating HD initiation. HD cath removed 9/19  for line free days.  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 Could he have suffered another CVA?   His wbc has increased drastically, blood cultures sent   at this juncture patient needs a lumbar puncture   can also rule out syphilis   needs MRI if his PPM is MRI compatible  Patient has a [employer] CLS Edora pacemaker( asked wife to bring the card in but PPM was placed in [facility] by Dr. Davis Saldana)    9/22: found out from his EP that cardiac device is MRI compatible just needs to be set to MRI mode, LP today, may need to go to [facility] for MRI brain, will follow all LP results, wbc better today   9/23: no fever, on NC, WBC elevated 21.95, Cr a little better 4.42, BCs with no growth to date, CSF PCR negative, no HSV 1/2, off abx, pending MRI  9/26: fevers yesterday, no fevers today, on NC, WBC better 15.86, Cr 3.23, CSF culture with no growth, additional testing is pending, MRI is pending. The pt was started on Vancomycin IV and Meropenem IV over the weekend due to high temperatures, cultures are collected, will continue with abx for now while awaiting for new culture data.    9/27: no fevers since 9/25, no new lab work, Vancomycin was stopped by medicine, Meropenem IV continued. Will continue abx for now empirically, MRI pending - awaiting for transfer to the tertiary facility for the imaging study.   WNV CSF Igg/Igm - negative, CSF culture with no growth, fungal and AFB cultures are in progress of being tested, BCs preliminary with no growth, UC grew pseudomonas, sensitivity is pending.   9/28: remains afebrile, no new lab work, MR brain performed - Acute/subacute lacunar infarction within the left corona radiata with associated cytotoxic edema. UC grew pseudomonas, received three days of Meropenem, sensitive, abx now stopped. BCs with no growth to date, CSF culture NGTD as well.   Please note, the pt has an alternate MR# in the system: [id number].     Plan:  MR brain reviewed - acute/subacute stroke   stop abx  follow all CSF studies   s/p LP 9/22  cell count - 0  glucose 104  protein 111  routine gram stain and culture - no growth to date  CSF PCR panel - negative  fungal Culture - testing   AFB culture - testing, unable to perform smear  CSF crypt antigen - negative  Syphilis screen negative   WNV CSF Igg Igm negative   HIV negative   EEG - abnormal   vitamin B12 >2000, folate 6.6,   TSH and free T4 reviewed   trend wbc and creatinine  follow renal function   follow WBC  please keep HOB elevated, pt is at risk for aspiration       discussed with Dr. Hernandez   discussed with Dr. Ochoa

## 2022-09-28 NOTE — PROGRESS NOTE ADULT - SUBJECTIVE AND OBJECTIVE BOX
Patient is a 81y old  Male who presents with a chief complaint of Encephalopathy, acute respiratory failure (28 Sep 2022 12:45)    HPI:  82yo M, PMH dementia, htn, TEVAR, PPM for syncopal bradycardia, ? sizures?and CKD (baseline Cr appears to be ~1.8?), on Eliquis (unclear why), BIBEMS to ED after being found unresponsive at home. On EMS arrival pt obtunded and not breathing. Pt intubated in the field. On arrival in ED, pt found to be Covid+. CTH with no acute pathology. CT chest with diffuse b/l GGO. ICU consulted for further management.      He has been weak and not eating since discharge from [facility] for unclear reasons.  He was normal with moving all his extremities and talking and moving with no issues just weak and had no apatite  (11 Sep 2022 21:45)    SUBJECTIVE & OBJECTIVE: Pt seen and examined at bedside. No interval chance since yesterday. NGT replaced yesterday after return from [facility]  PHYSICAL EXAM:  T(C): 37.2 (09-28-22 @ 11:00), Max: 37.4 (09-27-22 @ 23:29)  HR: 108 (09-28-22 @ 11:00) (96 - 108)  BP: 144/84 (09-28-22 @ 11:00) (130/87 - 144/84)  RR: 20 (09-28-22 @ 11:00) (17 - 20)  SpO2: 94% (09-28-22 @ 11:00) (94% - 98%) Daily     Daily I&O's Detail    27 Sep 2022 07:01  -  28 Sep 2022 07:00  --------------------------------------------------------  IN:    Enteral Tube Flush: 500 mL    Nepro with Carb Steady: 260 mL  Total IN: 760 mL    OUT:    Voided (mL): 1650 mL  Total OUT: 1650 mL    Total NET: -890 mL        GENERAL: thin and chronically ill appearing   HEAD:  Atraumatic, Normocephalic  EYES: EOMI, PERRLA, conjunctiva and sclera clear  ENMT: Moist mucous membranes  NECK: Supple, No JVD  NERVOUS SYSTEM: obtunded, not following commands   CHEST/LUNG: poor air entry to bases on supplemental 02   HEART: Regular rate and rhythm; No murmurs, rubs, or gallops  ABDOMEN: Soft, Nontender, Nondistended; Bowel sounds present  EXTREMITIES:  2+ Peripheral Pulses, No clubbing, cyanosis, or edema  LABS:  CAPILLARY BLOOD GLUCOSE:    POCT Blood Glucose.: 197 mg/dL (28 Sep 2022 13:25)  POCT Blood Glucose.: 117 mg/dL (28 Sep 2022 05:40)  POCT Blood Glucose.: 125 mg/dL (28 Sep 2022 00:36)  POCT Blood Glucose.: 139 mg/dL (27 Sep 2022 21:02)    RECENT CULTURES:   RADIOLOGY & ADDITIONAL TESTS:

## 2022-09-28 NOTE — PROGRESS NOTE ADULT - SUBJECTIVE AND OBJECTIVE BOX
[facility] NEPHROLOGY SERVICES, [facility]  NEPHROLOGY AND HYPERTENSION  [address]  [address]  [place] [address]  [phone]    MD TESSY ESPINOSA, MD POLI BENJAMIN, MD MAKENZIE RACHEL, MD JESSICA CLANCY, MD MAGALIE MAYFIELD MD          Patient events noted    MEDICATIONS  (STANDING):  aspirin  chewable 81 milliGRAM(s) Enteral Tube daily  chlorhexidine 2% Cloths 1 Application(s) Topical <User Schedule>  glucagon  Injectable 1 milliGRAM(s) IntraMuscular once  heparin   Injectable 5000 Unit(s) SubCutaneous every 12 hours  insulin lispro (ADMELOG) corrective regimen sliding scale   SubCutaneous every 6 hours  levETIRAcetam  Solution 500 milliGRAM(s) Oral daily  pantoprazole   Suspension 40 milliGRAM(s) Enteral Tube daily    MEDICATIONS  (PRN):  acetaminophen     Tablet .. 650 milliGRAM(s) Oral every 6 hours PRN Temp greater or equal to 38C (100.4F), Mild Pain (1 - 3)      09-27-22 @ 07:01  -  09-28-22 @ 07:00  --------------------------------------------------------  IN: 760 mL / OUT: 1650 mL / NET: -890 mL    09-28-22 @ 07:01  -  09-28-22 @ 23:10  --------------------------------------------------------  IN: 0 mL / OUT: 950 mL / NET: -950 mL      PHYSICAL EXAM:      T(C): 36.9 (09-28-22 @ 17:00), Max: 37.4 (09-27-22 @ 23:29)  HR: 105 (09-28-22 @ 17:00) (96 - 108)  BP: 127/92 (09-28-22 @ 17:00) (127/92 - 144/84)  RR: 18 (09-28-22 @ 17:00) (17 - 20)  SpO2: 97% (09-28-22 @ 17:00) (94% - 98%)  Wt(kg): --  Lungs clear  Heart S1S2  Abd soft NT ND  Extremities:   tr edema                Calcium, Ionized in AM (09.26.22 @ 08:35)    Calcium, Ionized: 5.1: Performed At: RN [facility]              Creatinine Trend: 3.23<--, 3.69<--, 3.49<--, 4.42<--, 5.05<--, 5.37<--    Assessment   VASILE, CKD suspected  Off hemodialysis   Hypercalcemia trend with leukocytosis  Mental status change, noted CVA on MRI    Plan:  Continue supportive care      Main Sotelo MD

## 2022-09-28 NOTE — PROGRESS NOTE ADULT - ASSESSMENT
82yo M, PMH dementia, htn, TEVAR, PPM for syncopal bradycardia, ? seizures?and CKD (baseline Cr appears to be ~1.8?), on Eliquis (unclear why), BIBEMS to ED after being found unresponsive at home. On EMS arrival pt obtunded and not breathing.     9/28 - family meeting over the phone was held with the patient's HCP.  New diagnosis of thalamic stroke has been been discussed with her. The patient's overall poor prognosis has been discussed as well.  The patient's inability to perform executive functions, to care for himself, including his absent swallow function have been discussed.  Right now the family would want the patient to be made comfortable.  They do not wish for invasive procedures, PEG, or other interventions.  MOLST to be filled out today with the patient's wife.  Likely to be made dnr/dni with comfort measures only.  Family would like the patient to be d/c to LTC with hospice services     Problem/Plan - 1:  ·  Problem: Metabolic encephalopathy.   ·  Plan: - still requiring NG tube   - on the ddx is infection vs cva ( cant get MRI), vs vs vasculitis vs anoxic brain injury ( pt intubated in the field)   - eeg x2  shows no seizure  - CT head x2 shows no cva   - s/p  LP to rule infectious etiology. will include nmda receptor ab, west nile IgM     9/22 eeg shows no seizure activity. will consider MRI if I can get transfer to [facility] due to pacemaker as brain stem cva still possible   9/23 normal cell count on LP but has elevated protein and abd glucose with is nonspecific. covid? encepahlitis. will check esr/crp and if negative can make the diagnosis of cva more likely. will try to get MRI of the brain at [facility] as pacemaker seems to be compatible   9/24  no change in mental status. doubt bacterial meningitis here. ESR and crp very elevated making cva less likely but still needs to me ruled out. WIll need to send to [facility] to get mri and MRA  to eval for any signs of vasculitis.. covid encephalopathy?    9/25 febrile today and slight drop in BP. started empiric abx for now. will try to descalate when possible.  CXR unemarkable by my read but still could be microaspiration.  will hold feed for now. cxs takes. Plan is if patient remains stable is to call the transfer center to have the patient shuttled  to [facility] for MRI due to pacemaker. Will need to be in  coordination of mri supervisor Moise Allen ( hes aware)  and EP doctor.  WIll try to get MRI and MRA. If possible, it would be best to have the read done whiles hes at [facility] because if he has any signs of vasculitis on imaging it may be best for him to be treated at a tertiary center.     9/26 - afebrile x 24 hours, blood and urine cultures are pending, check MRSA/MSSA PCR, Vanco/Zosyn given.  Will dose Vanco by level for now CrCl  - plan to d/c abx if cultures are negative   - high-suspicion for west nile virus vs. Vasculitis  -  west nile serology pending   - discussed case with Dr. Loyola at [facility] to coordinate MRI/MRA head.    9/27 - MRI/MRA done [facility] MRN#: [id number].   - acute/subacute lacunar infarct within the left corona radiata with associated cytotoxic edema. ? normal pressure hydrocephalus  - plan for family conversation regarding GOC  - Viral PCR negative  - IVABX to be deescalated today    Problem/Plan - 2:  ·  Problem: 2019 novel coronavirus disease (COVID-19).   ·  Plan: - s/p remdesivir  - supportive care.    Problem/Plan - 3:  ·  Problem: Hypernatremia.   ·  Plan: - improved with d5   - continue to monitor bmp daily.    Problem/Plan - 4:  ·  Problem: Stage 5 chronic kidney disease not on chronic dialysis.   ·  Plan: - required several HD treatments  - now off HD  - gentle IVF  - 9/27 - off IVF

## 2022-09-28 NOTE — PROGRESS NOTE ADULT - SUBJECTIVE AND OBJECTIVE BOX
SARAH JIMÉNEZ  MRN-[id number]    Follow Up:  fevers    Interval History: the pt was seen and examined earlier, no distress, remains not interactive, does not follow commands and does not answer questions. The pt is afebrile since 9/25, on RA during my exam, no new lab work, required suctioning.      PAST MEDICAL & SURGICAL HISTORY:  Hypertension, unspecified type      Descending aortic aneurysm      Syncope, unspecified syncope type      Anxiety      Renal insufficiency      Bradycardia      H/O cardiac pacemaker  medtronic          ROS:    [x ] Unobtainable because: pt is not interactive   [ ] All other systems negative    Constitutional: no fever, no chills  Head: no trauma  Eyes: no vision changes, no eye pain  ENT:  no sore throat, no rhinorrhea  Cardiovascular:  no chest pain, no palpitation  Respiratory:  no SOB, no cough  GI:  no abd pain, no vomiting, no diarrhea  urinary: no dysuria, no hematuria, no flank pain  musculoskeletal:  no joint pain, no joint swelling  skin:  no rash  neurology:  no headache, no seizure, no change in mental status  psych: no anxiety, no depression         Allergies  No Known Allergies        ANTIMICROBIALS:  meropenem  IVPB 500 every 12 hours      OTHER MEDS:  acetaminophen     Tablet .. 650 milliGRAM(s) Oral every 6 hours PRN  aspirin  chewable 81 milliGRAM(s) Enteral Tube daily  chlorhexidine 2% Cloths 1 Application(s) Topical <User Schedule>  glucagon  Injectable 1 milliGRAM(s) IntraMuscular once  heparin   Injectable 5000 Unit(s) SubCutaneous every 12 hours  insulin lispro (ADMELOG) corrective regimen sliding scale   SubCutaneous every 6 hours  levETIRAcetam  Solution 500 milliGRAM(s) Oral daily  pantoprazole   Suspension 40 milliGRAM(s) Enteral Tube daily      Vital Signs Last 24 Hrs  T(C): 37.2 (28 Sep 2022 11:00), Max: 37.4 (27 Sep 2022 23:29)  T(F): 99 (28 Sep 2022 11:00), Max: 99.3 (27 Sep 2022 23:29)  HR: 108 (28 Sep 2022 11:00) (96 - 108)  BP: 144/84 (28 Sep 2022 11:00) (130/87 - 144/84)  BP(mean): --  RR: 20 (28 Sep 2022 11:00) (17 - 20)  SpO2: 94% (28 Sep 2022 11:00) (94% - 98%)        Physical Exam:  Constitutional: ill appearing , no acute distress, on RA  HEAD/EYES: anicteric, no conjunctival injection  ENT:  neck is not rigid, +NGT   Cardiovascular:   normal S1, S2, no murmur, mild edema, +PPM left chest wall   Respiratory:  somewhat coarse BS bilaterally, better after suctioning, no wheezes, no rales  GI:  soft, non-tender, normal bowel sounds  :  condom catheter   Musculoskeletal:  no synovitis  Neurologic: awake but not alert, not responding to questions or commands   Skin:  no rash, no erythema, no phlebitis  Heme/Onc: no lymphadenopathy   Psychiatric:  awake, unable     WBC Count: 15.86 K/uL (09-26 @ 08:35)  WBC Count: 20.70 K/uL (09-25 @ 06:55)  WBC Count: 17.61 K/uL (09-24 @ 07:55)  WBC Count: 21.95 K/uL (09-23 @ 06:00)  WBC Count: 20.22 K/uL (09-22 @ 07:33)      Creatinine Trend: 3.23<--, 3.69<--, 3.49<--, 4.42<--, 5.05<--, 5.37<--      MICROBIOLOGY:  v  Catheterized Catheterized  09-25-22   >100,000 CFU/ml Pseudomonas aeruginosa  --  Pseudomonas aeruginosa      .Blood Blood-Peripheral  09-25-22   No growth to date.  --  --      .Blood Blood-Peripheral  09-25-22   No growth to date.  --  --      .CSF CSF  09-22-22   Culture is being performed.  --    No polymorphonuclear cells seen  No organisms seen  by cytocentrifuge      .Blood Blood-Peripheral  09-21-22   No Growth Final  --  --      .Blood Blood-Peripheral  09-21-22   No Growth Final  --  --      .Sputum Sputum trap  09-12-22   Moderate Klebsiella pneumoniae  Moderate Enterobacter cloacae complex  Normal Respiratory Victoria present  --  Klebsiella pneumoniae  Enterobacter cloacae complex      Clean Catch Clean Catch (Midstream)  09-11-22   No growth  --  --      .Blood Blood-Peripheral  09-11-22   No Growth Final  --  --    HSV 1/2 PCR: NotDetec (09-22 @ 15:14)        C-Reactive Protein, Serum: 228 (09-23)        RADIOLOGY:     SARAH JIMÉNEZ  MRN-[id number]    Follow Up:  fevers    Interval History: the pt was seen and examined earlier, no distress, remains not interactive, does not follow commands and does not answer questions. The pt is afebrile since 9/25, on RA during my exam, no new lab work, required suctioning.      PAST MEDICAL & SURGICAL HISTORY:  Hypertension, unspecified type      Descending aortic aneurysm      Syncope, unspecified syncope type      Anxiety      Renal insufficiency      Bradycardia      H/O cardiac pacemaker  medtronic          ROS:    [x ] Unobtainable because: pt is not interactive   [ ] All other systems negative    Constitutional: no fever, no chills  Head: no trauma  Eyes: no vision changes, no eye pain  ENT:  no sore throat, no rhinorrhea  Cardiovascular:  no chest pain, no palpitation  Respiratory:  no SOB, no cough  GI:  no abd pain, no vomiting, no diarrhea  urinary: no dysuria, no hematuria, no flank pain  musculoskeletal:  no joint pain, no joint swelling  skin:  no rash  neurology:  no headache, no seizure, no change in mental status  psych: no anxiety, no depression         Allergies  No Known Allergies        ANTIMICROBIALS:  meropenem  IVPB 500 every 12 hours      OTHER MEDS:  acetaminophen     Tablet .. 650 milliGRAM(s) Oral every 6 hours PRN  aspirin  chewable 81 milliGRAM(s) Enteral Tube daily  chlorhexidine 2% Cloths 1 Application(s) Topical <User Schedule>  glucagon  Injectable 1 milliGRAM(s) IntraMuscular once  heparin   Injectable 5000 Unit(s) SubCutaneous every 12 hours  insulin lispro (ADMELOG) corrective regimen sliding scale   SubCutaneous every 6 hours  levETIRAcetam  Solution 500 milliGRAM(s) Oral daily  pantoprazole   Suspension 40 milliGRAM(s) Enteral Tube daily      Vital Signs Last 24 Hrs  T(C): 37.2 (28 Sep 2022 11:00), Max: 37.4 (27 Sep 2022 23:29)  T(F): 99 (28 Sep 2022 11:00), Max: 99.3 (27 Sep 2022 23:29)  HR: 108 (28 Sep 2022 11:00) (96 - 108)  BP: 144/84 (28 Sep 2022 11:00) (130/87 - 144/84)  BP(mean): --  RR: 20 (28 Sep 2022 11:00) (17 - 20)  SpO2: 94% (28 Sep 2022 11:00) (94% - 98%)        Physical Exam:  Constitutional: ill appearing , no acute distress, on RA  HEAD/EYES: anicteric, no conjunctival injection  ENT:  neck is not rigid, +NGT   Cardiovascular:   normal S1, S2, no murmur, mild edema, +PPM left chest wall   Respiratory:  somewhat coarse BS bilaterally, better after suctioning, no wheezes, no rales  GI:  soft, non-tender, normal bowel sounds  :  condom catheter   Musculoskeletal:  no synovitis  Neurologic: awake but not alert, not responding to questions or commands   Skin:  no rash, no erythema, no phlebitis  Heme/Onc: no lymphadenopathy   Psychiatric:  awake, unable     WBC Count: 15.86 K/uL (09-26 @ 08:35)  WBC Count: 20.70 K/uL (09-25 @ 06:55)  WBC Count: 17.61 K/uL (09-24 @ 07:55)  WBC Count: 21.95 K/uL (09-23 @ 06:00)  WBC Count: 20.22 K/uL (09-22 @ 07:33)      Creatinine Trend: 3.23<--, 3.69<--, 3.49<--, 4.42<--, 5.05<--, 5.37<--      MICROBIOLOGY:  v  Catheterized Catheterized  09-25-22   >100,000 CFU/ml Pseudomonas aeruginosa  --  Pseudomonas aeruginosa      .Blood Blood-Peripheral  09-25-22   No growth to date.  --  --      .Blood Blood-Peripheral  09-25-22   No growth to date.  --  --      .CSF CSF  09-22-22   Culture is being performed.  --    No polymorphonuclear cells seen  No organisms seen  by cytocentrifuge      .Blood Blood-Peripheral  09-21-22   No Growth Final  --  --      .Blood Blood-Peripheral  09-21-22   No Growth Final  --  --      .Sputum Sputum trap  09-12-22   Moderate Klebsiella pneumoniae  Moderate Enterobacter cloacae complex  Normal Respiratory Victoria present  --  Klebsiella pneumoniae  Enterobacter cloacae complex      Clean Catch Clean Catch (Midstream)  09-11-22   No growth  --  --      .Blood Blood-Peripheral  09-11-22   No Growth Final  --  --    HSV 1/2 PCR: NotDetec (09-22 @ 15:14)        C-Reactive Protein, Serum: 228 (09-23)        RADIOLOGY:  9/27/22: MR brain - Acute/subacute lacunar infarction within the left corona radiata with associated cytotoxic edema.

## 2022-09-29 ENCOUNTER — TRANSCRIPTION ENCOUNTER (OUTPATIENT)
Age: 82
End: 2022-09-29

## 2022-09-29 LAB
B BURGDOR DNA SPEC QL NAA+PROBE: NEGATIVE — SIGNIFICANT CHANGE UP
GLUCOSE BLDC GLUCOMTR-MCNC: 160 MG/DL — HIGH (ref 70–99)
GLUCOSE BLDC GLUCOMTR-MCNC: 165 MG/DL — HIGH (ref 70–99)
GLUCOSE BLDC GLUCOMTR-MCNC: 170 MG/DL — HIGH (ref 70–99)
GLUCOSE BLDC GLUCOMTR-MCNC: 191 MG/DL — HIGH (ref 70–99)

## 2022-09-29 PROCEDURE — 99232 SBSQ HOSP IP/OBS MODERATE 35: CPT

## 2022-09-29 RX ORDER — LEVETIRACETAM 250 MG/1
0.5 TABLET, FILM COATED ORAL
Qty: 0 | Refills: 0 | DISCHARGE

## 2022-09-29 RX ADMIN — LEVETIRACETAM 500 MILLIGRAM(S): 250 TABLET, FILM COATED ORAL at 11:04

## 2022-09-29 RX ADMIN — Medication 2: at 05:54

## 2022-09-29 RX ADMIN — PANTOPRAZOLE SODIUM 40 MILLIGRAM(S): 20 TABLET, DELAYED RELEASE ORAL at 11:04

## 2022-09-29 RX ADMIN — Medication 2: at 11:37

## 2022-09-29 RX ADMIN — HEPARIN SODIUM 5000 UNIT(S): 5000 INJECTION INTRAVENOUS; SUBCUTANEOUS at 05:56

## 2022-09-29 RX ADMIN — Medication 81 MILLIGRAM(S): at 11:04

## 2022-09-29 RX ADMIN — Medication 2: at 18:33

## 2022-09-29 RX ADMIN — HEPARIN SODIUM 5000 UNIT(S): 5000 INJECTION INTRAVENOUS; SUBCUTANEOUS at 18:34

## 2022-09-29 RX ADMIN — CHLORHEXIDINE GLUCONATE 1 APPLICATION(S): 213 SOLUTION TOPICAL at 05:55

## 2022-09-29 NOTE — DISCHARGE NOTE NURSING/CASE MANAGEMENT/SOCIAL WORK - PATIENT PORTAL LINK FT
You can access the FollowMyHealth Patient Portal offered by [facility] by registering at the following website: [email or web address]. By joining [facility]’s FollowMyHealth portal, you will also be able to view your health information using other applications (apps) compatible with our system.

## 2022-09-29 NOTE — DISCHARGE NOTE PROVIDER - NSDCCPCAREPLAN_GEN_ALL_CORE_FT
PRINCIPAL DISCHARGE DIAGNOSIS  Diagnosis: Thalamic infarction  Assessment and Plan of Treatment: - vasogenic edema demonstrated on MR head  - patient has no ability to perform ADL's or executive function  - he will be d/c to LTC with hospice services      SECONDARY DISCHARGE DIAGNOSES  Diagnosis: Stage 5 chronic kidney disease not on chronic dialysis  Assessment and Plan of Treatment: - required 2 treatments of HD but  no longer requires HD    Diagnosis: Hypernatremia  Assessment and Plan of Treatment: - resolved during the hospital stay    Diagnosis: Metabolic encephalopathy  Assessment and Plan of Treatment: - end of life care

## 2022-09-29 NOTE — PROGRESS NOTE ADULT - SUBJECTIVE AND OBJECTIVE BOX
SARAH JIMÉNEZ  MRN-[id number]    Follow Up:  AMS, fevers    Interval History: the pt was seen and examined earlier today, seems more awake, not alert. The pt is afebrile, on NC, no new lab work.     PAST MEDICAL & SURGICAL HISTORY:  Hypertension, unspecified type      Descending aortic aneurysm      Syncope, unspecified syncope type      Anxiety      Renal insufficiency      Bradycardia      H/O cardiac pacemaker  medtronic          ROS:    [ x] Unobtainable because: pt is not interactive   [ ] All other systems negative    Constitutional: no fever, no chills  Head: no trauma  Eyes: no vision changes, no eye pain  ENT:  no sore throat, no rhinorrhea  Cardiovascular:  no chest pain, no palpitation  Respiratory:  no SOB, no cough  GI:  no abd pain, no vomiting, no diarrhea  urinary: no dysuria, no hematuria, no flank pain  musculoskeletal:  no joint pain, no joint swelling  skin:  no rash  neurology:  no headache, no seizure, no change in mental status  psych: no anxiety, no depression         Allergies  No Known Allergies        ANTIMICROBIALS:      OTHER MEDS:  acetaminophen     Tablet .. 650 milliGRAM(s) Oral every 6 hours PRN  aspirin  chewable 81 milliGRAM(s) Enteral Tube daily  chlorhexidine 2% Cloths 1 Application(s) Topical <User Schedule>  glucagon  Injectable 1 milliGRAM(s) IntraMuscular once  heparin   Injectable 5000 Unit(s) SubCutaneous every 12 hours  insulin lispro (ADMELOG) corrective regimen sliding scale   SubCutaneous every 6 hours  levETIRAcetam  Solution 500 milliGRAM(s) Oral daily  pantoprazole   Suspension 40 milliGRAM(s) Enteral Tube daily      Vital Signs Last 24 Hrs  T(C): 36.5 (29 Sep 2022 12:00), Max: 36.9 (28 Sep 2022 17:00)  T(F): 97.7 (29 Sep 2022 12:00), Max: 98.5 (28 Sep 2022 17:00)  HR: 90 (29 Sep 2022 12:00) (90 - 105)  BP: 124/86 (29 Sep 2022 12:00) (115/77 - 141/91)  BP(mean): --  RR: 17 (29 Sep 2022 12:00) (17 - 18)  SpO2: 97% (29 Sep 2022 12:00) (95% - 97%)    Parameters below as of 29 Sep 2022 12:00  Patient On (Oxygen Delivery Method): nasal cannula        Physical Exam:  Constitutional: ill appearing , no acute distress, on NC  HEAD/EYES: anicteric, no conjunctival injection  ENT:  neck is not rigid, +NGT   Cardiovascular:   normal S1, S2, no murmur, mild edema, +PPM left chest wall   Respiratory:  diminished BS bilaterally, no rhonchi, no wheezes, no rales  GI:  soft, non-tender, normal bowel sounds  :  condom catheter   Musculoskeletal:  no synovitis  Neurologic: awake but not alert, not responding to questions or commands   Skin:  no rash, no erythema, no phlebitis  Heme/Onc: no lymphadenopathy   Psychiatric:  awake, unable     WBC Count: 15.86 K/uL (09-26 @ 08:35)  WBC Count: 20.70 K/uL (09-25 @ 06:55)  WBC Count: 17.61 K/uL (09-24 @ 07:55)  WBC Count: 21.95 K/uL (09-23 @ 06:00)      Creatinine Trend: 3.23<--, 3.69<--, 3.49<--, 4.42<--, 5.05<--, 5.37<--      MICROBIOLOGY:  v  Catheterized Catheterized  09-25-22   >100,000 CFU/ml Pseudomonas aeruginosa  --  Pseudomonas aeruginosa      .Blood Blood-Peripheral  09-25-22   No growth to date.  --  --      .Blood Blood-Peripheral  09-25-22   No growth to date.  --  --      .CSF CSF  09-22-22   Culture is being performed.  --    No polymorphonuclear cells seen  No organisms seen  by cytocentrifuge      .Blood Blood-Peripheral  09-21-22   No Growth Final  --  --      .Blood Blood-Peripheral  09-21-22   No Growth Final  --  --      .Sputum Sputum trap  09-12-22   Moderate Klebsiella pneumoniae  Moderate Enterobacter cloacae complex  Normal Respiratory Victoria present  --  Klebsiella pneumoniae  Enterobacter cloacae complex      Clean Catch Clean Catch (Midstream)  09-11-22   No growth  --  --      .Blood Blood-Peripheral  09-11-22   No Growth Final  --  --          HSV 1/2 PCR: NotDete (09-22 @ 15:14)        C-Reactive Protein, Serum: 228 (09-23)            SARS-CoV-2 Result: NotDete (09-28-22 @ 13:09)      RADIOLOGY:     SARAH JIMÉNEZ  MRN-[id number]    Follow Up:  AMS, fevers    Interval History: the pt was seen and examined earlier today, seems more awake, not alert. The pt is afebrile, on NC, no new lab work.     PAST MEDICAL & SURGICAL HISTORY:  Hypertension, unspecified type      Descending aortic aneurysm      Syncope, unspecified syncope type      Anxiety      Renal insufficiency      Bradycardia      H/O cardiac pacemaker  medtronic          ROS:    [ x] Unobtainable because: pt is not interactive   [ ] All other systems negative          Allergies  No Known Allergies        ANTIMICROBIALS:      OTHER MEDS:  acetaminophen     Tablet .. 650 milliGRAM(s) Oral every 6 hours PRN  aspirin  chewable 81 milliGRAM(s) Enteral Tube daily  chlorhexidine 2% Cloths 1 Application(s) Topical <User Schedule>  glucagon  Injectable 1 milliGRAM(s) IntraMuscular once  heparin   Injectable 5000 Unit(s) SubCutaneous every 12 hours  insulin lispro (ADMELOG) corrective regimen sliding scale   SubCutaneous every 6 hours  levETIRAcetam  Solution 500 milliGRAM(s) Oral daily  pantoprazole   Suspension 40 milliGRAM(s) Enteral Tube daily      Vital Signs Last 24 Hrs  T(C): 36.5 (29 Sep 2022 12:00), Max: 36.9 (28 Sep 2022 17:00)  T(F): 97.7 (29 Sep 2022 12:00), Max: 98.5 (28 Sep 2022 17:00)  HR: 90 (29 Sep 2022 12:00) (90 - 105)  BP: 124/86 (29 Sep 2022 12:00) (115/77 - 141/91)  BP(mean): --  RR: 17 (29 Sep 2022 12:00) (17 - 18)  SpO2: 97% (29 Sep 2022 12:00) (95% - 97%)    Parameters below as of 29 Sep 2022 12:00  Patient On (Oxygen Delivery Method): nasal cannula        Physical Exam:  Constitutional: ill appearing , no acute distress, on NC  HEAD/EYES: anicteric, no conjunctival injection  ENT:  neck is not rigid, +NGT   Cardiovascular:   normal S1, S2, no murmur, mild edema, +PPM left chest wall   Respiratory:  diminished BS bilaterally, no rhonchi, no wheezes, no rales  GI:  soft, non-tender, normal bowel sounds  :  condom catheter   Musculoskeletal:  no synovitis  Neurologic: awake but not alert, not responding to questions or commands   Skin:  no rash, no erythema, no phlebitis  Heme/Onc: no lymphadenopathy   Psychiatric:  awake, unable     WBC Count: 15.86 K/uL (09-26 @ 08:35)  WBC Count: 20.70 K/uL (09-25 @ 06:55)  WBC Count: 17.61 K/uL (09-24 @ 07:55)  WBC Count: 21.95 K/uL (09-23 @ 06:00)      Creatinine Trend: 3.23<--, 3.69<--, 3.49<--, 4.42<--, 5.05<--, 5.37<--      MICROBIOLOGY:  v  Catheterized Catheterized  09-25-22   >100,000 CFU/ml Pseudomonas aeruginosa  --  Pseudomonas aeruginosa      .Blood Blood-Peripheral  09-25-22   No growth to date.  --  --      .Blood Blood-Peripheral  09-25-22   No growth to date.  --  --      .CSF CSF  09-22-22   Culture is being performed.  --    No polymorphonuclear cells seen  No organisms seen  by cytocentrifuge      .Blood Blood-Peripheral  09-21-22   No Growth Final  --  --      .Blood Blood-Peripheral  09-21-22   No Growth Final  --  --      .Sputum Sputum trap  09-12-22   Moderate Klebsiella pneumoniae  Moderate Enterobacter cloacae complex  Normal Respiratory Victoria present  --  Klebsiella pneumoniae  Enterobacter cloacae complex      Clean Catch Clean Catch (Midstream)  09-11-22   No growth  --  --      .Blood Blood-Peripheral  09-11-22   No Growth Final  --  --          HSV 1/2 PCR: Fadi (09-22 @ 15:14)        C-Reactive Protein, Serum: 228 (09-23)            [facility] (09-28-22 @ 13:09)      RADIOLOGY:

## 2022-09-29 NOTE — DISCHARGE NOTE PROVIDER - HOSPITAL COURSE
Patient is a 81y old  Male who presents with a chief complaint of Encephalopathy, acute respiratory failure (28 Sep 2022 23:09)    HPI:  80yo M, PMH dementia, htn, TEVAR, PPM for syncopal bradycardia, ? sizures?and CKD (baseline Cr appears to be ~1.8?), on Eliquis (unclear why), BIBEMS to ED after being found unresponsive at home. On EMS arrival pt obtunded and not breathing. Pt intubated in the field. On arrival in ED, pt found to be Covid+. CTH with no acute pathology. CT chest with diffuse b/l GGO. ICU consulted for further management.  He has been weak and not eating since discharge from [facility] for unclear reasons.  He was normal with moving all his extremities and talking and moving with no issues just weak and had no apatite  (11 Sep 2022 21:45)  80yo M, PMH dementia, htn, TEVAR, PPM for syncopal bradycardia, ? seizures?and CKD (baseline Cr appears to be ~1.8?), on Eliquis (unclear why), BIBEMS to ED after being found unresponsive at home. On EMS arrival pt obtunded and not breathing.     9/28 - family meeting over the phone was held with the patient's HCP.  New diagnosis of thalamic stroke has been been discussed with her. The patient's overall poor prognosis has been discussed as well.  The patient's inability to perform executive functions, to care for himself, including his absent swallow function have been discussed.  Right now the family would want the patient to be made comfortable.  They do not wish for invasive procedures, PEG, or other interventions.  MOLST to be filled out today with the patient's wife.  Likely to be made dnr/dni with comfort measures only.  Family would like the patient to be d/c to LTC with hospice services     Problem/Plan - 1:  ·  Problem: Metabolic encephalopathy.   ·  Plan: - still requiring NG tube   - on the ddx is infection vs cva ( cant get MRI), vs vs vasculitis vs anoxic brain injury ( pt intubated in the field)   - eeg x2  shows no seizure  - CT head x2 shows no cva   - s/p  LP to rule infectious etiology. will include nmda receptor ab, west nile IgM     9/22 eeg shows no seizure activity. will consider MRI if I can get transfer to [facility] due to pacemaker as brain stem cva still possible   9/23 normal cell count on LP but has elevated protein and abd glucose with is nonspecific. covid? encepahlitis. will check esr/crp and if negative can make the diagnosis of cva more likely. will try to get MRI of the brain at [facility] as pacemaker seems to be compatible   9/24  no change in mental status. doubt bacterial meningitis here. ESR and crp very elevated making cva less likely but still needs to me ruled out. WIll need to send to [facility] to get mri and MRA  to [facility] for any signs of vasculitis.. covid encephalopathy?    9/25 febrile today and slight drop in BP. started empiric abx for now. will try to descalate when possible.  CXR unemarkable by my read but still could be microaspiration.  will hold feed for now. cxs takes. Plan is if patient remains stable is to call the transfer center to have the patient shuttled  to [facility] for MRI due to pacemaker. Will need to be in  coordination of mri supervisor Moise Allen ( [facility] aware)  and EP doctor.  WIll try to get MRI and MRA. If possible, it would be best to have the read done whiles [facility] at [facility] because if he has any signs of vasculitis on imaging it may be best for him to be treated at a tertiary center.     9/26 - afebrile x 24 hours, blood and urine cultures are pending, check MRSA/MSSA PCR, Vanco/Zosyn given.  Will dose Vanco by level for now CrCl  - plan to d/c abx if cultures are negative   - high-suspicion for west nile virus vs. Vasculitis  -  west nile serology pending   - discussed case with Dr. Loyola at [facility] to coordinate MRI/MRA head.    9/27 - MRI/MRA done [facility] MRN#: [id number].   - acute/subacute lacunar infarct within the left corona radiata with associated cytotoxic edema. ? normal pressure hydrocephalus  - plan for family conversation regarding GOC  - Viral PCR negative  - IVABX to be deescalated today    Problem/Plan - 2:  ·  Problem: 2019 novel coronavirus disease (COVID-19).   ·  Plan: - s/p remdesivir  - supportive care.    Problem/Plan - 3:  ·  Problem: Hypernatremia.   ·  Plan: - improved with d5   - continue to monitor bmp daily.    Problem/Plan - 4:  ·  Problem: Stage 5 chronic kidney disease not on chronic dialysis.   ·  Plan: - required several HD treatments  - now off HD  - gentle IVF  - 9/27 - off IVF

## 2022-09-29 NOTE — PROGRESS NOTE ADULT - NS ATTEND AMEND GEN_ALL_CORE FT
continue to monitor off antibiotics  no obvious source of infection to explain his signs and symptoms   CVA seen on MRI   family leaning towards comfort care   frequent turns, offloading, and nutrition optimization to avoid bedsores  ongoing discussion with family-likely DNR/DNI/LTC with hospice     Will sign off. Please call with questions.     Jaun Hernandez,   Infectious Disease Attending  Reachable via Microsoft Teams or ID office: [phone]  After 5pm/weekends please call [phone] for all inquiries and new consults
agree with above plan  monitor off antibiotics   follow all pending labs     Jaun Hernandez, DO  Infectious Disease Attending  Reachable via Microsoft Teams or ID office: [phone]  After 5pm/weekends please call [phone] for all inquiries and new consults
MRI brain reviewed: acute lacunar infarction   will need to coordinate with neurology   workup up thus far looking for infection vs metabolic causes vs vasculitic processes have been looked into

## 2022-09-29 NOTE — PROGRESS NOTE ADULT - ASSESSMENT
82yo M, PMH dementia, htn, TEVAR, PPM for syncopal bradycardia, ? sizures?and CKD (baseline Cr appears to be ~1.8?), on Eliquis (unclear why), BIBEMS to ED after being found unresponsive at home. On EMS arrival pt obtunded and not breathing. Pt intubated in the field. On arrival in ED, pt found to be Covid+. CTH with no acute pathology. CT chest with diffuse b/l GGO. Treated for Hypernatremia and has been started on HD 9/13.  Possible uremic encephalopathy, necessitating HD initiation. HD cath removed 9/19  for line free days.  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 Could he have suffered another CVA?   His wbc has increased drastically, blood cultures sent   at this juncture patient needs a lumbar puncture   can also rule out syphilis   needs MRI if his PPM is MRI compatible  Patient has a [employer] CLS [employer]ora pacemaker( asked wife to bring the card in but PPM was placed in [facility] by Dr. Davis Saldana)    9/22: found out from his EP that cardiac device is MRI compatible just needs to be set to MRI mode, LP today, may need to go to [facility] for MRI brain, will follow all LP results, wbc better today   9/23: no fever, on NC, WBC elevated 21.95, Cr a little better 4.42, BCs with no growth to date, CSF PCR negative, no HSV 1/2, off abx, pending MRI  9/26: fevers yesterday, no fevers today, on NC, WBC better 15.86, Cr 3.23, CSF culture with no growth, additional testing is pending, MRI is pending. The pt was started on Vancomycin IV and Meropenem IV over the weekend due to high temperatures, cultures are collected, will continue with abx for now while awaiting for new culture data.    9/27: no fevers since 9/25, no new lab work, Vancomycin was stopped by medicine, Meropenem IV continued. Will continue abx for now empirically, MRI pending - awaiting for transfer to the tertiary facility for the imaging study.   WNV CSF Igg/Igm - negative, CSF culture with no growth, fungal and AFB cultures are in progress of being tested, BCs preliminary with no growth, UC grew pseudomonas, sensitivity is pending.   9/28: remains afebrile, no new lab work, MR brain performed - Acute/subacute lacunar infarction within the left corona radiata with associated cytotoxic edema. UC grew pseudomonas, received three days of Meropenem, sensitive, abx now stopped. BCs with no growth to date, CSF culture NGTD as well.   Please note, the pt has an alternate MR# in the system: [id number].   Attending addendum:  MRI brain reviewed: acute lacunar infarction   will need to coordinate with neurology   workup up thus far looking for infection vs metabolic causes vs vasculitic processes have been looked into  9/29: no fevers, no new lab work, all BCs and CSF culture with no growth to date, CSF fungal culture and AFB are testing, pt is off abx.     Plan:  MR brain - acute/subacute stroke   continue to monitor off abx   follow all CSF studies   s/p LP 9/22  cell count - 0  glucose 104  protein 111  routine gram stain and culture - no growth to date  CSF PCR panel - negative  fungal Culture - testing   AFB culture - testing, unable to perform smear  CSF crypt antigen - negative  Syphilis screen negative   WNV CSF Igg Igm negative   HIV negative   EEG - abnormal   vitamin B12 >2000, folate 6.6,   TSH and free T4 reviewed   trend wbc and creatinine  follow renal function   follow WBC  please keep HOB elevated, pt is at risk for aspiration

## 2022-09-29 NOTE — DISCHARGE NOTE NURSING/CASE MANAGEMENT/SOCIAL WORK - NSDCPEFALRISK_GEN_ALL_CORE
For information on Fall & Injury Prevention, visit: [email or web address] OR  [email or web address] OR  https://www.cdc.gov/steadi/patient.html

## 2022-09-30 LAB
ANION GAP SERPL CALC-SCNC: 5 MMOL/L — SIGNIFICANT CHANGE UP (ref 5–17)
BUN SERPL-MCNC: 75 MG/DL — HIGH (ref 7–23)
CALCIUM SERPL-MCNC: 10.2 MG/DL — HIGH (ref 8.5–10.1)
CHLORIDE SERPL-SCNC: 111 MMOL/L — HIGH (ref 96–108)
CO2 SERPL-SCNC: 28 MMOL/L — SIGNIFICANT CHANGE UP (ref 22–31)
CREAT SERPL-MCNC: 2.4 MG/DL — HIGH (ref 0.5–1.3)
CULTURE RESULTS: SIGNIFICANT CHANGE UP
CULTURE RESULTS: SIGNIFICANT CHANGE UP
EGFR: 27 ML/MIN/1.73M2 — LOW
GLUCOSE BLDC GLUCOMTR-MCNC: 144 MG/DL — HIGH (ref 70–99)
GLUCOSE BLDC GLUCOMTR-MCNC: 160 MG/DL — HIGH (ref 70–99)
GLUCOSE BLDC GLUCOMTR-MCNC: 167 MG/DL — HIGH (ref 70–99)
GLUCOSE BLDC GLUCOMTR-MCNC: 180 MG/DL — HIGH (ref 70–99)
GLUCOSE SERPL-MCNC: 161 MG/DL — HIGH (ref 70–99)
HCT VFR BLD CALC: 30.9 % — LOW (ref 39–50)
HGB BLD-MCNC: 9.4 G/DL — LOW (ref 13–17)
MCHC RBC-ENTMCNC: 30.4 G/DL — LOW (ref 32–36)
MCHC RBC-ENTMCNC: 30.6 PG — SIGNIFICANT CHANGE UP (ref 27–34)
MCV RBC AUTO: 100.7 FL — HIGH (ref 80–100)
NRBC # BLD: 0 /100 WBCS — SIGNIFICANT CHANGE UP (ref 0–0)
PLATELET # BLD AUTO: 272 K/UL — SIGNIFICANT CHANGE UP (ref 150–400)
POTASSIUM SERPL-MCNC: 4.2 MMOL/L — SIGNIFICANT CHANGE UP (ref 3.5–5.3)
POTASSIUM SERPL-SCNC: 4.2 MMOL/L — SIGNIFICANT CHANGE UP (ref 3.5–5.3)
RBC # BLD: 3.07 M/UL — LOW (ref 4.2–5.8)
RBC # FLD: 14.4 % — SIGNIFICANT CHANGE UP (ref 10.3–14.5)
SODIUM SERPL-SCNC: 144 MMOL/L — SIGNIFICANT CHANGE UP (ref 135–145)
SPECIMEN SOURCE: SIGNIFICANT CHANGE UP
SPECIMEN SOURCE: SIGNIFICANT CHANGE UP
WBC # BLD: 13.99 K/UL — HIGH (ref 3.8–10.5)
WBC # FLD AUTO: 13.99 K/UL — HIGH (ref 3.8–10.5)

## 2022-09-30 PROCEDURE — 99232 SBSQ HOSP IP/OBS MODERATE 35: CPT

## 2022-09-30 RX ADMIN — CHLORHEXIDINE GLUCONATE 1 APPLICATION(S): 213 SOLUTION TOPICAL at 06:16

## 2022-09-30 RX ADMIN — Medication 2: at 23:35

## 2022-09-30 RX ADMIN — LEVETIRACETAM 500 MILLIGRAM(S): 250 TABLET, FILM COATED ORAL at 12:07

## 2022-09-30 RX ADMIN — HEPARIN SODIUM 5000 UNIT(S): 5000 INJECTION INTRAVENOUS; SUBCUTANEOUS at 18:25

## 2022-09-30 RX ADMIN — Medication 81 MILLIGRAM(S): at 12:07

## 2022-09-30 RX ADMIN — Medication 2: at 00:12

## 2022-09-30 RX ADMIN — HEPARIN SODIUM 5000 UNIT(S): 5000 INJECTION INTRAVENOUS; SUBCUTANEOUS at 06:15

## 2022-09-30 RX ADMIN — Medication 2: at 18:25

## 2022-09-30 RX ADMIN — PANTOPRAZOLE SODIUM 40 MILLIGRAM(S): 20 TABLET, DELAYED RELEASE ORAL at 12:07

## 2022-09-30 RX ADMIN — Medication 2: at 12:06

## 2022-10-01 LAB
ANION GAP SERPL CALC-SCNC: 7 MMOL/L — SIGNIFICANT CHANGE UP (ref 5–17)
BUN SERPL-MCNC: 76 MG/DL — HIGH (ref 7–23)
CALCIUM SERPL-MCNC: 10.4 MG/DL — HIGH (ref 8.5–10.1)
CHLORIDE SERPL-SCNC: 109 MMOL/L — HIGH (ref 96–108)
CO2 SERPL-SCNC: 27 MMOL/L — SIGNIFICANT CHANGE UP (ref 22–31)
CREAT SERPL-MCNC: 2.48 MG/DL — HIGH (ref 0.5–1.3)
EGFR: 26 ML/MIN/1.73M2 — LOW
GLUCOSE BLDC GLUCOMTR-MCNC: 148 MG/DL — HIGH (ref 70–99)
GLUCOSE BLDC GLUCOMTR-MCNC: 159 MG/DL — HIGH (ref 70–99)
GLUCOSE BLDC GLUCOMTR-MCNC: 166 MG/DL — HIGH (ref 70–99)
GLUCOSE BLDC GLUCOMTR-MCNC: 201 MG/DL — HIGH (ref 70–99)
GLUCOSE SERPL-MCNC: 157 MG/DL — HIGH (ref 70–99)
HCT VFR BLD CALC: 30.5 % — LOW (ref 39–50)
HGB BLD-MCNC: 9.3 G/DL — LOW (ref 13–17)
MCHC RBC-ENTMCNC: 30.5 G/DL — LOW (ref 32–36)
MCHC RBC-ENTMCNC: 31.5 PG — SIGNIFICANT CHANGE UP (ref 27–34)
MCV RBC AUTO: 103.4 FL — HIGH (ref 80–100)
NRBC # BLD: 0 /100 WBCS — SIGNIFICANT CHANGE UP (ref 0–0)
PLATELET # BLD AUTO: 287 K/UL — SIGNIFICANT CHANGE UP (ref 150–400)
POTASSIUM SERPL-MCNC: 4.1 MMOL/L — SIGNIFICANT CHANGE UP (ref 3.5–5.3)
POTASSIUM SERPL-SCNC: 4.1 MMOL/L — SIGNIFICANT CHANGE UP (ref 3.5–5.3)
RBC # BLD: 2.95 M/UL — LOW (ref 4.2–5.8)
RBC # FLD: 14.5 % — SIGNIFICANT CHANGE UP (ref 10.3–14.5)
SODIUM SERPL-SCNC: 143 MMOL/L — SIGNIFICANT CHANGE UP (ref 135–145)
WBC # BLD: 13.06 K/UL — HIGH (ref 3.8–10.5)
WBC # FLD AUTO: 13.06 K/UL — HIGH (ref 3.8–10.5)

## 2022-10-01 PROCEDURE — 99232 SBSQ HOSP IP/OBS MODERATE 35: CPT

## 2022-10-01 RX ADMIN — Medication 81 MILLIGRAM(S): at 12:28

## 2022-10-01 RX ADMIN — LEVETIRACETAM 500 MILLIGRAM(S): 250 TABLET, FILM COATED ORAL at 12:28

## 2022-10-01 RX ADMIN — Medication 2: at 05:14

## 2022-10-01 RX ADMIN — Medication 2: at 12:26

## 2022-10-01 RX ADMIN — HEPARIN SODIUM 5000 UNIT(S): 5000 INJECTION INTRAVENOUS; SUBCUTANEOUS at 18:26

## 2022-10-01 RX ADMIN — HEPARIN SODIUM 5000 UNIT(S): 5000 INJECTION INTRAVENOUS; SUBCUTANEOUS at 05:14

## 2022-10-01 RX ADMIN — Medication 4: at 21:43

## 2022-10-01 RX ADMIN — CHLORHEXIDINE GLUCONATE 1 APPLICATION(S): 213 SOLUTION TOPICAL at 05:14

## 2022-10-01 RX ADMIN — PANTOPRAZOLE SODIUM 40 MILLIGRAM(S): 20 TABLET, DELAYED RELEASE ORAL at 12:28

## 2022-10-01 NOTE — PROGRESS NOTE ADULT - SUBJECTIVE AND OBJECTIVE BOX
Patient is a 81y old  Male who presents with a chief complaint of Encephalopathy, acute respiratory failure (29 Sep 2022 14:54)    HPI:  80yo M, PMH dementia, htn, TEVAR, PPM for syncopal bradycardia, ? sizures?and CKD (baseline Cr appears to be ~1.8?), on Eliquis (unclear why), BIBEMS to ED after being found unresponsive at home. On EMS arrival pt obtunded and not breathing. Pt intubated in the field. On arrival in ED, pt found to be Covid+. CTH with no acute pathology. CT chest with diffuse b/l GGO. ICU consulted for further management.      He has been weak and not eating since discharge from [place] for unclear reasons.  He was normal with moving all his extremities and talking and moving with no issues just weak and had no apatite  (11 Sep 2022 21:45)    SUBJECTIVE & OBJECTIVE: Pt seen and examined at bedside.   PHYSICAL EXAM:  T(C): 36.9 (10-01-22 @ 12:23), Max: 37.1 (09-30-22 @ 17:00)  HR: 93 (10-01-22 @ 12:23) (91 - 93)  BP: 151/105 (10-01-22 @ 12:23) (130/83 - 151/105)  RR: 20 (10-01-22 @ 12:23) (16 - 20)  SpO2: 95% (10-01-22 @ 12:23) (95% - 96%) Daily     Daily I&O's Detail    30 Sep 2022 07:01  -  01 Oct 2022 07:00  --------------------------------------------------------  IN:  Total IN: 0 mL    OUT:    Voided (mL): 550 mL  Total OUT: 550 mL    Total NET: -550 mL        GENERAL: obtunded and chronically ill appearing   HEAD:  Atraumatic, Normocephalic  EYES: EOMI, PERRLA, conjunctiva and sclera clear  ENMT: Moist mucous membranes  NECK: Supple, No JVD  NERVOUS SYSTEM:  obtunded, Motor Strength unassessed  CHEST/LUNG: Clear to auscultation bilaterally; No rales, rhonchi, wheezing, or rubs  HEART: Regular rate and rhythm; No murmurs, rubs, or gallops  ABDOMEN: Soft, Nontender, Nondistended; Bowel sounds present  EXTREMITIES:  2+ Peripheral Pulses, No clubbing, cyanosis, or edema  LABS:                        9.3    13.06 )-----------( 287      ( 01 Oct 2022 06:20 )             30.5   CAPILLARY BLOOD GLUCOSE      POCT Blood Glucose.: 159 mg/dL (01 Oct 2022 11:47)  POCT Blood Glucose.: 166 mg/dL (01 Oct 2022 05:11)  POCT Blood Glucose.: 167 mg/dL (30 Sep 2022 23:27)  POCT Blood Glucose.: 180 mg/dL (30 Sep 2022 17:59)    RECENT CULTURES:   RADIOLOGY & ADDITIONAL TESTS:

## 2022-10-01 NOTE — PROGRESS NOTE ADULT - ASSESSMENT
80yo M, PMH dementia, htn, TEVAR, PPM for syncopal bradycardia, ? seizures?and CKD (baseline Cr appears to be ~1.8?), on Eliquis (unclear why), BIBEMS to ED after being found unresponsive at home. On EMS arrival pt obtunded and not breathing.     9/28 - family meeting over the phone was held with the patient's HCP.  New diagnosis of thalamic stroke has been been discussed with her. The patient's overall poor prognosis has been discussed as well.  The patient's inability to perform executive functions, to care for himself, including his absent swallow function have been discussed.  Right now the family would want the patient to be made comfortable.  They do not wish for invasive procedures, PEG, or other interventions.  MOLST to be filled out today with the patient's wife.  Likely to be made dnr/dni with comfort measures only.  Family would like the patient to be d/c to LTC with hospice services     Problem/Plan - 1:  ·  Problem: Metabolic encephalopathy.   ·  Plan: - still requiring NG tube   - on the ddx is infection vs cva ( cant get MRI), vs vs vasculitis vs anoxic brain injury ( pt intubated in the field)   - eeg x2  shows no seizure  - CT head x2 shows no cva   - s/p  LP to rule infectious etiology. will include nmda receptor ab, west nile IgM     9/22 eeg shows no seizure activity. will consider MRI if I can get transfer to [facility] due to pacemaker as brain stem cva still possible   9/23 normal cell count on LP but has elevated protein and abd glucose with is nonspecific. covid? encepahlitis. will check esr/crp and if negative can make the diagnosis of cva more likely. will try to get MRI of the brain at [facility] as pacemaker seems to be compatible   9/24  no change in mental status. doubt bacterial meningitis here. ESR and crp very elevated making cva less likely but still needs to me ruled out. WIll need to send to [facility] to get mri and MRA  to eval for any signs of vasculitis.. covid encephalopathy?    9/25 febrile today and slight drop in BP. started empiric abx for now. will try to descalate when possible.  CXR unemarkable by my read but still could be microaspiration.  will hold feed for now. cxs takes. Plan is if patient remains stable is to call the transfer center to have the patient shuttled  to [facility] for MRI due to pacemaker. Will need to be in  coordination of mri supervisor Moise Allen ( hes aware)  and EP doctor.  WIll try to get MRI and MRA. If possible, it would be best to have the read done whiles hes at [facility] because if he has any signs of vasculitis on imaging it may be best for him to be treated at a tertiary center.     9/26 - afebrile x 24 hours, blood and urine cultures are pending, check MRSA/MSSA PCR, Vanco/Zosyn given.  Will dose Vanco by level for now CrCl  - plan to d/c abx if cultures are negative   - high-suspicion for west nile virus vs. Vasculitis  -  west nile serology pending   - discussed case with Dr. Loyola at [facility] to coordinate MRI/MRA head.    9/27 - MRI/MRA done [facility] MRN#: [id number].   - acute/subacute lacunar infarct within the left corona radiata with associated cytotoxic edema. ? normal pressure hydrocephalus  - plan for family conversation regarding GOC  - Viral PCR negative  - IVABX to be deescalated today\    10/1 - d.c planning for hospice off abx    Problem/Plan - 2:  ·  Problem: 2019 novel coronavirus disease (COVID-19).   ·  Plan: - s/p remdesivir  - supportive care.    Problem/Plan - 3:  ·  Problem: Hypernatremia.   ·  Plan: - improved with d5   - continue to monitor bmp daily.    Problem/Plan - 4:  ·  Problem: Stage 5 chronic kidney disease not on chronic dialysis.   ·  Plan: - required several HD treatments  - now off HD  - gentle IVF  - 9/27 - off IVF

## 2022-10-02 LAB
GLUCOSE BLDC GLUCOMTR-MCNC: 144 MG/DL — HIGH (ref 70–99)
GLUCOSE BLDC GLUCOMTR-MCNC: 158 MG/DL — HIGH (ref 70–99)
GLUCOSE BLDC GLUCOMTR-MCNC: 162 MG/DL — HIGH (ref 70–99)
GLUCOSE BLDC GLUCOMTR-MCNC: 175 MG/DL — HIGH (ref 70–99)
GLUCOSE BLDC GLUCOMTR-MCNC: 198 MG/DL — HIGH (ref 70–99)

## 2022-10-02 PROCEDURE — 99232 SBSQ HOSP IP/OBS MODERATE 35: CPT

## 2022-10-02 RX ADMIN — Medication 650 MILLIGRAM(S): at 18:27

## 2022-10-02 RX ADMIN — CHLORHEXIDINE GLUCONATE 1 APPLICATION(S): 213 SOLUTION TOPICAL at 05:47

## 2022-10-02 RX ADMIN — Medication 2: at 23:40

## 2022-10-02 RX ADMIN — Medication 2: at 06:37

## 2022-10-02 RX ADMIN — HEPARIN SODIUM 5000 UNIT(S): 5000 INJECTION INTRAVENOUS; SUBCUTANEOUS at 18:27

## 2022-10-02 RX ADMIN — LEVETIRACETAM 500 MILLIGRAM(S): 250 TABLET, FILM COATED ORAL at 11:47

## 2022-10-02 RX ADMIN — PANTOPRAZOLE SODIUM 40 MILLIGRAM(S): 20 TABLET, DELAYED RELEASE ORAL at 11:48

## 2022-10-02 RX ADMIN — Medication 2: at 18:28

## 2022-10-02 RX ADMIN — HEPARIN SODIUM 5000 UNIT(S): 5000 INJECTION INTRAVENOUS; SUBCUTANEOUS at 05:47

## 2022-10-02 RX ADMIN — Medication 81 MILLIGRAM(S): at 11:46

## 2022-10-02 NOTE — PROGRESS NOTE ADULT - ASSESSMENT
82yo M, PMH dementia, htn, TEVAR, PPM for syncopal bradycardia, ? seizures?and CKD (baseline Cr appears to be ~1.8?), on Eliquis (unclear why), BIBEMS to ED after being found unresponsive at home. On EMS arrival pt obtunded and not breathing.     9/28 - family meeting over the phone was held with the patient's HCP.  New diagnosis of thalamic stroke has been been discussed with her. The patient's overall poor prognosis has been discussed as well.  The patient's inability to perform executive functions, to care for himself, including his absent swallow function have been discussed.  Right now the family would want the patient to be made comfortable.  They do not wish for invasive procedures, PEG, or other interventions.  MOLST to be filled out today with the patient's wife.  Likely to be made dnr/dni with comfort measures only.  Family would like the patient to be d/c to LTC with hospice services     Problem/Plan - 1:  ·  Problem: Metabolic encephalopathy.   ·  Plan: - still requiring NG tube   - on the ddx is infection vs cva ( cant get MRI), vs vs vasculitis vs anoxic brain injury ( pt intubated in the field)   - eeg x2  shows no seizure  - CT head x2 shows no cva   - s/p  LP to rule infectious etiology. will include nmda receptor ab, west nile IgM     9/22 eeg shows no seizure activity. will consider MRI if I can get transfer to [facility] due to pacemaker as brain stem cva still possible   9/23 normal cell count on LP but has elevated protein and abd glucose with is nonspecific. covid? encepahlitis. will check esr/crp and if negative can make the diagnosis of cva more likely. will try to get MRI of the brain at [facility] as pacemaker seems to be compatible   9/24  no change in mental status. doubt bacterial meningitis here. ESR and crp very elevated making cva less likely but still needs to me ruled out. WIll need to send to [facility] to get mri and MRA  to eval for any signs of vasculitis.. covid encephalopathy?    9/25 febrile today and slight drop in BP. started empiric abx for now. will try to descalate when possible.  CXR unemarkable by my read but still could be microaspiration.  will hold feed for now. cxs takes. Plan is if patient remains stable is to call the transfer center to have the patient shuttled  to [facility] for MRI due to pacemaker. Will need to be in  coordination of mri supervisor Moise Allen ( hes aware)  and EP doctor.  WIll try to get MRI and MRA. If possible, it would be best to have the read done whiles hes at [facility] because if he has any signs of vasculitis on imaging it may be best for him to be treated at a tertiary center.     9/26 - afebrile x 24 hours, blood and urine cultures are pending, check MRSA/MSSA PCR, Vanco/Zosyn given.  Will dose Vanco by level for now CrCl  - plan to d/c abx if cultures are negative   - high-suspicion for west nile virus vs. Vasculitis  -  west nile serology pending   - discussed case with Dr. Loyola at [facility] to coordinate MRI/MRA head.    9/27 - MRI/MRA done [facility] MRN#: [id number].   - acute/subacute lacunar infarct within the left corona radiata with associated cytotoxic edema. ? normal pressure hydrocephalus  - plan for family conversation regarding GOC  - Viral PCR negative  - IVABX to be deescalated today\par  10/1 - d.c planning for hospice off abx    Problem/Plan - 2:  ·  Problem: 2019 novel coronavirus disease (COVID-19).   ·  Plan: - s/p remdesivir  - supportive care.    Problem/Plan - 3:  ·  Problem: Hypernatremia.   ·  Plan: - improved with d5   - continue to monitor bmp daily.    Problem/Plan - 4:  ·  Problem: Stage 5 chronic kidney disease not on chronic dialysis.   ·  Plan: - required several HD treatments  - now off HD  - gentle IVF  - 9/27 - off IVF

## 2022-10-02 NOTE — PROGRESS NOTE ADULT - SUBJECTIVE AND OBJECTIVE BOX
Patient is a 81y old  Male who presents with a chief complaint of Encephalopathy, acute respiratory failure (01 Oct 2022 13:58)    HPI:  80yo M, PMH dementia, htn, TEVAR, PPM for syncopal bradycardia, ? sizures?and CKD (baseline Cr appears to be ~1.8?), on Eliquis (unclear why), BIBEMS to ED after being found unresponsive at home. On EMS arrival pt obtunded and not breathing. Pt intubated in the field. On arrival in ED, pt found to be Covid+. CTH with no acute pathology. CT chest with diffuse b/l GGO. ICU consulted for further management.      He has been weak and not eating since discharge from [facility] [place] for unclear reasons.  He was normal with moving all his extremities and talking and moving with no issues just weak and had no apatite  (11 Sep 2022 21:45)    SUBJECTIVE & OBJECTIVE: Pt seen and examined at bedside.   PHYSICAL EXAM:  T(C): 36.9 (10-03-22 @ 05:08), Max: 36.9 (10-03-22 @ 05:08)  HR: 98 (10-03-22 @ 05:08) (98 - 98)  BP: 132/85 (10-03-22 @ 05:08) (121/81 - 132/85)  RR: 17 (10-03-22 @ 05:08) (17 - 20)  SpO2: 95% (10-03-22 @ 05:08) (94% - 95%) Daily     Daily I&O's Detail    02 Oct 2022 07:01  -  03 Oct 2022 07:00  --------------------------------------------------------  IN:  Total IN: 0 mL    OUT:    Voided (mL): 1050 mL  Total OUT: 1050 mL    Total NET: -1050 mL        GENERAL:obtunded, not responsive  HEAD:  Atraumatic, Normocephalic  EYES: EOMI, PERRLA, conjunctiva and sclera clear  ENMT: Moist mucous membranes  NECK: Supple, No JVD  NERVOUS SYSTEM: obtuned  CHEST/LUNG: Clear to auscultation bilaterally; No rales, rhonchi, wheezing, or rubs  HEART: Regular rate and rhythm; No murmurs, rubs, or gallops  ABDOMEN: Soft, Nontender, Nondistended; Bowel sounds present  EXTREMITIES:  2+ Peripheral Pulses, No clubbing, cyanosis, or edema  LABS:  CAPILLARY BLOOD GLUCOSE      POCT Blood Glucose.: 129 mg/dL (03 Oct 2022 05:52)  POCT Blood Glucose.: 175 mg/dL (02 Oct 2022 23:30)  POCT Blood Glucose.: 198 mg/dL (02 Oct 2022 21:35)  POCT Blood Glucose.: 162 mg/dL (02 Oct 2022 16:16)  POCT Blood Glucose.: 144 mg/dL (02 Oct 2022 11:42)    RECENT CULTURES:   RADIOLOGY & ADDITIONAL TESTS:

## 2022-10-03 LAB
GLUCOSE BLDC GLUCOMTR-MCNC: 129 MG/DL — HIGH (ref 70–99)
GLUCOSE BLDC GLUCOMTR-MCNC: 161 MG/DL — HIGH (ref 70–99)
GLUCOSE BLDC GLUCOMTR-MCNC: 162 MG/DL — HIGH (ref 70–99)

## 2022-10-03 PROCEDURE — 99497 ADVNCD CARE PLAN 30 MIN: CPT

## 2022-10-03 PROCEDURE — 99232 SBSQ HOSP IP/OBS MODERATE 35: CPT

## 2022-10-03 RX ADMIN — Medication 81 MILLIGRAM(S): at 12:38

## 2022-10-03 RX ADMIN — PANTOPRAZOLE SODIUM 40 MILLIGRAM(S): 20 TABLET, DELAYED RELEASE ORAL at 12:38

## 2022-10-03 RX ADMIN — LEVETIRACETAM 500 MILLIGRAM(S): 250 TABLET, FILM COATED ORAL at 12:38

## 2022-10-03 RX ADMIN — HEPARIN SODIUM 5000 UNIT(S): 5000 INJECTION INTRAVENOUS; SUBCUTANEOUS at 05:54

## 2022-10-03 RX ADMIN — Medication 2: at 12:39

## 2022-10-03 RX ADMIN — Medication 2: at 18:54

## 2022-10-03 RX ADMIN — CHLORHEXIDINE GLUCONATE 1 APPLICATION(S): 213 SOLUTION TOPICAL at 05:54

## 2022-10-03 RX ADMIN — HEPARIN SODIUM 5000 UNIT(S): 5000 INJECTION INTRAVENOUS; SUBCUTANEOUS at 18:54

## 2022-10-03 NOTE — PROGRESS NOTE ADULT - ASSESSMENT
82yo M, PMH dementia, htn, TEVAR, PPM for syncopal bradycardia, ? seizures?and CKD (baseline Cr appears to be ~1.8?), on Eliquis (unclear why), BIBEMS to ED after being found unresponsive at home. On EMS arrival pt obtunded and not breathing.     9/28 - family meeting over the phone was held with the patient's HCP.  New diagnosis of thalamic stroke has been been discussed with her. The patient's overall poor prognosis has been discussed as well.  The patient's inability to perform executive functions, to care for himself, including his absent swallow function have been discussed.  Right now the family would want the patient to be made comfortable.  They do not wish for invasive procedures, PEG, or other interventions.  MOLST to be filled out today with the patient's wife.  Likely to be made dnr/dni with comfort measures only.  Family would like the patient to be d/c to LTC with hospice services     Problem/Plan - 1:  ·  Problem: Metabolic encephalopathy.   ·  Plan: - still requiring NG tube   - on the ddx is infection vs cva ( cant get MRI), vs vs vasculitis vs anoxic brain injury ( pt intubated in the field)   - eeg x2  shows no seizure  - CT head x2 shows no cva   - s/p  LP to rule infectious etiology. will include nmda receptor ab, west nile IgM     9/22 eeg shows no seizure activity. will consider MRI if I can get transfer to [facility] due to pacemaker as brain stem cva still possible   9/23 normal cell count on LP but has elevated protein and abd glucose with is nonspecific. covid? encepahlitis. will check esr/crp and if negative can make the diagnosis of cva more likely. will try to get MRI of the brain at [facility] as pacemaker seems to be compatible   9/24  no change in mental status. doubt bacterial meningitis here. ESR and crp very elevated making cva less likely but still needs to me ruled out. WIll need to send to [facility] to get mri and MRA  to eval for any signs of vasculitis.. covid encephalopathy?    9/25 febrile today and slight drop in BP. started empiric abx for now. will try to descalate when possible.  CXR unemarkable by my read but still could be microaspiration.  will hold feed for now. cxs takes. Plan is if patient remains stable is to call the transfer center to have the patient shuttled  to [facility] for MRI due to pacemaker. Will need to be in  coordination of mri supervisor Moise Allen ( hes aware)  and EP doctor.  WIll try to get MRI and MRA. If possible, it would be best to have the read done whiles hes at [facility] because if he has any signs of vasculitis on imaging it may be best for him to be treated at a tertiary center.     9/26 - afebrile x 24 hours, blood and urine cultures are pending, check MRSA/MSSA PCR, Vanco/Zosyn given.  Will dose Vanco by level for now CrCl  - plan to d/c abx if cultures are negative   - high-suspicion for west nile virus vs. Vasculitis  -  west nile serology pending   - discussed case with Dr. Loyola at [facility] to coordinate MRI/MRA head.    9/27 - MRI/MRA done [facility] MRN#: [id number].   - acute/subacute lacunar infarct within the left corona radiata with associated cytotoxic edema. ? normal pressure hydrocephalus  - plan for family conversation regarding GOC  - Viral PCR negative  - IVABX to be deescalated today    10/1 - 10/3 - d.c planning for hospice off abx    Problem/Plan - 2:  ·  Problem: 2019 novel coronavirus disease (COVID-19).   ·  Plan: - s/p remdesivir  - supportive care.    Problem/Plan - 3:  ·  Problem: Hypernatremia.   ·  Plan: - improved with d5   - continue to monitor bmp daily.    Problem/Plan - 4:  ·  Problem: Stage 5 chronic kidney disease not on chronic dialysis.   ·  Plan: - required several HD treatments  - now off HD  - gentle IVF  - 9/27 - off IVF

## 2022-10-03 NOTE — PROGRESS NOTE ADULT - SUBJECTIVE AND OBJECTIVE BOX
Patient is a 81y old  Male who presents with a chief complaint of Encephalopathy, acute respiratory failure (02 Oct 2022 11:04)    HPI:  82yo M, PMH dementia, htn, TEVAR, PPM for syncopal bradycardia, ? sizures?and CKD (baseline Cr appears to be ~1.8?), on Eliquis (unclear why), BIBEMS to ED after being found unresponsive at home. On EMS arrival pt obtunded and not breathing. Pt intubated in the field. On arrival in ED, pt found to be Covid+. CTH with no acute pathology. CT chest with diffuse b/l GGO. ICU consulted for further management.      He has been weak and not eating since discharge from [place] for unclear reasons.  He was normal with moving all his extremities and talking and moving with no issues just weak and had no apatite  (11 Sep 2022 21:45)    SUBJECTIVE & OBJECTIVE: Pt seen and examined at bedside.   PHYSICAL EXAM:  T(C): 36.6 (10-03-22 @ 11:06), Max: 36.9 (10-03-22 @ 05:08)  HR: 104 (10-03-22 @ 11:06) (98 - 104)  BP: 121/81 (10-03-22 @ 11:06) (121/81 - 132/85)  RR: 20 (10-03-22 @ 11:06) (17 - 20)  SpO2: 94% (10-03-22 @ 11:06) (94% - 95%) Daily     Daily I&O's Detail    02 Oct 2022 07:01  -  03 Oct 2022 07:00  --------------------------------------------------------  IN:  Total IN: 0 mL    OUT:    Voided (mL): 1050 mL  Total OUT: 1050 mL    Total NET: -1050 mL      03 Oct 2022 07:01  -  03 Oct 2022 15:14  --------------------------------------------------------  IN:  Total IN: 0 mL    OUT:    Voided (mL): 800 mL  Total OUT: 800 mL    Total NET: -800 mL        GENERAL: obtunded, and not responsive  HEAD:  Atraumatic, Normocephalic  EYES: EOMI, PERRLA, conjunctiva and sclera clear  ENMT: Moist mucous membranes  NECK: Supple, No JVD  NERVOUS SYSTEM: obtunded, non-verbal non-responsive   CHEST/LUNG: Coarse breath   HEART: Regular rate and rhythm; No murmurs, rubs, or gallops  ABDOMEN: Soft, Nontender, Nondistended; Bowel sounds present  EXTREMITIES:  2+ Peripheral Pulses, No clubbing, cyanosis, or edema  LABS:  CAPILLARY BLOOD GLUCOSE      POCT Blood Glucose.: 162 mg/dL (03 Oct 2022 11:48)  POCT Blood Glucose.: 129 mg/dL (03 Oct 2022 05:52)  POCT Blood Glucose.: 175 mg/dL (02 Oct 2022 23:30)  POCT Blood Glucose.: 198 mg/dL (02 Oct 2022 21:35)  POCT Blood Glucose.: 162 mg/dL (02 Oct 2022 16:16)    RECENT CULTURES:   RADIOLOGY & ADDITIONAL TESTS:

## 2022-10-03 NOTE — GOALS OF CARE CONVERSATION - ADVANCED CARE PLANNING - CONVERSATION DETAILS
80yo M, PMH dementia, htn, TEVAR, PPM for syncopal bradycardia, ? sizures?and CKD (baseline Cr appears to be ~1.8?), on Eliquis (unclear why), BIBEMS to ED after being found unresponsive at home. On EMS arrival pt obtunded and not breathing. Pt intubated in the field. On arrival in ED, pt found to be Covid+. CTH with no acute pathology. CT chest with diffuse b/l GGO. ICU consulted for further management. He has been weak and not eating since discharge from [place] for unclear reasons.  He was normal with moving all his extremities and talking and moving with no issues just weak and had no apatite  (11 Sep 2022 21:45)    Met with wife Funmilayo Carrasquillo and discussed goals of care and advanced care planning. MOLST Form explained and signed DNR/DNI Comfort measures only and No tube feeding. family wanted to continue comfort care and hospice evaluation.

## 2022-10-04 VITALS
SYSTOLIC BLOOD PRESSURE: 123 MMHG | DIASTOLIC BLOOD PRESSURE: 86 MMHG | TEMPERATURE: 98 F | OXYGEN SATURATION: 94 % | RESPIRATION RATE: 18 BRPM

## 2022-10-04 LAB
FLUAV AG NPH QL: SIGNIFICANT CHANGE UP
FLUBV AG NPH QL: SIGNIFICANT CHANGE UP
GLUCOSE BLDC GLUCOMTR-MCNC: 133 MG/DL — HIGH (ref 70–99)
GLUCOSE BLDC GLUCOMTR-MCNC: 143 MG/DL — HIGH (ref 70–99)
GLUCOSE BLDC GLUCOMTR-MCNC: 157 MG/DL — HIGH (ref 70–99)
GLUCOSE BLDC GLUCOMTR-MCNC: 175 MG/DL — HIGH (ref 70–99)
SARS-COV-2 RNA SPEC QL NAA+PROBE: DETECTED

## 2022-10-04 PROCEDURE — 99239 HOSP IP/OBS DSCHRG MGMT >30: CPT

## 2022-10-04 RX ADMIN — Medication 2: at 06:03

## 2022-10-04 RX ADMIN — HEPARIN SODIUM 5000 UNIT(S): 5000 INJECTION INTRAVENOUS; SUBCUTANEOUS at 06:04

## 2022-10-04 RX ADMIN — Medication 2: at 12:02

## 2022-10-04 RX ADMIN — CHLORHEXIDINE GLUCONATE 1 APPLICATION(S): 213 SOLUTION TOPICAL at 06:04

## 2022-10-04 NOTE — PROGRESS NOTE ADULT - ASSESSMENT
80yo M, PMH dementia, htn, TEVAR, PPM for syncopal bradycardia, ? seizures?and CKD (baseline Cr appears to be ~1.8?), on Eliquis (unclear why), BIBEMS to ED after being found unresponsive at home. On EMS arrival pt obtunded and not breathing.     9/28 - family meeting over the phone was held with the patient's HCP.  New diagnosis of thalamic stroke has been been discussed with her. The patient's overall poor prognosis has been discussed as well.  The patient's inability to perform executive functions, to care for himself, including his absent swallow function have been discussed.  Right now the family would want the patient to be made comfortable.  They do not wish for invasive procedures, PEG, or other interventions.  MOLST to be filled out today with the patient's wife.  Likely to be made dnr/dni with comfort measures only.  Family would like the patient to be d/c to LTC with hospice services     10/4 - discharged today to hospice facility    Problem/Plan - 1:  ·  Problem: Metabolic encephalopathy.   ·  Plan: - still requiring NG tube   - on the ddx is infection vs cva ( cant get MRI), vs vs vasculitis vs anoxic brain injury ( pt intubated in the field)   - eeg x2  shows no seizure  - CT head x2 shows no cva   - s/p  LP to rule infectious etiology. will include nmda receptor ab, west nile IgM     9/22 eeg shows no seizure activity. will consider MRI if I can get transfer to [facility] due to pacemaker as brain stem cva still possible   9/23 normal cell count on LP but has elevated protein and abd glucose with is nonspecific. covid? encepahlitis. will check esr/crp and if negative can make the diagnosis of cva more likely. will try to get MRI of the brain at [facility] as pacemaker seems to be compatible   9/24  no change in mental status. doubt bacterial meningitis here. ESR and crp very elevated making cva less likely but still needs to me ruled out. WIll need to send to [facility] to get mri and MRA  to [facility] for any signs of vasculitis.. covid encephalopathy?    9/25 febrile today and slight drop in BP. started empiric abx for now. will try to descalate when possible.  CXR unemarkable by my read but still could be microaspiration.  will hold feed for now. cxs takes. Plan is if patient remains stable is to call the transfer center to have the patient shuttled  to [facility] for MRI due to pacemaker. Will need to be in  coordination of mri supervisor Moise Allen ( hes aware)  and EP doctor.  WIll try to get MRI and MRA. If possible, it would be best to have the read done whiles hes at [facility] because if he has any signs of vasculitis on imaging it may be best for him to be treated at a tertiary center.     9/26 - afebrile x 24 hours, blood and urine cultures are pending, check MRSA/MSSA PCR, Vanco/Zosyn given.  Will dose Vanco by level for now CrCl  - plan to d/c abx if cultures are negative   - high-suspicion for west nile virus vs. Vasculitis  -  west nile serology pending   - discussed case with Dr. Loyola at [facility] to coordinate MRI/MRA head.    9/27 - MRI/MRA done [facility] MRN#: [id number].   - acute/subacute lacunar infarct within the left corona radiata with associated cytotoxic edema. ? normal pressure hydrocephalus  - plan for family conversation regarding GOC  - Viral PCR negative  - IVABX to be deescalated today    10/1 - 10/3 - d.c planning for hospice off abx    Problem/Plan - 2:  ·  Problem: 2019 novel coronavirus disease (COVID-19).   ·  Plan: - s/p remdesivir  - supportive care.    Problem/Plan - 3:  ·  Problem: Hypernatremia.   ·  Plan: - improved with d5   - continue to monitor bmp daily.    Problem/Plan - 4:  ·  Problem: Stage 5 chronic kidney disease not on chronic dialysis.   ·  Plan: - required several HD treatments  - now off HD  - gentle IVF  - 9/27 - off IVF

## 2022-10-04 NOTE — PROGRESS NOTE ADULT - PROVIDER SPECIALTY LIST ADULT
Critical Care
Hospitalist
Infectious Disease
Nephrology
Critical Care
Critical Care
Hospitalist
Infectious Disease
Infectious Disease
MICU
Nephrology
Neurology
Neurology
Critical Care
Critical Care
Hospitalist
Infectious Disease
MICU
Nephrology
Hospitalist
Infectious Disease
Critical Care
Critical Care
Infectious Disease

## 2022-10-04 NOTE — PROGRESS NOTE ADULT - SUBJECTIVE AND OBJECTIVE BOX
Patient is a 81y old  Male who presents with a chief complaint of Encephalopathy, acute respiratory failure (03 Oct 2022 15:14)    HPI:  82yo M, PMH dementia, htn, TEVAR, PPM for syncopal bradycardia, ? sizures?and CKD (baseline Cr appears to be ~1.8?), on Eliquis (unclear why), BIBEMS to ED after being found unresponsive at home. On EMS arrival pt obtunded and not breathing. Pt intubated in the field. On arrival in ED, pt found to be Covid+. CTH with no acute pathology. CT chest with diffuse b/l GGO. ICU consulted for further management.      He has been weak and not eating since discharge from [place] for unclear reasons.  He was normal with moving all his extremities and talking and moving with no issues just weak and had no apatite  (11 Sep 2022 21:45)    SUBJECTIVE & OBJECTIVE: Pt seen and examined at bedside.   PHYSICAL EXAM:  T(C): 36.7 (10-04-22 @ 12:04), Max: 37.7 (10-03-22 @ 17:01)  HR: 70 (10-04-22 @ 12:04) (70 - 109)  BP: 107/66 (10-04-22 @ 12:04) (99/65 - 116/77)  RR: 16 (10-04-22 @ 12:04) (16 - 19)  SpO2: 99% (10-04-22 @ 12:04) (94% - 99%) Daily     Daily I&O's Detail    03 Oct 2022 07:01  -  04 Oct 2022 07:00  --------------------------------------------------------  IN:    Free Water: 1000 mL    Nepro with Carb Steady: 880 mL    Oral Fluid: 300 mL  Total IN: 2180 mL    OUT:    Voided (mL): 1150 mL  Total OUT: 1150 mL    Total NET: 1030 mL        GENERAL: obtunded  HEAD:  Atraumatic, Normocephalic  EYES: EOMI, PERRLA, conjunctiva and sclera clear  ENMT: Moist mucous membranes  NECK: Supple, No JVD  NERVOUS SYSTEM:  obtunded, Motor Strength unable to be assessed   CHEST/LUNG: Clear to auscultation bilaterally; No rales, rhonchi, wheezing, or rubs  HEART: Regular rate and rhythm; No murmurs, rubs, or gallops  ABDOMEN: Soft, Nontender, Nondistended; Bowel sounds present  EXTREMITIES:  2+ Peripheral Pulses, No clubbing, cyanosis, or edema  LABS:  CAPILLARY BLOOD GLUCOSE      POCT Blood Glucose.: 157 mg/dL (04 Oct 2022 11:14)  POCT Blood Glucose.: 175 mg/dL (04 Oct 2022 06:01)  POCT Blood Glucose.: 143 mg/dL (04 Oct 2022 00:25)  POCT Blood Glucose.: 161 mg/dL (03 Oct 2022 18:50)    RECENT CULTURES:   RADIOLOGY & ADDITIONAL TESTS:

## 2022-10-04 NOTE — PROGRESS NOTE ADULT - REASON FOR ADMISSION
Encephalopathy, acute respiratory failure

## 2022-10-22 LAB
CULTURE RESULTS: SIGNIFICANT CHANGE UP
SPECIMEN SOURCE: SIGNIFICANT CHANGE UP

## 2022-11-12 LAB
CULTURE RESULTS: SIGNIFICANT CHANGE UP
NIGHT BLUE STAIN TISS: SIGNIFICANT CHANGE UP
SPECIMEN SOURCE: SIGNIFICANT CHANGE UP

## 2025-05-11 NOTE — PROCEDURE NOTE - NSINDICATIONS_GEN_A_CORE
NANCIE without obvious abscess at this time. Discussed with pt. May develop abscess over time that would need IR drainage. Not currently necessary.   May need repeat imaging - clinically improving, no new imaging indicated at this time  Follow urine cultures - E coli ESBL, treat x 2 weeks  Rest of care per Hospital Medicine.   monitoring purposes